# Patient Record
Sex: MALE | Race: WHITE | Employment: OTHER
[De-identification: names, ages, dates, MRNs, and addresses within clinical notes are randomized per-mention and may not be internally consistent; named-entity substitution may affect disease eponyms.]

---

## 2017-01-05 ENCOUNTER — CARE COORDINATION (OUTPATIENT)
Dept: CARE COORDINATION | Facility: CLINIC | Age: 57
End: 2017-01-05

## 2017-02-04 ENCOUNTER — HOSPITAL ENCOUNTER (EMERGENCY)
Age: 57
Discharge: HOME | End: 2017-02-04
Attending: EMERGENCY MEDICINE
Payer: MEDICARE

## 2017-02-04 VITALS
DIASTOLIC BLOOD PRESSURE: 75 MMHG | OXYGEN SATURATION: 96 % | SYSTOLIC BLOOD PRESSURE: 140 MMHG | TEMPERATURE: 100.9 F | WEIGHT: 189 LBS | RESPIRATION RATE: 15 BRPM | BODY MASS INDEX: 28.32 KG/M2 | HEART RATE: 134 BPM

## 2017-02-04 DIAGNOSIS — L03.119 CELLULITIS OF LOWER EXTREMITY, UNSPECIFIED LATERALITY: ICD-10-CM

## 2017-02-04 DIAGNOSIS — T78.40XA ALLERGIC REACTION, INITIAL ENCOUNTER: Primary | ICD-10-CM

## 2017-02-04 PROCEDURE — 6370000000 HC RX 637 (ALT 250 FOR IP)

## 2017-02-04 PROCEDURE — 99283 EMERGENCY DEPT VISIT LOW MDM: CPT

## 2017-02-04 PROCEDURE — 96372 THER/PROPH/DIAG INJ SC/IM: CPT

## 2017-02-04 PROCEDURE — 6370000000 HC RX 637 (ALT 250 FOR IP): Performed by: EMERGENCY MEDICINE

## 2017-02-04 PROCEDURE — 6360000002 HC RX W HCPCS: Performed by: EMERGENCY MEDICINE

## 2017-02-04 RX ORDER — PREDNISONE 10 MG/1
TABLET ORAL
Qty: 30 TABLET | Refills: 0 | Status: SHIPPED | OUTPATIENT
Start: 2017-02-04 | End: 2017-02-14

## 2017-02-04 RX ORDER — DEXAMETHASONE SODIUM PHOSPHATE 10 MG/ML
10 INJECTION INTRAMUSCULAR; INTRAVENOUS ONCE
Status: COMPLETED | OUTPATIENT
Start: 2017-02-04 | End: 2017-02-04

## 2017-02-04 RX ORDER — DOXYCYCLINE HYCLATE 100 MG/1
100 CAPSULE ORAL 2 TIMES DAILY
Qty: 20 CAPSULE | Refills: 0 | Status: SHIPPED | OUTPATIENT
Start: 2017-02-04 | End: 2017-02-14

## 2017-02-04 RX ORDER — DOXYCYCLINE 100 MG/1
CAPSULE ORAL
Status: DISCONTINUED
Start: 2017-02-04 | End: 2017-02-04 | Stop reason: HOSPADM

## 2017-02-04 RX ORDER — OXYCODONE HYDROCHLORIDE AND ACETAMINOPHEN 5; 325 MG/1; MG/1
2 TABLET ORAL ONCE
Status: COMPLETED | OUTPATIENT
Start: 2017-02-04 | End: 2017-02-04

## 2017-02-04 RX ORDER — DOXYCYCLINE HYCLATE 100 MG
100 TABLET ORAL ONCE
Status: COMPLETED | OUTPATIENT
Start: 2017-02-04 | End: 2017-02-04

## 2017-02-04 RX ORDER — OXYCODONE HYDROCHLORIDE AND ACETAMINOPHEN 5; 325 MG/1; MG/1
1-2 TABLET ORAL EVERY 6 HOURS PRN
Qty: 20 TABLET | Refills: 0 | Status: SHIPPED | OUTPATIENT
Start: 2017-02-04 | End: 2017-02-11

## 2017-02-04 RX ADMIN — Medication 100 MG: at 08:29

## 2017-02-04 RX ADMIN — DEXAMETHASONE SODIUM PHOSPHATE 10 MG: 10 INJECTION INTRAMUSCULAR; INTRAVENOUS at 08:25

## 2017-02-04 RX ADMIN — OXYCODONE HYDROCHLORIDE AND ACETAMINOPHEN 2 TABLET: 5; 325 TABLET ORAL at 08:25

## 2017-02-04 ASSESSMENT — PAIN SCALES - GENERAL
PAINLEVEL_OUTOF10: 6
PAINLEVEL_OUTOF10: 9
PAINLEVEL_OUTOF10: 6
PAINLEVEL_OUTOF10: 7

## 2017-02-04 ASSESSMENT — ENCOUNTER SYMPTOMS
COLOR CHANGE: 0
BACK PAIN: 0
VOICE CHANGE: 0
VOMITING: 0
TROUBLE SWALLOWING: 0
SHORTNESS OF BREATH: 0
COUGH: 0
DIARRHEA: 0
ABDOMINAL PAIN: 0
NAUSEA: 0
RHINORRHEA: 0

## 2017-02-04 ASSESSMENT — PAIN DESCRIPTION - PAIN TYPE: TYPE: ACUTE PAIN

## 2017-02-06 ENCOUNTER — CARE COORDINATION (OUTPATIENT)
Dept: CARE COORDINATION | Facility: CLINIC | Age: 57
End: 2017-02-06

## 2017-03-02 RX ORDER — LANCETS 30 GAUGE
1 EACH MISCELLANEOUS 4 TIMES DAILY
Qty: 100 EACH | Refills: 0 | Status: SHIPPED | OUTPATIENT
Start: 2017-03-02 | End: 2017-10-02 | Stop reason: SDUPTHER

## 2017-03-02 RX ORDER — GLUCOSAMINE HCL/CHONDROITIN SU 500-400 MG
CAPSULE ORAL
Qty: 300 STRIP | Refills: 0 | Status: SHIPPED | OUTPATIENT
Start: 2017-03-02 | End: 2017-08-25 | Stop reason: SDUPTHER

## 2017-03-17 ENCOUNTER — OFFICE VISIT (OUTPATIENT)
Dept: FAMILY MEDICINE CLINIC | Age: 57
End: 2017-03-17
Payer: MEDICARE

## 2017-03-17 VITALS
HEART RATE: 84 BPM | WEIGHT: 205 LBS | DIASTOLIC BLOOD PRESSURE: 100 MMHG | HEIGHT: 69 IN | OXYGEN SATURATION: 97 % | BODY MASS INDEX: 30.36 KG/M2 | SYSTOLIC BLOOD PRESSURE: 148 MMHG

## 2017-03-17 DIAGNOSIS — I10 ESSENTIAL HYPERTENSION: ICD-10-CM

## 2017-03-17 DIAGNOSIS — Z79.4 TYPE 2 DIABETES MELLITUS WITHOUT COMPLICATION, WITH LONG-TERM CURRENT USE OF INSULIN (HCC): ICD-10-CM

## 2017-03-17 DIAGNOSIS — E11.9 TYPE 2 DIABETES MELLITUS WITHOUT COMPLICATION, WITH LONG-TERM CURRENT USE OF INSULIN (HCC): ICD-10-CM

## 2017-03-17 DIAGNOSIS — L40.9 PSORIASIS: Primary | ICD-10-CM

## 2017-03-17 PROCEDURE — G8427 DOCREV CUR MEDS BY ELIG CLIN: HCPCS | Performed by: FAMILY MEDICINE

## 2017-03-17 PROCEDURE — G8484 FLU IMMUNIZE NO ADMIN: HCPCS | Performed by: FAMILY MEDICINE

## 2017-03-17 PROCEDURE — 3017F COLORECTAL CA SCREEN DOC REV: CPT | Performed by: FAMILY MEDICINE

## 2017-03-17 PROCEDURE — G8417 CALC BMI ABV UP PARAM F/U: HCPCS | Performed by: FAMILY MEDICINE

## 2017-03-17 PROCEDURE — G8599 NO ASA/ANTIPLAT THER USE RNG: HCPCS | Performed by: FAMILY MEDICINE

## 2017-03-17 PROCEDURE — 99214 OFFICE O/P EST MOD 30 MIN: CPT | Performed by: FAMILY MEDICINE

## 2017-03-17 PROCEDURE — 3045F PR MOST RECENT HEMOGLOBIN A1C LEVEL 7.0-9.0%: CPT | Performed by: FAMILY MEDICINE

## 2017-03-17 PROCEDURE — 1036F TOBACCO NON-USER: CPT | Performed by: FAMILY MEDICINE

## 2017-03-17 RX ORDER — HALOBETASOL PROPIONATE 0.05 %
OINTMENT (GRAM) TOPICAL
COMMUNITY
Start: 2017-02-14 | End: 2017-08-25 | Stop reason: ALTCHOICE

## 2017-03-17 RX ORDER — METOPROLOL TARTRATE 50 MG/1
50 TABLET, FILM COATED ORAL 2 TIMES DAILY
Qty: 60 TABLET | Refills: 1 | Status: SHIPPED | OUTPATIENT
Start: 2017-03-17 | End: 2017-05-15 | Stop reason: SDUPTHER

## 2017-03-27 ENCOUNTER — CARE COORDINATION (OUTPATIENT)
Dept: CARE COORDINATION | Age: 57
End: 2017-03-27

## 2017-04-09 ASSESSMENT — ENCOUNTER SYMPTOMS: COLOR CHANGE: 1

## 2017-04-24 ENCOUNTER — OFFICE VISIT (OUTPATIENT)
Dept: FAMILY MEDICINE CLINIC | Age: 57
End: 2017-04-24
Payer: MEDICARE

## 2017-04-24 VITALS
BODY MASS INDEX: 30.36 KG/M2 | RESPIRATION RATE: 20 BRPM | WEIGHT: 205 LBS | TEMPERATURE: 97.8 F | DIASTOLIC BLOOD PRESSURE: 78 MMHG | OXYGEN SATURATION: 98 % | HEART RATE: 60 BPM | HEIGHT: 69 IN | SYSTOLIC BLOOD PRESSURE: 118 MMHG

## 2017-04-24 DIAGNOSIS — I10 ESSENTIAL HYPERTENSION: Primary | ICD-10-CM

## 2017-04-24 DIAGNOSIS — Z79.4 TYPE 2 DIABETES MELLITUS WITHOUT COMPLICATION, WITH LONG-TERM CURRENT USE OF INSULIN (HCC): ICD-10-CM

## 2017-04-24 DIAGNOSIS — E11.9 TYPE 2 DIABETES MELLITUS WITHOUT COMPLICATION, WITH LONG-TERM CURRENT USE OF INSULIN (HCC): ICD-10-CM

## 2017-04-24 DIAGNOSIS — L30.9 DERMATITIS: ICD-10-CM

## 2017-04-24 DIAGNOSIS — L40.9 PSORIASIS: ICD-10-CM

## 2017-04-24 PROCEDURE — G8427 DOCREV CUR MEDS BY ELIG CLIN: HCPCS | Performed by: FAMILY MEDICINE

## 2017-04-24 PROCEDURE — G8417 CALC BMI ABV UP PARAM F/U: HCPCS | Performed by: FAMILY MEDICINE

## 2017-04-24 PROCEDURE — 1036F TOBACCO NON-USER: CPT | Performed by: FAMILY MEDICINE

## 2017-04-24 PROCEDURE — 3045F PR MOST RECENT HEMOGLOBIN A1C LEVEL 7.0-9.0%: CPT | Performed by: FAMILY MEDICINE

## 2017-04-24 PROCEDURE — 3017F COLORECTAL CA SCREEN DOC REV: CPT | Performed by: FAMILY MEDICINE

## 2017-04-24 PROCEDURE — 99214 OFFICE O/P EST MOD 30 MIN: CPT | Performed by: FAMILY MEDICINE

## 2017-04-24 PROCEDURE — G8599 NO ASA/ANTIPLAT THER USE RNG: HCPCS | Performed by: FAMILY MEDICINE

## 2017-04-26 ENCOUNTER — OFFICE VISIT (OUTPATIENT)
Dept: PULMONOLOGY | Age: 57
End: 2017-04-26
Payer: MEDICARE

## 2017-04-26 VITALS
BODY MASS INDEX: 30.36 KG/M2 | HEART RATE: 60 BPM | SYSTOLIC BLOOD PRESSURE: 140 MMHG | RESPIRATION RATE: 20 BRPM | OXYGEN SATURATION: 98 % | WEIGHT: 205 LBS | HEIGHT: 69 IN | DIASTOLIC BLOOD PRESSURE: 90 MMHG

## 2017-04-26 DIAGNOSIS — Z98.890 S/P THORACOTOMY: ICD-10-CM

## 2017-04-26 DIAGNOSIS — J43.9 GIANT BULLOUS EMPHYSEMA (HCC): Primary | ICD-10-CM

## 2017-04-26 DIAGNOSIS — L40.50 PSORIASIS WITH ARTHROPATHY (HCC): ICD-10-CM

## 2017-04-26 DIAGNOSIS — R68.89 CUSHINGOID FACIES: ICD-10-CM

## 2017-04-26 PROCEDURE — G8417 CALC BMI ABV UP PARAM F/U: HCPCS | Performed by: INTERNAL MEDICINE

## 2017-04-26 PROCEDURE — G8926 SPIRO NO PERF OR DOC: HCPCS | Performed by: INTERNAL MEDICINE

## 2017-04-26 PROCEDURE — 99214 OFFICE O/P EST MOD 30 MIN: CPT | Performed by: INTERNAL MEDICINE

## 2017-04-26 PROCEDURE — G8599 NO ASA/ANTIPLAT THER USE RNG: HCPCS | Performed by: INTERNAL MEDICINE

## 2017-04-26 PROCEDURE — 3017F COLORECTAL CA SCREEN DOC REV: CPT | Performed by: INTERNAL MEDICINE

## 2017-04-26 PROCEDURE — 1036F TOBACCO NON-USER: CPT | Performed by: INTERNAL MEDICINE

## 2017-04-26 PROCEDURE — 3023F SPIROM DOC REV: CPT | Performed by: INTERNAL MEDICINE

## 2017-04-26 PROCEDURE — G8427 DOCREV CUR MEDS BY ELIG CLIN: HCPCS | Performed by: INTERNAL MEDICINE

## 2017-04-28 ENCOUNTER — TELEPHONE (OUTPATIENT)
Dept: FAMILY MEDICINE CLINIC | Age: 57
End: 2017-04-28

## 2017-05-06 ENCOUNTER — HOSPITAL ENCOUNTER (EMERGENCY)
Age: 57
Discharge: HOME | End: 2017-05-06
Attending: SPECIALIST
Payer: MEDICARE

## 2017-05-06 VITALS
WEIGHT: 205 LBS | OXYGEN SATURATION: 93 % | TEMPERATURE: 97.7 F | RESPIRATION RATE: 18 BRPM | HEART RATE: 92 BPM | SYSTOLIC BLOOD PRESSURE: 172 MMHG | DIASTOLIC BLOOD PRESSURE: 100 MMHG | HEIGHT: 69 IN | BODY MASS INDEX: 30.36 KG/M2

## 2017-05-06 DIAGNOSIS — L23.9 ALLERGIC DERMATITIS: Primary | ICD-10-CM

## 2017-05-06 PROCEDURE — 6370000000 HC RX 637 (ALT 250 FOR IP): Performed by: SPECIALIST

## 2017-05-06 PROCEDURE — 99282 EMERGENCY DEPT VISIT SF MDM: CPT

## 2017-05-06 RX ORDER — PREDNISONE 10 MG/1
TABLET ORAL
Qty: 20 TABLET | Refills: 0 | Status: SHIPPED | OUTPATIENT
Start: 2017-05-06 | End: 2017-05-16

## 2017-05-06 RX ORDER — PREDNISONE 20 MG/1
60 TABLET ORAL ONCE
Status: COMPLETED | OUTPATIENT
Start: 2017-05-06 | End: 2017-05-06

## 2017-05-06 RX ORDER — FAMOTIDINE 20 MG/1
20 TABLET, FILM COATED ORAL 2 TIMES DAILY
Qty: 10 TABLET | Refills: 0 | Status: SHIPPED | OUTPATIENT
Start: 2017-05-06 | End: 2017-05-09 | Stop reason: SDUPTHER

## 2017-05-06 RX ORDER — FAMOTIDINE 20 MG/1
20 TABLET, FILM COATED ORAL ONCE
Status: COMPLETED | OUTPATIENT
Start: 2017-05-06 | End: 2017-05-06

## 2017-05-06 RX ADMIN — FAMOTIDINE 20 MG: 20 TABLET ORAL at 20:20

## 2017-05-06 RX ADMIN — PREDNISONE 60 MG: 20 TABLET ORAL at 20:20

## 2017-05-06 ASSESSMENT — PAIN DESCRIPTION - PAIN TYPE: TYPE: CHRONIC PAIN

## 2017-05-06 ASSESSMENT — ENCOUNTER SYMPTOMS
SHORTNESS OF BREATH: 0
COLOR CHANGE: 1
WHEEZING: 0

## 2017-05-06 ASSESSMENT — PAIN SCALES - GENERAL: PAINLEVEL_OUTOF10: 7

## 2017-05-08 ENCOUNTER — CARE COORDINATION (OUTPATIENT)
Dept: CARE COORDINATION | Age: 57
End: 2017-05-08

## 2017-05-09 ENCOUNTER — OFFICE VISIT (OUTPATIENT)
Dept: FAMILY MEDICINE CLINIC | Age: 57
End: 2017-05-09
Payer: MEDICARE

## 2017-05-09 VITALS
BODY MASS INDEX: 30.36 KG/M2 | OXYGEN SATURATION: 97 % | HEART RATE: 79 BPM | SYSTOLIC BLOOD PRESSURE: 166 MMHG | DIASTOLIC BLOOD PRESSURE: 102 MMHG | HEIGHT: 69 IN | WEIGHT: 205 LBS

## 2017-05-09 DIAGNOSIS — T50.905A HYPERGLYCEMIA, DRUG-INDUCED: ICD-10-CM

## 2017-05-09 DIAGNOSIS — R73.9 HYPERGLYCEMIA, DRUG-INDUCED: ICD-10-CM

## 2017-05-09 DIAGNOSIS — I10 ESSENTIAL HYPERTENSION: ICD-10-CM

## 2017-05-09 DIAGNOSIS — L30.9 CHRONIC DERMATITIS: Primary | ICD-10-CM

## 2017-05-09 PROCEDURE — 99213 OFFICE O/P EST LOW 20 MIN: CPT | Performed by: FAMILY MEDICINE

## 2017-05-09 PROCEDURE — G8417 CALC BMI ABV UP PARAM F/U: HCPCS | Performed by: FAMILY MEDICINE

## 2017-05-09 PROCEDURE — G8599 NO ASA/ANTIPLAT THER USE RNG: HCPCS | Performed by: FAMILY MEDICINE

## 2017-05-09 PROCEDURE — G8427 DOCREV CUR MEDS BY ELIG CLIN: HCPCS | Performed by: FAMILY MEDICINE

## 2017-05-09 PROCEDURE — 1036F TOBACCO NON-USER: CPT | Performed by: FAMILY MEDICINE

## 2017-05-09 PROCEDURE — 3017F COLORECTAL CA SCREEN DOC REV: CPT | Performed by: FAMILY MEDICINE

## 2017-05-10 RX ORDER — FAMOTIDINE 20 MG/1
20 TABLET, FILM COATED ORAL 2 TIMES DAILY
Qty: 60 TABLET | Refills: 0 | Status: SHIPPED | OUTPATIENT
Start: 2017-05-10 | End: 2017-06-09 | Stop reason: SDUPTHER

## 2017-05-15 ENCOUNTER — HOSPITAL ENCOUNTER (EMERGENCY)
Age: 57
Discharge: HOME | End: 2017-05-15
Attending: EMERGENCY MEDICINE
Payer: MEDICARE

## 2017-05-15 VITALS
HEIGHT: 68 IN | OXYGEN SATURATION: 95 % | BODY MASS INDEX: 31.07 KG/M2 | HEART RATE: 90 BPM | SYSTOLIC BLOOD PRESSURE: 155 MMHG | TEMPERATURE: 96.1 F | RESPIRATION RATE: 14 BRPM | DIASTOLIC BLOOD PRESSURE: 66 MMHG | WEIGHT: 205 LBS

## 2017-05-15 DIAGNOSIS — R33.9 URINARY RETENTION: Primary | ICD-10-CM

## 2017-05-15 DIAGNOSIS — I10 ESSENTIAL HYPERTENSION: ICD-10-CM

## 2017-05-15 DIAGNOSIS — L50.8 RECURRENT URTICARIA: ICD-10-CM

## 2017-05-15 LAB
-: NORMAL
AMORPHOUS: NORMAL
BACTERIA: NORMAL
BILIRUBIN URINE: NEGATIVE
CASTS UA: NORMAL /LPF (ref 0–2)
COLOR: ABNORMAL
COMMENT UA: ABNORMAL
CRYSTALS, UA: NORMAL /HPF
EPITHELIAL CELLS UA: NORMAL /HPF (ref 0–5)
GLUCOSE URINE: NEGATIVE
KETONES, URINE: NEGATIVE
LEUKOCYTE ESTERASE, URINE: NEGATIVE
MUCUS: NORMAL
NITRITE, URINE: NEGATIVE
OTHER OBSERVATIONS UA: NORMAL
PH UA: 6 (ref 5–6)
PROTEIN UA: NEGATIVE
RBC UA: NORMAL /HPF (ref 0–4)
RENAL EPITHELIAL, UA: NORMAL /HPF
SPECIFIC GRAVITY UA: 1 (ref 1.01–1.02)
TRICHOMONAS: NORMAL
TURBIDITY: ABNORMAL
URINE HGB: NEGATIVE
UROBILINOGEN, URINE: NORMAL
WBC UA: NORMAL /HPF (ref 0–4)
YEAST: NORMAL

## 2017-05-15 PROCEDURE — 51702 INSERT TEMP BLADDER CATH: CPT

## 2017-05-15 PROCEDURE — 99283 EMERGENCY DEPT VISIT LOW MDM: CPT

## 2017-05-15 PROCEDURE — 81001 URINALYSIS AUTO W/SCOPE: CPT

## 2017-05-15 RX ORDER — METOPROLOL TARTRATE 50 MG/1
50 TABLET, FILM COATED ORAL 2 TIMES DAILY
Qty: 60 TABLET | Refills: 5 | Status: SHIPPED | OUTPATIENT
Start: 2017-05-15 | End: 2017-06-30

## 2017-05-15 ASSESSMENT — PAIN DESCRIPTION - PROGRESSION: CLINICAL_PROGRESSION: GRADUALLY WORSENING

## 2017-05-15 ASSESSMENT — PAIN SCALES - GENERAL
PAINLEVEL_OUTOF10: 7
PAINLEVEL_OUTOF10: 3

## 2017-05-15 ASSESSMENT — PAIN DESCRIPTION - LOCATION: LOCATION: PELVIS

## 2017-05-15 ASSESSMENT — PAIN DESCRIPTION - ONSET: ONSET: ON-GOING

## 2017-05-15 ASSESSMENT — PAIN DESCRIPTION - FREQUENCY: FREQUENCY: CONTINUOUS

## 2017-05-15 ASSESSMENT — PAIN - FUNCTIONAL ASSESSMENT: PAIN_FUNCTIONAL_ASSESSMENT: 0-10

## 2017-05-16 ENCOUNTER — HOSPITAL ENCOUNTER (EMERGENCY)
Age: 57
Discharge: HOME | End: 2017-05-16
Attending: EMERGENCY MEDICINE
Payer: MEDICARE

## 2017-05-16 VITALS
HEART RATE: 70 BPM | BODY MASS INDEX: 31.07 KG/M2 | SYSTOLIC BLOOD PRESSURE: 177 MMHG | RESPIRATION RATE: 14 BRPM | OXYGEN SATURATION: 97 % | HEIGHT: 68 IN | DIASTOLIC BLOOD PRESSURE: 105 MMHG | WEIGHT: 205 LBS | TEMPERATURE: 98.2 F

## 2017-05-16 DIAGNOSIS — Z46.6 ENCOUNTER FOR FOLEY CATHETER REMOVAL: Primary | ICD-10-CM

## 2017-05-16 PROCEDURE — 99283 EMERGENCY DEPT VISIT LOW MDM: CPT

## 2017-05-16 ASSESSMENT — ENCOUNTER SYMPTOMS
EYES NEGATIVE: 1
GASTROINTESTINAL NEGATIVE: 1
RESPIRATORY NEGATIVE: 1

## 2017-05-17 ENCOUNTER — TELEPHONE (OUTPATIENT)
Dept: FAMILY MEDICINE CLINIC | Age: 57
End: 2017-05-17

## 2017-05-21 ENCOUNTER — HOSPITAL ENCOUNTER (EMERGENCY)
Age: 57
Discharge: HOME | End: 2017-05-21
Attending: EMERGENCY MEDICINE
Payer: MEDICARE

## 2017-05-21 VITALS
RESPIRATION RATE: 14 BRPM | DIASTOLIC BLOOD PRESSURE: 69 MMHG | BODY MASS INDEX: 30.41 KG/M2 | SYSTOLIC BLOOD PRESSURE: 104 MMHG | WEIGHT: 200 LBS | OXYGEN SATURATION: 98 % | HEART RATE: 88 BPM | TEMPERATURE: 98.8 F

## 2017-05-21 DIAGNOSIS — T78.40XA ALLERGIC REACTION, INITIAL ENCOUNTER: Primary | ICD-10-CM

## 2017-05-21 PROCEDURE — 6370000000 HC RX 637 (ALT 250 FOR IP): Performed by: EMERGENCY MEDICINE

## 2017-05-21 PROCEDURE — 99282 EMERGENCY DEPT VISIT SF MDM: CPT

## 2017-05-21 RX ORDER — PREDNISONE 20 MG/1
60 TABLET ORAL ONCE
Status: COMPLETED | OUTPATIENT
Start: 2017-05-21 | End: 2017-05-21

## 2017-05-21 RX ORDER — PREDNISONE 20 MG/1
TABLET ORAL
Qty: 25 TABLET | Refills: 0 | Status: SHIPPED | OUTPATIENT
Start: 2017-05-21 | End: 2017-05-31

## 2017-05-21 RX ADMIN — PREDNISONE 60 MG: 20 TABLET ORAL at 13:10

## 2017-05-21 ASSESSMENT — ENCOUNTER SYMPTOMS
TROUBLE SWALLOWING: 0
COLOR CHANGE: 0
SHORTNESS OF BREATH: 0
COUGH: 0
RHINORRHEA: 0
BACK PAIN: 0
DIARRHEA: 0
VOICE CHANGE: 0
VOMITING: 0
ABDOMINAL PAIN: 0
NAUSEA: 0

## 2017-05-21 ASSESSMENT — PAIN SCALES - GENERAL
PAINLEVEL_OUTOF10: 8
PAINLEVEL_OUTOF10: 9

## 2017-05-21 ASSESSMENT — PAIN DESCRIPTION - ONSET: ONSET: ON-GOING

## 2017-05-21 ASSESSMENT — PAIN DESCRIPTION - PROGRESSION: CLINICAL_PROGRESSION: NOT CHANGED

## 2017-05-21 ASSESSMENT — PAIN DESCRIPTION - DESCRIPTORS: DESCRIPTORS: ACHING

## 2017-05-21 ASSESSMENT — PAIN DESCRIPTION - FREQUENCY: FREQUENCY: CONTINUOUS

## 2017-05-21 ASSESSMENT — PAIN DESCRIPTION - LOCATION: LOCATION: GENERALIZED

## 2017-05-21 ASSESSMENT — PAIN DESCRIPTION - PAIN TYPE: TYPE: ACUTE PAIN

## 2017-05-21 ASSESSMENT — PAIN DESCRIPTION - ORIENTATION: ORIENTATION: OTHER (COMMENT)

## 2017-05-22 ENCOUNTER — CARE COORDINATION (OUTPATIENT)
Dept: CARE COORDINATION | Age: 57
End: 2017-05-22

## 2017-05-23 ENCOUNTER — OFFICE VISIT (OUTPATIENT)
Dept: FAMILY MEDICINE CLINIC | Age: 57
End: 2017-05-23
Payer: MEDICARE

## 2017-05-23 VITALS
HEIGHT: 68 IN | DIASTOLIC BLOOD PRESSURE: 80 MMHG | HEART RATE: 84 BPM | SYSTOLIC BLOOD PRESSURE: 122 MMHG | WEIGHT: 200 LBS | BODY MASS INDEX: 30.31 KG/M2

## 2017-05-23 DIAGNOSIS — L40.9 PSORIASIS: ICD-10-CM

## 2017-05-23 DIAGNOSIS — I10 ESSENTIAL HYPERTENSION: ICD-10-CM

## 2017-05-23 DIAGNOSIS — L30.9 CHRONIC DERMATITIS: Primary | ICD-10-CM

## 2017-05-23 PROCEDURE — G8599 NO ASA/ANTIPLAT THER USE RNG: HCPCS | Performed by: FAMILY MEDICINE

## 2017-05-23 PROCEDURE — 3017F COLORECTAL CA SCREEN DOC REV: CPT | Performed by: FAMILY MEDICINE

## 2017-05-23 PROCEDURE — G8417 CALC BMI ABV UP PARAM F/U: HCPCS | Performed by: FAMILY MEDICINE

## 2017-05-23 PROCEDURE — 3046F HEMOGLOBIN A1C LEVEL >9.0%: CPT | Performed by: FAMILY MEDICINE

## 2017-05-23 PROCEDURE — G8427 DOCREV CUR MEDS BY ELIG CLIN: HCPCS | Performed by: FAMILY MEDICINE

## 2017-05-23 PROCEDURE — 1036F TOBACCO NON-USER: CPT | Performed by: FAMILY MEDICINE

## 2017-05-23 PROCEDURE — 99213 OFFICE O/P EST LOW 20 MIN: CPT | Performed by: FAMILY MEDICINE

## 2017-06-01 ENCOUNTER — TELEPHONE (OUTPATIENT)
Dept: FAMILY MEDICINE CLINIC | Age: 57
End: 2017-06-01

## 2017-06-01 DIAGNOSIS — L30.9 CHRONIC DERMATITIS: Primary | ICD-10-CM

## 2017-06-01 RX ORDER — PREDNISONE 10 MG/1
TABLET ORAL
Qty: 55 TABLET | Refills: 0 | Status: SHIPPED | OUTPATIENT
Start: 2017-06-01 | End: 2017-06-14 | Stop reason: DRUGHIGH

## 2017-06-09 RX ORDER — FAMOTIDINE 20 MG/1
TABLET, FILM COATED ORAL
Qty: 60 TABLET | Refills: 5 | Status: SHIPPED | OUTPATIENT
Start: 2017-06-09 | End: 2017-06-14 | Stop reason: ALTCHOICE

## 2017-06-09 ASSESSMENT — ENCOUNTER SYMPTOMS: COLOR CHANGE: 1

## 2017-06-14 ENCOUNTER — OFFICE VISIT (OUTPATIENT)
Dept: FAMILY MEDICINE CLINIC | Age: 57
End: 2017-06-14
Payer: MEDICARE

## 2017-06-14 VITALS
BODY MASS INDEX: 31.4 KG/M2 | HEIGHT: 68 IN | WEIGHT: 207.2 LBS | SYSTOLIC BLOOD PRESSURE: 144 MMHG | HEART RATE: 71 BPM | RESPIRATION RATE: 12 BRPM | OXYGEN SATURATION: 98 % | DIASTOLIC BLOOD PRESSURE: 88 MMHG | TEMPERATURE: 98.4 F

## 2017-06-14 DIAGNOSIS — R21 RASH AND NONSPECIFIC SKIN ERUPTION: ICD-10-CM

## 2017-06-14 DIAGNOSIS — R21 RASH: Primary | ICD-10-CM

## 2017-06-14 DIAGNOSIS — T78.40XD ALLERGIC REACTION, SUBSEQUENT ENCOUNTER: Primary | ICD-10-CM

## 2017-06-14 PROCEDURE — G8427 DOCREV CUR MEDS BY ELIG CLIN: HCPCS | Performed by: NURSE PRACTITIONER

## 2017-06-14 PROCEDURE — 99215 OFFICE O/P EST HI 40 MIN: CPT | Performed by: NURSE PRACTITIONER

## 2017-06-14 PROCEDURE — 1036F TOBACCO NON-USER: CPT | Performed by: NURSE PRACTITIONER

## 2017-06-14 PROCEDURE — G8417 CALC BMI ABV UP PARAM F/U: HCPCS | Performed by: NURSE PRACTITIONER

## 2017-06-14 PROCEDURE — G8599 NO ASA/ANTIPLAT THER USE RNG: HCPCS | Performed by: NURSE PRACTITIONER

## 2017-06-14 PROCEDURE — 3017F COLORECTAL CA SCREEN DOC REV: CPT | Performed by: NURSE PRACTITIONER

## 2017-06-14 RX ORDER — PREDNISONE 50 MG/1
50 TABLET ORAL DAILY
Qty: 30 TABLET | Refills: 1 | Status: SHIPPED | OUTPATIENT
Start: 2017-06-14 | End: 2017-06-20

## 2017-06-14 RX ORDER — RANITIDINE 150 MG/1
150 TABLET ORAL 2 TIMES DAILY
Qty: 60 TABLET | Refills: 3 | Status: SHIPPED | OUTPATIENT
Start: 2017-06-14 | End: 2017-10-10 | Stop reason: SDUPTHER

## 2017-06-14 ASSESSMENT — ENCOUNTER SYMPTOMS
CONSTIPATION: 0
NAUSEA: 0
TROUBLE SWALLOWING: 0
RESPIRATORY NEGATIVE: 1
COUGH: 0
EYES NEGATIVE: 1
CHEST TIGHTNESS: 0
ABDOMINAL PAIN: 0
SHORTNESS OF BREATH: 0
GASTROINTESTINAL NEGATIVE: 1
SINUS PRESSURE: 0
DIARRHEA: 0
ALLERGIC/IMMUNOLOGIC NEGATIVE: 1
VOMITING: 0

## 2017-06-20 ENCOUNTER — OFFICE VISIT (OUTPATIENT)
Dept: FAMILY MEDICINE CLINIC | Age: 57
End: 2017-06-20
Payer: MEDICARE

## 2017-06-20 VITALS
WEIGHT: 206 LBS | HEART RATE: 60 BPM | BODY MASS INDEX: 31.22 KG/M2 | HEIGHT: 68 IN | SYSTOLIC BLOOD PRESSURE: 176 MMHG | DIASTOLIC BLOOD PRESSURE: 98 MMHG

## 2017-06-20 DIAGNOSIS — L29.9 ITCHING: ICD-10-CM

## 2017-06-20 DIAGNOSIS — T38.0X5A STEROID SIDE EFFECTS, INITIAL ENCOUNTER: ICD-10-CM

## 2017-06-20 DIAGNOSIS — T78.40XA ALLERGIC REACTION CAUSED BY A DRUG: Primary | ICD-10-CM

## 2017-06-20 DIAGNOSIS — E55.9 VITAMIN D DEFICIENCY: ICD-10-CM

## 2017-06-20 PROCEDURE — 3017F COLORECTAL CA SCREEN DOC REV: CPT | Performed by: NURSE PRACTITIONER

## 2017-06-20 PROCEDURE — G8417 CALC BMI ABV UP PARAM F/U: HCPCS | Performed by: NURSE PRACTITIONER

## 2017-06-20 PROCEDURE — G8599 NO ASA/ANTIPLAT THER USE RNG: HCPCS | Performed by: NURSE PRACTITIONER

## 2017-06-20 PROCEDURE — 1036F TOBACCO NON-USER: CPT | Performed by: NURSE PRACTITIONER

## 2017-06-20 PROCEDURE — 99213 OFFICE O/P EST LOW 20 MIN: CPT | Performed by: NURSE PRACTITIONER

## 2017-06-20 PROCEDURE — G8427 DOCREV CUR MEDS BY ELIG CLIN: HCPCS | Performed by: NURSE PRACTITIONER

## 2017-06-20 RX ORDER — PREDNISONE 20 MG/1
TABLET ORAL
Qty: 60 TABLET | Refills: 1 | Status: SHIPPED | OUTPATIENT
Start: 2017-06-20 | End: 2017-09-18 | Stop reason: ALTCHOICE

## 2017-06-20 RX ORDER — WATER / MINERAL OIL / WHITE PETROLATUM 16 OZ
CREAM TOPICAL
Qty: 3 PACKAGE | Refills: 1 | Status: SHIPPED | OUTPATIENT
Start: 2017-06-20 | End: 2019-03-29

## 2017-06-20 RX ORDER — LEVOCETIRIZINE DIHYDROCHLORIDE 5 MG/1
5 TABLET, FILM COATED ORAL NIGHTLY
Qty: 30 TABLET | Refills: 1 | Status: SHIPPED | OUTPATIENT
Start: 2017-06-20 | End: 2017-08-15 | Stop reason: SDUPTHER

## 2017-06-20 RX ORDER — PREDNISONE 1 MG/1
TABLET ORAL
Qty: 30 TABLET | Refills: 1 | Status: SHIPPED | OUTPATIENT
Start: 2017-06-20 | End: 2017-10-31 | Stop reason: ALTCHOICE

## 2017-06-21 ENCOUNTER — CARE COORDINATION (OUTPATIENT)
Dept: CARE COORDINATION | Age: 57
End: 2017-06-21

## 2017-06-30 ENCOUNTER — OFFICE VISIT (OUTPATIENT)
Dept: FAMILY MEDICINE CLINIC | Age: 57
End: 2017-06-30
Payer: MEDICARE

## 2017-06-30 VITALS
SYSTOLIC BLOOD PRESSURE: 174 MMHG | BODY MASS INDEX: 30.66 KG/M2 | DIASTOLIC BLOOD PRESSURE: 96 MMHG | HEART RATE: 64 BPM | WEIGHT: 207 LBS | HEIGHT: 69 IN

## 2017-06-30 DIAGNOSIS — I10 ESSENTIAL HYPERTENSION: ICD-10-CM

## 2017-06-30 DIAGNOSIS — R21 RASH: Primary | ICD-10-CM

## 2017-06-30 DIAGNOSIS — L29.9 ITCHING: ICD-10-CM

## 2017-06-30 PROCEDURE — G8599 NO ASA/ANTIPLAT THER USE RNG: HCPCS | Performed by: NURSE PRACTITIONER

## 2017-06-30 PROCEDURE — 3017F COLORECTAL CA SCREEN DOC REV: CPT | Performed by: NURSE PRACTITIONER

## 2017-06-30 PROCEDURE — 99214 OFFICE O/P EST MOD 30 MIN: CPT | Performed by: NURSE PRACTITIONER

## 2017-06-30 PROCEDURE — G8417 CALC BMI ABV UP PARAM F/U: HCPCS | Performed by: NURSE PRACTITIONER

## 2017-06-30 PROCEDURE — G8427 DOCREV CUR MEDS BY ELIG CLIN: HCPCS | Performed by: NURSE PRACTITIONER

## 2017-06-30 PROCEDURE — 1036F TOBACCO NON-USER: CPT | Performed by: NURSE PRACTITIONER

## 2017-06-30 RX ORDER — BLOOD PRESSURE TEST KIT-MEDIUM
KIT MISCELLANEOUS
Qty: 1 DEVICE | Refills: 0 | Status: SHIPPED | OUTPATIENT
Start: 2017-06-30 | End: 2017-08-25 | Stop reason: ALTCHOICE

## 2017-06-30 RX ORDER — VERAPAMIL HYDROCHLORIDE 120 MG/1
120 CAPSULE, EXTENDED RELEASE ORAL NIGHTLY
Qty: 30 CAPSULE | Refills: 1 | Status: SHIPPED | OUTPATIENT
Start: 2017-06-30 | End: 2017-07-25

## 2017-06-30 ASSESSMENT — ENCOUNTER SYMPTOMS
EYE PAIN: 0
VOMITING: 0
SHORTNESS OF BREATH: 0
SORE THROAT: 0
RHINORRHEA: 0
DIARRHEA: 0
COUGH: 0
NAIL CHANGES: 0

## 2017-07-06 ENCOUNTER — TELEPHONE (OUTPATIENT)
Dept: FAMILY MEDICINE CLINIC | Age: 57
End: 2017-07-06

## 2017-07-25 ENCOUNTER — OFFICE VISIT (OUTPATIENT)
Dept: FAMILY MEDICINE CLINIC | Age: 57
End: 2017-07-25
Payer: MEDICARE

## 2017-07-25 VITALS
SYSTOLIC BLOOD PRESSURE: 142 MMHG | WEIGHT: 214 LBS | HEIGHT: 69 IN | RESPIRATION RATE: 20 BRPM | BODY MASS INDEX: 31.7 KG/M2 | DIASTOLIC BLOOD PRESSURE: 88 MMHG | HEART RATE: 60 BPM

## 2017-07-25 DIAGNOSIS — L29.9 ITCHING: ICD-10-CM

## 2017-07-25 DIAGNOSIS — T78.40XA ALLERGIC REACTION CAUSED BY A DRUG: ICD-10-CM

## 2017-07-25 DIAGNOSIS — R21 RASH: Primary | ICD-10-CM

## 2017-07-25 PROCEDURE — G8427 DOCREV CUR MEDS BY ELIG CLIN: HCPCS | Performed by: NURSE PRACTITIONER

## 2017-07-25 PROCEDURE — 3017F COLORECTAL CA SCREEN DOC REV: CPT | Performed by: NURSE PRACTITIONER

## 2017-07-25 PROCEDURE — 1036F TOBACCO NON-USER: CPT | Performed by: NURSE PRACTITIONER

## 2017-07-25 PROCEDURE — G8417 CALC BMI ABV UP PARAM F/U: HCPCS | Performed by: NURSE PRACTITIONER

## 2017-07-25 PROCEDURE — G8599 NO ASA/ANTIPLAT THER USE RNG: HCPCS | Performed by: NURSE PRACTITIONER

## 2017-07-25 PROCEDURE — 99212 OFFICE O/P EST SF 10 MIN: CPT | Performed by: NURSE PRACTITIONER

## 2017-07-25 RX ORDER — METOPROLOL TARTRATE 50 MG/1
50 TABLET, FILM COATED ORAL 2 TIMES DAILY
COMMUNITY
Start: 2017-07-15 | End: 2017-08-02 | Stop reason: DRUGHIGH

## 2017-07-25 ASSESSMENT — ENCOUNTER SYMPTOMS
EYE PAIN: 0
ROS SKIN COMMENTS: ITCHING
DIARRHEA: 0
RHINORRHEA: 0
VOMITING: 0
NAIL CHANGES: 0
SORE THROAT: 0
SHORTNESS OF BREATH: 0
COUGH: 0

## 2017-07-31 ENCOUNTER — TELEPHONE (OUTPATIENT)
Dept: FAMILY MEDICINE CLINIC | Age: 57
End: 2017-07-31

## 2017-07-31 DIAGNOSIS — I10 ESSENTIAL HYPERTENSION: Primary | ICD-10-CM

## 2017-07-31 RX ORDER — METOPROLOL TARTRATE 50 MG/1
TABLET, FILM COATED ORAL
Qty: 90 TABLET | Refills: 0 | Status: CANCELLED | OUTPATIENT
Start: 2017-07-31

## 2017-08-02 DIAGNOSIS — I10 ESSENTIAL HYPERTENSION: Primary | ICD-10-CM

## 2017-08-02 RX ORDER — METOPROLOL TARTRATE 50 MG/1
75 TABLET, FILM COATED ORAL 2 TIMES DAILY
Qty: 60 TABLET | Status: CANCELLED | OUTPATIENT
Start: 2017-08-02

## 2017-08-02 RX ORDER — METOPROLOL TARTRATE 50 MG/1
75 TABLET, FILM COATED ORAL 2 TIMES DAILY
Qty: 90 TABLET | Refills: 1 | Status: SHIPPED | OUTPATIENT
Start: 2017-08-02 | End: 2017-08-25 | Stop reason: DRUGHIGH

## 2017-08-07 ENCOUNTER — TELEPHONE (OUTPATIENT)
Dept: FAMILY MEDICINE CLINIC | Age: 57
End: 2017-08-07

## 2017-08-14 ENCOUNTER — TELEPHONE (OUTPATIENT)
Dept: FAMILY MEDICINE CLINIC | Age: 57
End: 2017-08-14

## 2017-08-14 DIAGNOSIS — E05.90 HYPERTHYROIDISM: Primary | ICD-10-CM

## 2017-08-15 DIAGNOSIS — T78.40XA ALLERGIC REACTION CAUSED BY A DRUG: ICD-10-CM

## 2017-08-16 ENCOUNTER — CARE COORDINATION (OUTPATIENT)
Dept: CARE COORDINATION | Age: 57
End: 2017-08-16

## 2017-08-17 RX ORDER — LEVOCETIRIZINE DIHYDROCHLORIDE 5 MG/1
TABLET, FILM COATED ORAL
Qty: 30 TABLET | Refills: 2 | Status: SHIPPED | OUTPATIENT
Start: 2017-08-17 | End: 2017-10-25 | Stop reason: ALTCHOICE

## 2017-08-22 ENCOUNTER — HOSPITAL ENCOUNTER (OUTPATIENT)
Dept: LAB | Age: 57
Discharge: HOME | End: 2017-08-22
Payer: MEDICARE

## 2017-08-22 DIAGNOSIS — E05.90 HYPERTHYROIDISM: ICD-10-CM

## 2017-08-22 LAB
T3 FREE: 2.46 PG/ML (ref 2.02–4.43)
THYROXINE, FREE: 1.49 NG/DL (ref 0.93–1.7)
TSH SERPL DL<=0.05 MIU/L-ACNC: 0.31 MIU/L (ref 0.3–5)

## 2017-08-22 PROCEDURE — 84439 ASSAY OF FREE THYROXINE: CPT

## 2017-08-22 PROCEDURE — 36415 COLL VENOUS BLD VENIPUNCTURE: CPT

## 2017-08-22 PROCEDURE — 84481 FREE ASSAY (FT-3): CPT

## 2017-08-22 PROCEDURE — 84443 ASSAY THYROID STIM HORMONE: CPT

## 2017-08-25 ENCOUNTER — CARE COORDINATOR VISIT (OUTPATIENT)
Dept: CARE COORDINATION | Age: 57
End: 2017-08-25

## 2017-08-25 ENCOUNTER — OFFICE VISIT (OUTPATIENT)
Dept: FAMILY MEDICINE CLINIC | Age: 57
End: 2017-08-25
Payer: MEDICARE

## 2017-08-25 VITALS
SYSTOLIC BLOOD PRESSURE: 150 MMHG | WEIGHT: 219 LBS | TEMPERATURE: 97.9 F | OXYGEN SATURATION: 93 % | BODY MASS INDEX: 32.81 KG/M2 | HEART RATE: 69 BPM | DIASTOLIC BLOOD PRESSURE: 110 MMHG

## 2017-08-25 DIAGNOSIS — E78.5 HYPERLIPIDEMIA, UNSPECIFIED HYPERLIPIDEMIA TYPE: ICD-10-CM

## 2017-08-25 DIAGNOSIS — I10 ESSENTIAL HYPERTENSION: Primary | ICD-10-CM

## 2017-08-25 DIAGNOSIS — E05.90 HYPERTHYROIDISM: ICD-10-CM

## 2017-08-25 PROCEDURE — 99214 OFFICE O/P EST MOD 30 MIN: CPT | Performed by: FAMILY MEDICINE

## 2017-08-25 PROCEDURE — G8427 DOCREV CUR MEDS BY ELIG CLIN: HCPCS | Performed by: FAMILY MEDICINE

## 2017-08-25 PROCEDURE — 3046F HEMOGLOBIN A1C LEVEL >9.0%: CPT | Performed by: FAMILY MEDICINE

## 2017-08-25 PROCEDURE — G8417 CALC BMI ABV UP PARAM F/U: HCPCS | Performed by: FAMILY MEDICINE

## 2017-08-25 PROCEDURE — G8599 NO ASA/ANTIPLAT THER USE RNG: HCPCS | Performed by: FAMILY MEDICINE

## 2017-08-25 PROCEDURE — 3017F COLORECTAL CA SCREEN DOC REV: CPT | Performed by: FAMILY MEDICINE

## 2017-08-25 PROCEDURE — 1036F TOBACCO NON-USER: CPT | Performed by: FAMILY MEDICINE

## 2017-08-25 RX ORDER — METOPROLOL TARTRATE 100 MG/1
100 TABLET ORAL 2 TIMES DAILY
Qty: 60 TABLET | Refills: 0 | Status: SHIPPED | OUTPATIENT
Start: 2017-08-25 | End: 2017-09-25 | Stop reason: SDUPTHER

## 2017-08-25 RX ORDER — GLUCOSAMINE HCL/CHONDROITIN SU 500-400 MG
CAPSULE ORAL
Qty: 300 STRIP | Refills: 5 | Status: SHIPPED | OUTPATIENT
Start: 2017-08-25 | End: 2017-10-06 | Stop reason: SDUPTHER

## 2017-08-25 ASSESSMENT — ENCOUNTER SYMPTOMS: DIARRHEA: 1

## 2017-08-28 RX ORDER — METOPROLOL TARTRATE 100 MG/1
100 TABLET ORAL 2 TIMES DAILY
Qty: 60 TABLET | Refills: 0 | Status: CANCELLED | OUTPATIENT
Start: 2017-08-28

## 2017-08-30 ENCOUNTER — CARE COORDINATION (OUTPATIENT)
Dept: CARE COORDINATION | Age: 57
End: 2017-08-30

## 2017-08-30 ENCOUNTER — TELEPHONE (OUTPATIENT)
Dept: FAMILY MEDICINE CLINIC | Age: 57
End: 2017-08-30

## 2017-08-30 DIAGNOSIS — I10 ESSENTIAL HYPERTENSION: Primary | ICD-10-CM

## 2017-09-01 RX ORDER — HYDROCHLOROTHIAZIDE 12.5 MG/1
12.5 TABLET ORAL DAILY PRN
Qty: 30 TABLET | Refills: 1 | Status: SHIPPED | OUTPATIENT
Start: 2017-09-01 | End: 2017-09-18 | Stop reason: DRUGHIGH

## 2017-09-06 ENCOUNTER — PATIENT MESSAGE (OUTPATIENT)
Dept: FAMILY MEDICINE CLINIC | Age: 57
End: 2017-09-06

## 2017-09-07 ENCOUNTER — CARE COORDINATION (OUTPATIENT)
Dept: CARE COORDINATION | Age: 57
End: 2017-09-07

## 2017-09-18 ENCOUNTER — CARE COORDINATION (OUTPATIENT)
Dept: CARE COORDINATION | Age: 57
End: 2017-09-18

## 2017-09-18 ENCOUNTER — NURSE ONLY (OUTPATIENT)
Dept: LAB | Age: 57
End: 2017-09-18

## 2017-09-18 ENCOUNTER — TELEPHONE (OUTPATIENT)
Dept: FAMILY MEDICINE CLINIC | Age: 57
End: 2017-09-18

## 2017-09-18 VITALS — DIASTOLIC BLOOD PRESSURE: 100 MMHG | SYSTOLIC BLOOD PRESSURE: 150 MMHG

## 2017-09-18 DIAGNOSIS — I10 ESSENTIAL HYPERTENSION: ICD-10-CM

## 2017-09-18 RX ORDER — HYDROCHLOROTHIAZIDE 12.5 MG/1
12.5 TABLET ORAL DAILY PRN
Qty: 30 TABLET | Refills: 1 | Status: CANCELLED | OUTPATIENT
Start: 2017-09-18

## 2017-09-18 RX ORDER — HYDROCHLOROTHIAZIDE 25 MG/1
25 TABLET ORAL DAILY
Qty: 30 TABLET | Refills: 2 | Status: ON HOLD | OUTPATIENT
Start: 2017-09-18 | End: 2017-09-23 | Stop reason: HOSPADM

## 2017-09-20 ENCOUNTER — HOSPITAL ENCOUNTER (OUTPATIENT)
Dept: LAB | Age: 57
Discharge: HOME | End: 2017-09-20
Payer: MEDICARE

## 2017-09-20 ENCOUNTER — OFFICE VISIT (OUTPATIENT)
Dept: FAMILY MEDICINE CLINIC | Age: 57
End: 2017-09-20
Payer: MEDICARE

## 2017-09-20 VITALS
DIASTOLIC BLOOD PRESSURE: 94 MMHG | HEIGHT: 69 IN | HEART RATE: 96 BPM | SYSTOLIC BLOOD PRESSURE: 120 MMHG | BODY MASS INDEX: 32.41 KG/M2 | WEIGHT: 218.8 LBS

## 2017-09-20 DIAGNOSIS — E78.5 HYPERLIPIDEMIA, UNSPECIFIED HYPERLIPIDEMIA TYPE: ICD-10-CM

## 2017-09-20 DIAGNOSIS — N18.30 CHRONIC KIDNEY DISEASE (CKD), STAGE III (MODERATE) (HCC): Chronic | ICD-10-CM

## 2017-09-20 DIAGNOSIS — L30.9 DERMATITIS: ICD-10-CM

## 2017-09-20 DIAGNOSIS — I10 ESSENTIAL HYPERTENSION: Primary | ICD-10-CM

## 2017-09-20 LAB
-: ABNORMAL
ABSOLUTE EOS #: 0 K/UL (ref 0–0.4)
ABSOLUTE LYMPH #: 1.2 K/UL (ref 1–4.8)
ABSOLUTE MONO #: 1 K/UL (ref 0.1–1.2)
AMORPHOUS: ABNORMAL
ANION GAP SERPL CALCULATED.3IONS-SCNC: 17 MMOL/L (ref 9–17)
BACTERIA: ABNORMAL
BASOPHILS # BLD: 1 % (ref 0–2)
BASOPHILS ABSOLUTE: 0.1 K/UL (ref 0–0.2)
BILIRUBIN URINE: NEGATIVE
BUN BLDV-MCNC: 25 MG/DL (ref 6–20)
BUN/CREAT BLD: 11 (ref 9–20)
CALCIUM IONIZED: 1.23 MMOL/L (ref 1.13–1.33)
CALCIUM SERPL-MCNC: 9.8 MG/DL (ref 8.6–10.4)
CASTS UA: ABNORMAL /LPF (ref 0–2)
CHLORIDE BLD-SCNC: 88 MMOL/L (ref 98–107)
CHOLESTEROL/HDL RATIO: 4.9
CHOLESTEROL: 249 MG/DL
CO2: 29 MMOL/L (ref 20–31)
COLOR: ABNORMAL
COMMENT UA: ABNORMAL
CREAT SERPL-MCNC: 2.18 MG/DL (ref 0.7–1.2)
CRYSTALS, UA: ABNORMAL /HPF
DIFFERENTIAL TYPE: ABNORMAL
EOSINOPHILS RELATIVE PERCENT: 0 % (ref 1–8)
EPITHELIAL CELLS UA: ABNORMAL /HPF (ref 0–5)
ESTIMATED AVERAGE GLUCOSE: 183 MG/DL
ESTIMATED AVERAGE GLUCOSE: 183 MG/DL
FERRITIN: 131 UG/L (ref 30–400)
GFR AFRICAN AMERICAN: 38 ML/MIN
GFR NON-AFRICAN AMERICAN: 32 ML/MIN
GFR SERPL CREATININE-BSD FRML MDRD: ABNORMAL ML/MIN/{1.73_M2}
GFR SERPL CREATININE-BSD FRML MDRD: ABNORMAL ML/MIN/{1.73_M2}
GLUCOSE BLD-MCNC: 251 MG/DL (ref 70–99)
GLUCOSE URINE: NEGATIVE
HBA1C MFR BLD: 8 % (ref 4.8–5.9)
HBA1C MFR BLD: 8 % (ref 4.8–5.9)
HCT VFR BLD CALC: 50.8 % (ref 41–53)
HDLC SERPL-MCNC: 51 MG/DL
HEMOGLOBIN: 17.1 G/DL (ref 13.5–17.5)
IRON SATURATION: 49 % (ref 20–55)
IRON: 97 UG/DL (ref 59–158)
KETONES, URINE: NEGATIVE
LDL CHOLESTEROL: 149 MG/DL (ref 0–130)
LEUKOCYTE ESTERASE, URINE: NEGATIVE
LYMPHOCYTES # BLD: 6 % (ref 15–43)
MCH RBC QN AUTO: 30.8 PG (ref 26–34)
MCHC RBC AUTO-ENTMCNC: 33.6 G/DL (ref 31–37)
MCV RBC AUTO: 91.8 FL (ref 80–100)
MONOCYTES # BLD: 5 % (ref 6–14)
MUCUS: ABNORMAL
NITRITE, URINE: NEGATIVE
OTHER OBSERVATIONS UA: ABNORMAL
PDW BLD-RTO: 15 % (ref 11–14.5)
PH UA: 6 (ref 5–6)
PLATELET # BLD: 306 K/UL (ref 140–450)
PLATELET ESTIMATE: ABNORMAL
PMV BLD AUTO: 8.4 FL (ref 6–12)
POTASSIUM SERPL-SCNC: 4.7 MMOL/L (ref 3.7–5.3)
PROTEIN UA: ABNORMAL
PTH INTACT: 84.07 PG/ML (ref 15–65)
RBC # BLD: 5.54 M/UL (ref 4.5–5.9)
RBC # BLD: ABNORMAL 10*6/UL
RBC UA: ABNORMAL /HPF (ref 0–4)
RENAL EPITHELIAL, UA: ABNORMAL /HPF
SEG NEUTROPHILS: 88 % (ref 44–74)
SEGMENTED NEUTROPHILS ABSOLUTE COUNT: 18 K/UL (ref 1.8–7.7)
SODIUM BLD-SCNC: 134 MMOL/L (ref 135–144)
SPECIFIC GRAVITY UA: 1.01 (ref 1.01–1.02)
TOTAL IRON BINDING CAPACITY: 198 UG/DL (ref 250–450)
TRICHOMONAS: ABNORMAL
TRIGL SERPL-MCNC: 244 MG/DL
TURBIDITY: ABNORMAL
UNSATURATED IRON BINDING CAPACITY: 101 UG/DL (ref 112–347)
URINE HGB: ABNORMAL
UROBILINOGEN, URINE: NORMAL
VLDLC SERPL CALC-MCNC: ABNORMAL MG/DL (ref 1–30)
WBC # BLD: 20.4 K/UL (ref 3.5–11)
WBC # BLD: ABNORMAL 10*3/UL
WBC UA: ABNORMAL /HPF (ref 0–4)
YEAST: ABNORMAL

## 2017-09-20 PROCEDURE — 36415 COLL VENOUS BLD VENIPUNCTURE: CPT

## 2017-09-20 PROCEDURE — 82728 ASSAY OF FERRITIN: CPT

## 2017-09-20 PROCEDURE — G8417 CALC BMI ABV UP PARAM F/U: HCPCS | Performed by: FAMILY MEDICINE

## 2017-09-20 PROCEDURE — 80061 LIPID PANEL: CPT

## 2017-09-20 PROCEDURE — 1036F TOBACCO NON-USER: CPT | Performed by: FAMILY MEDICINE

## 2017-09-20 PROCEDURE — 3017F COLORECTAL CA SCREEN DOC REV: CPT | Performed by: FAMILY MEDICINE

## 2017-09-20 PROCEDURE — G8427 DOCREV CUR MEDS BY ELIG CLIN: HCPCS | Performed by: FAMILY MEDICINE

## 2017-09-20 PROCEDURE — 83550 IRON BINDING TEST: CPT

## 2017-09-20 PROCEDURE — 86403 PARTICLE AGGLUT ANTBDY SCRN: CPT

## 2017-09-20 PROCEDURE — 87086 URINE CULTURE/COLONY COUNT: CPT

## 2017-09-20 PROCEDURE — 83540 ASSAY OF IRON: CPT

## 2017-09-20 PROCEDURE — 82330 ASSAY OF CALCIUM: CPT

## 2017-09-20 PROCEDURE — 85025 COMPLETE CBC W/AUTO DIFF WBC: CPT

## 2017-09-20 PROCEDURE — 83970 ASSAY OF PARATHORMONE: CPT

## 2017-09-20 PROCEDURE — 83036 HEMOGLOBIN GLYCOSYLATED A1C: CPT

## 2017-09-20 PROCEDURE — G8598 ASA/ANTIPLAT THER USED: HCPCS | Performed by: FAMILY MEDICINE

## 2017-09-20 PROCEDURE — 80048 BASIC METABOLIC PNL TOTAL CA: CPT

## 2017-09-20 PROCEDURE — 81001 URINALYSIS AUTO W/SCOPE: CPT

## 2017-09-20 PROCEDURE — 3046F HEMOGLOBIN A1C LEVEL >9.0%: CPT | Performed by: FAMILY MEDICINE

## 2017-09-20 PROCEDURE — 99214 OFFICE O/P EST MOD 30 MIN: CPT | Performed by: FAMILY MEDICINE

## 2017-09-20 RX ORDER — LISINOPRIL 5 MG/1
5 TABLET ORAL DAILY
Qty: 30 TABLET | Refills: 0 | Status: SHIPPED | OUTPATIENT
Start: 2017-09-20 | End: 2017-10-02 | Stop reason: SDUPTHER

## 2017-09-21 LAB
CULTURE: ABNORMAL
CULTURE: ABNORMAL
Lab: ABNORMAL
SPECIMEN DESCRIPTION: ABNORMAL
SPECIMEN DESCRIPTION: ABNORMAL
STATUS: ABNORMAL

## 2017-09-22 ENCOUNTER — HOSPITAL ENCOUNTER (EMERGENCY)
Age: 57
Discharge: ANOTHER HOSPITAL | End: 2017-09-22
Attending: EMERGENCY MEDICINE
Payer: MEDICARE

## 2017-09-22 ENCOUNTER — APPOINTMENT (OUTPATIENT)
Dept: GENERAL RADIOLOGY | Age: 57
End: 2017-09-22
Payer: MEDICARE

## 2017-09-22 ENCOUNTER — OFFICE VISIT (OUTPATIENT)
Dept: PRIMARY CARE CLINIC | Age: 57
End: 2017-09-22

## 2017-09-22 VITALS
BODY MASS INDEX: 32.48 KG/M2 | DIASTOLIC BLOOD PRESSURE: 88 MMHG | WEIGHT: 216.8 LBS | HEART RATE: 140 BPM | OXYGEN SATURATION: 97 % | SYSTOLIC BLOOD PRESSURE: 130 MMHG | RESPIRATION RATE: 20 BRPM

## 2017-09-22 VITALS
WEIGHT: 214 LBS | OXYGEN SATURATION: 95 % | RESPIRATION RATE: 12 BRPM | TEMPERATURE: 98.2 F | DIASTOLIC BLOOD PRESSURE: 67 MMHG | BODY MASS INDEX: 32.07 KG/M2 | SYSTOLIC BLOOD PRESSURE: 115 MMHG | HEART RATE: 86 BPM

## 2017-09-22 DIAGNOSIS — R00.0 TACHYCARDIA: Primary | ICD-10-CM

## 2017-09-22 DIAGNOSIS — R00.0 TACHYCARDIA: ICD-10-CM

## 2017-09-22 DIAGNOSIS — R77.8 ELEVATED TROPONIN: Primary | ICD-10-CM

## 2017-09-22 DIAGNOSIS — R06.02 SOB (SHORTNESS OF BREATH): ICD-10-CM

## 2017-09-22 LAB
ABSOLUTE EOS #: 0.1 K/UL (ref 0–0.4)
ABSOLUTE LYMPH #: 1.7 K/UL (ref 1–4.8)
ABSOLUTE MONO #: 1.3 K/UL (ref 0.1–1.2)
ANION GAP SERPL CALCULATED.3IONS-SCNC: 14 MMOL/L (ref 9–17)
BASOPHILS # BLD: 1 % (ref 0–2)
BASOPHILS ABSOLUTE: 0.1 K/UL (ref 0–0.2)
BUN BLDV-MCNC: 33 MG/DL (ref 6–20)
BUN/CREAT BLD: 16 (ref 9–20)
CALCIUM SERPL-MCNC: 9 MG/DL (ref 8.6–10.4)
CHLORIDE BLD-SCNC: 90 MMOL/L (ref 98–107)
CO2: 28 MMOL/L (ref 20–31)
CREAT SERPL-MCNC: 2.03 MG/DL (ref 0.7–1.2)
DIFFERENTIAL TYPE: ABNORMAL
EKG ATRIAL RATE: 108 BPM
EKG P AXIS: 53 DEGREES
EKG P-R INTERVAL: 152 MS
EKG Q-T INTERVAL: 312 MS
EKG QRS DURATION: 78 MS
EKG QTC CALCULATION (BAZETT): 418 MS
EKG R AXIS: -42 DEGREES
EKG T AXIS: 87 DEGREES
EKG VENTRICULAR RATE: 108 BPM
EOSINOPHILS RELATIVE PERCENT: 1 % (ref 1–8)
GFR AFRICAN AMERICAN: 42 ML/MIN
GFR NON-AFRICAN AMERICAN: 34 ML/MIN
GFR SERPL CREATININE-BSD FRML MDRD: ABNORMAL ML/MIN/{1.73_M2}
GFR SERPL CREATININE-BSD FRML MDRD: ABNORMAL ML/MIN/{1.73_M2}
GLUCOSE BLD-MCNC: 155 MG/DL (ref 70–99)
HCT VFR BLD CALC: 45 % (ref 41–53)
HEMOGLOBIN: 14.8 G/DL (ref 13.5–17.5)
INR BLD: 0.9
LYMPHOCYTES # BLD: 12 % (ref 15–43)
MCH RBC QN AUTO: 30.3 PG (ref 26–34)
MCHC RBC AUTO-ENTMCNC: 32.9 G/DL (ref 31–37)
MCV RBC AUTO: 92 FL (ref 80–100)
MONOCYTES # BLD: 9 % (ref 6–14)
MYOGLOBIN: 48 NG/ML (ref 28–72)
MYOGLOBIN: 53 NG/ML (ref 28–72)
PDW BLD-RTO: 15 % (ref 11–14.5)
PLATELET # BLD: 272 K/UL (ref 140–450)
PLATELET ESTIMATE: ABNORMAL
PMV BLD AUTO: 7.9 FL (ref 6–12)
POTASSIUM SERPL-SCNC: 3.8 MMOL/L (ref 3.7–5.3)
PROTHROMBIN TIME: 9.9 SEC (ref 9.4–11.3)
RBC # BLD: 4.89 M/UL (ref 4.5–5.9)
RBC # BLD: ABNORMAL 10*6/UL
SEG NEUTROPHILS: 77 % (ref 44–74)
SEGMENTED NEUTROPHILS ABSOLUTE COUNT: 10.6 K/UL (ref 1.8–7.7)
SODIUM BLD-SCNC: 132 MMOL/L (ref 135–144)
TROPONIN INTERP: ABNORMAL
TROPONIN INTERP: ABNORMAL
TROPONIN T: 0.1 NG/ML
TROPONIN T: 0.11 NG/ML
WBC # BLD: 13.7 K/UL (ref 3.5–11)
WBC # BLD: ABNORMAL 10*3/UL

## 2017-09-22 PROCEDURE — 84484 ASSAY OF TROPONIN QUANT: CPT

## 2017-09-22 PROCEDURE — 71020 XR CHEST STANDARD TWO VW: CPT

## 2017-09-22 PROCEDURE — 85610 PROTHROMBIN TIME: CPT

## 2017-09-22 PROCEDURE — 96361 HYDRATE IV INFUSION ADD-ON: CPT

## 2017-09-22 PROCEDURE — 2500000003 HC RX 250 WO HCPCS: Performed by: EMERGENCY MEDICINE

## 2017-09-22 PROCEDURE — 83874 ASSAY OF MYOGLOBIN: CPT

## 2017-09-22 PROCEDURE — 99285 EMERGENCY DEPT VISIT HI MDM: CPT

## 2017-09-22 PROCEDURE — 99999 PR OFFICE/OUTPT VISIT,PROCEDURE ONLY: CPT | Performed by: NURSE PRACTITIONER

## 2017-09-22 PROCEDURE — 80048 BASIC METABOLIC PNL TOTAL CA: CPT

## 2017-09-22 PROCEDURE — 6370000000 HC RX 637 (ALT 250 FOR IP): Performed by: EMERGENCY MEDICINE

## 2017-09-22 PROCEDURE — 93005 ELECTROCARDIOGRAM TRACING: CPT

## 2017-09-22 PROCEDURE — 85025 COMPLETE CBC W/AUTO DIFF WBC: CPT

## 2017-09-22 PROCEDURE — 36415 COLL VENOUS BLD VENIPUNCTURE: CPT

## 2017-09-22 PROCEDURE — 96374 THER/PROPH/DIAG INJ IV PUSH: CPT

## 2017-09-22 PROCEDURE — 1036F TOBACCO NON-USER: CPT | Performed by: NURSE PRACTITIONER

## 2017-09-22 PROCEDURE — 2580000003 HC RX 258: Performed by: EMERGENCY MEDICINE

## 2017-09-22 RX ORDER — METOPROLOL TARTRATE 50 MG/1
50 TABLET, FILM COATED ORAL ONCE
Status: COMPLETED | OUTPATIENT
Start: 2017-09-22 | End: 2017-09-22

## 2017-09-22 RX ORDER — 0.9 % SODIUM CHLORIDE 0.9 %
500 INTRAVENOUS SOLUTION INTRAVENOUS ONCE
Status: COMPLETED | OUTPATIENT
Start: 2017-09-22 | End: 2017-09-22

## 2017-09-22 RX ORDER — POTASSIUM CHLORIDE 7.45 MG/ML
10 INJECTION INTRAVENOUS PRN
Status: DISCONTINUED | OUTPATIENT
Start: 2017-09-22 | End: 2017-09-23 | Stop reason: HOSPADM

## 2017-09-22 RX ORDER — DOCUSATE SODIUM 100 MG/1
100 CAPSULE, LIQUID FILLED ORAL 2 TIMES DAILY
Status: DISCONTINUED | OUTPATIENT
Start: 2017-09-22 | End: 2017-09-23 | Stop reason: HOSPADM

## 2017-09-22 RX ORDER — NITROGLYCERIN 0.4 MG/1
0.4 TABLET SUBLINGUAL EVERY 5 MIN PRN
Status: DISCONTINUED | OUTPATIENT
Start: 2017-09-22 | End: 2017-09-23 | Stop reason: HOSPADM

## 2017-09-22 RX ORDER — HYDROCHLOROTHIAZIDE 25 MG/1
25 TABLET ORAL DAILY
Status: DISCONTINUED | OUTPATIENT
Start: 2017-09-23 | End: 2017-09-23

## 2017-09-22 RX ORDER — MORPHINE SULFATE 4 MG/ML
4 INJECTION, SOLUTION INTRAMUSCULAR; INTRAVENOUS
Status: DISCONTINUED | OUTPATIENT
Start: 2017-09-22 | End: 2017-09-23 | Stop reason: HOSPADM

## 2017-09-22 RX ORDER — METOPROLOL TARTRATE 5 MG/5ML
5 INJECTION INTRAVENOUS ONCE
Status: COMPLETED | OUTPATIENT
Start: 2017-09-22 | End: 2017-09-22

## 2017-09-22 RX ORDER — FAMOTIDINE 20 MG/1
20 TABLET, FILM COATED ORAL 2 TIMES DAILY
Status: DISCONTINUED | OUTPATIENT
Start: 2017-09-22 | End: 2017-09-23 | Stop reason: HOSPADM

## 2017-09-22 RX ORDER — ACETAMINOPHEN 325 MG/1
650 TABLET ORAL EVERY 4 HOURS PRN
Status: DISCONTINUED | OUTPATIENT
Start: 2017-09-22 | End: 2017-09-23 | Stop reason: HOSPADM

## 2017-09-22 RX ORDER — BISACODYL 10 MG
10 SUPPOSITORY, RECTAL RECTAL DAILY PRN
Status: DISCONTINUED | OUTPATIENT
Start: 2017-09-22 | End: 2017-09-23 | Stop reason: HOSPADM

## 2017-09-22 RX ORDER — LISINOPRIL 5 MG/1
5 TABLET ORAL DAILY
Status: DISCONTINUED | OUTPATIENT
Start: 2017-09-23 | End: 2017-09-23 | Stop reason: HOSPADM

## 2017-09-22 RX ORDER — METOPROLOL TARTRATE 50 MG/1
100 TABLET, FILM COATED ORAL 2 TIMES DAILY
Status: DISCONTINUED | OUTPATIENT
Start: 2017-09-22 | End: 2017-09-23 | Stop reason: HOSPADM

## 2017-09-22 RX ORDER — POTASSIUM CHLORIDE 20 MEQ/1
40 TABLET, EXTENDED RELEASE ORAL PRN
Status: DISCONTINUED | OUTPATIENT
Start: 2017-09-22 | End: 2017-09-23 | Stop reason: HOSPADM

## 2017-09-22 RX ORDER — MORPHINE SULFATE 2 MG/ML
2 INJECTION, SOLUTION INTRAMUSCULAR; INTRAVENOUS
Status: DISCONTINUED | OUTPATIENT
Start: 2017-09-22 | End: 2017-09-23 | Stop reason: HOSPADM

## 2017-09-22 RX ORDER — ONDANSETRON 2 MG/ML
4 INJECTION INTRAMUSCULAR; INTRAVENOUS EVERY 6 HOURS PRN
Status: DISCONTINUED | OUTPATIENT
Start: 2017-09-22 | End: 2017-09-23 | Stop reason: HOSPADM

## 2017-09-22 RX ORDER — HYDROCODONE BITARTRATE AND ACETAMINOPHEN 5; 325 MG/1; MG/1
1 TABLET ORAL EVERY 4 HOURS PRN
Status: DISCONTINUED | OUTPATIENT
Start: 2017-09-22 | End: 2017-09-23 | Stop reason: HOSPADM

## 2017-09-22 RX ORDER — SODIUM CHLORIDE 0.9 % (FLUSH) 0.9 %
10 SYRINGE (ML) INJECTION EVERY 12 HOURS SCHEDULED
Status: DISCONTINUED | OUTPATIENT
Start: 2017-09-22 | End: 2017-09-23 | Stop reason: HOSPADM

## 2017-09-22 RX ORDER — MAGNESIUM SULFATE 1 G/100ML
1 INJECTION INTRAVENOUS PRN
Status: DISCONTINUED | OUTPATIENT
Start: 2017-09-22 | End: 2017-09-23 | Stop reason: HOSPADM

## 2017-09-22 RX ORDER — CETIRIZINE HYDROCHLORIDE 10 MG/1
5 TABLET ORAL DAILY
Status: DISCONTINUED | OUTPATIENT
Start: 2017-09-23 | End: 2017-09-23 | Stop reason: HOSPADM

## 2017-09-22 RX ORDER — POTASSIUM CHLORIDE 20MEQ/15ML
40 LIQUID (ML) ORAL PRN
Status: DISCONTINUED | OUTPATIENT
Start: 2017-09-22 | End: 2017-09-23 | Stop reason: HOSPADM

## 2017-09-22 RX ORDER — SODIUM CHLORIDE 0.9 % (FLUSH) 0.9 %
10 SYRINGE (ML) INJECTION PRN
Status: DISCONTINUED | OUTPATIENT
Start: 2017-09-22 | End: 2017-09-23 | Stop reason: HOSPADM

## 2017-09-22 RX ORDER — ASPIRIN 81 MG/1
324 TABLET, CHEWABLE ORAL ONCE
Status: COMPLETED | OUTPATIENT
Start: 2017-09-22 | End: 2017-09-22

## 2017-09-22 RX ADMIN — METOPROLOL TARTRATE 50 MG: 50 TABLET, FILM COATED ORAL at 17:49

## 2017-09-22 RX ADMIN — ASPIRIN 81 MG 324 MG: 81 TABLET ORAL at 20:27

## 2017-09-22 RX ADMIN — SODIUM CHLORIDE 500 ML: 9 INJECTION, SOLUTION INTRAVENOUS at 18:00

## 2017-09-22 RX ADMIN — METOPROLOL TARTRATE 5 MG: 5 INJECTION INTRAVENOUS at 18:00

## 2017-09-22 ASSESSMENT — ENCOUNTER SYMPTOMS
ABDOMINAL PAIN: 0
SHORTNESS OF BREATH: 0
DIARRHEA: 0
COLOR CHANGE: 0
RHINORRHEA: 0
VOICE CHANGE: 0
VOMITING: 0
BACK PAIN: 0
TROUBLE SWALLOWING: 0
COUGH: 0
NAUSEA: 0

## 2017-09-22 NOTE — ED PROVIDER NOTES
[facility]  eMERGENCY dEPARTMENT eNCOUnter      Pt Name: Jelani Reyes  MRN: [id number]  [place] 1960  Date of evaluation: 9/22/2017      CHIEF COMPLAINT       Chief Complaint   Patient presents with    Tachycardia         HISTORY OF PRESENT ILLNESS    Jelani Reyes is a 49 y.o. male who presents With a chief complaint of racing heart rate, pink patient says he normally takes metoprolol 100 mg but this morning his blood pressure was normal so he did not take it he says he has no chest pain or shortness of breath just feels very worn down his pulse was in the 130s which when he went to urgent care she comes over here I'm no fevers no chills no chest pain and no leg swelling just complained of persistent tachycardia nothing seems to make it better or worse. The last time he took his Lopressor was yesterday      REVIEW OF SYSTEMS         Review of Systems   Constitutional: Positive for fatigue. Negative for chills and fever. HENT: Negative for congestion, ear pain, rhinorrhea, trouble swallowing and voice change. Respiratory: Negative for cough and shortness of breath. Cardiovascular: Positive for palpitations. Negative for chest pain and leg swelling. Gastrointestinal: Negative for abdominal pain, diarrhea, nausea and vomiting. Genitourinary: Negative for flank pain and frequency. Musculoskeletal: Negative for back pain, gait problem, neck pain and neck stiffness. Skin: Negative for color change and rash. Neurological: Negative for seizures, weakness, numbness and headaches. Hematological: Negative for adenopathy. Does not bruise/bleed easily. Psychiatric/Behavioral: Negative for behavioral problems and suicidal ideas. PAST MEDICAL HISTORY    has a past medical history of Allergic reaction caused by a drug; Arthritis; Chronic kidney disease; COPD (chronic obstructive pulmonary disease) ([facility] [address] CVA (cerebral vascular accident) ([facility] [address]  Depression; Diabetes PREDNISONE (DELTASONE) 5 MG TABLET    Take 1 tablet once daily    RANITIDINE (ZANTAC) 150 MG TABLET    Take 1 tablet by mouth 2 times daily    SKIN PROTECTANTS, MISC. (EUCERIN) CREAM    Apply topically as needed. ALLERGIES     is allergic to cosentyx [secukinumab]; lantus [insulin glargine]; methotrexate derivatives; seasonal; otezla [apremilast]; and stellaria. FAMILY HISTORY     indicated that his mother is alive. He indicated that his father is alive. He indicated that his sister is alive. He indicated that his maternal grandmother is . He indicated that his maternal grandfather is . He indicated that his paternal grandmother is . He indicated that his paternal grandfather is . family history includes Alzheimer's Disease in his maternal grandmother; Cancer in his maternal grandfather; Diabetes in his sister; High Blood Pressure in his maternal grandfather and mother; Other in his maternal grandmother, mother, and paternal grandmother. SOCIAL HISTORY      reports that he quit smoking about 2 years ago. His smoking use included Cigarettes. He has a 20.00 pack-year smoking history. He has never used smokeless tobacco. He reports that he does not drink alcohol or use illicit drugs. PHYSICAL EXAM     INITIAL VITALS:  weight is 214 lb (97.1 kg). His tympanic temperature is 98.2 °F (36.8 °C). His blood pressure is 118/91 (abnormal) and his pulse is 115. His respiration is 12 and oxygen saturation is 96%. Physical Exam   Constitutional: He is oriented to person, place, and time. He appears well-developed and well-nourished. No distress. HENT:   Head: Normocephalic and atraumatic. Eyes: Conjunctivae are normal. Pupils are equal, round, and reactive to light. Neck: Normal range of motion. Neck supple. Cardiovascular: Regular rhythm, normal heart sounds and intact distal pulses.     Tachycardic   Pulmonary/Chest: Effort normal and breath sounds normal. Abdominal: Soft. Bowel sounds are normal. He exhibits no mass. There is no tenderness. There is no rebound and no guarding. Musculoskeletal: Normal range of motion. He exhibits no edema or tenderness. Lymphadenopathy:     He has no cervical adenopathy. Neurological: He is alert and oriented to person, place, and time. No cranial nerve deficit. Skin: Skin is dry. Rash noted. He is not diaphoretic. Patient's had a fading rash bases on steroid taper   Psychiatric: He has a normal mood and affect.  His behavior is normal.       DIFFERENTIAL DIAGNOSIS/ MDM:     Tachycardia may be secondary to his not taking his beta blocker we'll give him a dose of beta blocker here we will also give him some fluids and check labs    DIAGNOSTIC RESULTS     EKG: All EKG's are interpreted by the Emergency Department Physician who either signs or Co-signs this chart in the absence of a cardiologist.  Sinus tachycardia rate of 108 bpm.  It was 152 ms QRS duration 78 ms QTC is 418 ms axis is -42 for the R wave he does have prominent P waves and an inverted T-wave in aVL      RADIOLOGY:   I directly visualized the following  images and reviewed the radiologist interpretations:     EXAMINATION:   TWO VIEWS OF THE CHEST       9/22/2017 5:50 pm       COMPARISON:   08/13/2016       HISTORY:   ORDERING SYSTEM PROVIDED HISTORY: shortness of breath   TECHNOLOGIST PROVIDED HISTORY:   Reason for exam:->shortness of breath   Ordering Physician Provided Reason for Exam: Tachycardia started yesterday   evening; denies chest pain, coughing, or shortness of breath; fatigue; former   smoker   Acuity: Acute   Type of Exam: Initial       FINDINGS:   Frontal and lateral views of the chest are submitted for review.  The cardiac   silhouette is normal in size.  Multiple overlying cardiac monitoring leads   and wires somewhat obscure evaluation of the lung parenchyma.  Lung   parenchyma is clear without focal airspace consolidation, sizeable pleural effusion, or pneumothorax. Harris Phenes is midline.  Osseous structures and soft   tissues are grossly intact.           Impression   No evidence for acute cardiopulmonary pathology.                 ED BEDSIDE ULTRASOUND:       LABS:  Labs Reviewed   BASIC METABOLIC PANEL - Abnormal; Notable for the following:        Result Value    Glucose 155 (*)     BUN 33 (*)     CREATININE 2.03 (*)     Sodium 132 (*)     Chloride 90 (*)     GFR Non- 34 (*)     GFR  42 (*)     All other components within normal limits   CBC WITH AUTO DIFFERENTIAL - Abnormal; Notable for the following:     WBC 13.7 (*)     RDW 15.0 (*)     Seg Neutrophils 77 (*)     Lymphocytes 12 (*)     Segs Absolute 10.60 (*)     Absolute Mono # 1.30 (*)     All other components within normal limits   TROPONIN - Abnormal; Notable for the following:     Troponin T 0.10 (*)     All other components within normal limits   MYOGLOBIN, SERUM   PROTIME-INR   MYOGLOBIN, SERUM   TROPONIN       Elevated troponin and this may be related to his elevated BUN/creatinine will do serial troponins    EMERGENCY DEPARTMENT COURSE:   Vitals:    Vitals:    09/22/17 1713 09/22/17 1800   BP: (!) 139/91 (!) 118/91   Pulse: 118 115   Resp: 12 12   Temp: 98.2 °F (36.8 °C)    TempSrc: Tympanic    SpO2: 96% 96%   Weight: 214 lb (97.1 kg)      -------------------------  BP: (!) 118/91, Temp: 98.2 °F (36.8 °C), Pulse: 115, Resp: 12        Re-evaluation Notes    Resting comfortably    CRITICAL CARE:   None        CONSULTS:      PROCEDURES:  None    FINAL IMPRESSION    No diagnosis found. DISPOSITION/PLAN   DISPOSITION Care is past the oncoming physician pending repeat cardiac enzymes and EKGs at the end of my shift 1930 hrs. Condition on Disposition  Stable    PATIENT REFERRED TO:  No follow-up provider specified.     DISCHARGE MEDICATIONS:  New Prescriptions    No medications on file       (Please note that portions of this note were completed with a

## 2017-09-23 ENCOUNTER — HOSPITAL ENCOUNTER (INPATIENT)
Age: 57
LOS: 1 days | Discharge: HOME | DRG: 310 | End: 2017-09-23
Attending: FAMILY MEDICINE | Admitting: FAMILY MEDICINE
Payer: MEDICARE

## 2017-09-23 VITALS
HEIGHT: 69 IN | WEIGHT: 214.73 LBS | TEMPERATURE: 98.2 F | DIASTOLIC BLOOD PRESSURE: 81 MMHG | BODY MASS INDEX: 31.8 KG/M2 | HEART RATE: 65 BPM | RESPIRATION RATE: 16 BRPM | SYSTOLIC BLOOD PRESSURE: 139 MMHG | OXYGEN SATURATION: 96 %

## 2017-09-23 LAB
ALBUMIN SERPL-MCNC: 3.4 G/DL (ref 3.5–5.2)
ALBUMIN/GLOBULIN RATIO: 1.3 (ref 1–2.5)
ALP BLD-CCNC: 90 U/L (ref 40–129)
ALT SERPL-CCNC: 24 U/L (ref 5–41)
ANION GAP SERPL CALCULATED.3IONS-SCNC: 13 MMOL/L (ref 9–17)
AST SERPL-CCNC: 14 U/L
BILIRUB SERPL-MCNC: 0.65 MG/DL (ref 0.3–1.2)
BUN BLDV-MCNC: 30 MG/DL (ref 6–20)
BUN/CREAT BLD: ABNORMAL (ref 9–20)
CALCIUM SERPL-MCNC: 8.7 MG/DL (ref 8.6–10.4)
CHLORIDE BLD-SCNC: 96 MMOL/L (ref 98–107)
CHOLESTEROL/HDL RATIO: 4.6
CHOLESTEROL: 193 MG/DL
CO2: 25 MMOL/L (ref 20–31)
CREAT SERPL-MCNC: 1.92 MG/DL (ref 0.7–1.2)
GFR AFRICAN AMERICAN: 44 ML/MIN
GFR NON-AFRICAN AMERICAN: 37 ML/MIN
GFR SERPL CREATININE-BSD FRML MDRD: ABNORMAL ML/MIN/{1.73_M2}
GFR SERPL CREATININE-BSD FRML MDRD: ABNORMAL ML/MIN/{1.73_M2}
GLUCOSE BLD-MCNC: 121 MG/DL (ref 75–110)
GLUCOSE BLD-MCNC: 129 MG/DL (ref 70–99)
GLUCOSE BLD-MCNC: 170 MG/DL (ref 75–110)
HCT VFR BLD CALC: 43.5 % (ref 41–53)
HDLC SERPL-MCNC: 42 MG/DL
HEMOGLOBIN: 14.4 G/DL (ref 13.5–17.5)
LDL CHOLESTEROL: 120 MG/DL (ref 0–130)
MCH RBC QN AUTO: 30.4 PG (ref 26–34)
MCHC RBC AUTO-ENTMCNC: 33.2 G/DL (ref 31–37)
MCV RBC AUTO: 91.6 FL (ref 80–100)
PDW BLD-RTO: 15.8 % (ref 12.5–15.4)
PLATELET # BLD: 257 K/UL (ref 140–450)
PMV BLD AUTO: 7.6 FL (ref 6–12)
POTASSIUM SERPL-SCNC: 3.7 MMOL/L (ref 3.7–5.3)
RBC # BLD: 4.75 M/UL (ref 4.5–5.9)
SODIUM BLD-SCNC: 134 MMOL/L (ref 135–144)
TOTAL PROTEIN: 6.1 G/DL (ref 6.4–8.3)
TRIGL SERPL-MCNC: 156 MG/DL
TROPONIN INTERP: ABNORMAL
TROPONIN T: 0.12 NG/ML
VLDLC SERPL CALC-MCNC: ABNORMAL MG/DL (ref 1–30)
WBC # BLD: 12.8 K/UL (ref 3.5–11)

## 2017-09-23 PROCEDURE — 6360000002 HC RX W HCPCS: Performed by: NURSE PRACTITIONER

## 2017-09-23 PROCEDURE — 80053 COMPREHEN METABOLIC PANEL: CPT

## 2017-09-23 PROCEDURE — 2580000003 HC RX 258: Performed by: NURSE PRACTITIONER

## 2017-09-23 PROCEDURE — 99235 HOSP IP/OBS SAME DATE MOD 70: CPT | Performed by: FAMILY MEDICINE

## 2017-09-23 PROCEDURE — 80061 LIPID PANEL: CPT

## 2017-09-23 PROCEDURE — 83036 HEMOGLOBIN GLYCOSYLATED A1C: CPT

## 2017-09-23 PROCEDURE — 85027 COMPLETE CBC AUTOMATED: CPT

## 2017-09-23 PROCEDURE — 84484 ASSAY OF TROPONIN QUANT: CPT

## 2017-09-23 PROCEDURE — 82947 ASSAY GLUCOSE BLOOD QUANT: CPT

## 2017-09-23 PROCEDURE — 36415 COLL VENOUS BLD VENIPUNCTURE: CPT

## 2017-09-23 PROCEDURE — 6370000000 HC RX 637 (ALT 250 FOR IP): Performed by: NURSE PRACTITIONER

## 2017-09-23 PROCEDURE — 2060000000 HC ICU INTERMEDIATE R&B

## 2017-09-23 RX ORDER — DEXTROSE MONOHYDRATE 25 G/50ML
12.5 INJECTION, SOLUTION INTRAVENOUS PRN
Status: DISCONTINUED | OUTPATIENT
Start: 2017-09-23 | End: 2017-09-23 | Stop reason: HOSPADM

## 2017-09-23 RX ORDER — NICOTINE POLACRILEX 4 MG
15 LOZENGE BUCCAL PRN
Status: DISCONTINUED | OUTPATIENT
Start: 2017-09-23 | End: 2017-09-23 | Stop reason: HOSPADM

## 2017-09-23 RX ORDER — DEXTROSE MONOHYDRATE 50 MG/ML
100 INJECTION, SOLUTION INTRAVENOUS PRN
Status: DISCONTINUED | OUTPATIENT
Start: 2017-09-23 | End: 2017-09-23 | Stop reason: HOSPADM

## 2017-09-23 RX ADMIN — Medication 10 ML: at 01:00

## 2017-09-23 RX ADMIN — ENOXAPARIN SODIUM 40 MG: 40 INJECTION SUBCUTANEOUS at 09:12

## 2017-09-23 RX ADMIN — FAMOTIDINE 20 MG: 20 TABLET, FILM COATED ORAL at 09:11

## 2017-09-23 RX ADMIN — LISINOPRIL 5 MG: 5 TABLET ORAL at 09:11

## 2017-09-23 RX ADMIN — Medication 10 ML: at 09:16

## 2017-09-23 RX ADMIN — CETIRIZINE HYDROCHLORIDE 5 MG: 10 TABLET ORAL at 09:12

## 2017-09-23 RX ADMIN — ASPIRIN 325 MG: 325 TABLET, COATED ORAL at 09:11

## 2017-09-23 RX ADMIN — METOPROLOL TARTRATE 100 MG: 50 TABLET, FILM COATED ORAL at 07:40

## 2017-09-23 ASSESSMENT — ENCOUNTER SYMPTOMS
WHEEZING: 0
VOMITING: 0
SINUS PRESSURE: 0
SORE THROAT: 0
COUGH: 0
SHORTNESS OF BREATH: 0
DIARRHEA: 0
BLOOD IN STOOL: 0
NAUSEA: 0
VOICE CHANGE: 0
CONSTIPATION: 0
ABDOMINAL PAIN: 0

## 2017-09-23 ASSESSMENT — PAIN SCALES - GENERAL
PAINLEVEL_OUTOF10: 0

## 2017-09-23 NOTE — ED PROVIDER NOTES
placed or performed during the hospital encounter of 55/02/82   Basic Metabolic Panel   Result Value Ref Range    Glucose 155 (H) 70 - 99 mg/dL    BUN 33 (H) 6 - 20 mg/dL    CREATININE 2.03 (H) 0.70 - 1.20 mg/dL    Bun/Cre Ratio 16 9 - 20    Calcium 9.0 8.6 - 10.4 mg/dL    Sodium 132 (L) 135 - 144 mmol/L    Potassium 3.8 3.7 - 5.3 mmol/L    Chloride 90 (L) 98 - 107 mmol/L    CO2 28 20 - 31 mmol/L    Anion Gap 14 9 - 17 mmol/L    GFR Non-African American 34 (L) >60 mL/min    GFR  42 (L) >60 mL/min    GFR Comment          GFR Staging NOT REPORTED    CBC Auto Differential   Result Value Ref Range    WBC 13.7 (H) 3.5 - 11.0 k/uL    RBC 4.89 4.5 - 5.9 m/uL    Hemoglobin 14.8 13.5 - 17.5 g/dL    Hematocrit 45.0 41 - 53 %    MCV 92.0 80 - 100 fL    MCH 30.3 26 - 34 pg    MCHC 32.9 31 - 37 g/dL    RDW 15.0 (H) 11.0 - 14.5 %    Platelets 631 132 - 192 k/uL    MPV 7.9 6.0 - 12.0 fL    Differential Type NOT REPORTED     WBC Morphology NOT REPORTED     RBC Morphology NOT REPORTED     Platelet Estimate NOT REPORTED     Seg Neutrophils 77 (H) 44 - 74 %    Lymphocytes 12 (L) 15 - 43 %    Monocytes 9 6 - 14 %    Eosinophils % 1 1 - 8 %    Basophils 1 0 - 2 %    Segs Absolute 10.60 (H) 1.8 - 7.7 k/uL    Absolute Lymph # 1.70 1.0 - 4.8 k/uL    Absolute Mono # 1.30 (H) 0.1 - 1.2 k/uL    Absolute Eos # 0.10 0.0 - 0.4 k/uL    Basophils # 0.10 0.0 - 0.2 k/uL   Myoglobin, Serum   Result Value Ref Range    Myoglobin 53 28 - 72 ng/mL   Protime-INR   Result Value Ref Range    Protime 9.9 9.4 - 11.3 sec    INR 0.9    Troponin   Result Value Ref Range    Troponin T 0.10 (HH) <0.03 ng/mL    Troponin Interp         Myoglobin, Serum   Result Value Ref Range    Myoglobin 48 28 - 72 ng/mL   Troponin   Result Value Ref Range    Troponin T 0.11 (HH) <0.03 ng/mL    Troponin Interp         EKG 12 Lead   Result Value Ref Range    Ventricular Rate 108 BPM    Atrial Rate 108 BPM    P-R Interval 152 ms    QRS Duration 78 ms    Q-T Interval condition. Disposition     FINAL IMPRESSION      1. Elevated troponin    2. Tachycardia          DISPOSITION/PLAN   DISPOSITION Decision to Transfer    PATIENT REFERRED TO:  No follow-up provider specified.     DISCHARGE MEDICATIONS:  Discharge Medication List as of 9/22/2017 11:46 PM                (Please note that portions of this note were completed with a voice recognition program.  Efforts were made to edit the dictations but occasionally words are mis-transcribed.)    Napier MD, NRP  Attending Emergency Medicine Physician                 Elisabeth Sparks MD  09/23/17 5610

## 2017-09-24 DIAGNOSIS — I10 ESSENTIAL HYPERTENSION: ICD-10-CM

## 2017-09-24 LAB
ESTIMATED AVERAGE GLUCOSE: 174 MG/DL
HBA1C MFR BLD: 7.7 % (ref 4–6)

## 2017-09-25 ENCOUNTER — CARE COORDINATION (OUTPATIENT)
Dept: CARE COORDINATION | Age: 57
End: 2017-09-25

## 2017-09-25 ENCOUNTER — CARE COORDINATION (OUTPATIENT)
Dept: CASE MANAGEMENT | Age: 57
End: 2017-09-25

## 2017-09-25 DIAGNOSIS — I10 ESSENTIAL HYPERTENSION: ICD-10-CM

## 2017-09-25 PROBLEM — Z86.59 HISTORY OF DEPRESSION: Status: ACTIVE | Noted: 2017-09-25

## 2017-09-25 LAB
EKG ATRIAL RATE: 72 BPM
EKG P AXIS: 48 DEGREES
EKG P-R INTERVAL: 164 MS
EKG Q-T INTERVAL: 412 MS
EKG QRS DURATION: 82 MS
EKG QTC CALCULATION (BAZETT): 451 MS
EKG R AXIS: -28 DEGREES
EKG T AXIS: 95 DEGREES
EKG VENTRICULAR RATE: 72 BPM

## 2017-09-25 RX ORDER — METOPROLOL TARTRATE 100 MG/1
TABLET ORAL
Qty: 60 TABLET | Refills: 5 | OUTPATIENT
Start: 2017-09-25

## 2017-09-25 RX ORDER — METOPROLOL TARTRATE 100 MG/1
100 TABLET ORAL 2 TIMES DAILY
Qty: 60 TABLET | Refills: 2 | Status: SHIPPED | OUTPATIENT
Start: 2017-09-25 | End: 2018-01-18 | Stop reason: SDUPTHER

## 2017-10-02 ENCOUNTER — OFFICE VISIT (OUTPATIENT)
Dept: FAMILY MEDICINE CLINIC | Age: 57
End: 2017-10-02
Payer: MEDICARE

## 2017-10-02 VITALS
HEART RATE: 69 BPM | BODY MASS INDEX: 33.95 KG/M2 | OXYGEN SATURATION: 96 % | HEIGHT: 68 IN | SYSTOLIC BLOOD PRESSURE: 130 MMHG | DIASTOLIC BLOOD PRESSURE: 80 MMHG | WEIGHT: 224 LBS

## 2017-10-02 DIAGNOSIS — I10 ESSENTIAL HYPERTENSION: Primary | ICD-10-CM

## 2017-10-02 DIAGNOSIS — L40.9 PSORIASIS: ICD-10-CM

## 2017-10-02 DIAGNOSIS — L53.8 MACULAR ERYTHEMATOUS RASH: ICD-10-CM

## 2017-10-02 PROCEDURE — 99495 TRANSJ CARE MGMT MOD F2F 14D: CPT | Performed by: FAMILY MEDICINE

## 2017-10-02 RX ORDER — LISINOPRIL 5 MG/1
5 TABLET ORAL 2 TIMES DAILY
Qty: 60 TABLET | Refills: 2 | Status: SHIPPED | OUTPATIENT
Start: 2017-10-02 | End: 2017-12-07 | Stop reason: SDUPTHER

## 2017-10-02 NOTE — MR AVS SNAPSHOT
Your BMI as listed above is considered obese (30 or more). BMI is an estimate of body fat, calculated from your height and weight. The higher your BMI, the greater your risk of heart disease, high blood pressure, type 2 diabetes, stroke, gallstones, arthritis, sleep apnea, and certain cancers. BMI is not perfect. It may overestimate body fat in athletes and people who are more muscular. Even a small weight loss (between 5 and 10 percent of your current weight) by decreasing your calorie intake and becoming more physically active will help lower your risk of developing or worsening diseases associated with obesity. Learn more at: [employer]             Today's Medication Changes          These changes are accurate as of: 10/2/17 10:45 AM.  If you have any questions, ask your nurse or doctor. CHANGE how you take these medications           lisinopril 5 MG tablet   Commonly known as:  PRINIVIL;ZESTRIL   Instructions: Take 1 tablet by mouth 2 times daily   Quantity:  60 tablet   Refills:  2   What changed:  when to take this   Changed by:  Vito Divers, DO            Where to Get Your Medications      These medications were sent to [address] [address]     Phone:  [phone]     lisinopril 5 MG tablet               Your Current Medications Are              lisinopril (PRINIVIL;ZESTRIL) 5 MG tablet Take 1 tablet by mouth 2 times daily    metoprolol (LOPRESSOR) 100 MG tablet Take 1 tablet by mouth 2 times daily    Insulin Syringe-Needle U-100 (AIMSCO INS SYR .3CC/29GX0.5\") 29G X 1/2\" 0.3 ML MISC Use 5 times daily as needed. Patient to use 1 needle Twice daily, then 1 needle with sliding scale TID.     insulin NPH (HUMULIN N;NOVOLIN N) 100 UNIT/ML injection vial Inject 12 Units into the skin 2 times daily Allergic reaction caused by a drug    BRENDAN (acute kidney injury) ([facility] [address]    Hyperkalemia    Macular erythematous rash    Psoriasis    Leukocytosis    Sepsis (HCC)    Pneumothorax    Sinus tachycardia    Subcutaneous emphysema, post-procedure    Chronic bullous emphysema (HCC)    Hypertension    Psoriatic arthritis (HCC)    Chronic kidney disease (CKD), stage III (moderate) (Chronic)    CVA (cerebral vascular accident) ([facility] [address]    History of migraine headaches    Hyperlipidemia    Pituitary microadenoma ([facility] [address]      Your Goals as of 10/2/2017 at 10:45 AM              9/18/17 8/30/17 8/25/17       Care Coordination    Self Monitoring (pt-stated)   On track  On track  On track    Notes    Self-Monitored Blood Glucose - I will check my blood sugar blood sugars ac & HS  I will notify my provider of any trends of increasing or decreasing blood sugars over a 1 month period. I will notify my provider if I have any blood sugar readings less than 70 more than 2 times a month. None Recently Recorded    Barriers: none  Plan for overcoming my barriers: N/A  Confidence: 8/10  Anticipated Goal Completion Date: 10/1/2017          Preventive Care        Date Due    Diabetic Foot Exam 12/23/1972    Pneumococcal Vaccine - Pneumovax for adults aged 19-64 years with: chronic heart disease, chronic lung disease, diabetes mellitus, alcoholism, chronic liver disease, or cigarette smoking. (1 of 1 - PPSV23) 12/23/1981    Eye Exam By An Eye Doctor 7/31/2015    Yearly Flu Vaccine (1) 10/21/2017 (Originally 9/1/2017)    Tetanus Combination Vaccine (1 - Tdap) 9/20/2018 (Originally 12/23/1981)    Colonoscopy 8/8/2018    Hemoglobin A1C (Test For Long-Term Glucose Control) 9/23/2018    Cholesterol Screening 9/23/2018            [employer] Signup           Our records indicate that you have an active [employer] account. You can view your After Visit Summary by going to [email or web address] and logging in with your

## 2017-10-02 NOTE — PROGRESS NOTES
[address]  [address] [place]  [phone]      Tyron Whatley is a 49 y.o. male who presents today for his medical conditions/complaints as noted below. Tyron Whatley is c/o of Follow-Up from Hospital (hospitalized STV 9/23/2017 for tachycardia DOD 09/23/17.)      HPI:     Pt here today for follow-up. Pt states he sometimes takes Lisinopril 5 mg BID - maybe 5x's pe week; sometimes seems to only work for 10-12 hours. 2 nights ago, he woke up at 3:00 am with a \"banging\" headache - BP was 186/126 with HR 74. Had his HCTZ stopped as it was felt to be dehydrating him. No further tachycardia since he was discharged home on 9/23; fatigue is improved. Sees Dr. Cuca Burnette on 10/11 to discuss outpatient stress testing, which pt declined to do at the hospital.    Had tapered down on his Prednisone to 10 mg; however, he started to have worsening of his rash on R arm, axillary area, face, and abdomen 2 days ago. Increased his Prednisone to 40 mg daily x past 2 days. This would be 11 days after his last Taltz injection. Sees Dr. Monroe Felty in 11/2017 - will try to hold off on further Taltz injections until he sees him. Glucose has been stable - testing frequently due to increased Prednisone use.         Past Medical History:   Diagnosis Date    Allergic reaction caused by a drug 8/13/2016    Arthritis     psoritic arthritis    Chronic kidney disease     Follows with Nephro here    COPD (chronic obstructive pulmonary disease) ([address]     pt  denies    CVA (cerebral vascular accident) ([address]     Neuro eval with Dr Samra Cerna here    Depression     Diabetes mellitus ([address]     History of blood transfusion     History of migraine headaches     Hyperlipidemia     Hypertension     Pituitary microadenoma ([address]     Eval by NS and Endo 2011 but no recent FU    Pneumothorax 05/27/2015    left    Psoriatic arthritis ([address]     Stroke ([address]     x3    Thrombocytopenia ([facility] [address]       Past Surgical History:   Procedure Laterality Date    CHEST TUBE INSERTION Left     out now    COLONOSCOPY      11/13 SIS 8 y    OTHER SURGICAL HISTORY Right     stent r kidney    OTHER SURGICAL HISTORY  5/27/15    resection of bleb    PILONIDAL CYST DRAINAGE      SHOULDER SURGERY Left     Rotator cuff    THORACOSCOPY  5/27/15    THORACOTOMY  5/27/15    UPPER GASTROINTESTINAL ENDOSCOPY       Family History   Problem Relation Age of Onset    Diabetes Sister     Alzheimer's Disease Maternal Grandmother     Other Maternal Grandmother      dementia    High Blood Pressure Maternal Grandfather     Cancer Maternal Grandfather      Unknown    High Blood Pressure Mother     Other Mother      blood clots    Other Paternal Grandmother      dementia     Social History   Substance Use Topics    Smoking status: Former Smoker     Packs/day: 1.00     Years: 20.00     Types: Cigarettes     Quit date: 5/27/2015    Smokeless tobacco: Never Used      Comment: Quit smoking 5/27/2016, CARMEN GEEP, 10/20/2016.  Alcohol use No      Current Outpatient Prescriptions   Medication Sig Dispense Refill    lisinopril (PRINIVIL;ZESTRIL) 5 MG tablet Take 1 tablet by mouth 2 times daily 60 tablet 2    metoprolol (LOPRESSOR) 100 MG tablet Take 1 tablet by mouth 2 times daily 60 tablet 2    Insulin Syringe-Needle U-100 (AIMSCO INS SYR .3CC/29GX0.5\") 29G X 1/2\" 0.3 ML MISC Use 5 times daily as needed.   Patient to use 1 needle Twice daily, then 1 needle with sliding scale TID. 300 each 3    insulin NPH (HUMULIN N;NOVOLIN N) 100 UNIT/ML injection vial Inject 12 Units into the skin 2 times daily (Patient taking differently: Inject 6 Units into the skin 2 times daily ) 2 vial 5    insulin aspart (NOVOLOG) 100 UNIT/ML injection vial Use TID as directed per sliding scale: Less than 170 0 units 171-220 3 units 221-270 5 units 271-320 7 units Greater keerthi 320 9 units 1 vial 5    Glucose Blood (BLOOD GLUCOSE TEST STRIPS) STRP Freestyle Lyte strips. 3x's per day. Hyperglycemia, DM Type II - exacerbated by meds - on insulin 300 strip 5    levocetirizine (XYZAL) 5 MG tablet TAKE ONE TABLET BY MOUTH EVERY EVENING 30 tablet 2    Skin Protectants, Misc. (EUCERIN) cream Apply topically as needed. 3 Package 1    predniSONE (DELTASONE) 5 MG tablet Take 1 tablet once daily (Patient taking differently: Take 20 mg by mouth 2 times daily Take 1 tablet once daily) 30 tablet 1    ranitidine (ZANTAC) 150 MG tablet Take 1 tablet by mouth 2 times daily 60 tablet 3    Ixekizumab (TALTZ) 80 MG/ML SOAJ Inject into the skin every 30 days       Lancets MISC 1 each by Does not apply route 4 times daily Compatible with freestyle monitor 100 each 0    glucose monitoring kit (FREESTYLE) monitoring kit 1 kit by Does not apply route daily as needed      Handicap Placard MISC by Does not apply route Diagnosis: J43.9, L40.50, N18.3. Expires in 5 years 08/23/2021. 1 each 0     No current facility-administered medications for this visit. Allergies   Allergen Reactions    Cosentyx [Secukinumab] Rash     Nausea, vomiting fever    Lantus [Insulin Glargine] Itching, Swelling and Rash     Patient started Lantus about a month ago, only new medication. His skin reaction started on his abdomen where he injects the Lantus and moved up to the face. This is similar to his response to Cosentyx.      Methotrexate Derivatives Other (See Comments)     Bone marrow toxicity    Seasonal     Otezla [Apremilast] Rash    Stellaria Rash       Health Maintenance   Topic Date Due    Diabetic foot exam  12/23/1972    Pneumococcal med risk (1 of 1 - PPSV23) 12/23/1981    Diabetic retinal exam  07/31/2015    Flu vaccine (1) 10/21/2017 (Originally 9/1/2017)    DTaP/Tdap/Td vaccine (1 - Tdap) 09/20/2018 (Originally 12/23/1981)    Colon cancer screen colonoscopy  08/08/2018    Diabetic hemoglobin A1C test  09/23/2018    Lipid screen  09/23/2018 Pt voiced understanding. Reviewed health maintenance.               Electronically signed by Isidro Brothers DO on 10/2/2017 at 9:32 PM

## 2017-10-03 DIAGNOSIS — I10 ESSENTIAL HYPERTENSION: ICD-10-CM

## 2017-10-03 RX ORDER — METOPROLOL TARTRATE 100 MG/1
100 TABLET ORAL 2 TIMES DAILY
Qty: 60 TABLET | Refills: 2 | OUTPATIENT
Start: 2017-10-03

## 2017-10-03 RX ORDER — LANCETS 28 GAUGE
1 EACH MISCELLANEOUS 3 TIMES DAILY
Qty: 100 EACH | Refills: 11 | Status: SHIPPED | OUTPATIENT
Start: 2017-10-03 | End: 2018-05-29 | Stop reason: ALTCHOICE

## 2017-10-04 ENCOUNTER — CARE COORDINATION (OUTPATIENT)
Dept: CASE MANAGEMENT | Age: 57
End: 2017-10-04

## 2017-10-04 NOTE — CARE COORDINATION
[address] [facility] Transitions Follow Up Call    10/4/2017    Patient: Yashira Ta  Patient : 1960   MRN: [id number]  Reason for Admission: There are no discharge diagnoses documented for the most recent discharge. Discharge Date: 17 RARS: Risk Score: 18       Spoke with: Mir Falconer, patient    Spoke with patient briefly. He states that he is feeling \"fine\" and denies any chest pain, palpitations, he states his blood pressure and HR have been \"normal\". Asked patient about rash and he states \"it's still there\"  He was limited in his responses. He was seen by PCP on 10/2, denies any questions about visit, says he returns to office on 10/31/17. He was reminded of cardiology appointment on 10/11/17. Denies needs. Will notify Izaiah Go of end of episode. Care Transitions Subsequent and Final Call    Schedule Follow Up Appointment with PCP:  Completed   Subsequent and Final Calls   Do you have any ongoing symptoms?:  Yes   Onset of Patient-reported symptoms:  Other   Patient-reported symptoms:  Rash   Have your medications changed?:  No   Do you have any questions related to your medications?:  No   Do you currently have any active services?:  No   Do you have any needs or concerns that I can assist you with?:  No   Identified Barriers:  None   Care Transitions Interventions   Other Interventions:                               Follow Up  Future Appointments  Date Time Provider Akshat Vann   10/11/2017 11:15 AM Nelda Soulier, MD [facility]   10/31/2017 9:40 AM DO DANIEL [facility]   2018 9:30 AM [facility]       Jacquie Shaikh RN

## 2017-10-06 RX ORDER — GLUCOSAMINE HCL/CHONDROITIN SU 500-400 MG
CAPSULE ORAL
Qty: 200 STRIP | Refills: 5 | Status: SHIPPED | OUTPATIENT
Start: 2017-10-06 | End: 2018-02-02 | Stop reason: SDUPTHER

## 2017-10-10 DIAGNOSIS — R21 RASH: ICD-10-CM

## 2017-10-10 RX ORDER — RANITIDINE 150 MG/1
TABLET ORAL
Qty: 60 TABLET | Refills: 5 | Status: SHIPPED | OUTPATIENT
Start: 2017-10-10 | End: 2017-10-31 | Stop reason: ALTCHOICE

## 2017-10-11 ENCOUNTER — OFFICE VISIT (OUTPATIENT)
Dept: CARDIOLOGY | Age: 57
End: 2017-10-11
Payer: MEDICARE

## 2017-10-11 ENCOUNTER — TELEPHONE (OUTPATIENT)
Dept: FAMILY MEDICINE CLINIC | Age: 57
End: 2017-10-11

## 2017-10-11 VITALS
WEIGHT: 222.4 LBS | BODY MASS INDEX: 32.94 KG/M2 | RESPIRATION RATE: 14 BRPM | DIASTOLIC BLOOD PRESSURE: 74 MMHG | HEIGHT: 69 IN | SYSTOLIC BLOOD PRESSURE: 128 MMHG | HEART RATE: 88 BPM

## 2017-10-11 DIAGNOSIS — I20.8 ANGINA EFFORT: ICD-10-CM

## 2017-10-11 DIAGNOSIS — R00.0 SINUS TACHYCARDIA: Primary | ICD-10-CM

## 2017-10-11 DIAGNOSIS — I10 ESSENTIAL HYPERTENSION: ICD-10-CM

## 2017-10-11 PROCEDURE — G8484 FLU IMMUNIZE NO ADMIN: HCPCS | Performed by: INTERNAL MEDICINE

## 2017-10-11 PROCEDURE — G8598 ASA/ANTIPLAT THER USED: HCPCS | Performed by: INTERNAL MEDICINE

## 2017-10-11 PROCEDURE — 3017F COLORECTAL CA SCREEN DOC REV: CPT | Performed by: INTERNAL MEDICINE

## 2017-10-11 PROCEDURE — G8417 CALC BMI ABV UP PARAM F/U: HCPCS | Performed by: INTERNAL MEDICINE

## 2017-10-11 PROCEDURE — 99214 OFFICE O/P EST MOD 30 MIN: CPT | Performed by: INTERNAL MEDICINE

## 2017-10-11 PROCEDURE — G8427 DOCREV CUR MEDS BY ELIG CLIN: HCPCS | Performed by: INTERNAL MEDICINE

## 2017-10-11 PROCEDURE — 1111F DSCHRG MED/CURRENT MED MERGE: CPT | Performed by: INTERNAL MEDICINE

## 2017-10-11 PROCEDURE — 1036F TOBACCO NON-USER: CPT | Performed by: INTERNAL MEDICINE

## 2017-10-11 NOTE — PROGRESS NOTES
Today's Date: 10/11/2017  Patient's Name: Shae Ascencio  Patient's age: 49 y.o., 1960    CHIEF COMPLAINT:    Racing heart since yesterday     HISTORY OF PRESENT ILLNESS:       History was obtained from the patient.       Shae Ascencio is a 49 y.o.  male with PMH of   - Essential hypertensin  - Diabetes Mellitus Type 2 on oral medications/insulin   - Tobacco abuse - 15 pack years, quit smoking 2 years ago  - CVA  - CKD stage III  - hyperlipidemia  - psoriasis, takes ixekizunab     We were consulted for tachycardia     Patient went to [facility] for racing heart and tachycardia. He measured his blood pressure at home, as it was in 150s he took 5 mg lisinopril, later again one more lisinopril. Did not take metoprolol as his BP came back to normal. HR was in 130s. He complained of racing of heart, felt exhausted but denied any chest pain, syncope, lightheadedness, leg swelling, SOB, cough, fever, chills, dysuria, nausea, vomiting, diaphoresis. In [place] ED, he got IV lopressor and 50 mg oral metoprolol and his HR came back to normal and did not feel heart racing. . Troponin was elevated 0.1, 0.11, 0.12. He has CKD with baseline creatinine of 2. His recent creatinine was 2.03. Initial EKG showed sinus tachycardia and inverted T waves in in aVL. Repeat EKG unchanged from previous one, other than the heart rate being normal. Today patient did not have any complaints. denied any MI, stents in the past. Transferred here for further evaluation.     Skin rash noticed on neck, chest, fading on abdomen. Takes prednisone for rash. Not taking aspirin as his dermatologist told that he was allergic to that medication. As per patient he is not allergic to aspirin.      Upon admission, pt was A&O, normotensive, tachycardic and afebrile.      Admission troponin was 0.10, 0.11, 0.12     Labs showed no leukocytosis, no AGMA, Na 132, creatinine 2.03, BUN 33, , HbA1C 8.     He is doing quit well tenderness  · Bowel sounds present  Extremities:  ·  No Cyanosis or Clubbing  ·  Lower extremity edema: No  ·  Skin: Warm and dry  Neurological:  · Alert and oriented. · Moves all extremities well  · No abnormalities of mood, affect, memory, mentation, or behavior are noted    Cardiac Data:  Diagnostics:    EKG: normal sinus rhythm, unchanged from previous tracings. Labs:   Labs:   CBC:        Recent Labs      09/20/17   0946  09/22/17   1744   WBC  20.4*  13.7*   HGB  17.1  14.8   HCT  50.8  45.0   PLT  306  272      BMP:        Recent Labs      09/20/17   0946  09/22/17   1744   NA  134*  132*   K  4.7  3.8   CO2  29  28   BUN  25*  33*   CREATININE  2.18*  2.03*   LABGLOM  32*  34*   GLUCOSE  251*  155*      BNP: No results for input(s): BNP in the last 72 hours.   PT/INR:       Recent Labs      09/22/17   1744   PROTIME  9.9   INR  0.9        FASTING LIPID PANEL:        Lab Results   Component Value Date     HDL 51 09/20/2017     TRIG 244 09/20/2017        FASTING LIPID PANEL:  Lab Results   Component Value Date    HDL 42 09/23/2017    TRIG 156 09/23/2017       IMPRESSION:    Patient Active Problem List   Diagnosis    Hypertension    Psoriatic arthritis ([address]    Chronic kidney disease (CKD), stage III (moderate)    CVA (cerebral vascular accident) ([address]    History of migraine headaches    Hyperlipidemia    Pituitary microadenoma (HCC)    Chronic bullous emphysema (HCC)    Subcutaneous emphysema, post-procedure    Sinus tachycardia    Pneumothorax    Sepsis ([address]    Allergic reaction caused by a drug    BRENDAN (acute kidney injury) ([address]    Hyperkalemia    Macular erythematous rash    Psoriasis    Leukocytosis    Acute hyperglycemia    Hypokalemia    Hyponatremia    Uncontrolled diabetes mellitus type 2 without complications (HCC)    Allergic reaction    COPD (chronic obstructive pulmonary disease) (HCC)    History of depression       Last Echo: 8/15/2016  Normal left ventricle size, wall

## 2017-10-11 NOTE — TELEPHONE ENCOUNTER
[facility] if he needs to hold any medications prior to his stress test.  This has not been scheduled yet. Please call him back at [phone].

## 2017-10-24 ENCOUNTER — HOSPITAL ENCOUNTER (OUTPATIENT)
Dept: NON INVASIVE DIAGNOSTICS | Age: 57
Discharge: HOME | End: 2017-10-24
Payer: MEDICARE

## 2017-10-24 ENCOUNTER — PATIENT MESSAGE (OUTPATIENT)
Dept: FAMILY MEDICINE CLINIC | Age: 57
End: 2017-10-24

## 2017-10-24 ENCOUNTER — HOSPITAL ENCOUNTER (OUTPATIENT)
Dept: NUCLEAR MEDICINE | Age: 57
Discharge: HOME | End: 2017-10-24
Payer: MEDICARE

## 2017-10-24 DIAGNOSIS — I20.8 ANGINA EFFORT: ICD-10-CM

## 2017-10-24 DIAGNOSIS — R00.0 SINUS TACHYCARDIA: ICD-10-CM

## 2017-10-24 DIAGNOSIS — L29.9 PRURITUS: Primary | ICD-10-CM

## 2017-10-24 DIAGNOSIS — I10 ESSENTIAL HYPERTENSION: ICD-10-CM

## 2017-10-24 PROCEDURE — A9500 TC99M SESTAMIBI: HCPCS | Performed by: INTERNAL MEDICINE

## 2017-10-24 PROCEDURE — 6360000002 HC RX W HCPCS: Performed by: INTERNAL MEDICINE

## 2017-10-24 PROCEDURE — 93017 CV STRESS TEST TRACING ONLY: CPT

## 2017-10-24 PROCEDURE — 78452 HT MUSCLE IMAGE SPECT MULT: CPT

## 2017-10-24 PROCEDURE — 3430000000 HC RX DIAGNOSTIC RADIOPHARMACEUTICAL: Performed by: INTERNAL MEDICINE

## 2017-10-24 PROCEDURE — 93018 CV STRESS TEST I&R ONLY: CPT | Performed by: INTERNAL MEDICINE

## 2017-10-24 RX ORDER — AMINOPHYLLINE DIHYDRATE 25 MG/ML
100 INJECTION, SOLUTION INTRAVENOUS ONCE
Status: COMPLETED | OUTPATIENT
Start: 2017-10-24 | End: 2017-10-24

## 2017-10-24 RX ADMIN — TETRAKIS(2-METHOXYISOBUTYLISOCYANIDE)COPPER(I) TETRAFLUOROBORATE 10 MILLICURIE: 1 INJECTION, POWDER, LYOPHILIZED, FOR SOLUTION INTRAVENOUS at 11:57

## 2017-10-24 RX ADMIN — REGADENOSON 0.4 MG: 0.08 INJECTION, SOLUTION INTRAVENOUS at 10:26

## 2017-10-24 RX ADMIN — AMINOPHYLLINE 100 MG: 25 INJECTION, SOLUTION INTRAVENOUS at 10:32

## 2017-10-24 RX ADMIN — TETRAKIS(2-METHOXYISOBUTYLISOCYANIDE)COPPER(I) TETRAFLUOROBORATE 30 MILLICURIE: 1 INJECTION, POWDER, LYOPHILIZED, FOR SOLUTION INTRAVENOUS at 11:57

## 2017-10-24 NOTE — TELEPHONE ENCOUNTER
From: Ruth Ann Chisholm  To: Damian Almodovar DO  Sent: 10/24/2017 3:01 PM EDT  Subject: Prescription Question    Dr Surekha Arana is there any way you can call a prescription to stop the itching? I think i had one before but I can't find any. Or please give me a call.  Thank you

## 2017-10-25 RX ORDER — CETIRIZINE HYDROCHLORIDE 10 MG/1
10 TABLET ORAL DAILY
Qty: 30 TABLET | Refills: 0 | Status: SHIPPED | OUTPATIENT
Start: 2017-10-25 | End: 2017-10-31 | Stop reason: ALTCHOICE

## 2017-10-25 NOTE — PROCEDURES
[address] [place], [place] [address]                             CARDIAC STRESS TEST    PATIENT NAME: David Redman                 :             1960  MED REC NO:   [id number]                              ROOM:  ACCOUNT NO:   [de-identified]                            ADMISSION DATE:  10/24/2017  PROVIDER:     Lincoln Sanz    DATE OF STUDY:  10/24/2017    Nuclear Myocardial Perfusion Imaging (SPECT)    Ordering Physician:  Argentina Reeder MD    Referring Physician: Bernadine De La Cruz DO    Primary Care Provider: Bernadine De La Cruz DO    Patient's Age: 49        Height: 69 inches        Weight: 220 pounds    TESTING METHOD  INDICATION:  Hypertension, angina    STRESS:   Lexiscan  AGENT:    Cardiolite  REST:     Injection Date: 10/24/2017 Time: 0845  Amount: 13.90 mCi  STRESS:   Injection Date: 10/24/2017 Time: 1025  Amount: 38.5 mCi  IMAGE TIME:    Rest: 933     Stress: 1115    FINDINGS:  Ischemia (Reversible Defect): No  Infarction (Reversible Defect):    No  Ejection fraction Calculated: Normal  Segmental wall motion:   Normal  Diastolic LV Compliance (Stiffness):    Normal    IMPRESSION:  1. Normal perfusion scan. 2. EF 64%.       Gwendolyn Cohen    D: 10/25/2017 12:04:12       T: 10/25/2017 12:12:45     [id number]  Job#: [id number]    Doc#: Unknown

## 2017-10-31 ENCOUNTER — CARE COORDINATION (OUTPATIENT)
Dept: FAMILY MEDICINE CLINIC | Age: 57
End: 2017-10-31

## 2017-10-31 ENCOUNTER — OFFICE VISIT (OUTPATIENT)
Dept: FAMILY MEDICINE CLINIC | Age: 57
End: 2017-10-31
Payer: MEDICARE

## 2017-10-31 VITALS
HEIGHT: 69 IN | SYSTOLIC BLOOD PRESSURE: 122 MMHG | BODY MASS INDEX: 31.7 KG/M2 | OXYGEN SATURATION: 95 % | HEART RATE: 60 BPM | WEIGHT: 214 LBS | DIASTOLIC BLOOD PRESSURE: 84 MMHG

## 2017-10-31 DIAGNOSIS — L29.9 ITCHING: ICD-10-CM

## 2017-10-31 DIAGNOSIS — L53.8 MACULAR ERYTHEMATOUS RASH: ICD-10-CM

## 2017-10-31 DIAGNOSIS — H66.002 ACUTE SUPPURATIVE OTITIS MEDIA OF LEFT EAR WITHOUT SPONTANEOUS RUPTURE OF TYMPANIC MEMBRANE, RECURRENCE NOT SPECIFIED: Primary | ICD-10-CM

## 2017-10-31 DIAGNOSIS — N18.30 CHRONIC KIDNEY DISEASE (CKD), STAGE III (MODERATE) (HCC): Chronic | ICD-10-CM

## 2017-10-31 PROCEDURE — G8417 CALC BMI ABV UP PARAM F/U: HCPCS | Performed by: FAMILY MEDICINE

## 2017-10-31 PROCEDURE — G8484 FLU IMMUNIZE NO ADMIN: HCPCS | Performed by: FAMILY MEDICINE

## 2017-10-31 PROCEDURE — 1036F TOBACCO NON-USER: CPT | Performed by: FAMILY MEDICINE

## 2017-10-31 PROCEDURE — 3045F PR MOST RECENT HEMOGLOBIN A1C LEVEL 7.0-9.0%: CPT | Performed by: FAMILY MEDICINE

## 2017-10-31 PROCEDURE — 99214 OFFICE O/P EST MOD 30 MIN: CPT | Performed by: FAMILY MEDICINE

## 2017-10-31 PROCEDURE — 3017F COLORECTAL CA SCREEN DOC REV: CPT | Performed by: FAMILY MEDICINE

## 2017-10-31 PROCEDURE — G8427 DOCREV CUR MEDS BY ELIG CLIN: HCPCS | Performed by: FAMILY MEDICINE

## 2017-10-31 PROCEDURE — G8598 ASA/ANTIPLAT THER USED: HCPCS | Performed by: FAMILY MEDICINE

## 2017-10-31 RX ORDER — HYDROXYZINE HYDROCHLORIDE 25 MG/1
25 TABLET, FILM COATED ORAL 3 TIMES DAILY PRN
Qty: 20 TABLET | Refills: 0 | Status: SHIPPED | OUTPATIENT
Start: 2017-10-31 | End: 2017-11-08 | Stop reason: ALTCHOICE

## 2017-10-31 RX ORDER — AMOXICILLIN 875 MG/1
875 TABLET, COATED ORAL 2 TIMES DAILY
Qty: 20 TABLET | Refills: 0 | Status: SHIPPED | OUTPATIENT
Start: 2017-10-31 | End: 2017-11-10

## 2017-10-31 ASSESSMENT — PATIENT HEALTH QUESTIONNAIRE - PHQ9
SUM OF ALL RESPONSES TO PHQ9 QUESTIONS 1 & 2: 0
1. LITTLE INTEREST OR PLEASURE IN DOING THINGS: 0
SUM OF ALL RESPONSES TO PHQ QUESTIONS 1-9: 0
2. FEELING DOWN, DEPRESSED OR HOPELESS: 0

## 2017-10-31 NOTE — CARE COORDINATION
Ambulatory Care Coordination Note  10/31/2017  CM Risk Score: 11  Teena Mortality Risk Score:      ACC: Lorelei Jiang RN    Summary Note: Leonardo Murcia has  CKD stage 4, COPD, Psoriasis, Type II Diabetes, Psoriatic Arthritis. He follows with pulmonology, nephrologist, dermatology and allergist for his recurrent rash.       This writer was unable to met with Patient while here to see PCP. Discussed with Dr. Zach Pan after Patient left. Leonardo Murcia is off Prednisone, insulin, and is losing weight. She stated BS are good since off Prednisone   Per chart review- weight down 10 # this month. Unable to reach Patient by phone. Left message requesting return call.      Future Appointments  Date Time Provider Akshat Garciaisti   2/2/2018 9:00 AM DO DANIEL Charles DPP   4/11/2018 11:15 AM MD ANGELICA Thompson DPP   4/25/2018 9:30 AM [facility]

## 2017-10-31 NOTE — PROGRESS NOTES
Medications    hydrOXYzine (ATARAX) 25 MG tablet     Sig: Take 1 tablet by mouth 3 times daily as needed for Itching     Dispense:  20 tablet     Refill:  0    amoxicillin (AMOXIL) 875 MG tablet     Sig: Take 1 tablet by mouth 2 times daily for 10 days With food     Dispense:  20 tablet     Refill:  0       Patient given educational materials - see patient instructions. Discussed use, benefit, and side effects of prescribed medications. All patient questions answered. Pt voiced understanding. Reviewed health maintenance.               Electronically signed by Damian Almodovar DO on 11/12/2017 at 10:07 PM

## 2017-10-31 NOTE — PATIENT INSTRUCTIONS
Patient Education        Ear Infection (Otitis Media): Care Instructions  Your Care Instructions    An ear infection may start with a cold and affect the middle ear (otitis media). It can hurt a lot. Most ear infections clear up on their own in a couple of days. Most often you will not need antibiotics. This is because many ear infections are caused by a virus. Antibiotics don't work against a virus. Regular doses of pain medicines are the best way to reduce your fever and help you feel better. Follow-up care is a key part of your treatment and safety. Be sure to make and go to all appointments, and call your doctor if you are having problems. It's also a good idea to know your test results and keep a list of the medicines you take. How can you care for yourself at home? · Take pain medicines exactly as directed. ¨ If the doctor gave you a prescription medicine for pain, take it as prescribed. ¨ If you are not taking a prescription pain medicine, take an over-the-counter medicine, such as acetaminophen (Tylenol), ibuprofen (Advil, Motrin), or naproxen (Aleve). Read and follow all instructions on the label. ¨ Do not take two or more pain medicines at the same time unless the doctor told you to. Many pain medicines have acetaminophen, which is Tylenol. Too much acetaminophen (Tylenol) can be harmful. · Plan to take a full dose of pain reliever before bedtime. Getting enough sleep will help you get better. · Try a warm, moist washcloth on the ear. It may help relieve pain. · If your doctor prescribed antibiotics, take them as directed. Do not stop taking them just because you feel better. You need to take the full course of antibiotics. When should you call for help? Call your doctor now or seek immediate medical care if:  · You have new or increasing ear pain. · You have new or increasing pus or blood draining from your ear. · You have a fever with a stiff neck or a severe headache.   Watch closely for changes in your health, and be sure to contact your doctor if:  · You have new or worse symptoms. · You are not getting better after taking an antibiotic for 2 days. Where can you learn more? Go to https://Spacebarpefaraeb.Searchperience Inc[employer] org and sign in to your [employer] account. Enter S313 in the [facility] box to learn more about \"Ear Infection (Otitis Media): Care Instructions. \"     If you do not have an account, please click on the \"Sign Up Now\" link. Current as of: May 4, 2017  Content Version: 11.3  © 8528-2439 [employer]. Care instructions adapted under license by [address] If you have questions about a medical condition or this instruction, always ask your healthcare professional. [address] any warranty or liability for your use of this information.

## 2017-11-03 ENCOUNTER — TELEPHONE (OUTPATIENT)
Dept: PRIMARY CARE CLINIC | Age: 57
End: 2017-11-03

## 2017-11-03 DIAGNOSIS — L29.9 PRURITUS: Primary | ICD-10-CM

## 2017-11-03 RX ORDER — FEXOFENADINE HCL 180 MG/1
180 TABLET ORAL DAILY
Qty: 30 TABLET | Refills: 0 | Status: SHIPPED | OUTPATIENT
Start: 2017-11-03 | End: 2019-02-14

## 2017-11-04 ENCOUNTER — HOSPITAL ENCOUNTER (OUTPATIENT)
Dept: LAB | Age: 57
Setting detail: SPECIMEN
Discharge: HOME | End: 2017-11-04
Payer: MEDICARE

## 2017-11-04 DIAGNOSIS — E05.90 HYPERTHYROIDISM: ICD-10-CM

## 2017-11-04 LAB
ESTIMATED AVERAGE GLUCOSE: 160 MG/DL
HBA1C MFR BLD: 7.2 % (ref 4.8–5.9)
THYROXINE, FREE: 1.24 NG/DL (ref 0.93–1.7)
TSH SERPL DL<=0.05 MIU/L-ACNC: 0.99 MIU/L (ref 0.3–5)

## 2017-11-04 PROCEDURE — 84439 ASSAY OF FREE THYROXINE: CPT

## 2017-11-04 PROCEDURE — 36415 COLL VENOUS BLD VENIPUNCTURE: CPT

## 2017-11-04 PROCEDURE — 83036 HEMOGLOBIN GLYCOSYLATED A1C: CPT

## 2017-11-04 PROCEDURE — 84443 ASSAY THYROID STIM HORMONE: CPT

## 2017-11-06 ENCOUNTER — TELEPHONE (OUTPATIENT)
Dept: FAMILY MEDICINE CLINIC | Age: 57
End: 2017-11-06

## 2017-11-06 DIAGNOSIS — L29.9 PRURITUS: Primary | ICD-10-CM

## 2017-11-06 NOTE — TELEPHONE ENCOUNTER
Pt states he tried the allegra all weekend and it did not help for his rash and itching and it's not, pt would like something else, pt uses [facility], please advise at above number.

## 2017-11-07 NOTE — TELEPHONE ENCOUNTER
Gi Coy says he's not taking Zantac anymore as it did not help. The Allegra doesn't help either but it's better than nothing at all. Says he's to the point of desperation & doesn't want to have to re-start the psoriasis med or the prednisone. Would like to try the gabapentin. Send to [facility] if ok. Told him I'd call him back.

## 2017-11-08 RX ORDER — GABAPENTIN 100 MG/1
100 CAPSULE ORAL 3 TIMES DAILY
Qty: 90 CAPSULE | Refills: 0 | Status: SHIPPED | OUTPATIENT
Start: 2017-11-08 | End: 2018-10-02 | Stop reason: ALTCHOICE

## 2017-11-13 ENCOUNTER — CARE COORDINATION (OUTPATIENT)
Dept: CARE COORDINATION | Age: 57
End: 2017-11-13

## 2017-11-13 NOTE — CARE COORDINATION
Ambulatory Care Coordination Note  11/13/2017  CM Risk Score: 11  Teena Mortality Risk Score:      ACC: Antony Carrillo RN    Summary Note: Mendel Bowie has  CKD stage 4, COPD, Psoriasis, Type II Diabetes, Psoriatic Arthritis. He follows with pulmonology, nephrologist, dermatology and allergist for his recurrent rash.      Lab Results   Component Value Date    LABA1C 7.2 (H) 11/04/2017    LABA1C 7.7 (H) 09/23/2017    LABA1C 8.0 (H) 09/20/2017     Lab Results   Component Value Date     11/04/2017     09/23/2017     09/20/2017       Per chart review- rash restarted last week. Patient was started on new medication. Unable to reach Patient by phone. Left message requesting return call.        Future Appointments  Date Time Provider [facility]   2/2/2018 9:00 AM DO DANIEL Rockwell DPP   4/11/2018 11:15 AM MD ANGELICA Mckenzie DPP   4/25/2018 9:30 AM MD [facility]

## 2017-11-25 ENCOUNTER — PATIENT MESSAGE (OUTPATIENT)
Dept: FAMILY MEDICINE CLINIC | Age: 57
End: 2017-11-25

## 2017-11-27 ENCOUNTER — TELEPHONE (OUTPATIENT)
Dept: FAMILY MEDICINE CLINIC | Age: 57
End: 2017-11-27

## 2017-11-27 ENCOUNTER — PATIENT MESSAGE (OUTPATIENT)
Dept: FAMILY MEDICINE CLINIC | Age: 57
End: 2017-11-27

## 2017-11-27 DIAGNOSIS — L30.9 DERMATITIS: Primary | ICD-10-CM

## 2017-11-27 NOTE — TELEPHONE ENCOUNTER
Called the patient back and asked the following questions; Why did he start the [address] again, When did he starts the prednisone, how was he taking the medication, how his rash is doing now. Patient reported that he needed to use the shot because he was having a flare up of Psoriasis. He reported using half of a 50 mg tablet for 7 days of the prednisone. He then reported that his last dose was yesterday and last night his rash started back up.

## 2017-11-28 RX ORDER — PREDNISONE 10 MG/1
TABLET ORAL
Qty: 29 TABLET | Refills: 0 | Status: SHIPPED | OUTPATIENT
Start: 2017-11-28 | End: 2018-01-05 | Stop reason: ALTCHOICE

## 2017-12-07 ENCOUNTER — OFFICE VISIT (OUTPATIENT)
Dept: PRIMARY CARE CLINIC | Age: 57
End: 2017-12-07
Payer: MEDICARE

## 2017-12-07 VITALS
HEART RATE: 64 BPM | OXYGEN SATURATION: 98 % | WEIGHT: 216.4 LBS | HEIGHT: 69 IN | BODY MASS INDEX: 32.05 KG/M2 | DIASTOLIC BLOOD PRESSURE: 98 MMHG | TEMPERATURE: 98.3 F | SYSTOLIC BLOOD PRESSURE: 166 MMHG

## 2017-12-07 DIAGNOSIS — I10 ESSENTIAL HYPERTENSION: ICD-10-CM

## 2017-12-07 DIAGNOSIS — L40.9 PSORIASIS: ICD-10-CM

## 2017-12-07 DIAGNOSIS — L53.8 MACULAR ERYTHEMATOUS RASH: Primary | ICD-10-CM

## 2017-12-07 PROCEDURE — G8427 DOCREV CUR MEDS BY ELIG CLIN: HCPCS | Performed by: PHYSICIAN ASSISTANT

## 2017-12-07 PROCEDURE — 3017F COLORECTAL CA SCREEN DOC REV: CPT | Performed by: PHYSICIAN ASSISTANT

## 2017-12-07 PROCEDURE — G8484 FLU IMMUNIZE NO ADMIN: HCPCS | Performed by: PHYSICIAN ASSISTANT

## 2017-12-07 PROCEDURE — G8417 CALC BMI ABV UP PARAM F/U: HCPCS | Performed by: PHYSICIAN ASSISTANT

## 2017-12-07 PROCEDURE — 99213 OFFICE O/P EST LOW 20 MIN: CPT | Performed by: PHYSICIAN ASSISTANT

## 2017-12-07 PROCEDURE — G8598 ASA/ANTIPLAT THER USED: HCPCS | Performed by: PHYSICIAN ASSISTANT

## 2017-12-07 PROCEDURE — 1036F TOBACCO NON-USER: CPT | Performed by: PHYSICIAN ASSISTANT

## 2017-12-07 RX ORDER — LISINOPRIL 10 MG/1
10 TABLET ORAL 2 TIMES DAILY
Qty: 60 TABLET | Refills: 1 | Status: SHIPPED | OUTPATIENT
Start: 2017-12-07 | End: 2017-12-13 | Stop reason: DRUGHIGH

## 2017-12-07 RX ORDER — PREDNISONE 20 MG/1
TABLET ORAL
Qty: 60 TABLET | Refills: 0 | Status: SHIPPED | OUTPATIENT
Start: 2017-12-07 | End: 2018-01-05 | Stop reason: SDUPTHER

## 2017-12-07 ASSESSMENT — ENCOUNTER SYMPTOMS
SHORTNESS OF BREATH: 0
COUGH: 0

## 2017-12-08 ENCOUNTER — TELEPHONE (OUTPATIENT)
Dept: FAMILY MEDICINE CLINIC | Age: 57
End: 2017-12-08

## 2017-12-08 NOTE — TELEPHONE ENCOUNTER
Pt calling stating he was seen in UC yesterday and increased his BP med, pt states he took 1 pill before his appt, 1 after the appt and 2 at bedtime and 1 this AM and it's not helped at all with the ringing in his ears, states he's had it for 4 days, please advise pt at above number.

## 2017-12-08 NOTE — TELEPHONE ENCOUNTER
Pt is using up his 5 mg tablets. He took 1 QID instead of 2 BID.   BP's were 160/110 and 150/98. UC provider said his ears were clear so it's not cerumen. He is not using ASA.

## 2017-12-08 NOTE — TELEPHONE ENCOUNTER
Is his BP improved since he increased his Lisinopril? The ringing may not be completely resolved with this change. Also, he was only supposed to increase to 10 mg BID, so I am unsure why he took 4 tabs yesterday.

## 2017-12-09 NOTE — TELEPHONE ENCOUNTER
Returned call to pt - states that the ringing in his ears started 3 days ago; has not improved yet. BP is still high this evening - \"over 150\"; he has already taken 10 mg (two 5 mg tabs) BID today. Recommended he increase further to 15 mg BID - will start taking three 5 mg tabs BID tomorrow morning, and monitor BP over the weekend. Will call on Monday or Tuesday with updated BP readings. Instructed him to just wait and monitor whether the ringing improves when his BP returns to normal range. Should f/u sooner if he develops any significant HA or stroke-like symptoms. Pt stated his understanding with this plan. Of note, pt did say his rash was slightly improved today - taking Prednisone 40 mg daily; was given 30-day supply in  yesterday.

## 2017-12-11 ENCOUNTER — TELEPHONE (OUTPATIENT)
Dept: FAMILY MEDICINE CLINIC | Age: 57
End: 2017-12-11

## 2017-12-11 DIAGNOSIS — I10 ESSENTIAL HYPERTENSION: Primary | ICD-10-CM

## 2017-12-11 NOTE — TELEPHONE ENCOUNTER
Pt calling stating his BP was still elevated over the weekend and the ringing in his ears is not gone, Sat, 150/107, 148/110, 142/100, Sunday 159/102, 142/108, 142/94 and today 140/97 and 142/100, please advise pt at above number.

## 2017-12-11 NOTE — TELEPHONE ENCOUNTER
Called and spoke with the patient and informed him the Dr. Zach Pan was not in the office today. However he should report the ED if he has any chest pain, sob, dizziness. He voiced understanding.

## 2017-12-12 NOTE — TELEPHONE ENCOUNTER
Please confirm that pt is taking Lisinopril 15 mg (1.5 of the 10 mg tabs) BID. If so, would recommend that he increase to 20 mg BID (two 10 mg tabs) and see if this brings it down >140/90 and helps with the ringing. Agree with pt following up sooner or going to ER if he develops any of the listed worrisome symptoms.

## 2017-12-13 RX ORDER — LISINOPRIL 20 MG/1
20 TABLET ORAL 2 TIMES DAILY
Qty: 60 TABLET | Refills: 0 | Status: SHIPPED | OUTPATIENT
Start: 2017-12-13 | End: 2018-01-09 | Stop reason: SDUPTHER

## 2017-12-13 NOTE — TELEPHONE ENCOUNTER
Called the patient and asked how he is taking his lisinopril. He stated he is taking Three 5 mg tablets twice a day because he only has 5 mgs left. Please send script for 20s to the pharmacy.

## 2017-12-19 ENCOUNTER — TELEPHONE (OUTPATIENT)
Dept: FAMILY MEDICINE CLINIC | Age: 57
End: 2017-12-19

## 2017-12-19 DIAGNOSIS — I10 ESSENTIAL HYPERTENSION: Primary | ICD-10-CM

## 2017-12-20 RX ORDER — AMLODIPINE BESYLATE 5 MG/1
5 TABLET ORAL DAILY
Qty: 30 TABLET | Refills: 1 | Status: SHIPPED | OUTPATIENT
Start: 2017-12-20 | End: 2018-02-02 | Stop reason: SINTOL

## 2017-12-20 NOTE — TELEPHONE ENCOUNTER
What is his current dose of Prednisone, and when he is next set to decrease/taper his dose? He is already on the maximum dose of Lisinopril and Lopressor.

## 2017-12-27 ENCOUNTER — CARE COORDINATION (OUTPATIENT)
Dept: CARE COORDINATION | Age: 57
End: 2017-12-27

## 2017-12-27 NOTE — CARE COORDINATION
Addressed             Most Recent     Patient Stated (pt-stated)                Sheri Poole stated he is going to join the [facility] 1/2/2018- walking 30 minutes 3 x week     Barriers: none  Plan for overcoming my barriers: N/A  Confidence: 8/10  Anticipated Goal Completion Date: 3/30/2018       Self Monitoring (pt-stated)   On track (12/27/2017)             Self-Monitored Blood Glucose - I will check my blood sugar blood sugars ac & HS  I will notify my provider of any trends of increasing or decreasing blood sugars over a 1 month period. I will notify my provider if I have any blood sugar readings less than 70 more than 2 times a month. None Recently Recorded    Barriers: none  Plan for overcoming my barriers: N/A  Confidence: 8/10  Anticipated Goal Completion Date: 3/30/2018              Prior to Admission medications    Medication Sig Start Date End Date Taking? Authorizing Provider   amLODIPine (NORVASC) 5 MG tablet Take 1 tablet by mouth daily 12/20/17   Quirino Deeds Black, DO   lisinopril (PRINIVIL;ZESTRIL) 20 MG tablet Take 1 tablet by mouth 2 times daily 12/13/17   Quirino Deeds Black, DO   predniSONE (DELTASONE) 20 MG tablet Take 2 tablets once daily 12/7/17   [place], [place]   predniSONE (DELTASONE) 10 MG tablet Take 3 tabs by mouth daily x 5 days, then 2 tabs daily x 5 days, then 1 tab daily x 4 days. 11/28/17   Quirino Deeds Black, DO   gabapentin (NEURONTIN) 100 MG capsule Take 1 capsule by mouth 3 times daily 11/8/17   Quirino Deeds Black, DO   fexofenadine (ALLEGRA) 180 MG tablet Take 1 tablet by mouth daily 11/3/17   Quirino Deeds Black, DO   triamcinolone (KENALOG) 0.1 % ointment  10/14/17   Historical Provider, MD   Glucose Blood (BLOOD GLUCOSE TEST STRIPS) STRP Freestyle Lyte strips,5 x's per day.  Hyperglycemia, DM Type II - exacerbated by meds - on insulin 10/6/17   Quirino Deeds Black, DO   FREESTYLE LANCETS MISC 1 each by Does not apply route 3 times daily 10/3/17   Quirino Deeds Black, DO   metoprolol (LOPRESSOR) 100 MG tablet Take 1

## 2017-12-28 DIAGNOSIS — I10 ESSENTIAL HYPERTENSION: ICD-10-CM

## 2017-12-28 RX ORDER — LISINOPRIL 5 MG/1
TABLET ORAL
Qty: 60 TABLET | Refills: 1 | OUTPATIENT
Start: 2017-12-28

## 2018-01-05 DIAGNOSIS — L53.8 MACULAR ERYTHEMATOUS RASH: ICD-10-CM

## 2018-01-05 DIAGNOSIS — L40.9 PSORIASIS: ICD-10-CM

## 2018-01-05 RX ORDER — PREDNISONE 20 MG/1
TABLET ORAL
Qty: 60 TABLET | Refills: 0 | Status: SHIPPED | OUTPATIENT
Start: 2018-01-05 | End: 2018-02-21 | Stop reason: SDUPTHER

## 2018-01-09 DIAGNOSIS — I10 ESSENTIAL HYPERTENSION: ICD-10-CM

## 2018-01-09 RX ORDER — LISINOPRIL 20 MG/1
TABLET ORAL
Qty: 60 TABLET | Refills: 2 | Status: SHIPPED | OUTPATIENT
Start: 2018-01-09 | End: 2018-05-23 | Stop reason: SDUPTHER

## 2018-01-18 DIAGNOSIS — I10 ESSENTIAL HYPERTENSION: ICD-10-CM

## 2018-01-19 RX ORDER — METOPROLOL TARTRATE 100 MG/1
TABLET ORAL
Qty: 60 TABLET | Refills: 5 | Status: SHIPPED | OUTPATIENT
Start: 2018-01-19 | End: 2018-08-12 | Stop reason: SDUPTHER

## 2018-02-01 ENCOUNTER — CARE COORDINATION (OUTPATIENT)
Dept: CARE COORDINATION | Age: 58
End: 2018-02-01

## 2018-02-01 NOTE — CARE COORDINATION
daily 12/20/17  Yes Homer Chen DO   gabapentin (NEURONTIN) 100 MG capsule Take 1 capsule by mouth 3 times daily 11/8/17  Yes Homer Chen DO   fexofenadine (ALLEGRA) 180 MG tablet Take 1 tablet by mouth daily 11/3/17  Yes Homer Chen, DO   insulin NPH (HUMULIN N;NOVOLIN N) 100 UNIT/ML injection vial Inject 12 Units into the skin 2 times daily  Patient taking differently: Inject 6 Units into the skin 2 times daily  8/28/17  Yes Homer Chen DO   insulin aspart (NOVOLOG) 100 UNIT/ML injection vial Use TID as directed per sliding scale: Less than 170 0 units 171-220 3 units 221-270 5 units 271-320 7 units Greater keerthi 320 9 units 8/28/17  Yes Homer Chen DO   Ixekizumab (TALTZ) 80 MG/ML SOAJ Inject into the skin every 30 days    Yes Historical Provider, MD   triamcinolone (KENALOG) 0.1 % ointment  10/14/17   Historical Provider, MD   Glucose Blood (BLOOD GLUCOSE TEST STRIPS) STRP Freestyle Lyte strips,5 x's per day. Hyperglycemia, DM Type II - exacerbated by meds - on insulin 10/6/17   Homer Chen, DO   FREESTYLE LANCETS MISC 1 each by Does not apply route 3 times daily 10/3/17   Homer Chen, DO   Insulin Syringe-Needle U-100 (AIMSCO INS SYR .3CC/29GX0.5\") 29G X 1/2\" 0.3 ML MISC Use 5 times daily as needed. Patient to use 1 needle Twice daily, then 1 needle with sliding scale TID. 8/30/17   Gustavo Waverly, DO   Skin Protectants, Misc. (EUCERIN) cream Apply topically as needed. 6/20/17   Angel Luis Gomez NP   glucose monitoring kit (FREESTYLE) monitoring kit 1 kit by Does not apply route daily as needed    Historical Provider, MD   Handicap Placard MISC by Does not apply route Diagnosis: J43.9, L40.50, N18.3.   Expires in 5 years 08/23/2021. 8/23/16   Gustavo Parker, DO       Future Appointments  Date Time Provider Akshat Vann   2/2/2018 9:00 AM Homermalorie Galicia [facility] [place] DANIEL DPP   4/11/2018 11:15 AM [facility]

## 2018-02-01 NOTE — PATIENT INSTRUCTIONS
Patient Education        Influenza (Flu): Care Instructions  Your Care Instructions    Influenza (flu) is an infection in the lungs and breathing passages. It is caused by the influenza virus. There are different strains, or types, of the flu virus from year to year. Unlike the common cold, the flu comes on suddenly and the symptoms, such as a cough, congestion, fever, chills, fatigue, aches, and pains, are more severe. These symptoms may last up to 10 days. Although the flu can make you feel very sick, it usually doesn't cause serious health problems. Home treatment is usually all you need for flu symptoms. But your doctor may prescribe antiviral medicine to prevent other health problems, such as pneumonia, from developing. Older people and those who have a long-term health condition, such as lung disease, are most at risk for having pneumonia or other health problems. Follow-up care is a key part of your treatment and safety. Be sure to make and go to all appointments, and call your doctor if you are having problems. It's also a good idea to know your test results and keep a list of the medicines you take. How can you care for yourself at home? · Get plenty of rest.  · Drink plenty of fluids, enough so that your urine is light yellow or clear like water. If you have kidney, heart, or liver disease and have to limit fluids, talk with your doctor before you increase the amount of fluids you drink. · Take an over-the-counter pain medicine if needed, such as acetaminophen (Tylenol), ibuprofen (Advil, Motrin), or naproxen (Aleve), to relieve fever, headache, and muscle aches. Read and follow all instructions on the label. No one younger than 20 should take aspirin. It has been linked to Reye syndrome, a serious illness. · Do not smoke. Smoking can make the flu worse. If you need help quitting, talk to your doctor about stop-smoking programs and medicines.  These can increase your chances of quitting for good.  · Breathe moist air from a hot shower or from a sink filled with hot water to help clear a stuffy nose. · Before you use cough and cold medicines, check the label. These medicines may not be safe for young children or for people with certain health problems. · If the skin around your nose and lips becomes sore, put some petroleum jelly on the area. · To ease coughing:  ¨ Drink fluids to soothe a scratchy throat. ¨ Suck on cough drops or plain hard candy. ¨ Take an over-the-counter cough medicine that contains dextromethorphan to help you get some sleep. Read and follow all instructions on the label. ¨ Raise your head at night with an extra pillow. This may help you rest if coughing keeps you awake. · Take any prescribed medicine exactly as directed. Call your doctor if you think you are having a problem with your medicine. To avoid spreading the flu  · Wash your hands regularly, and keep your hands away from your face. · Stay home from school, work, and other public places until you are feeling better and your fever has been gone for at least 24 hours. The fever needs to have gone away on its own without the help of medicine. · Ask people living with you to talk to their doctors about preventing the flu. They may get antiviral medicine to keep from getting the flu from you. · To prevent the flu in the future, get a flu vaccine every fall. Encourage people living with you to get the vaccine. · Cover your mouth when you cough or sneeze. When should you call for help? Call 911 anytime you think you may need emergency care. For example, call if:  ? · You have severe trouble breathing. ?Call your doctor now or seek immediate medical care if:  ? · You have new or worse trouble breathing. ? · You seem to be getting much sicker. ? · You feel very sleepy or confused. ? · You have a new or higher fever. ? · You get a new rash. ? Watch closely for changes in your health, and be sure to contact your doctor if:  ? · You begin to get better and then get worse. ? · You are not getting better after 1 week. Where can you learn more? Go to https://Cognitive Matchpepiceweb.jigl. org and sign in to your [employer] account. Enter C512 in the [facility] box to learn more about \"Influenza (Flu): Care Instructions. \"     If you do not have an account, please click on the \"Sign Up Now\" link. Current as of: May 12, 2017  Content Version: 11.5  © 2531-5911 [employer], Incorporated. Care instructions adapted under license by [facility] ([facility]). If you have questions about a medical condition or this instruction, always ask your healthcare professional. [address] any warranty or liability for your use of this information.

## 2018-02-02 ENCOUNTER — OFFICE VISIT (OUTPATIENT)
Dept: FAMILY MEDICINE CLINIC | Age: 58
End: 2018-02-02
Payer: MEDICARE

## 2018-02-02 VITALS
HEART RATE: 64 BPM | HEIGHT: 69 IN | SYSTOLIC BLOOD PRESSURE: 136 MMHG | BODY MASS INDEX: 30.96 KG/M2 | WEIGHT: 209 LBS | DIASTOLIC BLOOD PRESSURE: 84 MMHG | OXYGEN SATURATION: 94 %

## 2018-02-02 DIAGNOSIS — T78.40XD ALLERGIC REACTION TO DRUG, SUBSEQUENT ENCOUNTER: ICD-10-CM

## 2018-02-02 DIAGNOSIS — N18.30 CHRONIC KIDNEY DISEASE (CKD), STAGE III (MODERATE) (HCC): Chronic | ICD-10-CM

## 2018-02-02 DIAGNOSIS — L40.9 PSORIASIS: ICD-10-CM

## 2018-02-02 DIAGNOSIS — I10 ESSENTIAL HYPERTENSION: ICD-10-CM

## 2018-02-02 PROCEDURE — 3017F COLORECTAL CA SCREEN DOC REV: CPT | Performed by: FAMILY MEDICINE

## 2018-02-02 PROCEDURE — G8417 CALC BMI ABV UP PARAM F/U: HCPCS | Performed by: FAMILY MEDICINE

## 2018-02-02 PROCEDURE — 1036F TOBACCO NON-USER: CPT | Performed by: FAMILY MEDICINE

## 2018-02-02 PROCEDURE — G8484 FLU IMMUNIZE NO ADMIN: HCPCS | Performed by: FAMILY MEDICINE

## 2018-02-02 PROCEDURE — G8599 NO ASA/ANTIPLAT THER USE RNG: HCPCS | Performed by: FAMILY MEDICINE

## 2018-02-02 PROCEDURE — 99214 OFFICE O/P EST MOD 30 MIN: CPT | Performed by: FAMILY MEDICINE

## 2018-02-02 PROCEDURE — G8427 DOCREV CUR MEDS BY ELIG CLIN: HCPCS | Performed by: FAMILY MEDICINE

## 2018-02-02 PROCEDURE — 3046F HEMOGLOBIN A1C LEVEL >9.0%: CPT | Performed by: FAMILY MEDICINE

## 2018-02-02 RX ORDER — GLUCOSAMINE HCL/CHONDROITIN SU 500-400 MG
CAPSULE ORAL
Qty: 300 STRIP | Refills: 3 | Status: SHIPPED | OUTPATIENT
Start: 2018-02-02 | End: 2018-05-29 | Stop reason: ALTCHOICE

## 2018-02-06 ENCOUNTER — TELEPHONE (OUTPATIENT)
Dept: FAMILY MEDICINE CLINIC | Age: 58
End: 2018-02-06

## 2018-02-06 DIAGNOSIS — E87.1 HYPONATREMIA: Primary | ICD-10-CM

## 2018-02-06 DIAGNOSIS — I10 ESSENTIAL HYPERTENSION: ICD-10-CM

## 2018-02-06 NOTE — TELEPHONE ENCOUNTER
Pt calling stating he has stopped the amlodipine and then he started getting headaches, so he started it again and headache went away, but pt states he needs to be on something else due to side effects he had discussed with KB at last ov, pt uses above pharmacy, please advise at above number.

## 2018-02-12 NOTE — TELEPHONE ENCOUNTER
Melani Venegas stopped the amlodipine because he was getting lightheaded & his BP was 96/74. He finished prednisone on Feb 8. The H/A's went away. His systolic BP has remained in the 90's but his heart rate won't go lower than 88. He is only taking metoprolol & ranitidine. Says he can feel his heart pounding. No chest pain.

## 2018-02-14 NOTE — TELEPHONE ENCOUNTER
Please confirm - did pt also stop Lisinopril? Are headaches and sexual side effects all resolved? Pt to increase water intake to help raise BP; may need to SLIGHTLY increase salt intake until systolic BP >335. Will continue only Metoprolol for BP at this time, but if BP increases, may need to re-start Lisinopril.

## 2018-02-15 DIAGNOSIS — I10 ESSENTIAL HYPERTENSION: ICD-10-CM

## 2018-02-15 RX ORDER — METOPROLOL TARTRATE 100 MG/1
50 TABLET ORAL 2 TIMES DAILY
Qty: 60 TABLET | Refills: 5 | Status: CANCELLED | OUTPATIENT
Start: 2018-02-15

## 2018-02-15 RX ORDER — AMLODIPINE BESYLATE 5 MG/1
TABLET ORAL
Qty: 30 TABLET | Refills: 0 | OUTPATIENT
Start: 2018-02-15

## 2018-02-15 NOTE — TELEPHONE ENCOUNTER
Pt has refills at the pharmacy already per med list - he can fill this, and then cut the tabs in 1/2.

## 2018-02-16 ENCOUNTER — TELEPHONE (OUTPATIENT)
Dept: FAMILY MEDICINE CLINIC | Age: 58
End: 2018-02-16

## 2018-02-21 ENCOUNTER — TELEPHONE (OUTPATIENT)
Dept: FAMILY MEDICINE CLINIC | Age: 58
End: 2018-02-21

## 2018-02-21 DIAGNOSIS — Z79.52 CURRENT CHRONIC USE OF SYSTEMIC STEROIDS: ICD-10-CM

## 2018-02-21 DIAGNOSIS — L53.8 MACULAR ERYTHEMATOUS RASH: Primary | ICD-10-CM

## 2018-02-21 DIAGNOSIS — T50.905D ADVERSE EFFECT OF DRUG, SUBSEQUENT ENCOUNTER: ICD-10-CM

## 2018-02-21 RX ORDER — PREDNISONE 20 MG/1
TABLET ORAL
Qty: 30 TABLET | Refills: 0 | Status: SHIPPED | OUTPATIENT
Start: 2018-02-21 | End: 2018-03-06 | Stop reason: DRUGHIGH

## 2018-02-21 NOTE — TELEPHONE ENCOUNTER
Wants to give himself the injection today- he has it at home. The skin on his hands is cracking and the rash is starting up his back again. Thinks he should re-start at 40 mg daily. His next appt with specialist is March 23.

## 2018-03-02 ENCOUNTER — TELEPHONE (OUTPATIENT)
Dept: PRIMARY CARE CLINIC | Age: 58
End: 2018-03-02

## 2018-03-06 ENCOUNTER — HOSPITAL ENCOUNTER (OUTPATIENT)
Dept: LAB | Age: 58
Setting detail: SPECIMEN
Discharge: HOME | End: 2018-03-06
Payer: MEDICARE

## 2018-03-06 DIAGNOSIS — T50.905A ADVERSE EFFECT OF DRUG, INITIAL ENCOUNTER: ICD-10-CM

## 2018-03-06 DIAGNOSIS — E87.1 HYPONATREMIA: ICD-10-CM

## 2018-03-06 DIAGNOSIS — Z79.52 CURRENT CHRONIC USE OF SYSTEMIC STEROIDS: ICD-10-CM

## 2018-03-06 LAB
ANION GAP SERPL CALCULATED.3IONS-SCNC: 12 MMOL/L (ref 9–17)
BUN BLDV-MCNC: 25 MG/DL (ref 6–20)
BUN/CREAT BLD: 13 (ref 9–20)
CALCIUM SERPL-MCNC: 8.7 MG/DL (ref 8.6–10.4)
CHLORIDE BLD-SCNC: 94 MMOL/L (ref 98–107)
CO2: 27 MMOL/L (ref 20–31)
CREAT SERPL-MCNC: 1.87 MG/DL (ref 0.7–1.2)
GFR AFRICAN AMERICAN: 46 ML/MIN
GFR NON-AFRICAN AMERICAN: 38 ML/MIN
GFR SERPL CREATININE-BSD FRML MDRD: ABNORMAL ML/MIN/{1.73_M2}
GFR SERPL CREATININE-BSD FRML MDRD: ABNORMAL ML/MIN/{1.73_M2}
GLUCOSE BLD-MCNC: 425 MG/DL (ref 70–99)
POTASSIUM SERPL-SCNC: 4.9 MMOL/L (ref 3.7–5.3)
SODIUM BLD-SCNC: 133 MMOL/L (ref 135–144)

## 2018-03-06 PROCEDURE — 80048 BASIC METABOLIC PNL TOTAL CA: CPT

## 2018-03-06 PROCEDURE — 36415 COLL VENOUS BLD VENIPUNCTURE: CPT

## 2018-03-06 RX ORDER — PREDNISONE 10 MG/1
30 TABLET ORAL DAILY
Qty: 45 TABLET | Refills: 0 | Status: SHIPPED | OUTPATIENT
Start: 2018-03-06 | End: 2018-05-29 | Stop reason: ALTCHOICE

## 2018-03-06 NOTE — TELEPHONE ENCOUNTER
Pt came to window requesting med refills of [place] log and Prednisone, states that he cant just stop taking the Prednisone 40 mg, he has 3 day left of it and would like 30 mg sent into his phamamcy.

## 2018-03-07 ENCOUNTER — CARE COORDINATION (OUTPATIENT)
Dept: CARE COORDINATION | Age: 58
End: 2018-03-07

## 2018-03-07 DIAGNOSIS — I10 ESSENTIAL HYPERTENSION: ICD-10-CM

## 2018-03-19 RX ORDER — BLOOD SUGAR DIAGNOSTIC
STRIP MISCELLANEOUS
Qty: 150 EACH | Refills: 5 | Status: SHIPPED | OUTPATIENT
Start: 2018-03-19 | End: 2018-05-29 | Stop reason: ALTCHOICE

## 2018-03-20 ENCOUNTER — HOSPITAL ENCOUNTER (OUTPATIENT)
Dept: LAB | Age: 58
Setting detail: SPECIMEN
Discharge: HOME | End: 2018-03-20
Payer: MEDICARE

## 2018-03-20 LAB
-: NORMAL
ABSOLUTE EOS #: 0.1 K/UL (ref 0–0.4)
ABSOLUTE IMMATURE GRANULOCYTE: ABNORMAL K/UL (ref 0–0.3)
ABSOLUTE LYMPH #: 1.3 K/UL (ref 1–4.8)
ABSOLUTE MONO #: 0.8 K/UL (ref 0.1–1.2)
AMORPHOUS: NORMAL
ANION GAP SERPL CALCULATED.3IONS-SCNC: 10 MMOL/L (ref 9–17)
BACTERIA: NORMAL
BASOPHILS # BLD: 1 % (ref 0–2)
BASOPHILS ABSOLUTE: 0.1 K/UL (ref 0–0.2)
BILIRUBIN URINE: NEGATIVE
BUN BLDV-MCNC: 24 MG/DL (ref 6–20)
BUN/CREAT BLD: 14 (ref 9–20)
CALCIUM SERPL-MCNC: 8.8 MG/DL (ref 8.6–10.4)
CASTS UA: NORMAL /LPF (ref 0–2)
CHLORIDE BLD-SCNC: 91 MMOL/L (ref 98–107)
CO2: 31 MMOL/L (ref 20–31)
COLOR: ABNORMAL
COMMENT UA: ABNORMAL
CREAT SERPL-MCNC: 1.75 MG/DL (ref 0.7–1.2)
CRYSTALS, UA: NORMAL /HPF
DIFFERENTIAL TYPE: ABNORMAL
EOSINOPHILS RELATIVE PERCENT: 1 % (ref 1–8)
EPITHELIAL CELLS UA: NORMAL /HPF (ref 0–5)
GFR AFRICAN AMERICAN: 49 ML/MIN
GFR NON-AFRICAN AMERICAN: 41 ML/MIN
GFR SERPL CREATININE-BSD FRML MDRD: ABNORMAL ML/MIN/{1.73_M2}
GFR SERPL CREATININE-BSD FRML MDRD: ABNORMAL ML/MIN/{1.73_M2}
GLUCOSE BLD-MCNC: 495 MG/DL (ref 70–99)
GLUCOSE URINE: ABNORMAL
HCT VFR BLD CALC: 46.1 % (ref 41–53)
HEMOGLOBIN: 15.2 G/DL (ref 13.5–17.5)
IMMATURE GRANULOCYTES: ABNORMAL %
KETONES, URINE: NEGATIVE
LEUKOCYTE ESTERASE, URINE: NEGATIVE
LYMPHOCYTES # BLD: 11 % (ref 15–43)
MCH RBC QN AUTO: 31.1 PG (ref 26–34)
MCHC RBC AUTO-ENTMCNC: 32.9 G/DL (ref 31–37)
MCV RBC AUTO: 94.7 FL (ref 80–100)
MONOCYTES # BLD: 7 % (ref 6–14)
MUCUS: NORMAL
NITRITE, URINE: NEGATIVE
NRBC AUTOMATED: ABNORMAL PER 100 WBC
OTHER OBSERVATIONS UA: NORMAL
PDW BLD-RTO: 15.4 % (ref 11–14.5)
PH UA: 5.5 (ref 5–6)
PLATELET # BLD: 252 K/UL (ref 140–450)
PLATELET ESTIMATE: ABNORMAL
PMV BLD AUTO: 7.9 FL (ref 6–12)
POTASSIUM SERPL-SCNC: 5.3 MMOL/L (ref 3.7–5.3)
PROTEIN UA: NEGATIVE
RBC # BLD: 4.87 M/UL (ref 4.5–5.9)
RBC # BLD: ABNORMAL 10*6/UL
RBC UA: NORMAL /HPF (ref 0–4)
RENAL EPITHELIAL, UA: NORMAL /HPF
SEG NEUTROPHILS: 80 % (ref 44–74)
SEGMENTED NEUTROPHILS ABSOLUTE COUNT: 9.6 K/UL (ref 1.8–7.7)
SODIUM BLD-SCNC: 132 MMOL/L (ref 135–144)
SPECIFIC GRAVITY UA: 1 (ref 1.01–1.02)
TRICHOMONAS: NORMAL
TURBIDITY: ABNORMAL
URINE HGB: NEGATIVE
UROBILINOGEN, URINE: NORMAL
WBC # BLD: 11.8 K/UL (ref 3.5–11)
WBC # BLD: ABNORMAL 10*3/UL
WBC UA: NORMAL /HPF (ref 0–4)
YEAST: NORMAL

## 2018-03-20 PROCEDURE — 81001 URINALYSIS AUTO W/SCOPE: CPT

## 2018-03-20 PROCEDURE — 80048 BASIC METABOLIC PNL TOTAL CA: CPT

## 2018-03-20 PROCEDURE — 85025 COMPLETE CBC W/AUTO DIFF WBC: CPT

## 2018-03-20 PROCEDURE — 36415 COLL VENOUS BLD VENIPUNCTURE: CPT

## 2018-03-28 ENCOUNTER — INITIAL CONSULT (OUTPATIENT)
Dept: ENDOCRINOLOGY | Age: 58
End: 2018-03-28
Payer: MEDICARE

## 2018-03-28 VITALS
HEART RATE: 88 BPM | SYSTOLIC BLOOD PRESSURE: 116 MMHG | WEIGHT: 215.6 LBS | RESPIRATION RATE: 18 BRPM | BODY MASS INDEX: 31.93 KG/M2 | OXYGEN SATURATION: 96 % | DIASTOLIC BLOOD PRESSURE: 88 MMHG | HEIGHT: 69 IN | TEMPERATURE: 98 F

## 2018-03-28 DIAGNOSIS — Z79.52 LONG TERM CURRENT USE OF SYSTEMIC STEROIDS: Primary | ICD-10-CM

## 2018-03-28 DIAGNOSIS — E09.9 DRUG OR CHEMICAL INDUCED DIABETES MELLITUS WITHOUT COMPLICATION, WITHOUT LONG-TERM CURRENT USE OF INSULIN (HCC): ICD-10-CM

## 2018-03-28 LAB
GLUCOSE BLD-MCNC: 169 MG/DL
HBA1C MFR BLD: 8.6 %

## 2018-03-28 PROCEDURE — 3017F COLORECTAL CA SCREEN DOC REV: CPT | Performed by: INTERNAL MEDICINE

## 2018-03-28 PROCEDURE — G8427 DOCREV CUR MEDS BY ELIG CLIN: HCPCS | Performed by: INTERNAL MEDICINE

## 2018-03-28 PROCEDURE — G8417 CALC BMI ABV UP PARAM F/U: HCPCS | Performed by: INTERNAL MEDICINE

## 2018-03-28 PROCEDURE — 99204 OFFICE O/P NEW MOD 45 MIN: CPT | Performed by: INTERNAL MEDICINE

## 2018-03-28 PROCEDURE — 82962 GLUCOSE BLOOD TEST: CPT | Performed by: INTERNAL MEDICINE

## 2018-03-28 PROCEDURE — 83036 HEMOGLOBIN GLYCOSYLATED A1C: CPT | Performed by: INTERNAL MEDICINE

## 2018-03-28 PROCEDURE — G8484 FLU IMMUNIZE NO ADMIN: HCPCS | Performed by: INTERNAL MEDICINE

## 2018-03-28 RX ORDER — DEXAMETHASONE 0.5 MG/1
0.5 TABLET ORAL EVERY 6 HOURS
Qty: 40 TABLET | Refills: 0 | Status: CANCELLED | OUTPATIENT
Start: 2018-03-28 | End: 2018-04-07

## 2018-03-28 RX ORDER — RANITIDINE 150 MG/1
150 TABLET ORAL 2 TIMES DAILY
COMMUNITY
End: 2018-04-09 | Stop reason: SDUPTHER

## 2018-03-28 NOTE — PROGRESS NOTES
gabapentin (NEURONTIN) 100 MG capsule Take 1 capsule by mouth 3 times daily 90 capsule 0    fexofenadine (ALLEGRA) 180 MG tablet Take 1 tablet by mouth daily 30 tablet 0    FREESTYLE LANCETS MISC 1 each by Does not apply route 3 times daily 100 each 11    insulin NPH (HUMULIN N;NOVOLIN N) 100 UNIT/ML injection vial Inject 12 Units into the skin 2 times daily (Patient taking differently: Inject 8 Units into the skin 2 times daily ) 2 vial 5    Ixekizumab (TALTZ) 80 MG/ML SOAJ Inject into the skin every 30 days       glucose monitoring kit (FREESTYLE) monitoring kit 1 kit by Does not apply route daily as needed       No current facility-administered medications for this visit. Allergies   Allergen Reactions    Cosentyx [Secukinumab] Rash     Nausea, vomiting fever    Lantus [Insulin Glargine] Itching, Swelling and Rash     Patient started Lantus about a month ago, only new medication. His skin reaction started on his abdomen where he injects the Lantus and moved up to the face. This is similar to his response to Cosentyx.  Methotrexate Derivatives Other (See Comments)     Bone marrow toxicity    Seasonal     Otezla [Apremilast] Rash    Stellaria Rash       Subjective:      Review of Systems     CONSTITUTIONAL:  GAINED 25 LB IN LAST YEAR AND HALF SINCE ON PREDNISONE,  NO FATIGUE OR FEVER. Head: NO HEAD ACHES  Eyes: NO VISION CHANGES  ENT: NO COMPLAINTS  Cardiovascular: HYPERTENSION AS NOTED ABOVE  NO HYPERCHOLESTEROLEMIA . NO CHEST PAIN OR ANGINA. Peripheral Vascular: NO CLAUDICATION. HAS SLIGHT LEG EDEMA  Respiratory: NO COUGH OR SHORTNESS OF BREATH. NO HX OF SLEEP APNEA. [facility] [place] HX OF SPONTANEOUS LUNG COLLAPSE IN PAST. STABLE NOW. Gastro - Intetestinal: OCCASIONAL ACID REFLUX. BOWEL O.K. NO ABDOMINAL PAIN. Genito - Urinary: NO POLYURIA . NO DYSURIA. Rheumatological:  SOME ARTHRITIC PAINS IN KNEES , HANDS AND FEET. Neurological: HX OF  RECURRENT TIA  IN PAST. NO HX OF SEIZURES.  NO PARESTHESIAS and subcortical white matter which essentially are nonspecific in imaging    appearance however likely represent chronic small vessel disease. No    acute or subacute ischemic insult is seen.       Noncontrasted examination to the level of the pituitary gland demonstrate    hyperintense T1-weighted signal intensity within the posterior aspect of    the sella which appears slightly larger than previous examination    measuring approximately 1.1 CM x6 mm without evidence for cavernous sinus    invasion or suprasellar extension. Differential considerations include    Rathke's cleft cyst or choristoma. Nonetheless study lacks intravenous    contrast for accurate assessment. No evidence for invasion of the clivus    seen. No evidence for mass effect on the optic chiasm seen. IMPRESSION:    Slight interval enlargement of the previously seen    hyperintense T1-weighted signal intensity mass in the posterior aspect of    the sella. Differential considerations include Rathke's cleft cyst or    pituitary choristoma. Nonetheless study lacks intravenous contrast for    accurate assessment. No suprasellar extension seen. No evidence for    cavernous sinus invasion seen. No evidence for clival invasion seen. IMPRESSION :    WEIGHT GAIN FROM  CHRONIC STEROID USE FOR  PSORIASIS AND SKIN RASH    EXOGENOUS CUSHING'S    HX OF HYPERTENSION    STEROID INDUCED DIABETES MELLITUS    CHRONIC RENAL INSUFFICIENCY. 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 Long term current use of systemic steroids    2. BMI 32.0-32.9,adult    3. Drug or chemical induced diabetes mellitus without complication, without long-term current use of insulin ([address]              Plan:      1.  ALL LAB AND REPORTS

## 2018-04-02 ENCOUNTER — CARE COORDINATION (OUTPATIENT)
Dept: CARE COORDINATION | Age: 58
End: 2018-04-02

## 2018-04-02 RX ORDER — AMLODIPINE BESYLATE 5 MG/1
TABLET ORAL
Qty: 30 TABLET | Refills: 0 | OUTPATIENT
Start: 2018-04-02

## 2018-04-06 ENCOUNTER — OFFICE VISIT (OUTPATIENT)
Dept: FAMILY MEDICINE CLINIC | Age: 58
End: 2018-04-06
Payer: MEDICARE

## 2018-04-06 VITALS
DIASTOLIC BLOOD PRESSURE: 80 MMHG | HEIGHT: 69 IN | BODY MASS INDEX: 31.34 KG/M2 | SYSTOLIC BLOOD PRESSURE: 130 MMHG | HEART RATE: 68 BPM | WEIGHT: 211.6 LBS

## 2018-04-06 DIAGNOSIS — N18.30 CHRONIC KIDNEY DISEASE (CKD), STAGE III (MODERATE) (HCC): Chronic | ICD-10-CM

## 2018-04-06 DIAGNOSIS — I10 ESSENTIAL HYPERTENSION: ICD-10-CM

## 2018-04-06 DIAGNOSIS — L40.9 SCALP PSORIASIS: ICD-10-CM

## 2018-04-06 DIAGNOSIS — I10 ESSENTIAL HYPERTENSION: Primary | ICD-10-CM

## 2018-04-06 PROCEDURE — G8417 CALC BMI ABV UP PARAM F/U: HCPCS | Performed by: FAMILY MEDICINE

## 2018-04-06 PROCEDURE — 99214 OFFICE O/P EST MOD 30 MIN: CPT | Performed by: FAMILY MEDICINE

## 2018-04-06 PROCEDURE — 1036F TOBACCO NON-USER: CPT | Performed by: FAMILY MEDICINE

## 2018-04-06 PROCEDURE — 3017F COLORECTAL CA SCREEN DOC REV: CPT | Performed by: FAMILY MEDICINE

## 2018-04-06 PROCEDURE — G8427 DOCREV CUR MEDS BY ELIG CLIN: HCPCS | Performed by: FAMILY MEDICINE

## 2018-04-06 PROCEDURE — G8599 NO ASA/ANTIPLAT THER USE RNG: HCPCS | Performed by: FAMILY MEDICINE

## 2018-04-06 RX ORDER — LISINOPRIL 20 MG/1
TABLET ORAL
Qty: 60 TABLET | Refills: 1 | Status: CANCELLED | OUTPATIENT
Start: 2018-04-06

## 2018-04-06 RX ORDER — LISINOPRIL 20 MG/1
TABLET ORAL
Qty: 60 TABLET | Status: CANCELLED | OUTPATIENT
Start: 2018-04-06

## 2018-04-06 RX ORDER — CLOBETASOL PROPIONATE 0.05 G/100ML
SHAMPOO TOPICAL
Qty: 1 BOTTLE | Refills: 0 | Status: SHIPPED | OUTPATIENT
Start: 2018-04-06 | End: 2018-05-16 | Stop reason: SDUPTHER

## 2018-04-07 DIAGNOSIS — R21 RASH: ICD-10-CM

## 2018-04-09 RX ORDER — RANITIDINE 150 MG/1
TABLET ORAL
Qty: 60 TABLET | Refills: 11 | Status: SHIPPED | OUTPATIENT
Start: 2018-04-09 | End: 2019-04-12 | Stop reason: SDUPTHER

## 2018-04-11 DIAGNOSIS — I10 ESSENTIAL HYPERTENSION: ICD-10-CM

## 2018-04-12 ENCOUNTER — HOSPITAL ENCOUNTER (OUTPATIENT)
Dept: BONE DENSITY | Age: 58
Discharge: HOME | End: 2018-04-14
Payer: MEDICARE

## 2018-04-12 DIAGNOSIS — Z79.52 LONG TERM CURRENT USE OF SYSTEMIC STEROIDS: ICD-10-CM

## 2018-04-12 PROCEDURE — 77080 DXA BONE DENSITY AXIAL: CPT

## 2018-04-12 RX ORDER — LISINOPRIL 20 MG/1
TABLET ORAL
Qty: 60 TABLET | Refills: 1 | OUTPATIENT
Start: 2018-04-12

## 2018-05-02 ENCOUNTER — CARE COORDINATION (OUTPATIENT)
Dept: FAMILY MEDICINE CLINIC | Age: 58
End: 2018-05-02

## 2018-05-16 DIAGNOSIS — L40.9 SCALP PSORIASIS: ICD-10-CM

## 2018-05-17 RX ORDER — CLOBETASOL PROPIONATE 0.05 G/100ML
SHAMPOO TOPICAL
Qty: 1 BOTTLE | Refills: 2 | Status: SHIPPED | OUTPATIENT
Start: 2018-05-17 | End: 2019-01-21 | Stop reason: SDUPTHER

## 2018-05-18 RX ORDER — CLOBETASOL PROPIONATE 0.05 G/100ML
SHAMPOO TOPICAL
Refills: 0 | OUTPATIENT
Start: 2018-05-18

## 2018-05-23 DIAGNOSIS — I10 ESSENTIAL HYPERTENSION: ICD-10-CM

## 2018-05-23 RX ORDER — LISINOPRIL 20 MG/1
20 TABLET ORAL 2 TIMES DAILY
Qty: 60 TABLET | Refills: 0 | Status: SHIPPED | OUTPATIENT
Start: 2018-05-23 | End: 2018-06-21 | Stop reason: SDUPTHER

## 2018-05-29 ENCOUNTER — OFFICE VISIT (OUTPATIENT)
Dept: FAMILY MEDICINE CLINIC | Age: 58
End: 2018-05-29
Payer: MEDICARE

## 2018-05-29 VITALS
SYSTOLIC BLOOD PRESSURE: 130 MMHG | DIASTOLIC BLOOD PRESSURE: 80 MMHG | HEIGHT: 69 IN | BODY MASS INDEX: 29.77 KG/M2 | HEART RATE: 76 BPM | WEIGHT: 201 LBS | OXYGEN SATURATION: 94 %

## 2018-05-29 DIAGNOSIS — L53.8 MACULAR ERYTHEMATOUS RASH: ICD-10-CM

## 2018-05-29 DIAGNOSIS — L40.9 PSORIASIS: Primary | ICD-10-CM

## 2018-05-29 DIAGNOSIS — E78.5 HYPERLIPIDEMIA, UNSPECIFIED HYPERLIPIDEMIA TYPE: ICD-10-CM

## 2018-05-29 DIAGNOSIS — H60.92 OTITIS EXTERNA OF LEFT EAR, UNSPECIFIED CHRONICITY, UNSPECIFIED TYPE: ICD-10-CM

## 2018-05-29 PROCEDURE — 3017F COLORECTAL CA SCREEN DOC REV: CPT | Performed by: FAMILY MEDICINE

## 2018-05-29 PROCEDURE — G8599 NO ASA/ANTIPLAT THER USE RNG: HCPCS | Performed by: FAMILY MEDICINE

## 2018-05-29 PROCEDURE — G8417 CALC BMI ABV UP PARAM F/U: HCPCS | Performed by: FAMILY MEDICINE

## 2018-05-29 PROCEDURE — G8427 DOCREV CUR MEDS BY ELIG CLIN: HCPCS | Performed by: FAMILY MEDICINE

## 2018-05-29 PROCEDURE — 99214 OFFICE O/P EST MOD 30 MIN: CPT | Performed by: FAMILY MEDICINE

## 2018-05-29 PROCEDURE — 4130F TOPICAL PREP RX AOE: CPT | Performed by: FAMILY MEDICINE

## 2018-05-29 PROCEDURE — 1036F TOBACCO NON-USER: CPT | Performed by: FAMILY MEDICINE

## 2018-05-29 RX ORDER — ATORVASTATIN CALCIUM 10 MG/1
10 TABLET, FILM COATED ORAL DAILY
Qty: 30 TABLET | Refills: 5 | Status: SHIPPED | OUTPATIENT
Start: 2018-05-29 | End: 2018-06-25

## 2018-05-29 RX ORDER — CIPROFLOXACIN AND DEXAMETHASONE 3; 1 MG/ML; MG/ML
4 SUSPENSION/ DROPS AURICULAR (OTIC) 2 TIMES DAILY
Qty: 1 BOTTLE | Refills: 1 | Status: SHIPPED | OUTPATIENT
Start: 2018-05-29 | End: 2018-09-28 | Stop reason: SDUPTHER

## 2018-05-30 ENCOUNTER — CARE COORDINATION (OUTPATIENT)
Dept: FAMILY MEDICINE CLINIC | Age: 58
End: 2018-05-30

## 2018-06-13 ENCOUNTER — HOSPITAL ENCOUNTER (OUTPATIENT)
Dept: LAB | Age: 58
Setting detail: SPECIMEN
Discharge: HOME | End: 2018-06-13
Payer: MEDICARE

## 2018-06-13 LAB
ABSOLUTE EOS #: 0.4 K/UL (ref 0–0.4)
ABSOLUTE IMMATURE GRANULOCYTE: ABNORMAL K/UL (ref 0–0.3)
ABSOLUTE LYMPH #: 1.8 K/UL (ref 1–4.8)
ABSOLUTE MONO #: 1.1 K/UL (ref 0.1–1.2)
ALBUMIN SERPL-MCNC: 3.9 G/DL (ref 3.5–5.2)
ALBUMIN/GLOBULIN RATIO: 1.1 (ref 1–2.5)
ALP BLD-CCNC: 90 U/L (ref 40–129)
ALT SERPL-CCNC: 16 U/L (ref 5–41)
ANION GAP SERPL CALCULATED.3IONS-SCNC: 9 MMOL/L (ref 9–17)
AST SERPL-CCNC: 18 U/L
BASOPHILS # BLD: 1 % (ref 0–2)
BASOPHILS ABSOLUTE: 0.2 K/UL (ref 0–0.2)
BILIRUB SERPL-MCNC: 0.78 MG/DL (ref 0.3–1.2)
BUN BLDV-MCNC: 9 MG/DL (ref 6–20)
BUN/CREAT BLD: 5 (ref 9–20)
CALCIUM SERPL-MCNC: 9.1 MG/DL (ref 8.6–10.4)
CHLORIDE BLD-SCNC: 89 MMOL/L (ref 98–107)
CO2: 28 MMOL/L (ref 20–31)
CREAT SERPL-MCNC: 1.89 MG/DL (ref 0.7–1.2)
CREATININE: 1.9 MG/DL
DIFFERENTIAL TYPE: ABNORMAL
EOSINOPHILS RELATIVE PERCENT: 4 % (ref 1–8)
GFR AFRICAN AMERICAN: 45 ML/MIN
GFR NON-AFRICAN AMERICAN: 37 ML/MIN
GFR SERPL CREATININE-BSD FRML MDRD: ABNORMAL ML/MIN/{1.73_M2}
GFR SERPL CREATININE-BSD FRML MDRD: ABNORMAL ML/MIN/{1.73_M2}
GLUCOSE BLD-MCNC: 141 MG/DL (ref 70–99)
HCT VFR BLD CALC: 43.2 % (ref 41–53)
HEMOGLOBIN: 14.6 G/DL (ref 13.5–17.5)
IMMATURE GRANULOCYTES: ABNORMAL %
LYMPHOCYTES # BLD: 15 % (ref 15–43)
MCH RBC QN AUTO: 32 PG (ref 26–34)
MCHC RBC AUTO-ENTMCNC: 33.9 G/DL (ref 31–37)
MCV RBC AUTO: 94.3 FL (ref 80–100)
MONOCYTES # BLD: 9 % (ref 6–14)
NRBC AUTOMATED: ABNORMAL PER 100 WBC
PDW BLD-RTO: 15.6 % (ref 11–14.5)
PLATELET # BLD: 316 K/UL (ref 140–450)
PLATELET ESTIMATE: ABNORMAL
PMV BLD AUTO: 7.5 FL (ref 6–12)
POTASSIUM SERPL-SCNC: 4.4 MMOL/L (ref 3.7–5.3)
RBC # BLD: 4.58 M/UL (ref 4.5–5.9)
RBC # BLD: ABNORMAL 10*6/UL
SEG NEUTROPHILS: 71 % (ref 44–74)
SEGMENTED NEUTROPHILS ABSOLUTE COUNT: 8.2 K/UL (ref 1.8–7.7)
SODIUM BLD-SCNC: 126 MMOL/L (ref 135–144)
TOTAL PROTEIN: 7.3 G/DL (ref 6.4–8.3)
WBC # BLD: 11.6 K/UL (ref 3.5–11)
WBC # BLD: ABNORMAL 10*3/UL

## 2018-06-13 PROCEDURE — 36415 COLL VENOUS BLD VENIPUNCTURE: CPT

## 2018-06-13 PROCEDURE — 85025 COMPLETE CBC W/AUTO DIFF WBC: CPT

## 2018-06-13 PROCEDURE — 80053 COMPREHEN METABOLIC PANEL: CPT

## 2018-06-21 DIAGNOSIS — I10 ESSENTIAL HYPERTENSION: ICD-10-CM

## 2018-06-21 RX ORDER — LISINOPRIL 20 MG/1
TABLET ORAL
Qty: 60 TABLET | Refills: 5 | Status: SHIPPED | OUTPATIENT
Start: 2018-06-21 | End: 2018-10-16 | Stop reason: SDUPTHER

## 2018-06-22 LAB — HBA1C MFR BLD: 6.8 %

## 2018-06-25 ENCOUNTER — OFFICE VISIT (OUTPATIENT)
Dept: ENDOCRINOLOGY | Age: 58
End: 2018-06-25
Payer: MEDICARE

## 2018-06-25 VITALS
BODY MASS INDEX: 30.07 KG/M2 | SYSTOLIC BLOOD PRESSURE: 120 MMHG | TEMPERATURE: 97.8 F | OXYGEN SATURATION: 98 % | HEART RATE: 60 BPM | HEIGHT: 69 IN | DIASTOLIC BLOOD PRESSURE: 80 MMHG | WEIGHT: 203 LBS

## 2018-06-25 DIAGNOSIS — Z79.52 LONG TERM CURRENT USE OF SYSTEMIC STEROIDS: Primary | ICD-10-CM

## 2018-06-25 DIAGNOSIS — E09.9 DRUG OR CHEMICAL INDUCED DIABETES MELLITUS WITHOUT COMPLICATION, WITHOUT LONG-TERM CURRENT USE OF INSULIN (HCC): ICD-10-CM

## 2018-06-25 LAB
AVERAGE GLUCOSE: NORMAL
HBA1C MFR BLD: 6.8 %

## 2018-06-25 PROCEDURE — G8417 CALC BMI ABV UP PARAM F/U: HCPCS | Performed by: INTERNAL MEDICINE

## 2018-06-25 PROCEDURE — G8599 NO ASA/ANTIPLAT THER USE RNG: HCPCS | Performed by: INTERNAL MEDICINE

## 2018-06-25 PROCEDURE — 1036F TOBACCO NON-USER: CPT | Performed by: INTERNAL MEDICINE

## 2018-06-25 PROCEDURE — 3017F COLORECTAL CA SCREEN DOC REV: CPT | Performed by: INTERNAL MEDICINE

## 2018-06-25 PROCEDURE — 99214 OFFICE O/P EST MOD 30 MIN: CPT | Performed by: INTERNAL MEDICINE

## 2018-06-25 PROCEDURE — G8427 DOCREV CUR MEDS BY ELIG CLIN: HCPCS | Performed by: INTERNAL MEDICINE

## 2018-06-25 PROCEDURE — 83036 HEMOGLOBIN GLYCOSYLATED A1C: CPT | Performed by: INTERNAL MEDICINE

## 2018-06-25 RX ORDER — IBANDRONATE SODIUM 150 MG/1
150 TABLET, FILM COATED ORAL
Qty: 1 TABLET | Refills: 3 | Status: SHIPPED | OUTPATIENT
Start: 2018-06-25 | End: 2018-07-02 | Stop reason: CLARIF

## 2018-06-25 RX ORDER — PRAVASTATIN SODIUM 20 MG
20 TABLET ORAL EVERY EVENING
Qty: 30 TABLET | Refills: 5 | Status: SHIPPED | OUTPATIENT
Start: 2018-06-25 | End: 2018-10-02 | Stop reason: ALTCHOICE

## 2018-07-02 RX ORDER — ALENDRONATE SODIUM 70 MG/1
70 TABLET ORAL
Qty: 4 TABLET | Refills: 3 | Status: SHIPPED | OUTPATIENT
Start: 2018-07-02 | End: 2018-10-02 | Stop reason: ALTCHOICE

## 2018-07-05 ENCOUNTER — CARE COORDINATION (OUTPATIENT)
Dept: FAMILY MEDICINE CLINIC | Age: 58
End: 2018-07-05

## 2018-07-05 NOTE — CARE COORDINATION
Ambulatory Care Coordination Note  7/5/2018  CM Risk Score: 7  Teena Mortality Risk Score:      ACC: Pedro Alexander RN    Summary Note: Hailey Pena has  CKD stage 4, COPD, Psoriasis, Type II Diabetes, Psoriatic Arthritis. He follows with pulmonology, nephrologist, dermatology, endocrinology, and allergist for his recurrent rash- psoriasis.          Plan of Care : F/U on dermatology appointment. F/U weight loss and BS readings since off insulin. Review medications.                             [facility] education, support, and assessments.        Hgb A1C 6/25/2018 was 6.8 at endocrinologist's office. Per chart review: he is not on insulin. Unable to reach patient by phone. Left message requesting return call. Care Coordination Interventions    Program Enrollment:  Complex Care  Referral from Primary Care Provider:  No  Suggested Interventions and Community Resources  Diabetes Education:  Completed  Zone Management Tools:  Completed         Prior to Admission medications    Medication Sig Start Date End Date Taking? Authorizing Provider   alendronate (FOSAMAX) 70 MG tablet Take 1 tablet by mouth every 7 days 7/2/18   Alisia Ndiaye MD   pravastatin (PRAVACHOL) 20 MG tablet Take 1 tablet by mouth every evening 6/25/18   Alisia Ndiaye MD   lisinopril (PRINIVIL;ZESTRIL) 20 MG tablet TAKE ONE TABLET BY MOUTH TWICE A DAY 6/21/18   Clovia Lines Black, DO   Clobetasol Propionate 0.05 % SHAM Apply to scalp BID; leave on for 15 mins and then rinse thoroughly. May use 3-4x's weekly for maintenance.  5/17/18   Clovia Lines Black, DO   ranitidine (ZANTAC) 150 MG tablet TAKE ONE TABLET BY MOUTH TWICE A DAY 4/9/18   Clovia Lines Black, DO   metoprolol (LOPRESSOR) 100 MG tablet TAKE ONE TABLET BY MOUTH TWICE A DAY 1/19/18   Clovia Lines Black, DO   gabapentin (NEURONTIN) 100 MG capsule Take 1 capsule by mouth 3 times daily 11/8/17   Clovia Lines Black, DO   fexofenadine (ALLEGRA) 180 MG

## 2018-07-11 ENCOUNTER — CARE COORDINATION (OUTPATIENT)
Dept: FAMILY MEDICINE CLINIC | Age: 58
End: 2018-07-11

## 2018-07-11 NOTE — CARE COORDINATION
Ambulatory Care Coordination Note  7/11/2018  CM Risk Score: 7  Teena Mortality Risk Score:      ACC: Fan Woodward RN    Summary Note: Gianni Moreno has  CKD stage 4, COPD, Psoriasis, Type II Diabetes, Psoriatic Arthritis. He follows with pulmonology, nephrologist, dermatology, endocrinology, and allergist for his recurrent rash- psoriasis.          Plan of Care : F/U on dermatology appointment.                          F/U weight loss and BS readings since off insulin. Review medications.                             [facility] education, support, and assessments.        Spoke with Abran Carnes. He is not on insulin at this time, \"my BS have been good\", last Hgb A1C at [facility] was 6.8. He stated his psoriasis has returned. He is seeing dermatologist and is taking Acitretin one capsule daily- MAR updated. He stated he is not able to exercise due to psoriasis. He is following with dermatologist tomorrow. He reported that his weight is up to 210# ( \"I was below 200# before my psoriasis flared up\"). General Assessment    Do you have any symptoms that are causing concern?:  Yes  Progression since Onset:  Unchanged  Reported Symptoms:  (Comment: Psoriasis)       Diabetes Assessment    Medic Alert ID:  No  Meal Planning:  Avoidance of concentrated sweets   How often do you test your blood sugar?:  Daily, Meals, Bedtime   Do you have barriers with adherence to non-pharmacologic self-management interventions?  (Nutrition/Exercise/Self-Monitoring):  No   Have you ever had to go to the ED for symptoms of low blood sugar?:  No       No patient-reported symptoms       and   COPD Assessment    Does the patient understand envrionmental exposure?:  Yes  Does the patient have a nebulizer?:  No     No patient-reported symptoms         Symptoms:             Care Coordination Interventions    Program Enrollment:  Complex Care  Referral from Primary Care Provider:  No  Suggested Interventions and Community

## 2018-07-12 ENCOUNTER — HOSPITAL ENCOUNTER (OUTPATIENT)
Dept: LAB | Age: 58
Setting detail: SPECIMEN
Discharge: HOME | End: 2018-07-12
Payer: MEDICARE

## 2018-07-12 LAB
ABSOLUTE EOS #: 0.2 K/UL (ref 0–0.4)
ABSOLUTE IMMATURE GRANULOCYTE: ABNORMAL K/UL (ref 0–0.3)
ABSOLUTE LYMPH #: 1.5 K/UL (ref 1–4.8)
ABSOLUTE MONO #: 1.2 K/UL (ref 0.1–1.2)
ALBUMIN SERPL-MCNC: 3.9 G/DL (ref 3.5–5.2)
ALBUMIN/GLOBULIN RATIO: 1 (ref 1–2.5)
ALP BLD-CCNC: 114 U/L (ref 40–129)
ALT SERPL-CCNC: 15 U/L (ref 5–41)
ANION GAP SERPL CALCULATED.3IONS-SCNC: 11 MMOL/L (ref 9–17)
AST SERPL-CCNC: 14 U/L
BASOPHILS # BLD: 1 % (ref 0–2)
BASOPHILS ABSOLUTE: 0.2 K/UL (ref 0–0.2)
BILIRUB SERPL-MCNC: 0.75 MG/DL (ref 0.3–1.2)
BUN BLDV-MCNC: 9 MG/DL (ref 6–20)
BUN/CREAT BLD: 4 (ref 9–20)
CALCIUM SERPL-MCNC: 9.4 MG/DL (ref 8.6–10.4)
CHLORIDE BLD-SCNC: 87 MMOL/L (ref 98–107)
CO2: 29 MMOL/L (ref 20–31)
CREAT SERPL-MCNC: 2.07 MG/DL (ref 0.7–1.2)
DIFFERENTIAL TYPE: ABNORMAL
EOSINOPHILS RELATIVE PERCENT: 2 % (ref 1–8)
GFR AFRICAN AMERICAN: 41 ML/MIN
GFR NON-AFRICAN AMERICAN: 34 ML/MIN
GFR SERPL CREATININE-BSD FRML MDRD: ABNORMAL ML/MIN/{1.73_M2}
GFR SERPL CREATININE-BSD FRML MDRD: ABNORMAL ML/MIN/{1.73_M2}
GLUCOSE BLD-MCNC: 138 MG/DL (ref 70–99)
HCT VFR BLD CALC: 46.3 % (ref 41–53)
HEMOGLOBIN: 15.6 G/DL (ref 13.5–17.5)
IMMATURE GRANULOCYTES: ABNORMAL %
LYMPHOCYTES # BLD: 11 % (ref 15–43)
MCH RBC QN AUTO: 31.7 PG (ref 26–34)
MCHC RBC AUTO-ENTMCNC: 33.6 G/DL (ref 31–37)
MCV RBC AUTO: 94.3 FL (ref 80–100)
MONOCYTES # BLD: 9 % (ref 6–14)
NRBC AUTOMATED: ABNORMAL PER 100 WBC
PDW BLD-RTO: 15.5 % (ref 11–14.5)
PLATELET # BLD: 413 K/UL (ref 140–450)
PLATELET ESTIMATE: ABNORMAL
PMV BLD AUTO: 7.1 FL (ref 6–12)
POTASSIUM SERPL-SCNC: 5.1 MMOL/L (ref 3.7–5.3)
RBC # BLD: 4.9 M/UL (ref 4.5–5.9)
RBC # BLD: ABNORMAL 10*6/UL
SEG NEUTROPHILS: 77 % (ref 44–74)
SEGMENTED NEUTROPHILS ABSOLUTE COUNT: 10.3 K/UL (ref 1.8–7.7)
SODIUM BLD-SCNC: 127 MMOL/L (ref 135–144)
TOTAL PROTEIN: 7.7 G/DL (ref 6.4–8.3)
WBC # BLD: 13.4 K/UL (ref 3.5–11)
WBC # BLD: ABNORMAL 10*3/UL

## 2018-07-12 PROCEDURE — 36415 COLL VENOUS BLD VENIPUNCTURE: CPT

## 2018-07-12 PROCEDURE — 85025 COMPLETE CBC W/AUTO DIFF WBC: CPT

## 2018-07-12 PROCEDURE — 80053 COMPREHEN METABOLIC PANEL: CPT

## 2018-08-03 ENCOUNTER — HOSPITAL ENCOUNTER (OUTPATIENT)
Dept: LAB | Age: 58
Setting detail: SPECIMEN
Discharge: HOME | End: 2018-08-03
Payer: MEDICARE

## 2018-08-03 DIAGNOSIS — E78.5 HYPERLIPIDEMIA, UNSPECIFIED HYPERLIPIDEMIA TYPE: ICD-10-CM

## 2018-08-03 LAB
ABSOLUTE EOS #: 0.4 K/UL (ref 0–0.4)
ABSOLUTE IMMATURE GRANULOCYTE: ABNORMAL K/UL (ref 0–0.3)
ABSOLUTE LYMPH #: 1.1 K/UL (ref 1–4.8)
ABSOLUTE MONO #: 0.8 K/UL (ref 0.1–1.2)
ALBUMIN SERPL-MCNC: 3.6 G/DL (ref 3.5–5.2)
ALBUMIN/GLOBULIN RATIO: 1.1 (ref 1–2.5)
ALP BLD-CCNC: 110 U/L (ref 40–129)
ALT SERPL-CCNC: 19 U/L (ref 5–41)
ANION GAP SERPL CALCULATED.3IONS-SCNC: 13 MMOL/L (ref 9–17)
AST SERPL-CCNC: 16 U/L
BASOPHILS # BLD: 1 % (ref 0–2)
BASOPHILS ABSOLUTE: 0.1 K/UL (ref 0–0.2)
BILIRUB SERPL-MCNC: 0.6 MG/DL (ref 0.3–1.2)
BUN BLDV-MCNC: 11 MG/DL (ref 6–20)
BUN/CREAT BLD: 5 (ref 9–20)
CALCIUM SERPL-MCNC: 8.9 MG/DL (ref 8.6–10.4)
CHLORIDE BLD-SCNC: 92 MMOL/L (ref 98–107)
CO2: 27 MMOL/L (ref 20–31)
CREAT SERPL-MCNC: 2.05 MG/DL (ref 0.7–1.2)
DIFFERENTIAL TYPE: ABNORMAL
EOSINOPHILS RELATIVE PERCENT: 3 % (ref 1–8)
GFR AFRICAN AMERICAN: 41 ML/MIN
GFR NON-AFRICAN AMERICAN: 34 ML/MIN
GFR SERPL CREATININE-BSD FRML MDRD: ABNORMAL ML/MIN/{1.73_M2}
GFR SERPL CREATININE-BSD FRML MDRD: ABNORMAL ML/MIN/{1.73_M2}
GLUCOSE BLD-MCNC: 276 MG/DL (ref 70–99)
HCT VFR BLD CALC: 42.1 % (ref 41–53)
HEMOGLOBIN: 14.4 G/DL (ref 13.5–17.5)
IMMATURE GRANULOCYTES: ABNORMAL %
LYMPHOCYTES # BLD: 10 % (ref 15–43)
MCH RBC QN AUTO: 33.1 PG (ref 26–34)
MCHC RBC AUTO-ENTMCNC: 34.1 G/DL (ref 31–37)
MCV RBC AUTO: 96.8 FL (ref 80–100)
MONOCYTES # BLD: 7 % (ref 6–14)
NRBC AUTOMATED: ABNORMAL PER 100 WBC
PDW BLD-RTO: 17 % (ref 11–14.5)
PLATELET # BLD: 337 K/UL (ref 140–450)
PLATELET ESTIMATE: ABNORMAL
PMV BLD AUTO: 7.1 FL (ref 6–12)
POTASSIUM SERPL-SCNC: 4.2 MMOL/L (ref 3.7–5.3)
RBC # BLD: 4.35 M/UL (ref 4.5–5.9)
RBC # BLD: ABNORMAL 10*6/UL
SEG NEUTROPHILS: 79 % (ref 44–74)
SEGMENTED NEUTROPHILS ABSOLUTE COUNT: 9.2 K/UL (ref 1.8–7.7)
SODIUM BLD-SCNC: 132 MMOL/L (ref 135–144)
TOTAL PROTEIN: 7 G/DL (ref 6.4–8.3)
WBC # BLD: 11.6 K/UL (ref 3.5–11)
WBC # BLD: ABNORMAL 10*3/UL

## 2018-08-03 PROCEDURE — 36415 COLL VENOUS BLD VENIPUNCTURE: CPT

## 2018-08-03 PROCEDURE — 80053 COMPREHEN METABOLIC PANEL: CPT

## 2018-08-03 PROCEDURE — 85025 COMPLETE CBC W/AUTO DIFF WBC: CPT

## 2018-08-09 ENCOUNTER — CARE COORDINATION (OUTPATIENT)
Dept: FAMILY MEDICINE CLINIC | Age: 58
End: 2018-08-09

## 2018-08-09 NOTE — CARE COORDINATION
BY MOUTH TWICE A DAY 1/19/18   Loulou Service Black, DO   gabapentin (NEURONTIN) 100 MG capsule Take 1 capsule by mouth 3 times daily 11/8/17   Loulou Service Black, DO   fexofenadine (ALLEGRA) 180 MG tablet Take 1 tablet by mouth daily 11/3/17   Loulou Service Black, DO   triamcinolone (KENALOG) 0.1 % ointment  10/14/17   Historical Provider, MD   Skin Protectants, Misc. (EUCERIN) cream Apply topically as needed. 6/20/17   Artie Douglas, APRN - CNP   Handicap Placard MISC by Does not apply route Diagnosis: J43.9, L40.50, N18.3.   Expires in 5 years 08/23/2021. 8/23/16   Micha White, DO       Future Appointments  Date Time Provider Akshat Vann   10/2/2018 8:20 AM [facility] Getachew Tang,  [facility]   10/26/2018 1:15 PM [facility]

## 2018-08-12 DIAGNOSIS — I10 ESSENTIAL HYPERTENSION: ICD-10-CM

## 2018-08-13 RX ORDER — METOPROLOL TARTRATE 100 MG/1
TABLET ORAL
Qty: 60 TABLET | Refills: 11 | Status: SHIPPED | OUTPATIENT
Start: 2018-08-13 | End: 2019-08-07 | Stop reason: SDUPTHER

## 2018-08-16 ENCOUNTER — HOSPITAL ENCOUNTER (EMERGENCY)
Age: 58
Discharge: HOME | End: 2018-08-16
Attending: EMERGENCY MEDICINE
Payer: MEDICARE

## 2018-08-16 VITALS
SYSTOLIC BLOOD PRESSURE: 126 MMHG | RESPIRATION RATE: 16 BRPM | DIASTOLIC BLOOD PRESSURE: 78 MMHG | HEART RATE: 87 BPM | TEMPERATURE: 100.5 F | OXYGEN SATURATION: 96 %

## 2018-08-16 DIAGNOSIS — L50.9 URTICARIA: Primary | ICD-10-CM

## 2018-08-16 PROCEDURE — 99282 EMERGENCY DEPT VISIT SF MDM: CPT

## 2018-08-16 PROCEDURE — 6360000002 HC RX W HCPCS: Performed by: EMERGENCY MEDICINE

## 2018-08-16 PROCEDURE — S0028 INJECTION, FAMOTIDINE, 20 MG: HCPCS | Performed by: EMERGENCY MEDICINE

## 2018-08-16 PROCEDURE — 96375 TX/PRO/DX INJ NEW DRUG ADDON: CPT

## 2018-08-16 PROCEDURE — 96374 THER/PROPH/DIAG INJ IV PUSH: CPT

## 2018-08-16 PROCEDURE — 2500000003 HC RX 250 WO HCPCS: Performed by: EMERGENCY MEDICINE

## 2018-08-16 PROCEDURE — 2580000003 HC RX 258: Performed by: EMERGENCY MEDICINE

## 2018-08-16 RX ORDER — METHYLPREDNISOLONE SODIUM SUCCINATE 125 MG/2ML
125 INJECTION, POWDER, LYOPHILIZED, FOR SOLUTION INTRAMUSCULAR; INTRAVENOUS ONCE
Status: COMPLETED | OUTPATIENT
Start: 2018-08-16 | End: 2018-08-16

## 2018-08-16 RX ORDER — PREDNISONE 50 MG/1
50 TABLET ORAL DAILY
Qty: 5 TABLET | Refills: 0 | Status: SHIPPED | OUTPATIENT
Start: 2018-08-16 | End: 2018-08-21

## 2018-08-16 RX ORDER — 0.9 % SODIUM CHLORIDE 0.9 %
1000 INTRAVENOUS SOLUTION INTRAVENOUS ONCE
Status: COMPLETED | OUTPATIENT
Start: 2018-08-16 | End: 2018-08-16

## 2018-08-16 RX ORDER — CEPHALEXIN 500 MG/1
500 CAPSULE ORAL 2 TIMES DAILY
COMMUNITY
End: 2018-08-27

## 2018-08-16 RX ORDER — FAMOTIDINE 20 MG/1
20 TABLET, FILM COATED ORAL 2 TIMES DAILY
Qty: 10 TABLET | Refills: 0 | Status: SHIPPED | OUTPATIENT
Start: 2018-08-16 | End: 2018-09-04 | Stop reason: ALTCHOICE

## 2018-08-16 RX ORDER — DIPHENHYDRAMINE HYDROCHLORIDE 50 MG/ML
25 INJECTION INTRAMUSCULAR; INTRAVENOUS ONCE
Status: COMPLETED | OUTPATIENT
Start: 2018-08-16 | End: 2018-08-16

## 2018-08-16 RX ORDER — CETIRIZINE HYDROCHLORIDE 10 MG/1
10 TABLET ORAL DAILY
Qty: 5 TABLET | Refills: 0 | Status: SHIPPED | OUTPATIENT
Start: 2018-08-16 | End: 2018-08-21

## 2018-08-16 RX ADMIN — FAMOTIDINE 20 MG: 10 INJECTION, SOLUTION INTRAVENOUS at 17:25

## 2018-08-16 RX ADMIN — DIPHENHYDRAMINE HYDROCHLORIDE 25 MG: 50 INJECTION, SOLUTION INTRAMUSCULAR; INTRAVENOUS at 17:23

## 2018-08-16 RX ADMIN — SODIUM CHLORIDE 1000 ML: 9 INJECTION, SOLUTION INTRAVENOUS at 17:22

## 2018-08-16 RX ADMIN — METHYLPREDNISOLONE SODIUM SUCCINATE 125 MG: 125 INJECTION, POWDER, FOR SOLUTION INTRAMUSCULAR; INTRAVENOUS at 17:28

## 2018-08-16 ASSESSMENT — PAIN DESCRIPTION - PAIN TYPE: TYPE: ACUTE PAIN

## 2018-08-16 ASSESSMENT — PAIN DESCRIPTION - FREQUENCY: FREQUENCY: CONTINUOUS

## 2018-08-16 ASSESSMENT — PAIN SCALES - GENERAL: PAINLEVEL_OUTOF10: 7

## 2018-08-16 ASSESSMENT — PAIN DESCRIPTION - DESCRIPTORS: DESCRIPTORS: BURNING

## 2018-08-16 NOTE — ED PROVIDER NOTES
[address] [facility] ED  [facility]  Phone: [phone]      Pt Name: Yovana Donald  MRN: [id number]  [place] 1960  Date of evaluation: 8/16/2018      CHIEF COMPLAINT       Chief Complaint   Patient presents with    Allergic Reaction     pt is on acitretion for psoriasis - started keflex yesterday for imbetigo by Dr [facility] - today increasing redness/ itching and pain - back, arms. legs, chest, face - painful and itchy       HISTORY OF PRESENT ILLNESS    53-year-old male presents to the emergency department today complaining of generalized itchy rash encompassing his entire body. He tells me that he was placed on Keflex yesterday and recently started on a medication for his psoriatic arthritis. He denies any shortness of breath. This been no other evaluation or management of these symptoms right arrival.  He did not call his dermatologist prior to coming here. He hasn't point with him tomorrow. He has tolerated Keflex in the past without any difficulty. He reports that it is itching so much that it hurts. Pain on a scale of 0-10 is a 7. REVIEW OF SYSTEMS     Review of Systems   All other systems reviewed and are negative. PAST MEDICAL HISTORY    has a past medical history of Allergic reaction caused by a drug; Arthritis; Chronic kidney disease; COPD (chronic obstructive pulmonary disease) ([address] CVA (cerebral vascular accident) ([address] Depression; Diabetes mellitus ([address] History of blood transfusion; History of migraine headaches; Hyperlipidemia; Hypertension; Pituitary microadenoma ([address] Pneumothorax; Psoriatic arthritis ([address] Stroke [facility]); and Thrombocytopenia ([address] SURGICAL HISTORY      has a past surgical history that includes other surgical history (Right); shoulder surgery (Left); Colonoscopy; Upper gastrointestinal endoscopy; Pilonidal cyst drainage; chest tube insertion (Left);  Thoracoscopy (5/27/15); thoracotomy (5/27/15); and other surgical history (5/27/15). CURRENT MEDICATIONS       Previous Medications    ACITRETIN PO    Take 1 capsule by mouth daily    ALENDRONATE (FOSAMAX) 70 MG TABLET    Take 1 tablet by mouth every 7 days    CEPHALEXIN (KEFLEX) 500 MG CAPSULE    Take 500 mg by mouth 2 times daily    CLOBETASOL PROPIONATE 0.05 % SHAM    Apply to scalp BID; leave on for 15 mins and then rinse thoroughly. May use 3-4x's weekly for maintenance. FEXOFENADINE (ALLEGRA) 180 MG TABLET    Take 1 tablet by mouth daily    GABAPENTIN (NEURONTIN) 100 MG CAPSULE    Take 1 capsule by mouth 3 times daily    HANDICAP PLACARD MISC    by Does not apply route Diagnosis: J43.9, L40.50, N18.3. Expires in 5 years 2021. LISINOPRIL (PRINIVIL;ZESTRIL) 20 MG TABLET    TAKE ONE TABLET BY MOUTH TWICE A DAY    METOPROLOL (LOPRESSOR) 100 MG TABLET    TAKE ONE TABLET BY MOUTH TWICE A DAY    PRAVASTATIN (PRAVACHOL) 20 MG TABLET    Take 1 tablet by mouth every evening    RANITIDINE (ZANTAC) 150 MG TABLET    TAKE ONE TABLET BY MOUTH TWICE A DAY    SKIN PROTECTANTS, MISC. (EUCERIN) CREAM    Apply topically as needed. TRIAMCINOLONE (KENALOG) 0.1 % OINTMENT           ALLERGIES     is allergic to cosentyx [secukinumab]; lantus [insulin glargine]; methotrexate derivatives; seasonal; otezla [apremilast]; and stellaria. FAMILY HISTORY     indicated that his mother is alive. He indicated that his father is alive. He indicated that his sister is alive. He indicated that his maternal grandmother is . He indicated that his maternal grandfather is . He indicated that his paternal grandmother is . He indicated that his paternal grandfather is . family history includes Alzheimer's Disease in his maternal grandmother; Cancer in his maternal grandfather; Diabetes in his sister; High Blood Pressure in his maternal grandfather and mother;  Other in his maternal grandmother, mother, and paternal  diphenhydrAMINE (BENADRYL) injection 25 mg    predniSONE (DELTASONE) 50 MG tablet     Sig: Take 1 tablet by mouth daily for 5 days     Dispense:  5 tablet     Refill:  0    famotidine (PEPCID) 20 MG tablet     Sig: Take 1 tablet by mouth 2 times daily for 5 days     Dispense:  10 tablet     Refill:  0    cetirizine (ZYRTEC ALLERGY) 10 MG tablet     Sig: Take 1 tablet by mouth daily for 5 days     Dispense:  5 tablet     Refill:  0          FINAL IMPRESSION      1.  Urticaria          DISPOSITION/PLAN   DISPOSITION Decision To Discharge 08/16/2018 06:33:44 PM      Condition on Disposition  Good    PATIENT REFERRED TO:  Your dermatologist    Schedule an appointment as soon as possible for a visit in 1 day        DISCHARGE MEDICATIONS:  New Prescriptions    CETIRIZINE (ZYRTEC ALLERGY) 10 MG TABLET    Take 1 tablet by mouth daily for 5 days    FAMOTIDINE (PEPCID) 20 MG TABLET    Take 1 tablet by mouth 2 times daily for 5 days    PREDNISONE (DELTASONE) 50 MG TABLET    Take 1 tablet by mouth daily for 5 days       (Please note that portions of this note were completed with a voice recognition program.  Efforts were made to edit the dictations but occasionally words are mis-transcribed.)    Kary Manley MD, F.A.C.E.P, F.A.A.E.M  Emergency Physician Attending          Kary Manley MD  08/16/18 0061

## 2018-08-17 ENCOUNTER — CARE COORDINATION (OUTPATIENT)
Dept: FAMILY MEDICINE CLINIC | Age: 58
End: 2018-08-17

## 2018-08-17 NOTE — CARE COORDINATION
Ambulatory Care Coordination Note  8/17/2018  CM Risk Score: 7  Teena Mortality Risk Score:      ACC: Paula Wall, RN    Summary Note: Harry Baltazar has  CKD stage 4, COPD, Psoriasis, Type II Diabetes, Psoriatic Arthritis. He follows with pulmonology, nephrologist, dermatology, endocrinology, and allergist for his recurrent rash- psoriasis.        [facility] ER 8/16/2018- Urticaria- given medications and referred back to his dermatologist.     Plan of Care : F/U [facility] ER 8/16/2018- referred back to his dermatologist         F/U on dermatology appointment.                          F/U weight loss and BS readings since off insulin.                           Review medications.                             Continue education, support, and assessments.         Unable to reach by phone- left message requesting a return call. Care Coordination Interventions    Program Enrollment:  Complex Care  Referral from Primary Care Provider:  No  Suggested Interventions and Community Resources  Diabetes Education:  Completed  Zone Management Tools:  Completed           Prior to Admission medications    Medication Sig Start Date End Date Taking?  Authorizing Provider   cephALEXin (KEFLEX) 500 MG capsule Take 500 mg by mouth 2 times daily    Historical Provider, MD   predniSONE (DELTASONE) 50 MG tablet Take 1 tablet by mouth daily for 5 days 8/16/18 8/21/18  Ton De La Vega MD   famotidine (PEPCID) 20 MG tablet Take 1 tablet by mouth 2 times daily for 5 days 8/16/18 8/21/18  Ton De La Vega MD   cetirizine (ZYRTEC ALLERGY) 10 MG tablet Take 1 tablet by mouth daily for 5 days 8/16/18 8/21/18  Ton De La Vega MD   metoprolol (LOPRESSOR) 100 MG tablet TAKE ONE TABLET BY MOUTH TWICE A DAY 8/13/18   Noemy Chen DO   ACITRETIN PO Take 1 capsule by mouth daily    Historical Provider, MD   alendronate (FOSAMAX) 70 MG tablet Take 1 tablet by mouth every 7 days 7/2/18   Freddie Tobias MD   pravastatin (PRAVACHOL) 20 MG tablet Take 1 tablet by mouth

## 2018-08-27 ENCOUNTER — OFFICE VISIT (OUTPATIENT)
Dept: FAMILY MEDICINE CLINIC | Age: 58
End: 2018-08-27
Payer: MEDICARE

## 2018-08-27 VITALS
WEIGHT: 190 LBS | DIASTOLIC BLOOD PRESSURE: 80 MMHG | HEART RATE: 69 BPM | SYSTOLIC BLOOD PRESSURE: 132 MMHG | OXYGEN SATURATION: 98 % | BODY MASS INDEX: 28.47 KG/M2

## 2018-08-27 DIAGNOSIS — I10 ESSENTIAL HYPERTENSION: ICD-10-CM

## 2018-08-27 DIAGNOSIS — L30.9 CHRONIC DERMATITIS: Primary | ICD-10-CM

## 2018-08-27 DIAGNOSIS — L40.9 PSORIASIS: ICD-10-CM

## 2018-08-27 DIAGNOSIS — N18.30 CHRONIC KIDNEY DISEASE (CKD), STAGE III (MODERATE) (HCC): Chronic | ICD-10-CM

## 2018-08-27 PROCEDURE — 99213 OFFICE O/P EST LOW 20 MIN: CPT | Performed by: FAMILY MEDICINE

## 2018-08-27 PROCEDURE — 1036F TOBACCO NON-USER: CPT | Performed by: FAMILY MEDICINE

## 2018-08-27 PROCEDURE — 3017F COLORECTAL CA SCREEN DOC REV: CPT | Performed by: FAMILY MEDICINE

## 2018-08-27 PROCEDURE — G8599 NO ASA/ANTIPLAT THER USE RNG: HCPCS | Performed by: FAMILY MEDICINE

## 2018-08-27 PROCEDURE — G8427 DOCREV CUR MEDS BY ELIG CLIN: HCPCS | Performed by: FAMILY MEDICINE

## 2018-08-27 PROCEDURE — G8417 CALC BMI ABV UP PARAM F/U: HCPCS | Performed by: FAMILY MEDICINE

## 2018-08-27 RX ORDER — ACITRETIN 25 MG/1
25 CAPSULE ORAL
COMMUNITY
End: 2018-10-02 | Stop reason: ALTCHOICE

## 2018-08-27 NOTE — PROGRESS NOTES
Hyperlipidemia     Hypertension     Pituitary microadenoma ([facility]     Eval by NS and Endo 2011 but no recent FU    Pneumothorax 05/27/2015    left    Psoriatic arthritis ([facility]     Stroke ([facility]     x3    Thrombocytopenia ([facility]       Past Surgical History:   Procedure Laterality Date    CHEST TUBE INSERTION Left     out now    COLONOSCOPY      11/13 SIS 10 y    OTHER SURGICAL HISTORY Right     stent r kidney    OTHER SURGICAL HISTORY  5/27/15    resection of bleb    PILONIDAL CYST DRAINAGE      SHOULDER SURGERY Left     Rotator cuff    THORACOSCOPY  5/27/15    THORACOTOMY  5/27/15    UPPER GASTROINTESTINAL ENDOSCOPY       Family History   Problem Relation Age of Onset    Diabetes Sister     Alzheimer's Disease Maternal Grandmother     Other Maternal Grandmother         dementia    High Blood Pressure Maternal Grandfather     Cancer Maternal Grandfather         Unknown    High Blood Pressure Mother     Other Mother         blood clots    Other Paternal Grandmother         dementia     Social History   Substance Use Topics    Smoking status: Former Smoker     Packs/day: 1.00     Years: 20.00     Types: Cigarettes     Quit date: 5/27/2015    Smokeless tobacco: Never Used      Comment: Quit smoking 5/27/2016, [facility], 10/20/2016.  Alcohol use No      Current Outpatient Prescriptions   Medication Sig Dispense Refill    acitretin (SORIATANE) 25 MG capsule Take 25 mg by mouth every morning (before breakfast)      mupirocin (BACTROBAN) 2 % ointment Apply topically 4 times daily Apply topically 3 times daily.       metoprolol (LOPRESSOR) 100 MG tablet TAKE ONE TABLET BY MOUTH TWICE A DAY 60 tablet 11    alendronate (FOSAMAX) 70 MG tablet Take 1 tablet by mouth every 7 days 4 tablet 3    pravastatin (PRAVACHOL) 20 MG tablet Take 1 tablet by mouth every evening 30 tablet 5    lisinopril (PRINIVIL;ZESTRIL) 20 MG tablet TAKE ONE TABLET BY MOUTH TWICE A DAY 60 tablet 5    Clobetasol Propionate 0.05 % SHAM Apply to scalp BID; leave on for 15 mins and then rinse thoroughly. May use 3-4x's weekly for maintenance. 1 Bottle 2    ranitidine (ZANTAC) 150 MG tablet TAKE ONE TABLET BY MOUTH TWICE A DAY 60 tablet 11    gabapentin (NEURONTIN) 100 MG capsule Take 1 capsule by mouth 3 times daily 90 capsule 0    fexofenadine (ALLEGRA) 180 MG tablet Take 1 tablet by mouth daily 30 tablet 0    Skin Protectants, Misc. (EUCERIN) cream Apply topically as needed. 3 Package 1    Handicap Placard MISC by Does not apply route Diagnosis: J43.9, L40.50, N18.3. Expires in 5 years 08/23/2021. 1 each 0    famotidine (PEPCID) 20 MG tablet Take 1 tablet by mouth 2 times daily for 5 days 10 tablet 0     No current facility-administered medications for this visit. Allergies   Allergen Reactions    Cosentyx [Secukinumab] Rash     Nausea, vomiting fever    Lantus [Insulin Glargine] Itching, Swelling and Rash     Patient started Lantus about a month ago, only new medication. His skin reaction started on his abdomen where he injects the Lantus and moved up to the face. This is similar to his response to Cosentyx.      Methotrexate Derivatives Other (See Comments)     Bone marrow toxicity    Seasonal     Keflex [Cephalexin] Rash    Otezla [Apremilast] Rash    Stellaria Rash       Health Maintenance   Topic Date Due    Pneumococcal med risk (1 of 1 - PPSV23) 12/23/1981    Shingles Vaccine (1 of 2 - 2 Dose Series) 12/23/2012    DTaP/Tdap/Td vaccine (1 - Tdap) 09/20/2018 (Originally 12/23/1981)    Flu vaccine (1) 09/01/2018    Lipid screen  09/23/2018    Diabetic retinal exam  10/30/2018    Diabetic foot exam  02/02/2019    A1C test (Diabetic or Prediabetic)  06/25/2019    Potassium monitoring  08/03/2019    Creatinine monitoring  08/03/2019    Colon cancer screen colonoscopy  08/08/2023    Hepatitis C screen  Addressed    HIV screen  Addressed       Subjective:      Review of Systems   Skin: Positive by Laly Bhatti DO on 8/28/2018 at 12:53 AM

## 2018-08-28 ASSESSMENT — ENCOUNTER SYMPTOMS: COLOR CHANGE: 1

## 2018-09-03 ENCOUNTER — HOSPITAL ENCOUNTER (EMERGENCY)
Age: 58
Discharge: HOME | End: 2018-09-03
Attending: EMERGENCY MEDICINE
Payer: MEDICARE

## 2018-09-03 VITALS
HEART RATE: 87 BPM | OXYGEN SATURATION: 94 % | TEMPERATURE: 98.1 F | DIASTOLIC BLOOD PRESSURE: 93 MMHG | RESPIRATION RATE: 16 BRPM | SYSTOLIC BLOOD PRESSURE: 159 MMHG

## 2018-09-03 DIAGNOSIS — S61.012A LACERATION OF LEFT THUMB WITHOUT FOREIGN BODY WITHOUT DAMAGE TO NAIL, INITIAL ENCOUNTER: Primary | ICD-10-CM

## 2018-09-03 PROCEDURE — 90471 IMMUNIZATION ADMIN: CPT | Performed by: EMERGENCY MEDICINE

## 2018-09-03 PROCEDURE — 12001 RPR S/N/AX/GEN/TRNK 2.5CM/<: CPT

## 2018-09-03 PROCEDURE — 90715 TDAP VACCINE 7 YRS/> IM: CPT | Performed by: EMERGENCY MEDICINE

## 2018-09-03 PROCEDURE — 6360000002 HC RX W HCPCS: Performed by: EMERGENCY MEDICINE

## 2018-09-03 PROCEDURE — 99282 EMERGENCY DEPT VISIT SF MDM: CPT

## 2018-09-03 PROCEDURE — 6370000000 HC RX 637 (ALT 250 FOR IP): Performed by: EMERGENCY MEDICINE

## 2018-09-03 PROCEDURE — 2500000003 HC RX 250 WO HCPCS: Performed by: EMERGENCY MEDICINE

## 2018-09-03 RX ORDER — LIDOCAINE HYDROCHLORIDE 10 MG/ML
20 INJECTION, SOLUTION INFILTRATION; PERINEURAL ONCE
Status: COMPLETED | OUTPATIENT
Start: 2018-09-03 | End: 2018-09-03

## 2018-09-03 RX ORDER — DIAPER,BRIEF,INFANT-TODD,DISP
EACH MISCELLANEOUS ONCE
Status: COMPLETED | OUTPATIENT
Start: 2018-09-03 | End: 2018-09-03

## 2018-09-03 RX ADMIN — BACITRACIN ZINC 1 G: 500 OINTMENT TOPICAL at 09:10

## 2018-09-03 RX ADMIN — TETANUS TOXOID, REDUCED DIPHTHERIA TOXOID AND ACELLULAR PERTUSSIS VACCINE, ADSORBED 0.5 ML: 5; 2.5; 8; 8; 2.5 SUSPENSION INTRAMUSCULAR at 09:09

## 2018-09-03 RX ADMIN — LIDOCAINE HYDROCHLORIDE 20 ML: 10 INJECTION, SOLUTION INFILTRATION; PERINEURAL at 09:10

## 2018-09-03 ASSESSMENT — ENCOUNTER SYMPTOMS
BLOOD IN STOOL: 0
SHORTNESS OF BREATH: 0
WHEEZING: 0
BACK PAIN: 0
ABDOMINAL PAIN: 0
NAUSEA: 0
DIARRHEA: 0
SORE THROAT: 0
CONSTIPATION: 0
TROUBLE SWALLOWING: 0
VOMITING: 0

## 2018-09-03 ASSESSMENT — PAIN SCALES - GENERAL
PAINLEVEL_OUTOF10: 2
PAINLEVEL_OUTOF10: 0

## 2018-09-03 ASSESSMENT — PAIN DESCRIPTION - ORIENTATION: ORIENTATION: LEFT

## 2018-09-03 ASSESSMENT — PAIN DESCRIPTION - LOCATION: LOCATION: FINGER (COMMENT WHICH ONE)

## 2018-09-03 ASSESSMENT — PAIN DESCRIPTION - FREQUENCY: FREQUENCY: CONTINUOUS

## 2018-09-03 NOTE — ED PROVIDER NOTES
Stroke [facility]); and Thrombocytopenia ([facility] [address] SURGICAL HISTORY      has a past surgical history that includes other surgical history (Right); shoulder surgery (Left); Colonoscopy; Upper gastrointestinal endoscopy; Pilonidal cyst drainage; chest tube insertion (Left); Thoracoscopy (5/27/15); thoracotomy (5/27/15); and other surgical history (5/27/15). CURRENT MEDICATIONS       Previous Medications    ACITRETIN (SORIATANE) 25 MG CAPSULE    Take 25 mg by mouth every morning (before breakfast)    ALENDRONATE (FOSAMAX) 70 MG TABLET    Take 1 tablet by mouth every 7 days    CLOBETASOL PROPIONATE 0.05 % SHAM    Apply to scalp BID; leave on for 15 mins and then rinse thoroughly. May use 3-4x's weekly for maintenance. FAMOTIDINE (PEPCID) 20 MG TABLET    Take 1 tablet by mouth 2 times daily for 5 days    FEXOFENADINE (ALLEGRA) 180 MG TABLET    Take 1 tablet by mouth daily    GABAPENTIN (NEURONTIN) 100 MG CAPSULE    Take 1 capsule by mouth 3 times daily    HANDICAP PLACARD MISC    by Does not apply route Diagnosis: J43.9, L40.50, N18.3. Expires in 5 years 08/23/2021. LISINOPRIL (PRINIVIL;ZESTRIL) 20 MG TABLET    TAKE ONE TABLET BY MOUTH TWICE A DAY    METOPROLOL (LOPRESSOR) 100 MG TABLET    TAKE ONE TABLET BY MOUTH TWICE A DAY    MUPIROCIN (BACTROBAN) 2 % OINTMENT    Apply topically 4 times daily Apply topically 3 times daily. PRAVASTATIN (PRAVACHOL) 20 MG TABLET    Take 1 tablet by mouth every evening    RANITIDINE (ZANTAC) 150 MG TABLET    TAKE ONE TABLET BY MOUTH TWICE A DAY    SKIN PROTECTANTS, MISC. (EUCERIN) CREAM    Apply topically as needed. ALLERGIES     is allergic to cosentyx [secukinumab]; lantus [insulin glargine]; methotrexate derivatives; seasonal; keflex [cephalexin]; otezla [apremilast]; stellaria; and taltz [ixekizumab]. FAMILY HISTORY     indicated that his mother is alive. He indicated that his father is alive. He indicated that his sister is alive.  He indicated that his maternal

## 2018-09-04 ENCOUNTER — CARE COORDINATION (OUTPATIENT)
Dept: FAMILY MEDICINE CLINIC | Age: 58
End: 2018-09-04

## 2018-09-04 NOTE — CARE COORDINATION
Ambulatory Care Coordination Note  9/4/2018  CM Risk Score: 11  Teena Mortality Risk Score:      ACC: Oliver Jimenez, RN    Summary Note: Jose Walker has  CKD stage 4, COPD, Psoriasis, Type II Diabetes, Psoriatic Arthritis. He follows with pulmonology, nephrologist, dermatology, endocrinology, and allergist for his recurrent rash- psoriasis.        [facility] ER 8/16/2018- Urticaria- given medications and referred back to his dermatologist.   [facility] ([facility] ER 9/3/2018 Laceration Lt Thumb- sutured    Plan of Care : F/U weight loss and BS readings since off insulin.                           Review medications.                             QEGBABWW education, support, and assessments.      Spoke with Jose Walker. He stated his urticaria was from allergy to keflex. He denied any concerns with BS. Last testing low 100s. Denied any increase in SOB. He stated weight down to 190 #. He has not returned to the [facility]. ER F/U:  He denied any redness, drainage at suture site lt thumb. Spoke with PCP- suture removal 7-10 days. Appointment made for 9/11/2018 at 4 pm. Lv croft. Care Coordination Interventions    Program Enrollment:  Complex Care  Referral from Primary Care Provider:  No  Suggested Interventions and Community Resources  Diabetes Education:  Completed  Zone Management Tools:  Completed  Other Services or Interventions: Follows with endocrinologist and dermatologist.          Goals Addressed             Most Recent       Patient Stated     COMPLETED: Patient Stated (pt-stated)   On track (9/4/2018)             Jose Walker stated he is working on losing weight- would like to be below 200 #. Barriers: impairment:  physical: psoriatic arthritis  Plan for overcoming my barriers: following with dermatology and endocrinologist  Confidence: 9/10  Anticipated Goal Completion Date: 9/1/2018 7/11/2018 Jose Walker reported he was below 200# and then Psoriasis flared up- 210#  9/4/2018 Jose Walker has met his goal- weight 190#.         Patient Stated (pt-stated)   No change (9/4/2018)             Kishore Colin stated he would like to return to exercising 60 minutes every other day. Barriers: impairment:  physical: skin has been peeling off hands and feet. Plan for overcoming my barriers: He has stopped the medication that he feels caused this and is seeing improvement. Confidence: 8/10  Anticipated Goal Completion Date: 12/4/2018            Prior to Admission medications    Medication Sig Start Date End Date Taking? Authorizing Provider   metoprolol (LOPRESSOR) 100 MG tablet TAKE ONE TABLET BY MOUTH TWICE A DAY 8/13/18  Yes Abran Chen DO   pravastatin (PRAVACHOL) 20 MG tablet Take 1 tablet by mouth every evening 6/25/18  Yes Edmond Biggs MD   lisinopril (PRINIVIL;ZESTRIL) 20 MG tablet TAKE ONE TABLET BY MOUTH TWICE A DAY 6/21/18  Yes Abran Chen DO   ranitidine (ZANTAC) 150 MG tablet TAKE ONE TABLET BY MOUTH TWICE A DAY 4/9/18  Yes Abran Chen DO   gabapentin (NEURONTIN) 100 MG capsule Take 1 capsule by mouth 3 times daily 11/8/17  Yes Abran Chen DO   fexofenadine (ALLEGRA) 180 MG tablet Take 1 tablet by mouth daily 11/3/17  Yes Abran Chen DO   acitretin (SORIATANE) 25 MG capsule Take 25 mg by mouth every morning (before breakfast)    Historical Provider, MD   mupirocin (BACTROBAN) 2 % ointment Apply topically 4 times daily Apply topically 3 times daily. Historical Provider, MD   alendronate (FOSAMAX) 70 MG tablet Take 1 tablet by mouth every 7 days 7/2/18   Edmond Biggs MD   Clobetasol Propionate 0.05 % SHAM Apply to scalp BID; leave on for 15 mins and then rinse thoroughly. May use 3-4x's weekly for maintenance. 5/17/18   Marlena Wolfe DO   Skin Protectants, Misc. (EUCERIN) cream Apply topically as needed. 6/20/17   XOCHILT Agudelo - ALEXSANDER   Handicap Placard MISC by Does not apply route Diagnosis: J43.9, L40.50, N18.3.   Expires in 5 years 08/23/2021. 8/23/16   Marlena Wolfe, DO       Future Appointments  Date Time

## 2018-09-11 ENCOUNTER — OFFICE VISIT (OUTPATIENT)
Dept: FAMILY MEDICINE CLINIC | Age: 58
End: 2018-09-11
Payer: MEDICARE

## 2018-09-11 VITALS
SYSTOLIC BLOOD PRESSURE: 118 MMHG | BODY MASS INDEX: 28.77 KG/M2 | OXYGEN SATURATION: 98 % | WEIGHT: 192 LBS | DIASTOLIC BLOOD PRESSURE: 76 MMHG | HEART RATE: 76 BPM

## 2018-09-11 DIAGNOSIS — Z48.02 ENCOUNTER FOR REMOVAL OF SUTURES: Primary | ICD-10-CM

## 2018-09-11 DIAGNOSIS — S61.112D LACERATION OF LEFT THUMB WITHOUT FOREIGN BODY WITH DAMAGE TO NAIL, SUBSEQUENT ENCOUNTER: ICD-10-CM

## 2018-09-11 PROCEDURE — 1036F TOBACCO NON-USER: CPT | Performed by: FAMILY MEDICINE

## 2018-09-11 PROCEDURE — 3017F COLORECTAL CA SCREEN DOC REV: CPT | Performed by: FAMILY MEDICINE

## 2018-09-11 PROCEDURE — G8427 DOCREV CUR MEDS BY ELIG CLIN: HCPCS | Performed by: FAMILY MEDICINE

## 2018-09-11 PROCEDURE — G8599 NO ASA/ANTIPLAT THER USE RNG: HCPCS | Performed by: FAMILY MEDICINE

## 2018-09-11 PROCEDURE — 99212 OFFICE O/P EST SF 10 MIN: CPT | Performed by: FAMILY MEDICINE

## 2018-09-11 PROCEDURE — G8417 CALC BMI ABV UP PARAM F/U: HCPCS | Performed by: FAMILY MEDICINE

## 2018-09-12 ASSESSMENT — ENCOUNTER SYMPTOMS: COLOR CHANGE: 0

## 2018-09-27 ENCOUNTER — PATIENT MESSAGE (OUTPATIENT)
Dept: FAMILY MEDICINE CLINIC | Age: 58
End: 2018-09-27

## 2018-09-27 DIAGNOSIS — H60.92 OTITIS EXTERNA OF LEFT EAR, UNSPECIFIED CHRONICITY, UNSPECIFIED TYPE: ICD-10-CM

## 2018-09-28 RX ORDER — CIPROFLOXACIN AND DEXAMETHASONE 3; 1 MG/ML; MG/ML
4 SUSPENSION/ DROPS AURICULAR (OTIC) 2 TIMES DAILY
Qty: 1 BOTTLE | Refills: 1 | Status: SHIPPED | OUTPATIENT
Start: 2018-09-28 | End: 2019-03-29 | Stop reason: ALTCHOICE

## 2018-09-28 NOTE — TELEPHONE ENCOUNTER
From: Vargas Garcia  To: Micha White DO  Sent: 9/27/2018 7:34 PM EDT  Subject: Prescription Question    I cant seem to find the medicine you prescribed for the ringing in my ears. ciprofloxacin-dexamethasone. Can you please fax a script to [facility]?     Thank you

## 2018-10-02 ENCOUNTER — OFFICE VISIT (OUTPATIENT)
Dept: FAMILY MEDICINE CLINIC | Age: 58
End: 2018-10-02
Payer: MEDICARE

## 2018-10-02 VITALS
HEART RATE: 72 BPM | BODY MASS INDEX: 27.85 KG/M2 | TEMPERATURE: 97.8 F | WEIGHT: 188 LBS | HEIGHT: 69 IN | OXYGEN SATURATION: 97 % | DIASTOLIC BLOOD PRESSURE: 60 MMHG | SYSTOLIC BLOOD PRESSURE: 90 MMHG

## 2018-10-02 DIAGNOSIS — R00.0 SINUS TACHYCARDIA: ICD-10-CM

## 2018-10-02 DIAGNOSIS — L40.50 PSORIATIC ARTHRITIS (HCC): ICD-10-CM

## 2018-10-02 DIAGNOSIS — Z00.00 ROUTINE GENERAL MEDICAL EXAMINATION AT A HEALTH CARE FACILITY: Primary | ICD-10-CM

## 2018-10-02 DIAGNOSIS — F41.8 DEPRESSION WITH ANXIETY: ICD-10-CM

## 2018-10-02 DIAGNOSIS — I10 ESSENTIAL HYPERTENSION: ICD-10-CM

## 2018-10-02 DIAGNOSIS — G47.9 SLEEP DIFFICULTIES: ICD-10-CM

## 2018-10-02 DIAGNOSIS — N18.30 CHRONIC KIDNEY DISEASE (CKD), STAGE III (MODERATE) (HCC): Chronic | ICD-10-CM

## 2018-10-02 DIAGNOSIS — J43.9 CHRONIC BULLOUS EMPHYSEMA (HCC): ICD-10-CM

## 2018-10-02 DIAGNOSIS — L40.9 PSORIASIS: ICD-10-CM

## 2018-10-02 PROBLEM — Z86.59 HISTORY OF DEPRESSION: Status: RESOLVED | Noted: 2017-09-25 | Resolved: 2018-10-02

## 2018-10-02 PROCEDURE — 1036F TOBACCO NON-USER: CPT | Performed by: FAMILY MEDICINE

## 2018-10-02 PROCEDURE — G8417 CALC BMI ABV UP PARAM F/U: HCPCS | Performed by: FAMILY MEDICINE

## 2018-10-02 PROCEDURE — 3017F COLORECTAL CA SCREEN DOC REV: CPT | Performed by: FAMILY MEDICINE

## 2018-10-02 PROCEDURE — G0438 PPPS, INITIAL VISIT: HCPCS | Performed by: FAMILY MEDICINE

## 2018-10-02 PROCEDURE — 99214 OFFICE O/P EST MOD 30 MIN: CPT | Performed by: FAMILY MEDICINE

## 2018-10-02 PROCEDURE — G8427 DOCREV CUR MEDS BY ELIG CLIN: HCPCS | Performed by: FAMILY MEDICINE

## 2018-10-02 PROCEDURE — 3023F SPIROM DOC REV: CPT | Performed by: FAMILY MEDICINE

## 2018-10-02 PROCEDURE — G8599 NO ASA/ANTIPLAT THER USE RNG: HCPCS | Performed by: FAMILY MEDICINE

## 2018-10-02 PROCEDURE — G8484 FLU IMMUNIZE NO ADMIN: HCPCS | Performed by: FAMILY MEDICINE

## 2018-10-02 PROCEDURE — G8926 SPIRO NO PERF OR DOC: HCPCS | Performed by: FAMILY MEDICINE

## 2018-10-02 RX ORDER — CALCIPOTRIENE 50 UG/G
CREAM TOPICAL
COMMUNITY
Start: 2018-09-18 | End: 2019-03-29

## 2018-10-02 ASSESSMENT — ENCOUNTER SYMPTOMS
BLURRED VISION: 0
SHORTNESS OF BREATH: 0
BLOOD IN STOOL: 0

## 2018-10-02 ASSESSMENT — PATIENT HEALTH QUESTIONNAIRE - PHQ9: SUM OF ALL RESPONSES TO PHQ QUESTIONS 1-9: 18

## 2018-10-02 ASSESSMENT — ANXIETY QUESTIONNAIRES: GAD7 TOTAL SCORE: 6

## 2018-10-02 ASSESSMENT — LIFESTYLE VARIABLES: HOW OFTEN DO YOU HAVE A DRINK CONTAINING ALCOHOL: 0

## 2018-10-02 NOTE — PATIENT INSTRUCTIONS
Personalized Preventive Plan for [place]den [place] - 10/2/2018  Medicare offers a range of preventive health benefits. Some of the tests and screenings are paid in full while other may be subject to a deductible, co-insurance, and/or copay. Some of these benefits include a comprehensive review of your medical history including lifestyle, illnesses that may run in your family, and various assessments and screenings as appropriate. After reviewing your medical record and screening and assessments performed today your provider may have ordered immunizations, labs, imaging, and/or referrals for you. A list of these orders (if applicable) as well as your Preventive Care list are included within your After Visit Summary for your review. Other Preventive Recommendations:    · A preventive eye exam performed by an eye specialist is recommended every 1-2 years to screen for glaucoma; cataracts, macular degeneration, and other eye disorders. · A preventive dental visit is recommended every 6 months. · Try to get at least 150 minutes of exercise per week or 10,000 steps per day on a pedometer . · Order or download the FREE \"Exercise & Physical Activity: Your Everyday Guide\" from The [employer]. Call 3-596.965.7440 or search [employer] online. · You need 6806-4026 mg of calcium and 1465-7924 IU of vitamin D per day. It is possible to meet your calcium requirement with diet alone, but a vitamin D supplement is usually necessary to meet this goal.  · When exposed to the sun, use a sunscreen that protects against both UVA and UVB radiation with an SPF of 30 or greater. Reapply every 2 to 3 hours or after sweating, drying off with a towel, or swimming. · Always wear a seat belt when traveling in a car. Always wear a helmet when riding a bicycle or motorcycle.

## 2018-10-02 NOTE — PROGRESS NOTES
SURGICAL HISTORY Right     stent r kidney    OTHER SURGICAL HISTORY  5/27/15    resection of bleb    PILONIDAL CYST DRAINAGE      SHOULDER SURGERY Left     Rotator cuff    THORACOSCOPY  5/27/15    THORACOTOMY  5/27/15    UPPER GASTROINTESTINAL ENDOSCOPY         Family History   Problem Relation Age of Onset    Diabetes Sister     Alzheimer's Disease Maternal Grandmother     Other Maternal Grandmother         dementia    High Blood Pressure Maternal Grandfather     Cancer Maternal Grandfather         Unknown    High Blood Pressure Mother     Other Mother         blood clots    Other Paternal Grandmother         dementia       CareTeam (Including outside providers/suppliers regularly involved in providing care):   Patient Care Team:  Fabiola Patel DO as PCP - General (Family Medicine)  Fabiola Patel DO as PCP - [facility] Attributed Provider  Isidro Campos as Referring Physician (Internal Medicine)  Peter Ellison MD as Surgeon (Cardiothoracic Surgery)  Mary Tripp MD (Nephrology)  Consuelo Miller MD as Consulting Physician (Pediatric Allergy & Immunology)  Geo Mark MD as Consulting Physician (Pulmonology)  Andre Gracia (Dermatology)  Rj Underwood RN as Care Coordinator  Maria Elena Sales MD as Consulting Physician (Cardiology)  Gladys Omer MD as Consulting Physician (Endocrinology)  Dimple aClix MD (Dermatology)    Review of Systems   Constitutional: Negative for fever. HENT: Positive for tinnitus (in the L ear). Negative for hearing loss. Eyes: Negative for blurred vision. Respiratory: Negative for shortness of breath. Cardiovascular: Negative for chest pain and palpitations. Gastrointestinal: Negative for blood in stool. Genitourinary: Negative for dysuria and hematuria. Skin: Positive for rash. Neurological: Negative for dizziness and headaches. Psychiatric/Behavioral: Positive for depression. Negative for suicidal ideas.  The patient is

## 2018-10-05 ENCOUNTER — CARE COORDINATION (OUTPATIENT)
Dept: FAMILY MEDICINE CLINIC | Age: 58
End: 2018-10-05

## 2018-10-16 DIAGNOSIS — I10 ESSENTIAL HYPERTENSION: ICD-10-CM

## 2018-10-17 RX ORDER — LISINOPRIL 20 MG/1
20 TABLET ORAL DAILY
Qty: 90 TABLET | Refills: 3 | Status: SHIPPED | OUTPATIENT
Start: 2018-10-17 | End: 2019-10-14

## 2018-10-29 DIAGNOSIS — F41.8 DEPRESSION WITH ANXIETY: ICD-10-CM

## 2018-11-02 ENCOUNTER — CARE COORDINATION (OUTPATIENT)
Dept: CARE COORDINATION | Age: 58
End: 2018-11-02

## 2018-11-06 ENCOUNTER — CARE COORDINATION (OUTPATIENT)
Dept: CARE COORDINATION | Age: 58
End: 2018-11-06

## 2018-11-06 ENCOUNTER — OFFICE VISIT (OUTPATIENT)
Dept: FAMILY MEDICINE CLINIC | Age: 58
End: 2018-11-06
Payer: MEDICARE

## 2018-11-06 VITALS
HEART RATE: 66 BPM | WEIGHT: 196.6 LBS | BODY MASS INDEX: 29.46 KG/M2 | OXYGEN SATURATION: 97 % | DIASTOLIC BLOOD PRESSURE: 78 MMHG | SYSTOLIC BLOOD PRESSURE: 122 MMHG

## 2018-11-06 DIAGNOSIS — L40.9 PSORIASIS: ICD-10-CM

## 2018-11-06 DIAGNOSIS — D35.2 PITUITARY MICROADENOMA (HCC): ICD-10-CM

## 2018-11-06 DIAGNOSIS — E78.5 HYPERLIPIDEMIA, UNSPECIFIED HYPERLIPIDEMIA TYPE: ICD-10-CM

## 2018-11-06 DIAGNOSIS — E09.9 STEROID-INDUCED DIABETES (HCC): ICD-10-CM

## 2018-11-06 DIAGNOSIS — G47.00 INSOMNIA, UNSPECIFIED TYPE: Primary | ICD-10-CM

## 2018-11-06 DIAGNOSIS — T38.0X5A STEROID-INDUCED DIABETES (HCC): ICD-10-CM

## 2018-11-06 PROCEDURE — G8427 DOCREV CUR MEDS BY ELIG CLIN: HCPCS | Performed by: FAMILY MEDICINE

## 2018-11-06 PROCEDURE — G8599 NO ASA/ANTIPLAT THER USE RNG: HCPCS | Performed by: FAMILY MEDICINE

## 2018-11-06 PROCEDURE — 1036F TOBACCO NON-USER: CPT | Performed by: FAMILY MEDICINE

## 2018-11-06 PROCEDURE — G8483 FLU IMM NO ADMIN DOC REA: HCPCS | Performed by: FAMILY MEDICINE

## 2018-11-06 PROCEDURE — 99214 OFFICE O/P EST MOD 30 MIN: CPT | Performed by: FAMILY MEDICINE

## 2018-11-06 PROCEDURE — G8417 CALC BMI ABV UP PARAM F/U: HCPCS | Performed by: FAMILY MEDICINE

## 2018-11-06 PROCEDURE — 3017F COLORECTAL CA SCREEN DOC REV: CPT | Performed by: FAMILY MEDICINE

## 2018-11-06 RX ORDER — TRAZODONE HYDROCHLORIDE 50 MG/1
TABLET ORAL
Qty: 30 TABLET | Refills: 0 | Status: SHIPPED | OUTPATIENT
Start: 2018-11-06 | End: 2018-11-19 | Stop reason: DRUGHIGH

## 2018-11-06 NOTE — PROGRESS NOTES
Eval by NS and Endo 2011 but no recent FU    Pneumothorax 05/27/2015    left    Psoriatic arthritis ([facility] [address]     Stroke ([facility] [address]     x3    Thrombocytopenia ([facility] [address]       Past Surgical History:   Procedure Laterality Date    CHEST TUBE INSERTION Left     out now    COLONOSCOPY      11/13 SIS 10 y    OTHER SURGICAL HISTORY Right     stent r kidney    OTHER SURGICAL HISTORY  5/27/15    resection of bleb    PILONIDAL CYST DRAINAGE      SHOULDER SURGERY Left     Rotator cuff    THORACOSCOPY  5/27/15    THORACOTOMY  5/27/15    UPPER GASTROINTESTINAL ENDOSCOPY       Family History   Problem Relation Age of Onset    Diabetes Sister     Alzheimer's Disease Maternal Grandmother     Other Maternal Grandmother         dementia    High Blood Pressure Maternal Grandfather     Cancer Maternal Grandfather         Unknown    High Blood Pressure Mother     Other Mother         blood clots    Other Paternal Grandmother         dementia     Social History   Substance Use Topics    Smoking status: Former Smoker     Packs/day: 1.00     Years: 20.00     Types: Cigarettes     Quit date: 5/27/2015    Smokeless tobacco: Never Used      Comment: Quit smoking 5/27/2016, CARMEN GEEP, 10/20/2016.  Alcohol use No      Current Outpatient Prescriptions   Medication Sig Dispense Refill    Guselkumab 100 MG/ML SOSY See Admin Instructions      traZODone (DESYREL) 50 MG tablet Take 1 tab by mouth nightly as needed for insomnia; if not effective after 5 nights, may increase to 2 tabs nightly. 30 tablet 0    sertraline (ZOLOFT) 50 MG tablet TAKE ONE TABLET BY MOUTH ONCE NIGHTLY 30 tablet 1    lisinopril (PRINIVIL;ZESTRIL) 20 MG tablet Take 1 tablet by mouth daily 90 tablet 3    calcipotriene (DOVONEX) 0.005 % cream Saturday and Sunday  Cover area with thin film (avoid face, armpits, groin)  Then cover areas with warm water damp towel and then cover with dry towel and blankets.   Leave on for 1-2 hrs   Remove all after 1-2

## 2018-11-07 ENCOUNTER — HOSPITAL ENCOUNTER (OUTPATIENT)
Dept: LAB | Age: 58
Discharge: HOME | End: 2018-11-07
Payer: MEDICARE

## 2018-11-07 DIAGNOSIS — E78.5 HYPERLIPIDEMIA, UNSPECIFIED HYPERLIPIDEMIA TYPE: ICD-10-CM

## 2018-11-07 DIAGNOSIS — E09.9 STEROID-INDUCED DIABETES (HCC): ICD-10-CM

## 2018-11-07 DIAGNOSIS — T38.0X5A STEROID-INDUCED DIABETES (HCC): ICD-10-CM

## 2018-11-07 LAB
CHOLESTEROL/HDL RATIO: 4.6
CHOLESTEROL: 158 MG/DL
ESTIMATED AVERAGE GLUCOSE: 154 MG/DL
HBA1C MFR BLD: 7 % (ref 4.8–5.9)
HDLC SERPL-MCNC: 34 MG/DL
LDL CHOLESTEROL: 102 MG/DL (ref 0–130)
TRIGL SERPL-MCNC: 108 MG/DL
VLDLC SERPL CALC-MCNC: ABNORMAL MG/DL (ref 1–30)

## 2018-11-07 PROCEDURE — 80061 LIPID PANEL: CPT

## 2018-11-07 PROCEDURE — 83036 HEMOGLOBIN GLYCOSYLATED A1C: CPT

## 2018-11-07 PROCEDURE — 36415 COLL VENOUS BLD VENIPUNCTURE: CPT

## 2018-11-08 DIAGNOSIS — T38.0X5A STEROID-INDUCED DIABETES (HCC): Primary | ICD-10-CM

## 2018-11-08 DIAGNOSIS — E09.9 STEROID-INDUCED DIABETES (HCC): Primary | ICD-10-CM

## 2018-11-18 DIAGNOSIS — G47.00 INSOMNIA, UNSPECIFIED TYPE: ICD-10-CM

## 2018-11-19 RX ORDER — TRAZODONE HYDROCHLORIDE 100 MG/1
100 TABLET ORAL NIGHTLY
Qty: 30 TABLET | Refills: 2 | Status: SHIPPED | OUTPATIENT
Start: 2018-11-19 | End: 2019-02-14 | Stop reason: SDUPTHER

## 2018-11-19 RX ORDER — TRAZODONE HYDROCHLORIDE 50 MG/1
TABLET ORAL
Qty: 30 TABLET | Refills: 0 | OUTPATIENT
Start: 2018-11-19

## 2018-11-24 ENCOUNTER — CARE COORDINATION (OUTPATIENT)
Dept: CARE COORDINATION | Age: 58
End: 2018-11-24

## 2018-11-24 NOTE — CARE COORDINATION
(Nutrition/Exercise/Self-Monitoring):  No   Have you ever had to go to the ED for symptoms of low blood sugar?:  No       No patient-reported symptoms       and   COPD Assessment    Does the patient understand envrionmental exposure?:  Yes  Does the patient have a nebulizer?:  No     No patient-reported symptoms         Symptoms:           Care Coordination Interventions    Program Enrollment:  Complex Care  Referral from Primary Care Provider:  No  Suggested Interventions and Community Resources  Diabetes Education:  Completed  Zone Management Tools:  Completed  Other Services or Interventions:  He has seen endocrinologist and is following with dermatologist at [facility]. Goals Addressed             Most Recent       Patient Stated     Patient Stated (pt-stated)   No change (11/24/2018)             Keith Echavarria stated he would like to return to exercising 60 minutes every other day. Barriers: impairment:  physical: skin has been peeling off hands and feet. Plan for overcoming my barriers: He has stopped the medication that he feels caused this and is seeing improvement. Confidence: 8/10  Anticipated Goal Completion Date: 12/4/2018            Prior to Admission medications    Medication Sig Start Date End Date Taking? Authorizing Provider   traZODone (DESYREL) 100 MG tablet Take 1 tablet by mouth nightly 11/19/18   Adrianne Chen, DO   Guselkumab 100 MG/ML SOSY See Admin Instructions 10/9/18   Historical Provider, MD   sertraline (ZOLOFT) 50 MG tablet TAKE ONE TABLET BY MOUTH ONCE NIGHTLY 10/29/18   Adrianne Chen, DO   lisinopril (PRINIVIL;ZESTRIL) 20 MG tablet Take 1 tablet by mouth daily 10/17/18   Adrianne Chen, DO   calcipotriene (DOVONEX) 0.005 % cream Saturday and Sunday  Cover area with thin film (avoid face, armpits, groin)  Then cover areas with warm water damp towel and then cover with dry towel and blankets.   Leave on for 1-2 hrs   Remove all after 1-2 hours and then remove the thin film

## 2019-01-07 ENCOUNTER — OFFICE VISIT (OUTPATIENT)
Dept: FAMILY MEDICINE CLINIC | Age: 59
End: 2019-01-07
Payer: MEDICARE

## 2019-01-07 ENCOUNTER — HOSPITAL ENCOUNTER (OUTPATIENT)
Dept: LAB | Age: 59
Discharge: HOME | End: 2019-01-07
Payer: MEDICARE

## 2019-01-07 ENCOUNTER — CARE COORDINATION (OUTPATIENT)
Dept: CARE COORDINATION | Age: 59
End: 2019-01-07

## 2019-01-07 VITALS
DIASTOLIC BLOOD PRESSURE: 84 MMHG | WEIGHT: 186 LBS | OXYGEN SATURATION: 98 % | BODY MASS INDEX: 27.87 KG/M2 | SYSTOLIC BLOOD PRESSURE: 120 MMHG | HEART RATE: 60 BPM

## 2019-01-07 DIAGNOSIS — N18.30 CHRONIC KIDNEY DISEASE (CKD), STAGE III (MODERATE) (HCC): Chronic | ICD-10-CM

## 2019-01-07 DIAGNOSIS — I10 ESSENTIAL HYPERTENSION: Primary | ICD-10-CM

## 2019-01-07 DIAGNOSIS — F41.8 DEPRESSION WITH ANXIETY: ICD-10-CM

## 2019-01-07 DIAGNOSIS — L40.9 PSORIASIS: ICD-10-CM

## 2019-01-07 LAB
-: NORMAL
ABSOLUTE EOS #: 0.4 K/UL (ref 0–0.4)
ABSOLUTE IMMATURE GRANULOCYTE: ABNORMAL K/UL (ref 0–0.3)
ABSOLUTE LYMPH #: 1.6 K/UL (ref 1–4.8)
ABSOLUTE MONO #: 1.1 K/UL (ref 0.1–1.2)
AMORPHOUS: NORMAL
ANION GAP SERPL CALCULATED.3IONS-SCNC: 12 MMOL/L (ref 9–17)
BACTERIA: NORMAL
BASOPHILS # BLD: 1 % (ref 0–2)
BASOPHILS ABSOLUTE: 0.1 K/UL (ref 0–0.2)
BILIRUBIN URINE: NEGATIVE
BUN BLDV-MCNC: 11 MG/DL (ref 6–20)
BUN/CREAT BLD: 5 (ref 9–20)
CALCIUM IONIZED: 1.25 MMOL/L (ref 1.13–1.33)
CALCIUM SERPL-MCNC: 9.4 MG/DL (ref 8.6–10.4)
CASTS UA: NORMAL /LPF (ref 0–2)
CHLORIDE BLD-SCNC: 96 MMOL/L (ref 98–107)
CO2: 27 MMOL/L (ref 20–31)
COLOR: ABNORMAL
COMMENT UA: ABNORMAL
CREAT SERPL-MCNC: 2.07 MG/DL (ref 0.7–1.2)
CRYSTALS, UA: NORMAL /HPF
DIFFERENTIAL TYPE: ABNORMAL
EOSINOPHILS RELATIVE PERCENT: 4 % (ref 1–8)
EPITHELIAL CELLS UA: NORMAL /HPF (ref 0–5)
GFR AFRICAN AMERICAN: 40 ML/MIN
GFR NON-AFRICAN AMERICAN: 33 ML/MIN
GFR SERPL CREATININE-BSD FRML MDRD: ABNORMAL ML/MIN/{1.73_M2}
GFR SERPL CREATININE-BSD FRML MDRD: ABNORMAL ML/MIN/{1.73_M2}
GLUCOSE BLD-MCNC: 130 MG/DL (ref 70–99)
GLUCOSE URINE: NEGATIVE
HCT VFR BLD CALC: 46.9 % (ref 41–53)
HEMOGLOBIN: 15.2 G/DL (ref 13.5–17.5)
IMMATURE GRANULOCYTES: ABNORMAL %
KETONES, URINE: NEGATIVE
LEUKOCYTE ESTERASE, URINE: NEGATIVE
LYMPHOCYTES # BLD: 15 % (ref 15–43)
MCH RBC QN AUTO: 31.2 PG (ref 26–34)
MCHC RBC AUTO-ENTMCNC: 32.4 G/DL (ref 31–37)
MCV RBC AUTO: 96.4 FL (ref 80–100)
MONOCYTES # BLD: 10 % (ref 6–14)
MUCUS: NORMAL
NITRITE, URINE: NEGATIVE
NRBC AUTOMATED: ABNORMAL PER 100 WBC
OTHER OBSERVATIONS UA: NORMAL
PDW BLD-RTO: 14.9 % (ref 11–14.5)
PH UA: 5.5 (ref 5–6)
PLATELET # BLD: 328 K/UL (ref 140–450)
PLATELET ESTIMATE: ABNORMAL
PMV BLD AUTO: 7.9 FL (ref 6–12)
POTASSIUM SERPL-SCNC: 4.5 MMOL/L (ref 3.7–5.3)
PROTEIN UA: ABNORMAL
PTH INTACT: 61.65 PG/ML (ref 15–65)
RBC # BLD: 4.87 M/UL (ref 4.5–5.9)
RBC # BLD: ABNORMAL 10*6/UL
RBC UA: NORMAL /HPF (ref 0–4)
RENAL EPITHELIAL, UA: NORMAL /HPF
SEG NEUTROPHILS: 70 % (ref 44–74)
SEGMENTED NEUTROPHILS ABSOLUTE COUNT: 7.8 K/UL (ref 1.8–7.7)
SODIUM BLD-SCNC: 135 MMOL/L (ref 135–144)
SPECIFIC GRAVITY UA: 1.02 (ref 1.01–1.02)
TRICHOMONAS: NORMAL
TURBIDITY: ABNORMAL
URINE HGB: NEGATIVE
UROBILINOGEN, URINE: NORMAL
WBC # BLD: 11.1 K/UL (ref 3.5–11)
WBC # BLD: ABNORMAL 10*3/UL
WBC UA: NORMAL /HPF (ref 0–4)
YEAST: NORMAL

## 2019-01-07 PROCEDURE — 3017F COLORECTAL CA SCREEN DOC REV: CPT | Performed by: FAMILY MEDICINE

## 2019-01-07 PROCEDURE — 36415 COLL VENOUS BLD VENIPUNCTURE: CPT

## 2019-01-07 PROCEDURE — G8417 CALC BMI ABV UP PARAM F/U: HCPCS | Performed by: FAMILY MEDICINE

## 2019-01-07 PROCEDURE — G8483 FLU IMM NO ADMIN DOC REA: HCPCS | Performed by: FAMILY MEDICINE

## 2019-01-07 PROCEDURE — G8427 DOCREV CUR MEDS BY ELIG CLIN: HCPCS | Performed by: FAMILY MEDICINE

## 2019-01-07 PROCEDURE — G8599 NO ASA/ANTIPLAT THER USE RNG: HCPCS | Performed by: FAMILY MEDICINE

## 2019-01-07 PROCEDURE — 1036F TOBACCO NON-USER: CPT | Performed by: FAMILY MEDICINE

## 2019-01-07 PROCEDURE — 80048 BASIC METABOLIC PNL TOTAL CA: CPT

## 2019-01-07 PROCEDURE — 99214 OFFICE O/P EST MOD 30 MIN: CPT | Performed by: FAMILY MEDICINE

## 2019-01-07 PROCEDURE — 83970 ASSAY OF PARATHORMONE: CPT

## 2019-01-07 PROCEDURE — 82330 ASSAY OF CALCIUM: CPT

## 2019-01-07 PROCEDURE — 81001 URINALYSIS AUTO W/SCOPE: CPT

## 2019-01-07 PROCEDURE — 85025 COMPLETE CBC W/AUTO DIFF WBC: CPT

## 2019-01-13 PROBLEM — L40.9 PSORIASIS: Status: ACTIVE | Noted: 2019-01-13

## 2019-01-21 DIAGNOSIS — L40.9 SCALP PSORIASIS: ICD-10-CM

## 2019-01-22 RX ORDER — CLOBETASOL PROPIONATE 0.05 G/100ML
SHAMPOO TOPICAL
Qty: 1 BOTTLE | Refills: 5 | Status: SHIPPED | OUTPATIENT
Start: 2019-01-22 | End: 2019-04-12

## 2019-02-05 ENCOUNTER — CARE COORDINATION (OUTPATIENT)
Dept: CARE COORDINATION | Age: 59
End: 2019-02-05

## 2019-02-05 RX ORDER — FOLIC ACID 1 MG/1
1 TABLET ORAL
COMMUNITY
Start: 2019-01-22 | End: 2019-04-12

## 2019-02-09 ENCOUNTER — HOSPITAL ENCOUNTER (OUTPATIENT)
Dept: LAB | Age: 59
Discharge: HOME | End: 2019-02-09
Payer: MEDICARE

## 2019-02-09 DIAGNOSIS — T38.0X5A STEROID-INDUCED DIABETES (HCC): ICD-10-CM

## 2019-02-09 DIAGNOSIS — E09.9 STEROID-INDUCED DIABETES (HCC): ICD-10-CM

## 2019-02-09 LAB
ABSOLUTE EOS #: 0.3 K/UL (ref 0–0.4)
ABSOLUTE IMMATURE GRANULOCYTE: ABNORMAL K/UL (ref 0–0.3)
ABSOLUTE LYMPH #: 1.8 K/UL (ref 1–4.8)
ABSOLUTE MONO #: 0.7 K/UL (ref 0.1–1.2)
ALBUMIN SERPL-MCNC: 3.8 G/DL (ref 3.5–5.2)
ALBUMIN/GLOBULIN RATIO: 1 (ref 1–2.5)
ALP BLD-CCNC: 96 U/L (ref 40–129)
ALT SERPL-CCNC: 13 U/L (ref 5–41)
ANION GAP SERPL CALCULATED.3IONS-SCNC: 10 MMOL/L (ref 9–17)
AST SERPL-CCNC: 15 U/L
BASOPHILS # BLD: 1 % (ref 0–2)
BASOPHILS ABSOLUTE: 0.1 K/UL (ref 0–0.2)
BILIRUB SERPL-MCNC: 0.65 MG/DL (ref 0.3–1.2)
BUN BLDV-MCNC: 14 MG/DL (ref 6–20)
BUN/CREAT BLD: 7 (ref 9–20)
CALCIUM SERPL-MCNC: 9.4 MG/DL (ref 8.6–10.4)
CHLORIDE BLD-SCNC: 96 MMOL/L (ref 98–107)
CO2: 29 MMOL/L (ref 20–31)
CREAT SERPL-MCNC: 2.15 MG/DL (ref 0.7–1.2)
DIFFERENTIAL TYPE: ABNORMAL
EOSINOPHILS RELATIVE PERCENT: 3 % (ref 1–8)
ESTIMATED AVERAGE GLUCOSE: 154 MG/DL
GFR AFRICAN AMERICAN: 39 ML/MIN
GFR NON-AFRICAN AMERICAN: 32 ML/MIN
GFR SERPL CREATININE-BSD FRML MDRD: ABNORMAL ML/MIN/{1.73_M2}
GFR SERPL CREATININE-BSD FRML MDRD: ABNORMAL ML/MIN/{1.73_M2}
GLUCOSE BLD-MCNC: 131 MG/DL (ref 70–99)
HBA1C MFR BLD: 7 % (ref 4.8–5.9)
HCT VFR BLD CALC: 47.1 % (ref 41–53)
HEMOGLOBIN: 15.5 G/DL (ref 13.5–17.5)
IMMATURE GRANULOCYTES: ABNORMAL %
LYMPHOCYTES # BLD: 17 % (ref 15–43)
MCH RBC QN AUTO: 31.9 PG (ref 26–34)
MCHC RBC AUTO-ENTMCNC: 32.8 G/DL (ref 31–37)
MCV RBC AUTO: 97.2 FL (ref 80–100)
MONOCYTES # BLD: 7 % (ref 6–14)
NRBC AUTOMATED: ABNORMAL PER 100 WBC
PDW BLD-RTO: 15.2 % (ref 11–14.5)
PLATELET # BLD: 313 K/UL (ref 140–450)
PLATELET ESTIMATE: ABNORMAL
PMV BLD AUTO: 7.5 FL (ref 6–12)
POTASSIUM SERPL-SCNC: 4.7 MMOL/L (ref 3.7–5.3)
RBC # BLD: 4.85 M/UL (ref 4.5–5.9)
RBC # BLD: ABNORMAL 10*6/UL
SEG NEUTROPHILS: 72 % (ref 44–74)
SEGMENTED NEUTROPHILS ABSOLUTE COUNT: 7.4 K/UL (ref 1.8–7.7)
SODIUM BLD-SCNC: 135 MMOL/L (ref 135–144)
TOTAL PROTEIN: 7.8 G/DL (ref 6.4–8.3)
WBC # BLD: 10.3 K/UL (ref 3.5–11)
WBC # BLD: ABNORMAL 10*3/UL

## 2019-02-09 PROCEDURE — 85025 COMPLETE CBC W/AUTO DIFF WBC: CPT

## 2019-02-09 PROCEDURE — 83036 HEMOGLOBIN GLYCOSYLATED A1C: CPT

## 2019-02-09 PROCEDURE — 80053 COMPREHEN METABOLIC PANEL: CPT

## 2019-02-09 PROCEDURE — 36415 COLL VENOUS BLD VENIPUNCTURE: CPT

## 2019-02-14 ENCOUNTER — OFFICE VISIT (OUTPATIENT)
Dept: CARDIOLOGY | Age: 59
End: 2019-02-14
Payer: MEDICARE

## 2019-02-14 VITALS
DIASTOLIC BLOOD PRESSURE: 64 MMHG | HEART RATE: 86 BPM | BODY MASS INDEX: 27.55 KG/M2 | HEIGHT: 69 IN | WEIGHT: 186 LBS | SYSTOLIC BLOOD PRESSURE: 102 MMHG

## 2019-02-14 DIAGNOSIS — I10 ESSENTIAL HYPERTENSION: Primary | ICD-10-CM

## 2019-02-14 DIAGNOSIS — G47.00 INSOMNIA, UNSPECIFIED TYPE: ICD-10-CM

## 2019-02-14 PROCEDURE — G8483 FLU IMM NO ADMIN DOC REA: HCPCS | Performed by: INTERNAL MEDICINE

## 2019-02-14 PROCEDURE — 93000 ELECTROCARDIOGRAM COMPLETE: CPT | Performed by: INTERNAL MEDICINE

## 2019-02-14 PROCEDURE — 1036F TOBACCO NON-USER: CPT | Performed by: INTERNAL MEDICINE

## 2019-02-14 PROCEDURE — G8417 CALC BMI ABV UP PARAM F/U: HCPCS | Performed by: INTERNAL MEDICINE

## 2019-02-14 PROCEDURE — G8427 DOCREV CUR MEDS BY ELIG CLIN: HCPCS | Performed by: INTERNAL MEDICINE

## 2019-02-14 PROCEDURE — 99212 OFFICE O/P EST SF 10 MIN: CPT | Performed by: INTERNAL MEDICINE

## 2019-02-14 PROCEDURE — G8599 NO ASA/ANTIPLAT THER USE RNG: HCPCS | Performed by: INTERNAL MEDICINE

## 2019-02-14 PROCEDURE — 3017F COLORECTAL CA SCREEN DOC REV: CPT | Performed by: INTERNAL MEDICINE

## 2019-02-14 RX ORDER — HYDROXYZINE HYDROCHLORIDE 25 MG/1
25 TABLET, FILM COATED ORAL NIGHTLY
COMMUNITY
Start: 2019-01-22 | End: 2019-04-12 | Stop reason: ALTCHOICE

## 2019-02-14 RX ORDER — CETIRIZINE HYDROCHLORIDE 10 MG/1
10 TABLET ORAL DAILY
COMMUNITY
Start: 2019-01-22 | End: 2021-06-08 | Stop reason: ALTCHOICE

## 2019-02-14 RX ORDER — TRAZODONE HYDROCHLORIDE 100 MG/1
TABLET ORAL
Qty: 30 TABLET | Refills: 5 | Status: SHIPPED | OUTPATIENT
Start: 2019-02-14 | End: 2019-02-14

## 2019-02-27 ENCOUNTER — CARE COORDINATION (OUTPATIENT)
Dept: CARE COORDINATION | Age: 59
End: 2019-02-27

## 2019-03-29 ENCOUNTER — CARE COORDINATION (OUTPATIENT)
Dept: CARE COORDINATION | Age: 59
End: 2019-03-29

## 2019-03-29 NOTE — CARE COORDINATION
Ambulatory Care Coordination Note  4/2/2019  CM Risk Score: 11  Teena Mortality Risk Score:      ACC: Marian Addison RN    Summary Note: Chintan Pimentel has  CKD stage 4, COPD, Psoriasis, Type II Diabetes, Psoriatic Arthritis. He follows with pulmonology, nephrologist, rheumatologist, dermatology, endocrinology (completed)    He is following at the [facility]. Chintan Pimentel started in [facility] d/t elevated BS and frequent ER visits. He was on steroids for his Psoriasis and Psoriatic Arthritis. His Hgb A1C was 13.9. He is now at 7.0 and not on steroids or diabetic medications. He is controlling BS through diet and being active.      Plan of Care :[facility]- completion                      Spoke with Chintan Pimentel. He stated his BS have good and he has not had any problems with BP. His weight is down to 186 #. He has scheduled PCP follow up. He is scheduled for follow up at the [facility] in May. He is also scheduled with his other specialists. He is not able to exercise at this time d/t arthritis pain. He is eating healthy and active in home. Medications reviewed- he stated he is not taking methotrexate d/t increased itching, Atarax, folic acid and Zyrtex. He is aware that last DM eye exam was 10/2017 and that he should have one yearly. He stated he just received new glasses. He is aware of same day appointments in [facility] and Urgent Care. He has not had any ER/IP visits in over 6 months. Zone Tools- DM and COPD reinforced. Chintan Pimentel voiced understanding. Education completed. Discussed moving to surveillance program. Chintan Pimentel stated he did not need further calls at this time. He stated he would call if he had issues. He has this writer's contact information. He is aware that this writer will no longer be calling him. This writer can restart ACC in the future if needed.      Lab Results   Component Value Date    LABA1C 7.0 (H) 02/09/2019    LABA1C 7.0 (H) 11/07/2018    LABA1C 6.8 06/25/2018     Lab Results   Component Value Date    EAG 154 02/09/2019     11/07/2018     11/04/2017        Diabetes Assessment    Medic Alert ID:  No  Meal Planning:  Avoidance of concentrated sweets   How often do you test your blood sugar?:  Daily, Meals, Bedtime   Do you have barriers with adherence to non-pharmacologic self-management interventions? (Nutrition/Exercise/Self-Monitoring):  No   Have you ever had to go to the ED for symptoms of low blood sugar?:  No       No patient-reported symptoms   Do you have hyperglycemia symptoms?:  No   Do you have hypoglycemia symptoms?:  No   Last Blood Sugar Value:  117   Blood Sugar Monitoring Regimen:  Once a Day   Blood Sugar Trends:  No Change       and   COPD Assessment    Does the patient understand envrionmental exposure?:  Yes  Does the patient have a nebulizer?:  No     No patient-reported symptoms         Symptoms:      Symptom course:  stable  Increase use of rapid acting/rescue inhaled medications?:  Not Applicable  Change in chronic cough?:  No/At Baseline  Change in sputum?:  No/At Baseline  Self Monitoring - SaO2:  No         Care Coordination Interventions    Program Enrollment:  Complex Care  Referral from Primary Care Provider:  No  Suggested Interventions and Community Resources  Diabetes Education:  Completed (Comment: saw endocrinologist )  Zone Management Tools:  Completed (Comment: DM and COPD)  Other Services or Interventions:  He has seen endocrinologist and is following with dermatologist at [facility]. Goals Addressed                 This Visit's Progress       Patient Stated     COMPLETED: Patient Stated (pt-stated)   On track     Franko Cali stated he would like to return to exercising 60 minutes every other day. Barriers: impairment:  physical: skin has been peeling off hands and feet. Plan for overcoming my barriers: He has stopped the medication that he feels caused this and is seeing improvement.    Confidence: 8/10  Anticipated Goal Completion Date:

## 2019-04-06 DIAGNOSIS — I10 ESSENTIAL HYPERTENSION: ICD-10-CM

## 2019-04-08 RX ORDER — LISINOPRIL 20 MG/1
TABLET ORAL
Qty: 180 TABLET | Refills: 3 | OUTPATIENT
Start: 2019-04-08

## 2019-04-12 ENCOUNTER — OFFICE VISIT (OUTPATIENT)
Dept: FAMILY MEDICINE CLINIC | Age: 59
End: 2019-04-12
Payer: MEDICARE

## 2019-04-12 VITALS
HEART RATE: 92 BPM | DIASTOLIC BLOOD PRESSURE: 72 MMHG | SYSTOLIC BLOOD PRESSURE: 110 MMHG | WEIGHT: 192 LBS | BODY MASS INDEX: 28.77 KG/M2 | OXYGEN SATURATION: 96 %

## 2019-04-12 DIAGNOSIS — E09.9 STEROID-INDUCED DIABETES (HCC): ICD-10-CM

## 2019-04-12 DIAGNOSIS — L40.9 SCALP PSORIASIS: ICD-10-CM

## 2019-04-12 DIAGNOSIS — L30.9 CHRONIC DERMATITIS: ICD-10-CM

## 2019-04-12 DIAGNOSIS — T38.0X5A STEROID-INDUCED DIABETES (HCC): ICD-10-CM

## 2019-04-12 DIAGNOSIS — K21.9 GASTROESOPHAGEAL REFLUX DISEASE, ESOPHAGITIS PRESENCE NOT SPECIFIED: ICD-10-CM

## 2019-04-12 DIAGNOSIS — L40.50 PSORIATIC ARTHRITIS (HCC): ICD-10-CM

## 2019-04-12 DIAGNOSIS — I10 ESSENTIAL HYPERTENSION: Primary | ICD-10-CM

## 2019-04-12 PROCEDURE — 3017F COLORECTAL CA SCREEN DOC REV: CPT | Performed by: FAMILY MEDICINE

## 2019-04-12 PROCEDURE — G8427 DOCREV CUR MEDS BY ELIG CLIN: HCPCS | Performed by: FAMILY MEDICINE

## 2019-04-12 PROCEDURE — G8599 NO ASA/ANTIPLAT THER USE RNG: HCPCS | Performed by: FAMILY MEDICINE

## 2019-04-12 PROCEDURE — G8417 CALC BMI ABV UP PARAM F/U: HCPCS | Performed by: FAMILY MEDICINE

## 2019-04-12 PROCEDURE — 1036F TOBACCO NON-USER: CPT | Performed by: FAMILY MEDICINE

## 2019-04-12 PROCEDURE — 99214 OFFICE O/P EST MOD 30 MIN: CPT | Performed by: FAMILY MEDICINE

## 2019-04-12 RX ORDER — RANITIDINE 150 MG/1
150 TABLET ORAL 2 TIMES DAILY
Qty: 180 TABLET | Refills: 3 | Status: SHIPPED | OUTPATIENT
Start: 2019-04-12 | End: 2019-12-02 | Stop reason: ALTCHOICE

## 2019-04-12 RX ORDER — LISINOPRIL 20 MG/1
20 TABLET ORAL DAILY
Qty: 90 TABLET | Refills: 3 | Status: CANCELLED | OUTPATIENT
Start: 2019-04-12

## 2019-04-12 NOTE — PROGRESS NOTES
[address]  [address], [address] Ad[place] Morelos  [phone]      Ricardo Smith is a 66 y.o. male who presents today for his medical conditions/complaints as noted below. Ricardo Smith is c/o of Insomnia and Hypertension      HPI:     Pt here today for follow-up of HTN, depression, and insomnia. Pt would like to speak with [facility] about going back on Cyclosporine; the Tremfya does not seem to be working well enough for his psoriasis or his arthritic pain. Still has issues with psoriasis on his scalp, and the insurance is going to stop covering the Clobetasol proprionate 0.05% solution he uses on scalp. Also having cracking of his feet - makes it hard to walk for a few days. Also causing more heartburn and GERD symptoms - taking Zantac 150 mg BID. Has an appt next month to see them and talk about these concerns. Last Tremfya injection was mid-to-late March. Was also started on Methotrexate 2 tablets weekly in 1/2019; tried on this on 2 different occasions, and it made him break out in rash on his body in dark red spots both times that lasted 3-4 days. No longer taking this. Was also prescribed Topicort 0.25% ointment, but his insurance did not cover, so he did not fill. Not using Zyrtec or Hydroxyzine currently, but has these on hand to use as needed for itching; Zyrtec to be used in the morning and Hydroxyzine at bedtime. Still has a significant amount of arthritis pain - mostly in feet, hips, and R hand. Keeping from doing things he wants to be doing, so less active. Just saw Nephrology in 2/2019 - Cr at 2.15. Taking Lisinopril 20 mg daily and Metoprolol 100 mg BID - taking Metoprolol at 10:00 am and the second with the Lisinopril at 7:00 pm, so he has not yet taken anything today. BP well-controlled today - 110/72.      No longer using Trazodone 100 mg nightly for insomnia - states on his own, he is able to get at least 6 hours of sleep. Sometimes, his arthritis pain keeps up him for awhile, but otherwise he feels he is doing well enough off the Trazodone, as it made him have sexual side effects. Also no longer taking the Zoloft x past 2 months - also feels that he was having sexual side effects from this. States mood is improved from when he started this.           Past Medical History:   Diagnosis Date    Allergic reaction caused by a drug 8/13/2016    Arthritis     psoritic arthritis    Chronic kidney disease     Follows with Nephro    COPD (chronic obstructive pulmonary disease) ([address]     pt  denies    CVA (cerebral vascular accident) ([address]     Neuro eval with Dr Ovalle here    Depression     Diabetes mellitus ([address]     History of blood transfusion     History of migraine headaches     Hyperlipidemia     Hypertension     Pituitary microadenoma ([address]     Eval by NS and Endo 2011 but no recent FU    Pneumothorax 05/27/2015    left    Psoriatic arthritis ([address]     Stroke ([address]     x3    Thrombocytopenia ([address]       Past Surgical History:   Procedure Laterality Date    CHEST TUBE INSERTION Left     out now    COLONOSCOPY      11/13 SIS 10 y    OTHER SURGICAL HISTORY Right     stent r kidney    OTHER SURGICAL HISTORY  5/27/15    resection of bleb    PILONIDAL CYST DRAINAGE      SHOULDER SURGERY Left     Rotator cuff    THORACOSCOPY  5/27/15    THORACOTOMY  5/27/15    UPPER GASTROINTESTINAL ENDOSCOPY       Family History   Problem Relation Age of Onset    Diabetes Sister     Alzheimer's Disease Maternal Grandmother     Other Maternal Grandmother         dementia    High Blood Pressure Maternal Grandfather     Cancer Maternal Grandfather         Unknown    High Blood Pressure Mother     Other Mother         blood clots    Other Paternal Grandmother         dementia     Social History     Tobacco Use    Smoking status: Former Smoker     Packs/day: 1.00     Years: 20.00     Pack years: 20.00     Types: Cigarettes     Last attempt to quit: 5/27/2015     Years since quitting: 3.9    Smokeless tobacco: Never Used    Tobacco comment: Quit smoking 5/27/2016, CARMEN Mack P, 10/20/2016. Substance Use Topics    Alcohol use: No     Alcohol/week: 0.0 oz      Current Outpatient Medications   Medication Sig Dispense Refill    ranitidine (ZANTAC) 150 MG tablet Take 1 tablet by mouth 2 times daily 180 tablet 3    cetirizine (ZYRTEC ALLERGY) 10 MG tablet Take 10 mg by mouth daily       Guselkumab 100 MG/ML SOSY See Admin Instructions      lisinopril (PRINIVIL;ZESTRIL) 20 MG tablet Take 1 tablet by mouth daily 90 tablet 3    metoprolol (LOPRESSOR) 100 MG tablet TAKE ONE TABLET BY MOUTH TWICE A DAY 60 tablet 11     No current facility-administered medications for this visit. Allergies   Allergen Reactions    Cosentyx [Secukinumab] Rash     Other reaction(s): Unknown  Nausea, vomiting fever  Nausea, vomiting fever    Lantus [Insulin Glargine] Itching, Swelling and Rash     Patient started Lantus about a month ago, only new medication. His skin reaction started on his abdomen where he injects the Lantus and moved up to the face. This is similar to his response to Cosentyx.      Infliximab      Other reaction(s): Nerve damage    Methotrexate Derivatives Other (See Comments)     Bone marrow toxicity    Seasonal     Adhesive Tape Rash    Keflex [Cephalexin] Rash    Otezla [Apremilast] Rash     Other reaction(s): Unknown    Stellaria Rash    Taltz [Ixekizumab] Rash     Other reaction(s): Unknown    Ustekinumab Rash     Other reaction(s): rash-allergic and acute renal failure  Other reaction(s): Unknown       Health Maintenance   Topic Date Due    Pneumococcal 0-64 years Vaccine (1 of 1 - PPSV23) 12/23/1968    Hepatitis B Vaccine (1 of 3 - Risk 3-dose series) 12/23/1981    Diabetic retinal exam  10/30/2018    Shingles Vaccine (1 of 2) 10/02/2019 (Originally 12/23/2012)    Lipid screen  11/07/2019    A1C test (Diabetic or Prediabetic)  02/09/2020    Potassium monitoring  02/09/2020    Creatinine monitoring  02/09/2020    Diabetic foot exam  04/12/2020    Colon cancer screen colonoscopy  08/08/2023    DTaP/Tdap/Td vaccine (2 - Td) 09/03/2028    Hepatitis C screen  Addressed    HIV screen  Addressed       Subjective:      Review of Systems   Constitutional: Negative for unexpected weight change. Skin: Positive for rash (chronic psoriasis). Psychiatric/Behavioral: Negative for dysphoric mood and sleep disturbance. Objective:     Vitals:    04/12/19 0943   BP: 110/72   Site: Right Upper Arm   Position: Sitting   Cuff Size: Large Adult   Pulse: 92   SpO2: 96%   Weight: 192 lb (87.1 kg)     Physical Exam   Constitutional: He is oriented to person, place, and time. He appears well-developed and well-nourished. No distress. HENT:   Head: Normocephalic and atraumatic. Right Ear: External ear normal.   Left Ear: External ear normal.   Eyes: Conjunctivae are normal.   Cardiovascular: Normal rate, regular rhythm and normal heart sounds. Pulmonary/Chest: Effort normal and breath sounds normal. No respiratory distress. Abdominal: Soft. Bowel sounds are normal. He exhibits no distension. There is no tenderness. Musculoskeletal: He exhibits no edema. Neurological: He is alert and oriented to person, place, and time. Skin: Skin is warm and dry. Psychiatric: He has a normal mood and affect. Diabetic foot check: Normal sensation with the monofilament bilaterally. Dorsalis pedis pulses intact bilaterally. Capillary refill at the toes was less than 2 seconds. No skin breakdown, erythema, blisters, scaling, or ulcers. Toenails thin and not ingrown. No evidence of fungal infection. Assessment:       Diagnosis Orders   1. Essential hypertension  Lipid Panel   2. Gastroesophageal reflux disease, esophagitis presence not specified  ranitidine (ZANTAC) 150 MG tablet   3.  Psoriatic arthritis ([address] 4. Scalp psoriasis     5. Chronic dermatitis     6. Steroid-induced diabetes ([facility]  Hemoglobin A1C     DIABETES FOOT EXAM         Plan:      Return in about 4 months (around 8/22/2019) for Follow-up. Orders Placed This Encounter   Procedures    Hemoglobin A1C     Standing Status:   Future     Standing Expiration Date:   4/12/2020    Lipid Panel     Standing Status:   Future     Standing Expiration Date:   4/12/2020     Order Specific Question:   Is Patient Fasting?/# of Hours     Answer:   12     DIABETES FOOT EXAM     Orders Placed This Encounter   Medications    ranitidine (ZANTAC) 150 MG tablet     Sig: Take 1 tablet by mouth 2 times daily     Dispense:  180 tablet     Refill:  3       Patient given educational materials - see patient instructions. Discussed use, benefit, and side effects of prescribed medications. All patient questions answered. Pt voiced understanding. Reviewed health maintenance.             Electronically signed by Marlyn Pires DO on 4/21/2019 at 11:52 PM

## 2019-08-07 DIAGNOSIS — I10 ESSENTIAL HYPERTENSION: ICD-10-CM

## 2019-08-08 RX ORDER — METOPROLOL TARTRATE 100 MG/1
TABLET ORAL
Qty: 60 TABLET | Refills: 11 | Status: SHIPPED | OUTPATIENT
Start: 2019-08-08 | End: 2019-10-21 | Stop reason: ALTCHOICE

## 2019-08-24 ENCOUNTER — HOSPITAL ENCOUNTER (OUTPATIENT)
Dept: LAB | Age: 59
Discharge: HOME | End: 2019-08-24
Payer: MEDICARE

## 2019-08-24 DIAGNOSIS — E09.9 STEROID-INDUCED DIABETES (HCC): ICD-10-CM

## 2019-08-24 DIAGNOSIS — T38.0X5A STEROID-INDUCED DIABETES (HCC): ICD-10-CM

## 2019-08-24 DIAGNOSIS — I10 ESSENTIAL HYPERTENSION: ICD-10-CM

## 2019-08-24 LAB
CHOLESTEROL/HDL RATIO: 3.8
CHOLESTEROL: 121 MG/DL
ESTIMATED AVERAGE GLUCOSE: 203 MG/DL
HBA1C MFR BLD: 8.7 % (ref 4.8–5.9)
HDLC SERPL-MCNC: 32 MG/DL
LDL CHOLESTEROL: 74 MG/DL (ref 0–130)
TRIGL SERPL-MCNC: 75 MG/DL
VLDLC SERPL CALC-MCNC: ABNORMAL MG/DL (ref 1–30)

## 2019-08-24 PROCEDURE — 80061 LIPID PANEL: CPT

## 2019-08-24 PROCEDURE — 36415 COLL VENOUS BLD VENIPUNCTURE: CPT

## 2019-08-24 PROCEDURE — 83036 HEMOGLOBIN GLYCOSYLATED A1C: CPT

## 2019-08-30 ENCOUNTER — OFFICE VISIT (OUTPATIENT)
Dept: FAMILY MEDICINE CLINIC | Age: 59
End: 2019-08-30
Payer: MEDICARE

## 2019-08-30 VITALS
DIASTOLIC BLOOD PRESSURE: 78 MMHG | OXYGEN SATURATION: 98 % | WEIGHT: 183 LBS | SYSTOLIC BLOOD PRESSURE: 128 MMHG | HEART RATE: 50 BPM | BODY MASS INDEX: 27.11 KG/M2 | HEIGHT: 69 IN

## 2019-08-30 DIAGNOSIS — H93.13 TINNITUS OF BOTH EARS: Primary | ICD-10-CM

## 2019-08-30 DIAGNOSIS — T38.0X5A STEROID-INDUCED DIABETES (HCC): ICD-10-CM

## 2019-08-30 DIAGNOSIS — I10 ESSENTIAL HYPERTENSION: ICD-10-CM

## 2019-08-30 DIAGNOSIS — L40.9 PSORIASIS: ICD-10-CM

## 2019-08-30 DIAGNOSIS — L40.50 PSORIATIC ARTHRITIS (HCC): ICD-10-CM

## 2019-08-30 DIAGNOSIS — E09.9 STEROID-INDUCED DIABETES (HCC): ICD-10-CM

## 2019-08-30 DIAGNOSIS — J43.9 CHRONIC BULLOUS EMPHYSEMA (HCC): ICD-10-CM

## 2019-08-30 PROCEDURE — G8417 CALC BMI ABV UP PARAM F/U: HCPCS | Performed by: FAMILY MEDICINE

## 2019-08-30 PROCEDURE — G8427 DOCREV CUR MEDS BY ELIG CLIN: HCPCS | Performed by: FAMILY MEDICINE

## 2019-08-30 PROCEDURE — 99214 OFFICE O/P EST MOD 30 MIN: CPT | Performed by: FAMILY MEDICINE

## 2019-08-30 PROCEDURE — 3045F PR MOST RECENT HEMOGLOBIN A1C LEVEL 7.0-9.0%: CPT | Performed by: FAMILY MEDICINE

## 2019-08-30 PROCEDURE — 1036F TOBACCO NON-USER: CPT | Performed by: FAMILY MEDICINE

## 2019-08-30 PROCEDURE — G8599 NO ASA/ANTIPLAT THER USE RNG: HCPCS | Performed by: FAMILY MEDICINE

## 2019-08-30 PROCEDURE — 3023F SPIROM DOC REV: CPT | Performed by: FAMILY MEDICINE

## 2019-08-30 PROCEDURE — G8926 SPIRO NO PERF OR DOC: HCPCS | Performed by: FAMILY MEDICINE

## 2019-08-30 PROCEDURE — 3017F COLORECTAL CA SCREEN DOC REV: CPT | Performed by: FAMILY MEDICINE

## 2019-08-30 PROCEDURE — 2022F DILAT RTA XM EVC RTNOPTHY: CPT | Performed by: FAMILY MEDICINE

## 2019-08-30 RX ORDER — CLOBETASOL PROPIONATE 0.05 G/100ML
SHAMPOO TOPICAL
COMMUNITY
Start: 2019-08-08 | End: 2019-08-30 | Stop reason: ALTCHOICE

## 2019-08-30 RX ORDER — PREDNISONE 10 MG/1
TABLET ORAL
COMMUNITY
Start: 2019-08-27 | End: 2019-10-21

## 2019-08-30 RX ORDER — DESOXIMETASONE 2.5 MG/G
OINTMENT TOPICAL
COMMUNITY
Start: 2019-01-22 | End: 2020-02-07

## 2019-08-30 ASSESSMENT — PATIENT HEALTH QUESTIONNAIRE - PHQ9
SUM OF ALL RESPONSES TO PHQ QUESTIONS 1-9: 0
SUM OF ALL RESPONSES TO PHQ9 QUESTIONS 1 & 2: 0
SUM OF ALL RESPONSES TO PHQ QUESTIONS 1-9: 0
1. LITTLE INTEREST OR PLEASURE IN DOING THINGS: 0
2. FEELING DOWN, DEPRESSED OR HOPELESS: 0

## 2019-08-30 NOTE — PROGRESS NOTES
[address]  [address], [address] Carlita Eliceo Morelos  [phone]      Priscila Nath is a 66 y.o. male who presents today for his medical conditions/complaints as noted below. Priscila Nath is c/o of Hypertension (4 month follow up) and Tinnitus (thinks it related to lisinopril and metoprolol)      HPI:     Pt here today for follow-up of HTN and tinnitus. Has had tinnitus intermittently x past few months; thought it was related to the [facility], as it would get better the further out from the injection he was. Went off the [facility] completely 3 months ago, but it has not resolved. Sounds like a \"whomping\" in his ears with his pulse; worse when he lies down on either side. Not taking any ASA. Pt went to [facility] in July - told them that he wanted off the [facility] and had been off it x 2 months already. Called when his rash worsening and they sent in Rx for Prednisone - 40 x 7 days, 30 x 7 days, 20 x 7 days, and 10 x 7 days. Started Prednisone 3 days ago. Knowing that his glucose will be higher on the Prednisone, pt has been trying to eat healthier - cooking outside more; has been having more fish and less fried food.         Past Medical History:   Diagnosis Date    Allergic reaction caused by a drug 8/13/2016    Arthritis     psoritic arthritis    Chronic kidney disease     Follows with Nephro    COPD (chronic obstructive pulmonary disease) ([address]     pt  denies    CVA (cerebral vascular accident) ([address]     Neuro eval with Dr Ovalle here    Depression     Diabetes mellitus ([address]     History of blood transfusion     History of migraine headaches     Hyperlipidemia     Hypertension     Pituitary microadenoma ([address]     Eval by NS and Endo 2011 but no recent FU    Pneumothorax 05/27/2015    left    Psoriatic arthritis ([address]     Stroke ([address]     x3    Thrombocytopenia ([address]       Past Surgical History:   Procedure Laterality Date    CHEST TUBE INSERTION Left     out now    COLONOSCOPY       SIS 10 y    OTHER SURGICAL HISTORY Right     stent r kidney    OTHER SURGICAL HISTORY  5/27/15    resection of bleb    PILONIDAL CYST DRAINAGE      SHOULDER SURGERY Left     Rotator cuff    THORACOSCOPY  5/27/15    THORACOTOMY  5/27/15    UPPER GASTROINTESTINAL ENDOSCOPY       Family History   Problem Relation Age of Onset    Diabetes Sister     Alzheimer's Disease Maternal Grandmother     Other Maternal Grandmother         dementia    High Blood Pressure Maternal Grandfather     Cancer Maternal Grandfather         Unknown    High Blood Pressure Mother     Other Mother         blood clots    Other Paternal Grandmother         dementia     Social History     Tobacco Use    Smoking status: Former Smoker     Packs/day: 1.00     Years: 20.00     Pack years: 20.00     Types: Cigarettes     Last attempt to quit: 2015     Years since quittin.2    Smokeless tobacco: Never Used    Tobacco comment: Quit smoking 2016, CARMEN Mack RCP, 10/20/2016. Substance Use Topics    Alcohol use: No     Alcohol/week: 0.0 standard drinks      Current Outpatient Medications   Medication Sig Dispense Refill    predniSONE (DELTASONE) 10 MG tablet Take 40 mg daily for 7 days, then decrease by 10 mg every 7 days for a total of 28 days of treatment      desoximetasone (TOPICORT) 0.25 % OINT Apply to affected areas twice daily. Avoid contact with face and groin.       insulin NPH (HUMULIN N;NOVOLIN N) 100 UNIT/ML injection vial Inject 8 Units into the skin 2 times daily 2 vial 2    insulin aspart (NOVOLOG) 100 UNIT/ML injection vial Use TID as directed per sliding scale: 171-220 - 3 units, 221-270 - 5 units, 271-320 - 7 units, greater keerthi 320 - 9 units 1 vial 2    metoprolol (LOPRESSOR) 100 MG tablet TAKE ONE TABLET BY MOUTH TWICE A DAY 60 tablet 11    ranitidine (ZANTAC) 150 MG tablet Take 1 tablet by mouth 2 times daily 180 tablet 3    cetirizine

## 2019-09-03 DIAGNOSIS — I10 ESSENTIAL HYPERTENSION: ICD-10-CM

## 2019-09-03 NOTE — TELEPHONE ENCOUNTER
Pt calling with BP readings following appt last Friday, 164/103 P 52, 154/110 p 67, 153/100/ P50, 149/100  P62. Pt states he needs to be on something for his BP, [facility], please advise at above number.

## 2019-09-07 ENCOUNTER — PATIENT MESSAGE (OUTPATIENT)
Dept: FAMILY MEDICINE CLINIC | Age: 59
End: 2019-09-07

## 2019-09-09 RX ORDER — BLOOD-GLUCOSE METER
1 KIT MISCELLANEOUS DAILY
Qty: 1 KIT | Refills: 0 | Status: CANCELLED | OUTPATIENT
Start: 2019-09-09

## 2019-09-09 RX ORDER — GLUCOSAMINE HCL/CHONDROITIN SU 500-400 MG
CAPSULE ORAL
Qty: 100 STRIP | Refills: 0 | Status: CANCELLED | OUTPATIENT
Start: 2019-09-09

## 2019-09-09 NOTE — TELEPHONE ENCOUNTER
Pt needs glucometer and test strips sent to [facility]. Also would like new B/P meds, thinks the tinnitus is associated with the b/p meds he is on.

## 2019-09-11 NOTE — TELEPHONE ENCOUNTER
Patient calling stating that he needs this test strips and different medication from BP. Patient is very upset. Please address.

## 2019-09-12 RX ORDER — GLUCOSAMINE HCL/CHONDROITIN SU 500-400 MG
CAPSULE ORAL
Qty: 100 STRIP | Refills: 10 | Status: SHIPPED | OUTPATIENT
Start: 2019-09-12 | End: 2019-12-02

## 2019-09-12 RX ORDER — HYDROCHLOROTHIAZIDE 25 MG/1
TABLET ORAL
Qty: 30 TABLET | Refills: 0 | Status: SHIPPED | OUTPATIENT
Start: 2019-09-12 | End: 2019-10-21 | Stop reason: ALTCHOICE

## 2019-09-16 ENCOUNTER — TELEPHONE (OUTPATIENT)
Dept: FAMILY MEDICINE CLINIC | Age: 59
End: 2019-09-16

## 2019-09-19 NOTE — TELEPHONE ENCOUNTER
Pt informed to take b/p 2 hours after taking med. Pt voiced understanding. Alos, pt is taking a who tab of the HCTZ 25 mg. Pt voiced understanding of calling in BS and B/P readings for another week. Per pt, is only taking prednisone for 11 more days.

## 2019-09-19 NOTE — TELEPHONE ENCOUNTER
Noted, thank you. Please inform pt that he should only record BP readings that are at least 2 hours or longer after he has taken BP meds; the ones before he takes meds are likely to always be high. How does he feel he is tolerating the new HCTZ 25 mg, and is he taking 1/2 tab or 1 tab daily? Glad the ringing in his ears is much better. Would recommend he take his Novolin 11 units QAM and 8 units QPM, and keep monitoring glucose readings. Can he please call in with BP and glucose readings again in another week? How many more days/weeks will he be taking the Prednisone?

## 2019-10-06 ENCOUNTER — HOSPITAL ENCOUNTER (EMERGENCY)
Age: 59
Discharge: ANOTHER HOSPITAL | End: 2019-10-07
Attending: EMERGENCY MEDICINE
Payer: MEDICARE

## 2019-10-06 ENCOUNTER — APPOINTMENT (OUTPATIENT)
Dept: CT IMAGING | Age: 59
End: 2019-10-06
Payer: MEDICARE

## 2019-10-06 DIAGNOSIS — N49.2 CELLULITIS OF SCROTUM: Primary | ICD-10-CM

## 2019-10-06 LAB
-: NORMAL
ABSOLUTE BANDS #: 0.53 K/UL
ABSOLUTE EOS #: 0.21 K/UL (ref 0–0.4)
ABSOLUTE IMMATURE GRANULOCYTE: ABNORMAL K/UL (ref 0–0.3)
ABSOLUTE LYMPH #: 0.42 K/UL (ref 1–4.8)
ABSOLUTE MONO #: 0.21 K/UL (ref 0.1–1.2)
ALBUMIN SERPL-MCNC: 3.5 G/DL (ref 3.5–5.2)
ALBUMIN/GLOBULIN RATIO: 1.1 (ref 1–2.5)
ALP BLD-CCNC: 83 U/L (ref 40–129)
ALT SERPL-CCNC: 22 U/L (ref 5–41)
AMORPHOUS: NORMAL
ANION GAP SERPL CALCULATED.3IONS-SCNC: 13 MMOL/L (ref 9–17)
AST SERPL-CCNC: 20 U/L
BACTERIA: NORMAL
BANDS: 5 %
BASOPHILS # BLD: 0 % (ref 0–2)
BASOPHILS ABSOLUTE: 0 K/UL (ref 0–0.2)
BILIRUB SERPL-MCNC: 1.96 MG/DL (ref 0.3–1.2)
BILIRUBIN DIRECT: 0.37 MG/DL
BILIRUBIN URINE: NEGATIVE
BILIRUBIN, INDIRECT: 1.59 MG/DL (ref 0–1)
BUN BLDV-MCNC: 26 MG/DL (ref 6–20)
BUN/CREAT BLD: 10 (ref 9–20)
CALCIUM SERPL-MCNC: 8.7 MG/DL (ref 8.6–10.4)
CASTS UA: NORMAL /LPF (ref 0–2)
CASTS UA: NORMAL /LPF (ref 0–2)
CHLORIDE BLD-SCNC: 87 MMOL/L (ref 98–107)
CO2: 27 MMOL/L (ref 20–31)
COLOR: ABNORMAL
COMMENT UA: ABNORMAL
CREAT SERPL-MCNC: 2.59 MG/DL (ref 0.7–1.2)
CRYSTALS, UA: NORMAL /HPF
DIFFERENTIAL TYPE: ABNORMAL
EKG ATRIAL RATE: 100 BPM
EKG Q-T INTERVAL: 236 MS
EKG QRS DURATION: 76 MS
EKG QTC CALCULATION (BAZETT): 400 MS
EKG R AXIS: -45 DEGREES
EKG T AXIS: -49 DEGREES
EKG VENTRICULAR RATE: 173 BPM
EOSINOPHILS RELATIVE PERCENT: 2 % (ref 1–8)
EPITHELIAL CELLS UA: NORMAL /HPF (ref 0–5)
GFR AFRICAN AMERICAN: 31 ML/MIN
GFR NON-AFRICAN AMERICAN: 26 ML/MIN
GFR SERPL CREATININE-BSD FRML MDRD: ABNORMAL ML/MIN/{1.73_M2}
GFR SERPL CREATININE-BSD FRML MDRD: ABNORMAL ML/MIN/{1.73_M2}
GLOBULIN: 3.3 G/DL (ref 1.5–3.8)
GLUCOSE BLD-MCNC: 180 MG/DL (ref 70–99)
GLUCOSE URINE: NEGATIVE
HCT VFR BLD CALC: 43.4 % (ref 41–53)
HEMOGLOBIN: 15.2 G/DL (ref 13.5–17.5)
IMMATURE GRANULOCYTES: ABNORMAL %
KETONES, URINE: NEGATIVE
LACTIC ACID, SEPSIS WHOLE BLOOD: NORMAL MMOL/L (ref 0.5–1.9)
LACTIC ACID, SEPSIS: 1.1 MMOL/L (ref 0.5–1.9)
LEUKOCYTE ESTERASE, URINE: NEGATIVE
LYMPHOCYTES # BLD: 4 % (ref 15–43)
MCH RBC QN AUTO: 32.3 PG (ref 26–34)
MCHC RBC AUTO-ENTMCNC: 35.1 G/DL (ref 31–37)
MCV RBC AUTO: 92.1 FL (ref 80–100)
MONOCYTES # BLD: 2 % (ref 6–14)
MORPHOLOGY: ABNORMAL
MUCUS: NORMAL
NITRITE, URINE: NEGATIVE
NRBC AUTOMATED: ABNORMAL PER 100 WBC
OTHER OBSERVATIONS UA: NORMAL
PDW BLD-RTO: 14.2 % (ref 11–14.5)
PH UA: 6 (ref 5–6)
PLATELET # BLD: 232 K/UL (ref 140–450)
PLATELET ESTIMATE: ABNORMAL
PMV BLD AUTO: 8 FL (ref 6–12)
POTASSIUM SERPL-SCNC: 4.1 MMOL/L (ref 3.7–5.3)
PROTEIN UA: ABNORMAL
RBC # BLD: 4.71 M/UL (ref 4.5–5.9)
RBC # BLD: ABNORMAL 10*6/UL
RBC UA: NORMAL /HPF (ref 0–4)
RENAL EPITHELIAL, UA: NORMAL /HPF
SEG NEUTROPHILS: 87 % (ref 44–74)
SEGMENTED NEUTROPHILS ABSOLUTE COUNT: 9.13 K/UL (ref 1.8–7.7)
SODIUM BLD-SCNC: 127 MMOL/L (ref 135–144)
SPECIFIC GRAVITY UA: 1.01 (ref 1.01–1.02)
TOTAL PROTEIN: 6.8 G/DL (ref 6.4–8.3)
TRICHOMONAS: NORMAL
TROPONIN INTERP: NORMAL
TROPONIN T: NORMAL NG/ML
TROPONIN, HIGH SENSITIVITY: 17 NG/L (ref 0–22)
TURBIDITY: ABNORMAL
URINE HGB: ABNORMAL
UROBILINOGEN, URINE: NORMAL
WBC # BLD: 10.5 K/UL (ref 3.5–11)
WBC # BLD: ABNORMAL 10*3/UL
WBC UA: NORMAL /HPF (ref 0–4)
YEAST: NORMAL

## 2019-10-06 PROCEDURE — 87040 BLOOD CULTURE FOR BACTERIA: CPT

## 2019-10-06 PROCEDURE — 36415 COLL VENOUS BLD VENIPUNCTURE: CPT

## 2019-10-06 PROCEDURE — 93005 ELECTROCARDIOGRAM TRACING: CPT | Performed by: EMERGENCY MEDICINE

## 2019-10-06 PROCEDURE — 74176 CT ABD & PELVIS W/O CONTRAST: CPT

## 2019-10-06 PROCEDURE — 80076 HEPATIC FUNCTION PANEL: CPT

## 2019-10-06 PROCEDURE — 2580000003 HC RX 258: Performed by: EMERGENCY MEDICINE

## 2019-10-06 PROCEDURE — 99285 EMERGENCY DEPT VISIT HI MDM: CPT

## 2019-10-06 PROCEDURE — 96375 TX/PRO/DX INJ NEW DRUG ADDON: CPT

## 2019-10-06 PROCEDURE — 6360000002 HC RX W HCPCS: Performed by: EMERGENCY MEDICINE

## 2019-10-06 PROCEDURE — 96376 TX/PRO/DX INJ SAME DRUG ADON: CPT

## 2019-10-06 PROCEDURE — 85025 COMPLETE CBC W/AUTO DIFF WBC: CPT

## 2019-10-06 PROCEDURE — 80048 BASIC METABOLIC PNL TOTAL CA: CPT

## 2019-10-06 PROCEDURE — 96365 THER/PROPH/DIAG IV INF INIT: CPT

## 2019-10-06 PROCEDURE — 81001 URINALYSIS AUTO W/SCOPE: CPT

## 2019-10-06 PROCEDURE — 84484 ASSAY OF TROPONIN QUANT: CPT

## 2019-10-06 PROCEDURE — 83605 ASSAY OF LACTIC ACID: CPT

## 2019-10-06 RX ORDER — 0.9 % SODIUM CHLORIDE 0.9 %
500 INTRAVENOUS SOLUTION INTRAVENOUS ONCE
Status: COMPLETED | OUTPATIENT
Start: 2019-10-06 | End: 2019-10-06

## 2019-10-06 RX ORDER — 0.9 % SODIUM CHLORIDE 0.9 %
1000 INTRAVENOUS SOLUTION INTRAVENOUS ONCE
Status: COMPLETED | OUTPATIENT
Start: 2019-10-06 | End: 2019-10-06

## 2019-10-06 RX ORDER — MORPHINE SULFATE 2 MG/ML
2 INJECTION, SOLUTION INTRAMUSCULAR; INTRAVENOUS ONCE
Status: COMPLETED | OUTPATIENT
Start: 2019-10-06 | End: 2019-10-06

## 2019-10-06 RX ORDER — MORPHINE SULFATE 4 MG/ML
4 INJECTION, SOLUTION INTRAMUSCULAR; INTRAVENOUS ONCE
Status: COMPLETED | OUTPATIENT
Start: 2019-10-06 | End: 2019-10-06

## 2019-10-06 RX ORDER — DIPHENHYDRAMINE HYDROCHLORIDE 50 MG/ML
50 INJECTION INTRAMUSCULAR; INTRAVENOUS ONCE
Status: COMPLETED | OUTPATIENT
Start: 2019-10-06 | End: 2019-10-06

## 2019-10-06 RX ADMIN — MORPHINE SULFATE 2 MG: 2 INJECTION, SOLUTION INTRAMUSCULAR; INTRAVENOUS at 19:19

## 2019-10-06 RX ADMIN — SODIUM CHLORIDE 1000 ML: 9 INJECTION, SOLUTION INTRAVENOUS at 19:16

## 2019-10-06 RX ADMIN — DIPHENHYDRAMINE HYDROCHLORIDE 50 MG: 50 INJECTION, SOLUTION INTRAMUSCULAR; INTRAVENOUS at 20:42

## 2019-10-06 RX ADMIN — SODIUM CHLORIDE 500 ML: 9 INJECTION, SOLUTION INTRAVENOUS at 22:20

## 2019-10-06 RX ADMIN — VANCOMYCIN HYDROCHLORIDE 1000 MG: 1 INJECTION, POWDER, LYOPHILIZED, FOR SOLUTION INTRAVENOUS at 21:29

## 2019-10-06 RX ADMIN — MORPHINE SULFATE 4 MG: 4 INJECTION, SOLUTION INTRAMUSCULAR; INTRAVENOUS at 21:27

## 2019-10-06 ASSESSMENT — PAIN SCALES - GENERAL
PAINLEVEL_OUTOF10: 7
PAINLEVEL_OUTOF10: 10
PAINLEVEL_OUTOF10: 7
PAINLEVEL_OUTOF10: 6
PAINLEVEL_OUTOF10: 8
PAINLEVEL_OUTOF10: 8

## 2019-10-06 ASSESSMENT — PAIN DESCRIPTION - ORIENTATION: ORIENTATION: RIGHT;LEFT

## 2019-10-06 ASSESSMENT — PAIN DESCRIPTION - LOCATION: LOCATION: SCROTUM

## 2019-10-07 ENCOUNTER — HOSPITAL ENCOUNTER (INPATIENT)
Age: 59
LOS: 1 days | Discharge: ANOTHER HOSPITAL | DRG: 607 | End: 2019-10-08
Attending: INTERNAL MEDICINE | Admitting: INTERNAL MEDICINE
Payer: MEDICARE

## 2019-10-07 VITALS
WEIGHT: 178 LBS | TEMPERATURE: 99.3 F | HEIGHT: 69 IN | HEART RATE: 92 BPM | OXYGEN SATURATION: 96 % | DIASTOLIC BLOOD PRESSURE: 56 MMHG | SYSTOLIC BLOOD PRESSURE: 95 MMHG | RESPIRATION RATE: 16 BRPM | BODY MASS INDEX: 26.36 KG/M2

## 2019-10-07 PROBLEM — B37.81 THRUSH OF MOUTH AND ESOPHAGUS (HCC): Status: ACTIVE | Noted: 2019-10-07

## 2019-10-07 PROBLEM — L27.0 DRUG-INDUCED SKIN RASH: Status: ACTIVE | Noted: 2019-10-07

## 2019-10-07 PROBLEM — B37.0 THRUSH OF MOUTH AND ESOPHAGUS (HCC): Status: ACTIVE | Noted: 2019-10-07

## 2019-10-07 LAB
GLUCOSE BLD-MCNC: 100 MG/DL (ref 75–110)
GLUCOSE BLD-MCNC: 196 MG/DL (ref 75–110)
GLUCOSE BLD-MCNC: 92 MG/DL (ref 75–110)
METHOTREXATE LEVEL: 0.04 UMOL/L

## 2019-10-07 PROCEDURE — 2580000003 HC RX 258: Performed by: NURSE PRACTITIONER

## 2019-10-07 PROCEDURE — 6360000002 HC RX W HCPCS: Performed by: EMERGENCY MEDICINE

## 2019-10-07 PROCEDURE — 2500000003 HC RX 250 WO HCPCS: Performed by: PHYSICIAN ASSISTANT

## 2019-10-07 PROCEDURE — 80299 QUANTITATIVE ASSAY DRUG: CPT

## 2019-10-07 PROCEDURE — 1200000000 HC SEMI PRIVATE

## 2019-10-07 PROCEDURE — APPSS60 APP SPLIT SHARED TIME 46-60 MINUTES: Performed by: PHYSICIAN ASSISTANT

## 2019-10-07 PROCEDURE — 87040 BLOOD CULTURE FOR BACTERIA: CPT

## 2019-10-07 PROCEDURE — 36415 COLL VENOUS BLD VENIPUNCTURE: CPT

## 2019-10-07 PROCEDURE — 2580000003 HC RX 258: Performed by: PHYSICIAN ASSISTANT

## 2019-10-07 PROCEDURE — 99222 1ST HOSP IP/OBS MODERATE 55: CPT | Performed by: INTERNAL MEDICINE

## 2019-10-07 PROCEDURE — 96376 TX/PRO/DX INJ SAME DRUG ADON: CPT

## 2019-10-07 PROCEDURE — 96375 TX/PRO/DX INJ NEW DRUG ADDON: CPT

## 2019-10-07 PROCEDURE — 82947 ASSAY GLUCOSE BLOOD QUANT: CPT

## 2019-10-07 PROCEDURE — 6360000002 HC RX W HCPCS: Performed by: PHYSICIAN ASSISTANT

## 2019-10-07 PROCEDURE — 6370000000 HC RX 637 (ALT 250 FOR IP): Performed by: PHYSICIAN ASSISTANT

## 2019-10-07 PROCEDURE — 6360000002 HC RX W HCPCS: Performed by: NURSE PRACTITIONER

## 2019-10-07 RX ORDER — SODIUM CHLORIDE 0.9 % (FLUSH) 0.9 %
10 SYRINGE (ML) INJECTION PRN
Status: DISCONTINUED | OUTPATIENT
Start: 2019-10-07 | End: 2019-10-08 | Stop reason: HOSPADM

## 2019-10-07 RX ORDER — OXYCODONE HYDROCHLORIDE AND ACETAMINOPHEN 5; 325 MG/1; MG/1
1 TABLET ORAL EVERY 4 HOURS PRN
Status: DISCONTINUED | OUTPATIENT
Start: 2019-10-07 | End: 2019-10-08

## 2019-10-07 RX ORDER — METHYLPREDNISOLONE SODIUM SUCCINATE 125 MG/2ML
125 INJECTION, POWDER, LYOPHILIZED, FOR SOLUTION INTRAMUSCULAR; INTRAVENOUS ONCE
Status: COMPLETED | OUTPATIENT
Start: 2019-10-07 | End: 2019-10-07

## 2019-10-07 RX ORDER — HYDRALAZINE HYDROCHLORIDE 20 MG/ML
10 INJECTION INTRAMUSCULAR; INTRAVENOUS EVERY 6 HOURS PRN
Status: DISCONTINUED | OUTPATIENT
Start: 2019-10-07 | End: 2019-10-08 | Stop reason: HOSPADM

## 2019-10-07 RX ORDER — FAMOTIDINE 20 MG/1
20 TABLET, FILM COATED ORAL DAILY
Status: DISCONTINUED | OUTPATIENT
Start: 2019-10-07 | End: 2019-10-07

## 2019-10-07 RX ORDER — DIPHENHYDRAMINE HYDROCHLORIDE 50 MG/ML
25 INJECTION INTRAMUSCULAR; INTRAVENOUS EVERY 6 HOURS
Status: DISCONTINUED | OUTPATIENT
Start: 2019-10-07 | End: 2019-10-08 | Stop reason: HOSPADM

## 2019-10-07 RX ORDER — POTASSIUM CHLORIDE 20 MEQ/1
40 TABLET, EXTENDED RELEASE ORAL PRN
Status: DISCONTINUED | OUTPATIENT
Start: 2019-10-07 | End: 2019-10-08 | Stop reason: HOSPADM

## 2019-10-07 RX ORDER — METOPROLOL TARTRATE 50 MG/1
100 TABLET, FILM COATED ORAL 2 TIMES DAILY
Status: DISCONTINUED | OUTPATIENT
Start: 2019-10-07 | End: 2019-10-08 | Stop reason: HOSPADM

## 2019-10-07 RX ORDER — HYDROCHLOROTHIAZIDE 25 MG/1
25 TABLET ORAL DAILY
Status: DISCONTINUED | OUTPATIENT
Start: 2019-10-07 | End: 2019-10-08 | Stop reason: HOSPADM

## 2019-10-07 RX ORDER — CETIRIZINE HYDROCHLORIDE 10 MG/1
10 TABLET ORAL DAILY
Status: DISCONTINUED | OUTPATIENT
Start: 2019-10-07 | End: 2019-10-07

## 2019-10-07 RX ORDER — SODIUM CHLORIDE 0.9 % (FLUSH) 0.9 %
10 SYRINGE (ML) INJECTION EVERY 12 HOURS SCHEDULED
Status: DISCONTINUED | OUTPATIENT
Start: 2019-10-07 | End: 2019-10-08 | Stop reason: HOSPADM

## 2019-10-07 RX ORDER — SODIUM CHLORIDE 9 MG/ML
INJECTION, SOLUTION INTRAVENOUS CONTINUOUS
Status: DISCONTINUED | OUTPATIENT
Start: 2019-10-07 | End: 2019-10-08 | Stop reason: HOSPADM

## 2019-10-07 RX ORDER — FLUCONAZOLE 2 MG/ML
200 INJECTION, SOLUTION INTRAVENOUS EVERY 24 HOURS
Status: DISCONTINUED | OUTPATIENT
Start: 2019-10-07 | End: 2019-10-08 | Stop reason: HOSPADM

## 2019-10-07 RX ORDER — MORPHINE SULFATE 4 MG/ML
4 INJECTION, SOLUTION INTRAMUSCULAR; INTRAVENOUS ONCE
Status: COMPLETED | OUTPATIENT
Start: 2019-10-07 | End: 2019-10-07

## 2019-10-07 RX ORDER — ACETAMINOPHEN 325 MG/1
650 TABLET ORAL EVERY 4 HOURS PRN
Status: DISCONTINUED | OUTPATIENT
Start: 2019-10-07 | End: 2019-10-08 | Stop reason: HOSPADM

## 2019-10-07 RX ORDER — NICOTINE 21 MG/24HR
1 PATCH, TRANSDERMAL 24 HOURS TRANSDERMAL DAILY PRN
Status: DISCONTINUED | OUTPATIENT
Start: 2019-10-07 | End: 2019-10-07

## 2019-10-07 RX ORDER — POTASSIUM CHLORIDE 7.45 MG/ML
10 INJECTION INTRAVENOUS PRN
Status: DISCONTINUED | OUTPATIENT
Start: 2019-10-07 | End: 2019-10-08 | Stop reason: HOSPADM

## 2019-10-07 RX ORDER — ONDANSETRON 2 MG/ML
4 INJECTION INTRAMUSCULAR; INTRAVENOUS EVERY 6 HOURS PRN
Status: DISCONTINUED | OUTPATIENT
Start: 2019-10-07 | End: 2019-10-08 | Stop reason: HOSPADM

## 2019-10-07 RX ORDER — MAGNESIUM SULFATE 1 G/100ML
1 INJECTION INTRAVENOUS PRN
Status: DISCONTINUED | OUTPATIENT
Start: 2019-10-07 | End: 2019-10-08 | Stop reason: HOSPADM

## 2019-10-07 RX ADMIN — NYSTATIN 500000 UNITS: 100000 SUSPENSION ORAL at 17:49

## 2019-10-07 RX ADMIN — METOPROLOL TARTRATE 100 MG: 50 TABLET, FILM COATED ORAL at 20:51

## 2019-10-07 RX ADMIN — HYDROMORPHONE HYDROCHLORIDE 1 MG: 1 INJECTION, SOLUTION INTRAMUSCULAR; INTRAVENOUS; SUBCUTANEOUS at 08:50

## 2019-10-07 RX ADMIN — TRIAMCINOLONE ACETONIDE: 1 OINTMENT TOPICAL at 20:51

## 2019-10-07 RX ADMIN — DIPHENHYDRAMINE HYDROCHLORIDE 25 MG: 50 INJECTION, SOLUTION INTRAMUSCULAR; INTRAVENOUS at 21:17

## 2019-10-07 RX ADMIN — METOPROLOL TARTRATE 100 MG: 50 TABLET, FILM COATED ORAL at 14:17

## 2019-10-07 RX ADMIN — ENOXAPARIN SODIUM 40 MG: 40 INJECTION SUBCUTANEOUS at 14:17

## 2019-10-07 RX ADMIN — METHYLPREDNISOLONE SODIUM SUCCINATE 125 MG: 125 INJECTION, POWDER, FOR SOLUTION INTRAMUSCULAR; INTRAVENOUS at 15:17

## 2019-10-07 RX ADMIN — HYDROMORPHONE HYDROCHLORIDE 1 MG: 1 INJECTION, SOLUTION INTRAMUSCULAR; INTRAVENOUS; SUBCUTANEOUS at 01:13

## 2019-10-07 RX ADMIN — CETIRIZINE HYDROCHLORIDE 10 MG: 10 TABLET ORAL at 14:17

## 2019-10-07 RX ADMIN — FLUCONAZOLE 200 MG: 2 INJECTION, SOLUTION INTRAVENOUS at 15:59

## 2019-10-07 RX ADMIN — OXYCODONE HYDROCHLORIDE AND ACETAMINOPHEN 1 TABLET: 5; 325 TABLET ORAL at 17:49

## 2019-10-07 RX ADMIN — FOLIC ACID 1 MG: 5 INJECTION, SOLUTION INTRAMUSCULAR; INTRAVENOUS; SUBCUTANEOUS at 15:21

## 2019-10-07 RX ADMIN — LIDOCAINE HYDROCHLORIDE: 20 SOLUTION ORAL; TOPICAL at 15:18

## 2019-10-07 RX ADMIN — SODIUM CHLORIDE: 9 INJECTION, SOLUTION INTRAVENOUS at 10:21

## 2019-10-07 RX ADMIN — FAMOTIDINE 20 MG: 10 INJECTION, SOLUTION INTRAVENOUS at 20:51

## 2019-10-07 RX ADMIN — FAMOTIDINE 20 MG: 20 TABLET, FILM COATED ORAL at 14:17

## 2019-10-07 RX ADMIN — INSULIN HUMAN 6 UNITS: 100 INJECTION, SUSPENSION SUBCUTANEOUS at 21:04

## 2019-10-07 RX ADMIN — HYDROCHLOROTHIAZIDE 25 MG: 25 TABLET ORAL at 14:17

## 2019-10-07 RX ADMIN — NYSTATIN 500000 UNITS: 100000 SUSPENSION ORAL at 20:51

## 2019-10-07 RX ADMIN — INSULIN LISPRO 1 UNITS: 100 INJECTION, SOLUTION INTRAVENOUS; SUBCUTANEOUS at 21:05

## 2019-10-07 RX ADMIN — MORPHINE SULFATE 4 MG: 4 INJECTION, SOLUTION INTRAMUSCULAR; INTRAVENOUS at 05:36

## 2019-10-07 RX ADMIN — DIPHENHYDRAMINE HYDROCHLORIDE 25 MG: 50 INJECTION, SOLUTION INTRAMUSCULAR; INTRAVENOUS at 15:16

## 2019-10-07 ASSESSMENT — PAIN SCALES - GENERAL
PAINLEVEL_OUTOF10: 3
PAINLEVEL_OUTOF10: 8
PAINLEVEL_OUTOF10: 10
PAINLEVEL_OUTOF10: 8
PAINLEVEL_OUTOF10: 6

## 2019-10-07 ASSESSMENT — ENCOUNTER SYMPTOMS
TROUBLE SWALLOWING: 1
CHEST TIGHTNESS: 0
VOMITING: 0
SHORTNESS OF BREATH: 0
DIARRHEA: 1
NAUSEA: 0
ABDOMINAL PAIN: 0
EYE PAIN: 0
CONSTIPATION: 0
ABDOMINAL DISTENTION: 0
WHEEZING: 0
COUGH: 0

## 2019-10-07 ASSESSMENT — PAIN DESCRIPTION - LOCATION: LOCATION: GENERALIZED

## 2019-10-08 VITALS
HEART RATE: 83 BPM | WEIGHT: 180.38 LBS | OXYGEN SATURATION: 99 % | TEMPERATURE: 98.6 F | BODY MASS INDEX: 27.34 KG/M2 | DIASTOLIC BLOOD PRESSURE: 96 MMHG | RESPIRATION RATE: 18 BRPM | SYSTOLIC BLOOD PRESSURE: 139 MMHG | HEIGHT: 68 IN

## 2019-10-08 PROBLEM — D72.819 LEUKOPENIA: Status: ACTIVE | Noted: 2019-10-08

## 2019-10-08 LAB
ANION GAP SERPL CALCULATED.3IONS-SCNC: 15 MMOL/L (ref 9–17)
BUN BLDV-MCNC: 32 MG/DL (ref 6–20)
BUN/CREAT BLD: ABNORMAL (ref 9–20)
CALCIUM SERPL-MCNC: 8.5 MG/DL (ref 8.6–10.4)
CHLORIDE BLD-SCNC: 93 MMOL/L (ref 98–107)
CO2: 26 MMOL/L (ref 20–31)
CREAT SERPL-MCNC: 2.08 MG/DL (ref 0.7–1.2)
GFR AFRICAN AMERICAN: 40 ML/MIN
GFR NON-AFRICAN AMERICAN: 33 ML/MIN
GFR SERPL CREATININE-BSD FRML MDRD: ABNORMAL ML/MIN/{1.73_M2}
GFR SERPL CREATININE-BSD FRML MDRD: ABNORMAL ML/MIN/{1.73_M2}
GLUCOSE BLD-MCNC: 145 MG/DL (ref 75–110)
GLUCOSE BLD-MCNC: 171 MG/DL (ref 75–110)
GLUCOSE BLD-MCNC: 192 MG/DL (ref 75–110)
GLUCOSE BLD-MCNC: 204 MG/DL (ref 70–99)
HCT VFR BLD CALC: 39 % (ref 40.7–50.3)
HEMOGLOBIN: 13.2 G/DL (ref 13–17)
MCH RBC QN AUTO: 31.3 PG (ref 25.2–33.5)
MCHC RBC AUTO-ENTMCNC: 33.8 G/DL (ref 28.4–34.8)
MCV RBC AUTO: 92.4 FL (ref 82.6–102.9)
NRBC AUTOMATED: 0 PER 100 WBC
PDW BLD-RTO: 12.5 % (ref 11.8–14.4)
PLATELET # BLD: 178 K/UL (ref 138–453)
PMV BLD AUTO: 10 FL (ref 8.1–13.5)
POTASSIUM SERPL-SCNC: 4.7 MMOL/L (ref 3.7–5.3)
RBC # BLD: 4.22 M/UL (ref 4.21–5.77)
SODIUM BLD-SCNC: 134 MMOL/L (ref 135–144)
WBC # BLD: 1.9 K/UL (ref 3.5–11.3)

## 2019-10-08 PROCEDURE — 2580000003 HC RX 258: Performed by: PHYSICIAN ASSISTANT

## 2019-10-08 PROCEDURE — 6360000002 HC RX W HCPCS: Performed by: NURSE PRACTITIONER

## 2019-10-08 PROCEDURE — APPSS30 APP SPLIT SHARED TIME 16-30 MINUTES: Performed by: PHYSICIAN ASSISTANT

## 2019-10-08 PROCEDURE — 6360000002 HC RX W HCPCS: Performed by: PHYSICIAN ASSISTANT

## 2019-10-08 PROCEDURE — 99232 SBSQ HOSP IP/OBS MODERATE 35: CPT | Performed by: INTERNAL MEDICINE

## 2019-10-08 PROCEDURE — 36415 COLL VENOUS BLD VENIPUNCTURE: CPT

## 2019-10-08 PROCEDURE — 2500000003 HC RX 250 WO HCPCS: Performed by: PHYSICIAN ASSISTANT

## 2019-10-08 PROCEDURE — 6370000000 HC RX 637 (ALT 250 FOR IP): Performed by: PHYSICIAN ASSISTANT

## 2019-10-08 PROCEDURE — 80048 BASIC METABOLIC PNL TOTAL CA: CPT

## 2019-10-08 PROCEDURE — 82947 ASSAY GLUCOSE BLOOD QUANT: CPT

## 2019-10-08 PROCEDURE — 85027 COMPLETE CBC AUTOMATED: CPT

## 2019-10-08 RX ADMIN — DIPHENHYDRAMINE HYDROCHLORIDE 25 MG: 50 INJECTION, SOLUTION INTRAMUSCULAR; INTRAVENOUS at 04:19

## 2019-10-08 RX ADMIN — ENOXAPARIN SODIUM 40 MG: 40 INJECTION SUBCUTANEOUS at 09:00

## 2019-10-08 RX ADMIN — NYSTATIN 500000 UNITS: 100000 SUSPENSION ORAL at 13:02

## 2019-10-08 RX ADMIN — METOPROLOL TARTRATE 100 MG: 50 TABLET, FILM COATED ORAL at 17:14

## 2019-10-08 RX ADMIN — NYSTATIN 500000 UNITS: 100000 SUSPENSION ORAL at 09:00

## 2019-10-08 RX ADMIN — FLUCONAZOLE 200 MG: 2 INJECTION, SOLUTION INTRAVENOUS at 15:22

## 2019-10-08 RX ADMIN — TRIAMCINOLONE ACETONIDE: 1 OINTMENT TOPICAL at 09:06

## 2019-10-08 RX ADMIN — INSULIN LISPRO 1 UNITS: 100 INJECTION, SOLUTION INTRAVENOUS; SUBCUTANEOUS at 09:07

## 2019-10-08 RX ADMIN — HYDROMORPHONE HYDROCHLORIDE 0.5 MG: 1 INJECTION, SOLUTION INTRAMUSCULAR; INTRAVENOUS; SUBCUTANEOUS at 18:30

## 2019-10-08 RX ADMIN — DIPHENHYDRAMINE HYDROCHLORIDE 25 MG: 50 INJECTION, SOLUTION INTRAMUSCULAR; INTRAVENOUS at 14:41

## 2019-10-08 RX ADMIN — HYDROMORPHONE HYDROCHLORIDE 0.5 MG: 1 INJECTION, SOLUTION INTRAMUSCULAR; INTRAVENOUS; SUBCUTANEOUS at 14:41

## 2019-10-08 RX ADMIN — INSULIN LISPRO 1 UNITS: 100 INJECTION, SOLUTION INTRAVENOUS; SUBCUTANEOUS at 11:58

## 2019-10-08 RX ADMIN — METOPROLOL TARTRATE 100 MG: 50 TABLET, FILM COATED ORAL at 09:07

## 2019-10-08 RX ADMIN — HYDROMORPHONE HYDROCHLORIDE 0.5 MG: 1 INJECTION, SOLUTION INTRAMUSCULAR; INTRAVENOUS; SUBCUTANEOUS at 10:46

## 2019-10-08 RX ADMIN — INSULIN LISPRO 1 UNITS: 100 INJECTION, SOLUTION INTRAVENOUS; SUBCUTANEOUS at 17:15

## 2019-10-08 RX ADMIN — DIPHENHYDRAMINE HYDROCHLORIDE 25 MG: 50 INJECTION, SOLUTION INTRAMUSCULAR; INTRAVENOUS at 09:00

## 2019-10-08 RX ADMIN — INSULIN HUMAN 6 UNITS: 100 INJECTION, SUSPENSION SUBCUTANEOUS at 09:07

## 2019-10-08 RX ADMIN — LIDOCAINE HYDROCHLORIDE: 20 SOLUTION ORAL; TOPICAL at 04:22

## 2019-10-08 RX ADMIN — NYSTATIN 500000 UNITS: 100000 SUSPENSION ORAL at 17:14

## 2019-10-08 RX ADMIN — FAMOTIDINE 20 MG: 10 INJECTION, SOLUTION INTRAVENOUS at 09:00

## 2019-10-08 RX ADMIN — FOLIC ACID 1 MG: 5 INJECTION, SOLUTION INTRAMUSCULAR; INTRAVENOUS; SUBCUTANEOUS at 09:05

## 2019-10-08 ASSESSMENT — PAIN SCALES - GENERAL
PAINLEVEL_OUTOF10: 8
PAINLEVEL_OUTOF10: 7
PAINLEVEL_OUTOF10: 7

## 2019-10-10 DIAGNOSIS — I10 ESSENTIAL HYPERTENSION: ICD-10-CM

## 2019-10-13 LAB
CULTURE: NORMAL
Lab: NORMAL
SPECIMEN DESCRIPTION: NORMAL

## 2019-10-14 RX ORDER — LISINOPRIL 20 MG/1
TABLET ORAL
Qty: 90 TABLET | Refills: 1 | OUTPATIENT
Start: 2019-10-14

## 2019-10-16 ENCOUNTER — HOSPITAL ENCOUNTER (OUTPATIENT)
Dept: LAB | Age: 59
Discharge: HOME | End: 2019-10-16
Payer: MEDICARE

## 2019-10-16 LAB
ABSOLUTE BANDS #: 0.67 K/UL
ABSOLUTE EOS #: 0.11 K/UL (ref 0–0.4)
ABSOLUTE IMMATURE GRANULOCYTE: ABNORMAL K/UL (ref 0–0.3)
ABSOLUTE LYMPH #: 0.84 K/UL (ref 1–4.8)
ABSOLUTE MONO #: 1.35 K/UL (ref 0.1–1.2)
ALBUMIN SERPL-MCNC: 3.3 G/DL (ref 3.5–5.2)
ALBUMIN/GLOBULIN RATIO: 0.9 (ref 1–2.5)
ALP BLD-CCNC: 65 U/L (ref 40–129)
ALT SERPL-CCNC: 28 U/L (ref 5–41)
ANION GAP SERPL CALCULATED.3IONS-SCNC: 14 MMOL/L (ref 9–17)
AST SERPL-CCNC: 22 U/L
ATYPICAL LYMPHOCYTE ABSOLUTE COUNT: 0.07 K/UL
ATYPICAL LYMPHOCYTES: 2 %
BANDS: 19 %
BASOPHILS # BLD: 0 % (ref 0–2)
BASOPHILS ABSOLUTE: 0 K/UL (ref 0–0.2)
BILIRUB SERPL-MCNC: 0.51 MG/DL (ref 0.3–1.2)
BUN BLDV-MCNC: 25 MG/DL (ref 6–20)
BUN/CREAT BLD: 8 (ref 9–20)
CALCIUM SERPL-MCNC: 9.3 MG/DL (ref 8.6–10.4)
CHLORIDE BLD-SCNC: 87 MMOL/L (ref 98–107)
CO2: 27 MMOL/L (ref 20–31)
CREAT SERPL-MCNC: 2.96 MG/DL (ref 0.7–1.2)
DIFFERENTIAL TYPE: ABNORMAL
EOSINOPHILS RELATIVE PERCENT: 3 % (ref 1–8)
GFR AFRICAN AMERICAN: 27 ML/MIN
GFR NON-AFRICAN AMERICAN: 22 ML/MIN
GFR SERPL CREATININE-BSD FRML MDRD: ABNORMAL ML/MIN/{1.73_M2}
GFR SERPL CREATININE-BSD FRML MDRD: ABNORMAL ML/MIN/{1.73_M2}
GLUCOSE BLD-MCNC: 143 MG/DL (ref 70–99)
HCT VFR BLD CALC: 39.2 % (ref 41–53)
HEMOGLOBIN: 13.3 G/DL (ref 13.5–17.5)
IMMATURE GRANULOCYTES: ABNORMAL %
LYMPHOCYTES # BLD: 24 % (ref 15–43)
MCH RBC QN AUTO: 31.2 PG (ref 26–34)
MCHC RBC AUTO-ENTMCNC: 34 G/DL (ref 31–37)
MCV RBC AUTO: 91.8 FL (ref 80–100)
MONOCYTES # BLD: 39 % (ref 6–14)
MORPHOLOGY: ABNORMAL
NRBC AUTOMATED: ABNORMAL PER 100 WBC
PDW BLD-RTO: 14.1 % (ref 11–14.5)
PLATELET # BLD: 244 K/UL (ref 140–450)
PLATELET ESTIMATE: ABNORMAL
PMV BLD AUTO: 8.8 FL (ref 6–12)
POTASSIUM SERPL-SCNC: 4.4 MMOL/L (ref 3.7–5.3)
RBC # BLD: 4.27 M/UL (ref 4.5–5.9)
RBC # BLD: ABNORMAL 10*6/UL
SEG NEUTROPHILS: 13 % (ref 44–74)
SEGMENTED NEUTROPHILS ABSOLUTE COUNT: 0.46 K/UL (ref 1.8–7.7)
SODIUM BLD-SCNC: 128 MMOL/L (ref 135–144)
TOTAL PROTEIN: 7.1 G/DL (ref 6.4–8.3)
WBC # BLD: 3.5 K/UL (ref 3.5–11)
WBC # BLD: ABNORMAL 10*3/UL

## 2019-10-16 PROCEDURE — 36415 COLL VENOUS BLD VENIPUNCTURE: CPT

## 2019-10-16 PROCEDURE — 80053 COMPREHEN METABOLIC PANEL: CPT

## 2019-10-16 PROCEDURE — 85025 COMPLETE CBC W/AUTO DIFF WBC: CPT

## 2019-10-21 ENCOUNTER — OFFICE VISIT (OUTPATIENT)
Dept: FAMILY MEDICINE CLINIC | Age: 59
End: 2019-10-21
Payer: MEDICARE

## 2019-10-21 VITALS
HEART RATE: 100 BPM | BODY MASS INDEX: 24.14 KG/M2 | OXYGEN SATURATION: 98 % | HEIGHT: 69 IN | DIASTOLIC BLOOD PRESSURE: 90 MMHG | SYSTOLIC BLOOD PRESSURE: 140 MMHG | WEIGHT: 163 LBS

## 2019-10-21 DIAGNOSIS — L40.9 PSORIASIS: ICD-10-CM

## 2019-10-21 DIAGNOSIS — I10 ESSENTIAL HYPERTENSION: Primary | ICD-10-CM

## 2019-10-21 PROCEDURE — G8420 CALC BMI NORM PARAMETERS: HCPCS | Performed by: FAMILY MEDICINE

## 2019-10-21 PROCEDURE — 1036F TOBACCO NON-USER: CPT | Performed by: FAMILY MEDICINE

## 2019-10-21 PROCEDURE — G8598 ASA/ANTIPLAT THER USED: HCPCS | Performed by: FAMILY MEDICINE

## 2019-10-21 PROCEDURE — G8483 FLU IMM NO ADMIN DOC REA: HCPCS | Performed by: FAMILY MEDICINE

## 2019-10-21 PROCEDURE — 99214 OFFICE O/P EST MOD 30 MIN: CPT | Performed by: FAMILY MEDICINE

## 2019-10-21 PROCEDURE — 1111F DSCHRG MED/CURRENT MED MERGE: CPT | Performed by: FAMILY MEDICINE

## 2019-10-21 PROCEDURE — G8427 DOCREV CUR MEDS BY ELIG CLIN: HCPCS | Performed by: FAMILY MEDICINE

## 2019-10-21 PROCEDURE — 3017F COLORECTAL CA SCREEN DOC REV: CPT | Performed by: FAMILY MEDICINE

## 2019-10-21 RX ORDER — CARVEDILOL 25 MG/1
25 TABLET ORAL 2 TIMES DAILY
COMMUNITY
Start: 2019-10-13 | End: 2019-10-21 | Stop reason: SINTOL

## 2019-10-21 RX ORDER — ATENOLOL 25 MG/1
25 TABLET ORAL DAILY
Qty: 30 TABLET | Refills: 0 | Status: SHIPPED | OUTPATIENT
Start: 2019-10-21 | End: 2019-11-14 | Stop reason: SINTOL

## 2019-10-28 ENCOUNTER — TELEPHONE (OUTPATIENT)
Dept: FAMILY MEDICINE CLINIC | Age: 59
End: 2019-10-28

## 2019-10-28 DIAGNOSIS — I10 ESSENTIAL HYPERTENSION: ICD-10-CM

## 2019-10-30 RX ORDER — HYDROCHLOROTHIAZIDE 25 MG/1
25 TABLET ORAL DAILY
Qty: 30 TABLET | Refills: 0 | Status: CANCELLED | OUTPATIENT
Start: 2019-10-30

## 2019-10-31 ENCOUNTER — PATIENT MESSAGE (OUTPATIENT)
Dept: FAMILY MEDICINE CLINIC | Age: 59
End: 2019-10-31

## 2019-11-02 DIAGNOSIS — I10 ESSENTIAL HYPERTENSION: ICD-10-CM

## 2019-11-02 RX ORDER — ATENOLOL 25 MG/1
25 TABLET ORAL DAILY
Qty: 30 TABLET | Refills: 0 | Status: CANCELLED | OUTPATIENT
Start: 2019-11-02

## 2019-11-04 RX ORDER — ATENOLOL 25 MG/1
TABLET ORAL DAILY
Refills: 0 | Status: CANCELLED | OUTPATIENT
Start: 2019-11-04

## 2019-11-14 ENCOUNTER — OFFICE VISIT (OUTPATIENT)
Dept: CARDIOLOGY | Age: 59
End: 2019-11-14
Payer: MEDICARE

## 2019-11-14 VITALS
DIASTOLIC BLOOD PRESSURE: 70 MMHG | HEIGHT: 68 IN | SYSTOLIC BLOOD PRESSURE: 110 MMHG | BODY MASS INDEX: 26.83 KG/M2 | WEIGHT: 177 LBS | HEART RATE: 74 BPM

## 2019-11-14 DIAGNOSIS — I10 ESSENTIAL HYPERTENSION: Primary | ICD-10-CM

## 2019-11-14 PROCEDURE — 1036F TOBACCO NON-USER: CPT | Performed by: INTERNAL MEDICINE

## 2019-11-14 PROCEDURE — G8483 FLU IMM NO ADMIN DOC REA: HCPCS | Performed by: INTERNAL MEDICINE

## 2019-11-14 PROCEDURE — G8427 DOCREV CUR MEDS BY ELIG CLIN: HCPCS | Performed by: INTERNAL MEDICINE

## 2019-11-14 PROCEDURE — 3017F COLORECTAL CA SCREEN DOC REV: CPT | Performed by: INTERNAL MEDICINE

## 2019-11-14 PROCEDURE — G8598 ASA/ANTIPLAT THER USED: HCPCS | Performed by: INTERNAL MEDICINE

## 2019-11-14 PROCEDURE — 99214 OFFICE O/P EST MOD 30 MIN: CPT | Performed by: INTERNAL MEDICINE

## 2019-11-14 PROCEDURE — G8417 CALC BMI ABV UP PARAM F/U: HCPCS | Performed by: INTERNAL MEDICINE

## 2019-11-14 RX ORDER — ZINC SULFATE 50(220)MG
220 CAPSULE ORAL DAILY
COMMUNITY
Start: 2019-10-14 | End: 2020-06-23

## 2019-11-14 RX ORDER — METOPROLOL TARTRATE 100 MG/1
100 TABLET ORAL 2 TIMES DAILY
COMMUNITY
End: 2020-08-24

## 2019-11-16 DIAGNOSIS — I10 ESSENTIAL HYPERTENSION: ICD-10-CM

## 2019-11-18 RX ORDER — ATENOLOL 25 MG/1
TABLET ORAL
Qty: 30 TABLET | Refills: 0 | OUTPATIENT
Start: 2019-11-18

## 2019-12-02 ENCOUNTER — HOSPITAL ENCOUNTER (OUTPATIENT)
Dept: LAB | Age: 59
Discharge: HOME | End: 2019-12-02
Payer: MEDICARE

## 2019-12-02 ENCOUNTER — OFFICE VISIT (OUTPATIENT)
Dept: FAMILY MEDICINE CLINIC | Age: 59
End: 2019-12-02
Payer: MEDICARE

## 2019-12-02 VITALS
OXYGEN SATURATION: 97 % | BODY MASS INDEX: 27.43 KG/M2 | DIASTOLIC BLOOD PRESSURE: 78 MMHG | HEART RATE: 63 BPM | HEIGHT: 68 IN | WEIGHT: 181 LBS | SYSTOLIC BLOOD PRESSURE: 122 MMHG

## 2019-12-02 DIAGNOSIS — G47.00 INSOMNIA, UNSPECIFIED TYPE: ICD-10-CM

## 2019-12-02 DIAGNOSIS — T38.0X5A STEROID-INDUCED DIABETES (HCC): ICD-10-CM

## 2019-12-02 DIAGNOSIS — N18.30 CHRONIC KIDNEY DISEASE (CKD), STAGE III (MODERATE) (HCC): ICD-10-CM

## 2019-12-02 DIAGNOSIS — L40.9 PSORIASIS: ICD-10-CM

## 2019-12-02 DIAGNOSIS — E09.9 STEROID-INDUCED DIABETES (HCC): ICD-10-CM

## 2019-12-02 DIAGNOSIS — L40.50 PSORIATIC ARTHRITIS (HCC): ICD-10-CM

## 2019-12-02 DIAGNOSIS — I10 ESSENTIAL HYPERTENSION: Primary | ICD-10-CM

## 2019-12-02 DIAGNOSIS — D35.2 PITUITARY MICROADENOMA (HCC): ICD-10-CM

## 2019-12-02 LAB
ANION GAP SERPL CALCULATED.3IONS-SCNC: 8 MMOL/L (ref 9–17)
BUN BLDV-MCNC: 11 MG/DL (ref 6–20)
BUN/CREAT BLD: 6 (ref 9–20)
CALCIUM SERPL-MCNC: 9.4 MG/DL (ref 8.6–10.4)
CHLORIDE BLD-SCNC: 95 MMOL/L (ref 98–107)
CO2: 30 MMOL/L (ref 20–31)
CREAT SERPL-MCNC: 1.8 MG/DL (ref 0.7–1.2)
ESTIMATED AVERAGE GLUCOSE: 151 MG/DL
GFR AFRICAN AMERICAN: 48 ML/MIN
GFR NON-AFRICAN AMERICAN: 39 ML/MIN
GFR SERPL CREATININE-BSD FRML MDRD: ABNORMAL ML/MIN/{1.73_M2}
GFR SERPL CREATININE-BSD FRML MDRD: ABNORMAL ML/MIN/{1.73_M2}
GLUCOSE BLD-MCNC: 114 MG/DL (ref 70–99)
HBA1C MFR BLD: 6.9 % (ref 4.8–5.9)
POTASSIUM SERPL-SCNC: 4.5 MMOL/L (ref 3.7–5.3)
SODIUM BLD-SCNC: 133 MMOL/L (ref 135–144)

## 2019-12-02 PROCEDURE — G8483 FLU IMM NO ADMIN DOC REA: HCPCS | Performed by: FAMILY MEDICINE

## 2019-12-02 PROCEDURE — 83036 HEMOGLOBIN GLYCOSYLATED A1C: CPT

## 2019-12-02 PROCEDURE — 80048 BASIC METABOLIC PNL TOTAL CA: CPT

## 2019-12-02 PROCEDURE — G8417 CALC BMI ABV UP PARAM F/U: HCPCS | Performed by: FAMILY MEDICINE

## 2019-12-02 PROCEDURE — G8598 ASA/ANTIPLAT THER USED: HCPCS | Performed by: FAMILY MEDICINE

## 2019-12-02 PROCEDURE — 3017F COLORECTAL CA SCREEN DOC REV: CPT | Performed by: FAMILY MEDICINE

## 2019-12-02 PROCEDURE — 1036F TOBACCO NON-USER: CPT | Performed by: FAMILY MEDICINE

## 2019-12-02 PROCEDURE — 36415 COLL VENOUS BLD VENIPUNCTURE: CPT

## 2019-12-02 PROCEDURE — 99214 OFFICE O/P EST MOD 30 MIN: CPT | Performed by: FAMILY MEDICINE

## 2019-12-02 PROCEDURE — G8427 DOCREV CUR MEDS BY ELIG CLIN: HCPCS | Performed by: FAMILY MEDICINE

## 2019-12-02 RX ORDER — RISANKIZUMAB-RZAA 75 MG/0.83
150 KIT SUBCUTANEOUS
COMMUNITY
Start: 2019-11-25

## 2019-12-09 RX ORDER — CARVEDILOL 25 MG/1
25 TABLET ORAL 2 TIMES DAILY
Qty: 60 TABLET | Refills: 0 | OUTPATIENT
Start: 2019-12-09 | End: 2020-01-08

## 2019-12-11 ENCOUNTER — HOSPITAL ENCOUNTER (OUTPATIENT)
Dept: MRI IMAGING | Age: 59
Discharge: HOME | End: 2019-12-13
Payer: MEDICARE

## 2019-12-11 DIAGNOSIS — D35.2 PITUITARY MICROADENOMA (HCC): ICD-10-CM

## 2019-12-11 PROCEDURE — 70551 MRI BRAIN STEM W/O DYE: CPT

## 2020-02-07 ENCOUNTER — OFFICE VISIT (OUTPATIENT)
Dept: FAMILY MEDICINE CLINIC | Age: 60
End: 2020-02-07
Payer: MEDICARE

## 2020-02-07 VITALS
SYSTOLIC BLOOD PRESSURE: 122 MMHG | WEIGHT: 182 LBS | HEART RATE: 74 BPM | DIASTOLIC BLOOD PRESSURE: 78 MMHG | BODY MASS INDEX: 27.58 KG/M2 | HEIGHT: 68 IN

## 2020-02-07 PROCEDURE — G8483 FLU IMM NO ADMIN DOC REA: HCPCS | Performed by: FAMILY MEDICINE

## 2020-02-07 PROCEDURE — 1036F TOBACCO NON-USER: CPT | Performed by: FAMILY MEDICINE

## 2020-02-07 PROCEDURE — G8427 DOCREV CUR MEDS BY ELIG CLIN: HCPCS | Performed by: FAMILY MEDICINE

## 2020-02-07 PROCEDURE — 99212 OFFICE O/P EST SF 10 MIN: CPT | Performed by: FAMILY MEDICINE

## 2020-02-07 PROCEDURE — 3017F COLORECTAL CA SCREEN DOC REV: CPT | Performed by: FAMILY MEDICINE

## 2020-02-07 PROCEDURE — G8417 CALC BMI ABV UP PARAM F/U: HCPCS | Performed by: FAMILY MEDICINE

## 2020-02-07 PROCEDURE — 99214 OFFICE O/P EST MOD 30 MIN: CPT | Performed by: FAMILY MEDICINE

## 2020-02-07 RX ORDER — DIPHENOXYLATE HYDROCHLORIDE AND ATROPINE SULFATE 2.5; .025 MG/1; MG/1
1 TABLET ORAL 4 TIMES DAILY PRN
Qty: 20 TABLET | Refills: 0 | Status: SHIPPED | OUTPATIENT
Start: 2020-02-07 | End: 2020-02-12

## 2020-02-07 ASSESSMENT — PATIENT HEALTH QUESTIONNAIRE - PHQ9
2. FEELING DOWN, DEPRESSED OR HOPELESS: 0
SUM OF ALL RESPONSES TO PHQ9 QUESTIONS 1 & 2: 0
SUM OF ALL RESPONSES TO PHQ QUESTIONS 1-9: 0
SUM OF ALL RESPONSES TO PHQ QUESTIONS 1-9: 0
1. LITTLE INTEREST OR PLEASURE IN DOING THINGS: 0

## 2020-03-03 ENCOUNTER — TELEPHONE (OUTPATIENT)
Dept: FAMILY MEDICINE CLINIC | Age: 60
End: 2020-03-03

## 2020-03-03 NOTE — TELEPHONE ENCOUNTER
Pt calling stating he was told at last visit to try benadryl at night to help him sleep but pt calling stating it's not working, do you want to call in something else, pt [facility], please advise at above number.

## 2020-03-04 NOTE — TELEPHONE ENCOUNTER
Doesn't use Trazodone unless he's desperate because it \"doesn't work well\". His ears started ringing after his last Skyrizi injection, worse than with previous injections and this is keeping him awake. Is ok with either Triptan med. Send to [facility] He is going to check with Derm again on alternative psoriasis meds.

## 2020-03-06 RX ORDER — TRAZODONE HYDROCHLORIDE 100 MG/1
TABLET ORAL
Qty: 30 TABLET | Refills: 4 | OUTPATIENT
Start: 2020-03-06

## 2020-03-06 RX ORDER — NORTRIPTYLINE HYDROCHLORIDE 10 MG/1
10 CAPSULE ORAL NIGHTLY
Qty: 30 CAPSULE | Refills: 0 | Status: SHIPPED
Start: 2020-03-06 | End: 2020-06-08

## 2020-03-06 NOTE — TELEPHONE ENCOUNTER
Rx for Nortriptyline 10 mg nightly sent to [facility] - pt to try this dose for at least 1 week, and then can increase to 2 tabs (20 mg) nightly, if not effective. Should call if 20 mg dose is still not effective. Should take 30-60 mins prior to wanting to fall asleep.

## 2020-03-16 RX ORDER — CLOBETASOL PROPIONATE 0.05 G/100ML
SHAMPOO TOPICAL
Qty: 118 ML | Refills: 3 | Status: SHIPPED | OUTPATIENT
Start: 2020-03-16 | End: 2020-09-30

## 2020-03-17 NOTE — TELEPHONE ENCOUNTER
Jin Ocampo says he increased Nortriptyline to 2 caps nightly but it still didn't help with insomnia. So he stopped it when he ran out. Says [facility] must've sent the refill automatically because he didn't request it.

## 2020-03-18 RX ORDER — NORTRIPTYLINE HYDROCHLORIDE 10 MG/1
CAPSULE ORAL
Qty: 30 CAPSULE | Refills: 0 | OUTPATIENT
Start: 2020-03-18

## 2020-04-15 ENCOUNTER — VIRTUAL VISIT (OUTPATIENT)
Dept: FAMILY MEDICINE CLINIC | Age: 60
End: 2020-04-15
Payer: MEDICARE

## 2020-04-15 VITALS — BODY MASS INDEX: 26.96 KG/M2 | TEMPERATURE: 97.1 F | HEIGHT: 69 IN | WEIGHT: 182 LBS

## 2020-04-15 PROBLEM — B37.0 THRUSH OF MOUTH AND ESOPHAGUS (HCC): Status: RESOLVED | Noted: 2019-10-07 | Resolved: 2020-04-15

## 2020-04-15 PROBLEM — B37.81 THRUSH OF MOUTH AND ESOPHAGUS (HCC): Status: RESOLVED | Noted: 2019-10-07 | Resolved: 2020-04-15

## 2020-04-15 PROCEDURE — 99214 OFFICE O/P EST MOD 30 MIN: CPT | Performed by: FAMILY MEDICINE

## 2020-04-15 PROCEDURE — 1036F TOBACCO NON-USER: CPT | Performed by: FAMILY MEDICINE

## 2020-04-15 PROCEDURE — 3017F COLORECTAL CA SCREEN DOC REV: CPT | Performed by: FAMILY MEDICINE

## 2020-04-15 PROCEDURE — G8427 DOCREV CUR MEDS BY ELIG CLIN: HCPCS | Performed by: FAMILY MEDICINE

## 2020-04-15 PROCEDURE — G8417 CALC BMI ABV UP PARAM F/U: HCPCS | Performed by: FAMILY MEDICINE

## 2020-04-15 RX ORDER — FAMOTIDINE 20 MG/1
20 TABLET, FILM COATED ORAL 2 TIMES DAILY
Qty: 60 TABLET | Refills: 0 | Status: SHIPPED | OUTPATIENT
Start: 2020-04-15 | End: 2020-05-11

## 2020-04-15 RX ORDER — SUCRALFATE 1 G/1
TABLET ORAL
Qty: 60 TABLET | Refills: 0 | Status: SHIPPED | OUTPATIENT
Start: 2020-04-15 | End: 2020-04-27 | Stop reason: SDUPTHER

## 2020-04-15 NOTE — PROGRESS NOTES
4/15/2020    TELEHEALTH EVALUATION -- Audio/Visual (During [id number] public health emergency)    HPI:    Srikanth Velázquez (:  1960) has requested an audio/video evaluation for the following concern(s): reflux symptoms    Pt was eating [address] [employer] two days ago, and he started to feel like \"his stomach is coming up to his throat\", and his \"chest is on fire\". Increased belching; no abdominal bloating. Feels like he can't swallow. No change in diet recently. Had 3-4 episodes of vomiting 2 days ago; none since. No nausea. No change in bowels. No epigastric pain. No sore throat or bad taste in his mouth. Has tried lunch meat sandwich - couldn't eat it. Could eat dry chips and crackers. No symptoms between meals. Tried milk, water, Pepto-Bismol, Nexium, and Pepcid - none are effective. Review of Systems   HENT: Positive for trouble swallowing. Gastrointestinal: Positive for vomiting. Negative for constipation, diarrhea and nausea. Prior to Visit Medications    Medication Sig Taking? Authorizing Provider   famotidine (PEPCID) 20 MG tablet Take 1 tablet by mouth 2 times daily Yes Monique Chen, DO   sucralfate (CARAFATE) 1 GM tablet Take 1 tab 4 times daily, on empty stomach, 1 hr before meals & bed. Do not take antacids within 30 mins & separate other meds by 2 hrs. Yes Monique Chen, DO   Clobetasol Propionate 0.05 % SHAM APPLY TO SCALP, LEAVE ON FOR 15 MINS THEN RINSE THOROUGHLY. MAY USE 3-4 TIMES WEEKLY FOR MAINTENANCE. Yes Monique Chen, DO   nortriptyline (PAMELOR) 10 MG capsule Take 1 capsule by mouth nightly May increase to 2 capsules nightly after 7 nights, if not effective.  Yes Monique Chen, DO   SKYRIZI, 150 MG DOSE, 75 MG/0.83ML PSKT injection  Yes Historical Provider, MD   Ascorbic Acid (VITAMIN C PO) Take by mouth 2 times daily Yes Historical Provider, MD   zinc sulfate (ZINCATE) 220 (50 Zn) MG capsule Take 220 mg by mouth daily Yes Historical Provider, MD   metoprolol (LOPRESSOR) 100 MG tablet Take 100 mg by mouth 2 times daily Yes Historical Provider, MD   cyanocobalamin 1000 MCG tablet Take 1,000 mcg by mouth daily Yes Historical Provider, MD   cetirizine (ZYRTEC ALLERGY) 10 MG tablet Take 10 mg by mouth daily  Yes Historical Provider, MD       Social History     Tobacco Use    Smoking status: Former Smoker     Packs/day: 1.00     Years: 20.00     Pack years: 20.00     Types: Cigarettes     Last attempt to quit: 2015     Years since quittin.9    Smokeless tobacco: Never Used    Tobacco comment: Quit smoking 2016, CARMEN Mack P, 10/20/2016. Substance Use Topics    Alcohol use: No     Alcohol/week: 0.0 standard drinks    Drug use: No        Allergies   Allergen Reactions    Cosentyx [Secukinumab] Rash     Other reaction(s): Unknown  Nausea, vomiting fever  Nausea, vomiting fever    Lantus [Insulin Glargine] Itching, Swelling and Rash     Patient started Lantus about a month ago, only new medication. His skin reaction started on his abdomen where he injects the Lantus and moved up to the face. This is similar to his response to Cosentyx.      Infliximab      Other reaction(s): Nerve damage    Methotrexate Derivatives Other (See Comments)     Bone marrow toxicity    Seasonal     Adhesive Tape Rash    Keflex [Cephalexin] Rash    Otezla [Apremilast] Rash     Other reaction(s): Unknown    Stellaria Rash    Taltz [Ixekizumab] Rash     Other reaction(s): Unknown    Ustekinumab Rash     Other reaction(s): rash-allergic and acute renal failure  Other reaction(s): Unknown   ,   Past Medical History:   Diagnosis Date    Allergic reaction caused by a drug 2016    Arthritis     psoritic arthritis    Chronic kidney disease     Follows with Nephro    COPD (chronic obstructive pulmonary disease) (HCC)     pt  denies    CVA (cerebral vascular accident) ([facility]     Neuro eval with Dr Ovalle here    Depression     Diabetes mellitus

## 2020-04-17 ENCOUNTER — TELEPHONE (OUTPATIENT)
Dept: FAMILY MEDICINE CLINIC | Age: 60
End: 2020-04-17

## 2020-04-26 ASSESSMENT — ENCOUNTER SYMPTOMS
VOMITING: 1
NAUSEA: 0
TROUBLE SWALLOWING: 1
DIARRHEA: 0
CONSTIPATION: 0

## 2020-04-27 DIAGNOSIS — R10.13 DYSPEPSIA: ICD-10-CM

## 2020-04-27 RX ORDER — SUCRALFATE 1 G/1
TABLET ORAL
Qty: 120 TABLET | Refills: 2 | Status: SHIPPED | OUTPATIENT
Start: 2020-04-27 | End: 2020-06-23

## 2020-04-28 NOTE — TELEPHONE ENCOUNTER
Med refilled; however, he should be trying to wean off this medication over the next 1-2 weeks, and see if he still requires it as often for his symptoms. Pt to update the office on his progress.

## 2020-05-02 ENCOUNTER — OFFICE VISIT (OUTPATIENT)
Dept: PRIMARY CARE CLINIC | Age: 60
End: 2020-05-02
Payer: MEDICARE

## 2020-05-02 VITALS
TEMPERATURE: 97.9 F | SYSTOLIC BLOOD PRESSURE: 118 MMHG | BODY MASS INDEX: 27.34 KG/M2 | HEART RATE: 76 BPM | RESPIRATION RATE: 18 BRPM | WEIGHT: 183 LBS | OXYGEN SATURATION: 98 % | DIASTOLIC BLOOD PRESSURE: 70 MMHG

## 2020-05-02 PROCEDURE — 99213 OFFICE O/P EST LOW 20 MIN: CPT | Performed by: FAMILY MEDICINE

## 2020-05-02 PROCEDURE — G8417 CALC BMI ABV UP PARAM F/U: HCPCS | Performed by: FAMILY MEDICINE

## 2020-05-02 PROCEDURE — 3017F COLORECTAL CA SCREEN DOC REV: CPT | Performed by: FAMILY MEDICINE

## 2020-05-02 PROCEDURE — 1036F TOBACCO NON-USER: CPT | Performed by: FAMILY MEDICINE

## 2020-05-02 PROCEDURE — G8427 DOCREV CUR MEDS BY ELIG CLIN: HCPCS | Performed by: FAMILY MEDICINE

## 2020-05-02 RX ORDER — DOXYCYCLINE HYCLATE 100 MG
100 TABLET ORAL 2 TIMES DAILY
Qty: 20 TABLET | Refills: 0 | Status: SHIPPED | OUTPATIENT
Start: 2020-05-02 | End: 2020-05-12

## 2020-05-02 NOTE — PATIENT INSTRUCTIONS
Patient Education        Folliculitis: Care Instructions  Your Care Instructions    Folliculitis (say \"nrl-KDD-vzj-LY-tus\") is an infection of the pouches (follicles) in the skin where hair grows. It can occur on any part of the body, but it is most common on the scalp, face, armpits, and groin. Bacteria, such as those found in a hot tub, can cause folliculitis. Folliculitis begins as a red, tender area near a strand of hair. The skin can itch or burn and may drain pus or blood. Sometimes folliculitis can lead to more serious skin infections. Your doctor usually can treat mild folliculitis with an antibiotic cream or ointment. If you have folliculitis on your scalp, you may use a shampoo that kills bacteria. Antibiotics you take as pills can treat infections deeper in the skin. For stubborn cases of folliculitis, laser treatment may be an option. Laser treatment uses strong beams of light to destroy the hair follicle. But hair will no longer grow in the treated area. Follow-up care is a key part of your treatment and safety. Be sure to make and go to all appointments, and call your doctor if you are having problems. It's also a good idea to know your test results and keep a list of the medicines you take. How can you care for yourself at home? · Take your medicine exactly as prescribed. If your doctor prescribed antibiotics, take them as directed. Do not stop taking them just because you feel better. You need to take the full course of antibiotics. · Use a soap that kills bacteria to wash the infected area. If your scalp or beard is infected, use a shampoo with selenium or propylene glycol. Be careful. Do not scrub too long or too hard. · Mix 1 1/3 cup warm water and 1 tablespoon vinegar. Soak a cloth in the mixture, and place it over the infected skin until it cools off (usually 5 to 10 minutes). You can do this 3 to 6 times a day. · Do not share your razor, towel, or washcloth.  That can spread folliculitis. · Use a new blade in your razor each time you shave to keep from re-infecting your skin. · If you tend to get folliculitis, avoid using hot tubs. They can contain bacteria that cause folliculitis. When should you call for help? Call your doctor now or seek immediate medical care if:    · You have symptoms of infection, such as:  ? Increased pain, swelling, warmth, or redness. ? Red streaks leading from the area. ? Pus draining from the area. ? A fever.    Watch closely for changes in your health, and be sure to contact your doctor if:    · You do not get better as expected. Where can you learn more? Go to https://[employer]. org and sign in to your imeem account. Enter M257 in the [employer] box to learn more about \"Folliculitis: Care Instructions. \"     If you do not have an account, please click on the \"Sign Up Now\" link. Current as of: October 30, 2019Content Version: 12.4  © 3457-3237 Healthwise, Inc[employer]. Care instructions adapted under license by [facility] ([facility]). If you have questions about a medical condition or this instruction, always ask your healthcare professional. [address] any warranty or liability for your use of this information.

## 2020-05-12 RX ORDER — FAMOTIDINE 20 MG/1
TABLET, FILM COATED ORAL
Qty: 60 TABLET | Refills: 5 | Status: SHIPPED | OUTPATIENT
Start: 2020-05-12 | End: 2020-06-09

## 2020-05-13 RX ORDER — RANITIDINE 150 MG/1
TABLET ORAL
Qty: 180 TABLET | Refills: 1 | OUTPATIENT
Start: 2020-05-13

## 2020-05-14 ENCOUNTER — TELEMEDICINE (OUTPATIENT)
Dept: CARDIOLOGY | Age: 60
End: 2020-05-14
Payer: MEDICARE

## 2020-05-14 PROCEDURE — 1036F TOBACCO NON-USER: CPT | Performed by: INTERNAL MEDICINE

## 2020-05-14 PROCEDURE — G8428 CUR MEDS NOT DOCUMENT: HCPCS | Performed by: INTERNAL MEDICINE

## 2020-05-14 PROCEDURE — 3017F COLORECTAL CA SCREEN DOC REV: CPT | Performed by: INTERNAL MEDICINE

## 2020-05-14 PROCEDURE — G8926 SPIRO NO PERF OR DOC: HCPCS | Performed by: INTERNAL MEDICINE

## 2020-05-14 PROCEDURE — 3023F SPIROM DOC REV: CPT | Performed by: INTERNAL MEDICINE

## 2020-05-14 PROCEDURE — 3046F HEMOGLOBIN A1C LEVEL >9.0%: CPT | Performed by: INTERNAL MEDICINE

## 2020-05-14 PROCEDURE — 2022F DILAT RTA XM EVC RTNOPTHY: CPT | Performed by: INTERNAL MEDICINE

## 2020-05-14 PROCEDURE — G8417 CALC BMI ABV UP PARAM F/U: HCPCS | Performed by: INTERNAL MEDICINE

## 2020-05-14 PROCEDURE — 99214 OFFICE O/P EST MOD 30 MIN: CPT | Performed by: INTERNAL MEDICINE

## 2020-05-14 RX ORDER — LISINOPRIL 10 MG/1
10 TABLET ORAL DAILY
Qty: 90 TABLET | Refills: 1 | Status: SHIPPED | OUTPATIENT
Start: 2020-05-14 | End: 2021-03-02

## 2020-05-14 NOTE — PROGRESS NOTES
Take 1 capsule by mouth nightly May increase to 2 capsules nightly after 7 nights, if not effective. 30 capsule 0    SKYRIZI, 150 MG DOSE, 75 MG/0.83ML PSKT injection       Ascorbic Acid (VITAMIN C PO) Take by mouth 2 times daily      zinc sulfate (ZINCATE) 220 (50 Zn) MG capsule Take 220 mg by mouth daily      metoprolol (LOPRESSOR) 100 MG tablet Take 100 mg by mouth 2 times daily      cyanocobalamin 1000 MCG tablet Take 1,000 mcg by mouth daily      cetirizine (ZYRTEC ALLERGY) 10 MG tablet Take 10 mg by mouth daily        No current facility-administered medications for this visit. Patient Active Problem List   Diagnosis    Hypertension    Chronic kidney disease (CKD), stage III (moderate) (HCC)    [facility]    History of migraine headaches    Hyperlipidemia    Pituitary microadenoma (HCC)    Chronic bullous emphysema (HCC)    Sinus tachycardia    Acute kidney injury ([facility]    Hyponatremia    Uncontrolled diabetes mellitus type 2 without complications (HCC)    Psoriasis    Drug-induced skin rash    Leukopenia         PHYSICAL EXAMINATION:  [ INSTRUCTIONS:  \"[x]\" Indicates a positive item  \"[]\" Indicates a negative item  -- DELETE ALL ITEMS NOT EXAMINED]  Vital Signs: (As obtained by patient/caregiver or practitioner observation)    Blood pressure-  Heart rate-    Respiratory rate-    Temperature-  Pulse oximetry-     Constitutional: [x] Appears well-developed and well-nourished [x] No apparent distress      [] Abnormal-   Mental status  [x] Alert and awake  [x] Oriented to person/place/time [x]Able to follow commands      Eyes:  EOM    [x]  Normal  [] Abnormal-  Sclera  [x]  Normal  [] Abnormal -         Discharge [x]  None visible  [] Abnormal -    HENT:   [x] Normocephalic, atraumatic.   [] Abnormal   [x] Mouth/Throat: Mucous membranes are moist.     External Ears [x] Normal  [] Abnormal-     Neck: [x] No visualized mass     Pulmonary/Chest: [x] Respiratory effort normal.  [x] No visualized signs of difficulty breathing or respiratory distress        [] Abnormal-      Musculoskeletal:   [x] Normal gait with no signs of ataxia         [x] Normal range of motion of neck        [] Abnormal-       Neurological:        [x] No Facial Asymmetry (Cranial nerve 7 motor function) (limited exam to video visit)          [x] No gaze palsy        [] Abnormal-         Skin:        [x] No significant exanthematous lesions or discoloration noted on facial skin         [] Abnormal-            Psychiatric:       [x] Normal Affect [x] No Hallucinations        [] Abnormal-     Other pertinent observable physical exam findings-       ASSESSMENT/PLAN:    HTN- BP is elevated for about a week after his Skyrziz shots, gets up to 160systolic. Add lisinopril to take PRN for that week. Those Skyrzi shots are once every 8 weeks. DM- stable  HLP-stable  Previous history of smoking. CKD with solitary kidney. COPD  History of CVA. Preserved LV systolic function, NEGATIVE STRESS TEST 10/2017  REGULAR EXERCISE  CONTINUE CURRENT TREATMENT  Answered all questions. Discussed all medications and possible side effects. Natanael Sawant is a 64 y.o. male being evaluated by a Virtual Visit (video visit) encounter to address concerns as mentioned above. A caregiver was present when appropriate. Due to this being a TeleHealth encounter (During DIYNH-25 public health emergency), evaluation of the following organ systems was limited: Vitals/Constitutional/EENT/Resp/CV/GI//MS/Neuro/Skin/Heme-Lymph-Imm. Pursuant to the emergency declaration under the [address] [place] [address] authority and the [employer] Act, this Virtual Visit was conducted with patient's (and/or legal guardian's) consent, to reduce the patient's risk of exposure to COVID-19 and provide necessary medical care.   The patient (and/or legal guardian) has also been advised to contact this office for worsening conditions or problems, and seek emergency medical treatment and/or call 911 if deemed necessary. Services were provided through a video synchronous discussion virtually to substitute for in-person clinic visit. Patient and provider were located at their individual homes. --Sandra Morris DO on 5/14/2020 at 9:56 AM    An electronic signature was used to authenticate this note.

## 2020-06-08 ENCOUNTER — OFFICE VISIT (OUTPATIENT)
Dept: FAMILY MEDICINE CLINIC | Age: 60
End: 2020-06-08
Payer: MEDICARE

## 2020-06-08 VITALS
WEIGHT: 182.63 LBS | DIASTOLIC BLOOD PRESSURE: 84 MMHG | BODY MASS INDEX: 27.68 KG/M2 | HEIGHT: 68 IN | SYSTOLIC BLOOD PRESSURE: 110 MMHG | HEART RATE: 64 BPM | TEMPERATURE: 97.8 F

## 2020-06-08 PROCEDURE — G8427 DOCREV CUR MEDS BY ELIG CLIN: HCPCS | Performed by: FAMILY MEDICINE

## 2020-06-08 PROCEDURE — G0439 PPPS, SUBSEQ VISIT: HCPCS | Performed by: FAMILY MEDICINE

## 2020-06-08 PROCEDURE — 1036F TOBACCO NON-USER: CPT | Performed by: FAMILY MEDICINE

## 2020-06-08 PROCEDURE — 3017F COLORECTAL CA SCREEN DOC REV: CPT | Performed by: FAMILY MEDICINE

## 2020-06-08 PROCEDURE — G8417 CALC BMI ABV UP PARAM F/U: HCPCS | Performed by: FAMILY MEDICINE

## 2020-06-08 PROCEDURE — 99212 OFFICE O/P EST SF 10 MIN: CPT

## 2020-06-08 PROCEDURE — 99214 OFFICE O/P EST MOD 30 MIN: CPT | Performed by: FAMILY MEDICINE

## 2020-06-08 RX ORDER — OMEPRAZOLE 20 MG/1
20 CAPSULE, DELAYED RELEASE ORAL DAILY
Qty: 30 CAPSULE | Refills: 0 | Status: SHIPPED | OUTPATIENT
Start: 2020-06-08 | End: 2020-06-09

## 2020-06-08 RX ORDER — BETAMETHASONE DIPROPIONATE 0.5 MG/G
CREAM TOPICAL
COMMUNITY
Start: 2020-05-12 | End: 2022-04-05 | Stop reason: ALTCHOICE

## 2020-06-08 ASSESSMENT — LIFESTYLE VARIABLES
HOW OFTEN DO YOU HAVE SIX OR MORE DRINKS ON ONE OCCASION: 0
HOW MANY STANDARD DRINKS CONTAINING ALCOHOL DO YOU HAVE ON A TYPICAL DAY: 0
HOW OFTEN DURING THE LAST YEAR HAVE YOU FAILED TO DO WHAT WAS NORMALLY EXPECTED FROM YOU BECAUSE OF DRINKING: 0
HOW OFTEN DURING THE LAST YEAR HAVE YOU BEEN UNABLE TO REMEMBER WHAT HAPPENED THE NIGHT BEFORE BECAUSE YOU HAD BEEN DRINKING: 0
HOW OFTEN DURING THE LAST YEAR HAVE YOU HAD A FEELING OF GUILT OR REMORSE AFTER DRINKING: 0
HOW OFTEN DURING THE LAST YEAR HAVE YOU NEEDED AN ALCOHOLIC DRINK FIRST THING IN THE MORNING TO GET YOURSELF GOING AFTER A NIGHT OF HEAVY DRINKING: 0
AUDIT-C TOTAL SCORE: 1
HOW OFTEN DURING THE LAST YEAR HAVE YOU FOUND THAT YOU WERE NOT ABLE TO STOP DRINKING ONCE YOU HAD STARTED: 0
HOW OFTEN DO YOU HAVE A DRINK CONTAINING ALCOHOL: 1
HAS A RELATIVE, FRIEND, DOCTOR, OR ANOTHER HEALTH PROFESSIONAL EXPRESSED CONCERN ABOUT YOUR DRINKING OR SUGGESTED YOU CUT DOWN: 0
AUDIT TOTAL SCORE: 1
HAVE YOU OR SOMEONE ELSE BEEN INJURED AS A RESULT OF YOUR DRINKING: 0

## 2020-06-08 ASSESSMENT — ENCOUNTER SYMPTOMS: COLOR CHANGE: 1

## 2020-06-08 ASSESSMENT — PATIENT HEALTH QUESTIONNAIRE - PHQ9
SUM OF ALL RESPONSES TO PHQ QUESTIONS 1-9: 0
SUM OF ALL RESPONSES TO PHQ QUESTIONS 1-9: 0

## 2020-06-08 NOTE — PROGRESS NOTES
month ago - will see him again in 5 months. Last Cr was 1.8 on BMP in 12/2019 - no changes per Nephro. Last A1c was 6.9% on 12/2 - due for re-check today. No recent DM medications. Checks glucose only intermittently - no recent high's or low's. No steroid in >6 months. Due for diabetic eye exam - was going to go in Feb, but got delayed due to Covid, so will call to reschedule. BP well-controlled today - 110/84. Taking Lopressor 100 mg BID for HTN and tachycardia - stable. However, his BP will be high secondary to when he first takes his Skyrizi injection, and again when it \"kicks in\" approx 4 weeks later. This will cause his BP to be high (150's/100\"s) for approx 5-6 days, at which time, he takes the Lisinopril 10 mg daily, which keeps his BP well-controlled. Will stop it when the BP normalizes, as he checks his BP at least TID every day. Sometimes his HR will still be higher at [facility], [facility] as well - up to upper 90's, even with the Metoprolol. Will then go back down to normal.     Unsure of next appt at [facility] - wants to wait until everything with Covid dies down a little more; will have allergy testing at next appt.         Past Medical History:   Diagnosis Date    Allergic reaction caused by a drug 8/13/2016    Arthritis     psoritic arthritis    Chronic kidney disease     Follows with Nephro    COPD (chronic obstructive pulmonary disease) ([address]     pt  denies    CVA (cerebral vascular accident) ([address]     Neuro eval with Dr Ovalle here    Depression     Diabetes mellitus ([address]     History of blood transfusion     History of migraine headaches     Hyperlipidemia     Hypertension     Pituitary microadenoma ([address]     Eval by NS and Endo 2011 but no recent FU    Pneumothorax 05/27/2015    left    Psoriatic arthritis ([address]     Stroke ([address]     x3    Thrombocytopenia ([address]       Past Surgical History:   Procedure Laterality Date    CHEST TUBE INSERTION Left     out now    COLONOSCOPY cyanocobalamin 1000 MCG tablet Take 1,000 mcg by mouth daily      cetirizine (ZYRTEC ALLERGY) 10 MG tablet Take 10 mg by mouth daily       omeprazole (PRILOSEC) 20 MG delayed release capsule Take 2 capsules by mouth daily 30 capsule 0     No current facility-administered medications for this visit. Allergies   Allergen Reactions    Cosentyx [Secukinumab] Rash     Other reaction(s): Unknown  Nausea, vomiting fever  Nausea, vomiting fever    Lantus [Insulin Glargine] Itching, Swelling and Rash     Patient started Lantus about a month ago, only new medication. His skin reaction started on his abdomen where he injects the Lantus and moved up to the face. This is similar to his response to Cosentyx.      Infliximab      Other reaction(s): Nerve damage    Methotrexate Derivatives Other (See Comments)     Bone marrow toxicity    Seasonal     Adhesive Tape Rash    Keflex [Cephalexin] Rash    Valetta Isidro [Apremilast] Rash     Other reaction(s): Unknown    Stellaria Rash    Taltz [Ixekizumab] Rash     Other reaction(s): Unknown    Ustekinumab Rash     Other reaction(s): rash-allergic and acute renal failure  Other reaction(s): Unknown       Health Maintenance   Topic Date Due    Annual Wellness Visit (AWV)  05/29/2019    Diabetic retinal exam  08/07/2020 (Originally 10/30/2018)    Hepatitis B vaccine (1 of 3 - Risk 3-dose series) 08/30/2020 (Originally 12/23/1981)    Pneumococcal 0-64 years Vaccine (1 of 1 - PPSV23) 08/30/2020 (Originally 12/23/1968)    Shingles Vaccine (1 of 2) 12/02/2020 (Originally 12/23/2012)    Lipid screen  08/24/2020    A1C test (Diabetic or Prediabetic)  12/02/2020    Potassium monitoring  12/02/2020    Creatinine monitoring  12/02/2020    Diabetic foot exam  06/08/2021    Colon cancer screen colonoscopy  08/08/2023    DTaP/Tdap/Td vaccine (2 - Td) 09/03/2028    Hepatitis C screen  Addressed    HIV screen  Addressed    Hepatitis A vaccine  Aged Out    Hib vaccine  Aged Out  Meningococcal (ACWY) vaccine  Aged Out       Subjective:      Review of Systems   HENT: Positive for tinnitus (chronic, slightly improved recently). Skin: Positive for color change (erythema of both lower arms - was outside for only a few mins and has bright sunburn appearance). Psychiatric/Behavioral: Positive for sleep disturbance (usually related to his chronic tinnitus; no longer using Trazodone or Nortriptyline, as it was not working ). Objective:     Vitals:    06/08/20 0917   BP: 110/84   Site: Right Upper Arm   Position: Sitting   Cuff Size: Medium Adult   Pulse: 64   Temp: 97.8 °F (36.6 °C)   TempSrc: Tympanic   Weight: 182 lb 10.1 oz (82.8 kg)   Height: 5' 8.4\" (1.737 m)     Physical Exam  Vitals signs and nursing note reviewed. Constitutional:       General: He is not in acute distress. Appearance: He is well-developed. HENT:      Head: Normocephalic and atraumatic. Right Ear: Tympanic membrane, ear canal and external ear normal.      Left Ear: Tympanic membrane, ear canal and external ear normal.      Nose: Nose normal.      Mouth/Throat:      Mouth: Mucous membranes are moist.      Pharynx: Oropharynx is clear. No oropharyngeal exudate. Eyes:      Conjunctiva/sclera: Conjunctivae normal.   Cardiovascular:      Rate and Rhythm: Normal rate and regular rhythm. Heart sounds: Normal heart sounds. Pulmonary:      Effort: Pulmonary effort is normal. No respiratory distress. Breath sounds: Normal breath sounds. Abdominal:      General: Bowel sounds are normal. There is no distension. Palpations: Abdomen is soft. Tenderness: There is no abdominal tenderness. Skin:     General: Skin is warm and dry. Neurological:      Mental Status: He is alert and oriented to person, place, and time. Diabetic foot check: Normal sensation with the monofilament bilaterally. Dorsalis pedis pulses intact bilaterally.  No skin breakdown, erythema, blisters, scaling,

## 2020-06-08 NOTE — PROGRESS NOTES
Medicare Annual Wellness Visit  Name: Sandie Ramos Date: 2020   MRN: [id number] Sex: Male   Age: 64 y.o. Ethnicity: Non-/Non    : 1960 Race: Tiffanie Pope is here for Medicare AWV; Hypertension; Blood Sugar Problem; and Gastroesophageal Reflux (states the meds given isn't working)    Screenings for behavioral, psychosocial and functional/safety risks, and cognitive dysfunction are all negative except as indicated below. These results, as well as other patient data from the [address] form, are documented in Flowsheets linked to this Encounter. Allergies   Allergen Reactions    Cosentyx [Secukinumab] Rash     Other reaction(s): Unknown  Nausea, vomiting fever  Nausea, vomiting fever    Lantus [Insulin Glargine] Itching, Swelling and Rash     Patient started Lantus about a month ago, only new medication. His skin reaction started on his abdomen where he injects the Lantus and moved up to the face. This is similar to his response to Cosentyx.  Infliximab      Other reaction(s): Nerve damage    Methotrexate Derivatives Other (See Comments)     Bone marrow toxicity    Seasonal     Adhesive Tape Rash    Keflex [Cephalexin] Rash    Otezla [Apremilast] Rash     Other reaction(s): Unknown    Stellaria Rash    Taltz [Ixekizumab] Rash     Other reaction(s): Unknown    Ustekinumab Rash     Other reaction(s): rash-allergic and acute renal failure  Other reaction(s): Unknown       Prior to Visit Medications    Medication Sig Taking?  Authorizing Provider   betamethasone dipropionate (DIPROLENE) 0.05 % cream  Yes Historical Provider, MD   lisinopril (PRINIVIL;ZESTRIL) 10 MG tablet Take 1 tablet by mouth daily Take when BP elevated after [employer] S Yvon, DO   famotidine (PEPCID) 20 MG tablet TAKE ONE TABLET BY MOUTH TWICE A DAY Yes Chandrakant Chen,    sucralfate (CARAFATE) 1 GM tablet Take 1 tab 4 times daily, on empty stomach, 1 hr before meals & bed. Do not take antacids within 30 mins & separate other meds by 2 hrs. Yes Oseas Chen, DO   Clobetasol Propionate 0.05 % SHAM APPLY TO SCALP, LEAVE ON FOR 15 MINS THEN RINSE THOROUGHLY. MAY USE 3-4 TIMES WEEKLY FOR MAINTENANCE. Yes Osesa Chen, DO   nortriptyline (PAMELOR) 10 MG capsule Take 1 capsule by mouth nightly May increase to 2 capsules nightly after 7 nights, if not effective.  Yes Oseas Chen, DO   SKYRIZI, 150 MG DOSE, 75 MG/0.83ML PSKT injection  Yes Historical Provider, MD   Ascorbic Acid (VITAMIN C PO) Take by mouth 2 times daily Yes Historical Provider, MD   zinc sulfate (ZINCATE) 220 (50 Zn) MG capsule Take 220 mg by mouth daily Yes Historical Provider, MD   metoprolol (LOPRESSOR) 100 MG tablet Take 100 mg by mouth 2 times daily Yes Historical Provider, MD   cyanocobalamin 1000 MCG tablet Take 1,000 mcg by mouth daily Yes Historical Provider, MD   cetirizine (ZYRTEC ALLERGY) 10 MG tablet Take 10 mg by mouth daily  Yes Historical Provider, MD       Past Medical History:   Diagnosis Date    Allergic reaction caused by a drug 8/13/2016    Arthritis     psoritic arthritis    Chronic kidney disease     Follows with Nephro    COPD (chronic obstructive pulmonary disease) ([address]     pt  denies    CVA (cerebral vascular accident) ([address]     Neuro eval with Dr Milagros Nguyễn here    Depression     Diabetes mellitus ([address]     History of blood transfusion     History of migraine headaches     Hyperlipidemia     Hypertension     Pituitary microadenoma ([address]     Eval by NS and Endo 2011 but no recent FU    Pneumothorax 05/27/2015    left    Psoriatic arthritis ([address]     Stroke ([address]     x3    Thrombocytopenia (HCC)        Past Surgical History:   Procedure Laterality Date    CHEST TUBE INSERTION Left     out now    COLONOSCOPY      11/13 SIS 10 y    OTHER SURGICAL HISTORY Right     stent r kidney    OTHER SURGICAL HISTORY  5/27/15    resection of bleb    PILONIDAL CYST DRAINAGE      SHOULDER SURGERY Left     Rotator cuff    THORACOSCOPY  5/27/15    THORACOTOMY  5/27/15    UPPER GASTROINTESTINAL ENDOSCOPY         Family History   Problem Relation Age of Onset    Diabetes Sister     Alzheimer's Disease Maternal Grandmother     Other Maternal Grandmother         dementia    High Blood Pressure Maternal Grandfather     Cancer Maternal Grandfather         Unknown    High Blood Pressure Mother     Other Mother         blood clots    Other Paternal Grandmother         dementia       CareTeam (Including outside providers/suppliers regularly involved in providing care):   Patient Care Team:  Duran Home, DO as PCP - General (Family Medicine)  [employer], DO as PCP - [facility] Provider  Corbin Harrison as Referring Physician (Internal Medicine)  Amy Antoine MD as Surgeon (Cardiothoracic Surgery)  Emmanuel Garcia MD (Nephrology)  Marylen Re, MD as Consulting Physician (Pediatric Allergy & Immunology)  Marcus Rico MD as Consulting Physician (Pulmonology)  Mara Olmedo (Dermatology)  George Gayle MD as Consulting Physician (Cardiology)  Tommie Long MD as Consulting Physician (Endocrinology)  Eboni Painter MD (Dermatology)    Wt Readings from Last 3 Encounters:   06/08/20 182 lb 10.1 oz (82.8 kg)   05/02/20 183 lb (83 kg)   04/15/20 182 lb (82.6 kg)     Vitals:    06/08/20 0917   BP: 110/84   Site: Right Upper Arm   Position: Sitting   Cuff Size: Medium Adult   Pulse: 64   Temp: 97.8 °F (36.6 °C)   TempSrc: Tympanic   Weight: 182 lb 10.1 oz (82.8 kg)   Height: 5' 8.4\" (1.737 m)     Body mass index is 27.44 kg/m². Based upon direct observation of the patient, evaluation of cognition reveals recent and remote memory intact. Patient's complete Health Risk Assessment and screening values have been reviewed and are found in Flowsheets.  The following problems were reviewed today and where indicated follow up appointments were made and/or referrals necessary. Patient identification was verified at the start of the visit: Yes    Services were provided through a video synchronous discussion virtually to substitute for in-person clinic visit. Patient and provider were located at their individual homes. --Leann Adams DO on 6/8/2020 at 9:42 AM    An electronic signature was used to authenticate this note.

## 2020-06-09 ENCOUNTER — PRE-PROCEDURE TELEPHONE (OUTPATIENT)
Dept: PREADMISSION TESTING | Age: 60
End: 2020-06-09

## 2020-06-09 ENCOUNTER — INITIAL CONSULT (OUTPATIENT)
Dept: SURGERY | Age: 60
End: 2020-06-09
Payer: MEDICARE

## 2020-06-09 VITALS
SYSTOLIC BLOOD PRESSURE: 120 MMHG | BODY MASS INDEX: 27.74 KG/M2 | DIASTOLIC BLOOD PRESSURE: 70 MMHG | HEIGHT: 68 IN | HEART RATE: 60 BPM | RESPIRATION RATE: 16 BRPM | TEMPERATURE: 97.5 F | WEIGHT: 183 LBS

## 2020-06-09 PROCEDURE — 99203 OFFICE O/P NEW LOW 30 MIN: CPT | Performed by: SURGERY

## 2020-06-09 PROCEDURE — 3017F COLORECTAL CA SCREEN DOC REV: CPT | Performed by: SURGERY

## 2020-06-09 PROCEDURE — 1036F TOBACCO NON-USER: CPT | Performed by: SURGERY

## 2020-06-09 PROCEDURE — G8417 CALC BMI ABV UP PARAM F/U: HCPCS | Performed by: SURGERY

## 2020-06-09 PROCEDURE — G8427 DOCREV CUR MEDS BY ELIG CLIN: HCPCS | Performed by: SURGERY

## 2020-06-09 PROCEDURE — 99214 OFFICE O/P EST MOD 30 MIN: CPT

## 2020-06-09 RX ORDER — OMEPRAZOLE 20 MG/1
40 CAPSULE, DELAYED RELEASE ORAL DAILY
Qty: 30 CAPSULE | Refills: 0 | Status: SHIPPED | OUTPATIENT
Start: 2020-06-09 | End: 2020-06-23

## 2020-06-09 NOTE — PROGRESS NOTES
[facility] General Surgery   History & Physical  Pattie RomerodickDO  Pt Name: Akash Tan  MRN: [id number]  YOB: 1960  Date of evaluation: 6/9/2020  Primary Care Physician: Sridhar Painter DO    Chief Complaint:   Chief Complaint   Patient presents with    Nausea    Dysphagia         SUBJECTIVE:    History of Present Illness: This is a 64 y.o.  male who presents for evaluation of worsening hearburn with now worsening dysphagia, has had heartburn for greater than 10yrs, had been taking Pepcid and Carafate with some initial success in symptom reduction but recently not working, worse in the last month without significant lifestyle changes, was placed on Prilosec 20mg yesterday but unsure if this has changed significantly. New onset occasional dysphagia to solid foods in the last several weeks, occasionally will need to regurgitate food bolus, no issues with liquids or pills. Past Medical History   has a past medical history of Allergic reaction caused by a drug, Arthritis, Chronic kidney disease, COPD (chronic obstructive pulmonary disease) ([address] CVA (cerebral vascular accident) ([address] Depression, Diabetes mellitus ([address] History of blood transfusion, History of migraine headaches, Hyperlipidemia, Hypertension, Pituitary microadenoma ([address] Pneumothorax, Psoriatic arthritis ([address] Stroke ([address] and Thrombocytopenia ([address] Past Surgical History   has a past surgical history that includes other surgical history (Right); shoulder surgery (Left); Colonoscopy; Upper gastrointestinal endoscopy; Pilonidal cyst drainage; chest tube insertion (Left); Thoracoscopy (5/27/15); thoracotomy (5/27/15); and other surgical history (5/27/15). Family History  family history includes Alzheimer's Disease in his maternal grandmother; Cancer in his maternal grandfather; Diabetes in his sister; High Blood Pressure in his maternal grandfather and mother;  Other in his maternal grandmother, mother, and paternal grandmother. Social History  Tobacco use:  reports that he quit smoking about 5 years ago. His smoking use included cigarettes. He has a 20.00 pack-year smoking history. He has never used smokeless tobacco.  Alcohol use:  reports no history of alcohol use. Drug use:  reports no history of drug use. Medications  Current Medications:   Current Outpatient Medications   Medication Sig Dispense Refill    omeprazole (PRILOSEC) 20 MG delayed release capsule Take 2 capsules by mouth daily 30 capsule 0    betamethasone dipropionate (DIPROLENE) 0.05 % cream       lisinopril (PRINIVIL;ZESTRIL) 10 MG tablet Take 1 tablet by mouth daily Take when BP elevated after Skyrizi 90 tablet 1    sucralfate (CARAFATE) 1 GM tablet Take 1 tab 4 times daily, on empty stomach, 1 hr before meals & bed. Do not take antacids within 30 mins & separate other meds by 2 hrs. 120 tablet 2    Clobetasol Propionate 0.05 % SHAM APPLY TO SCALP, LEAVE ON FOR 15 MINS THEN RINSE THOROUGHLY. MAY USE 3-4 TIMES WEEKLY FOR MAINTENANCE. 118 mL 3    SKYRIZI, 150 MG DOSE, 75 MG/0.83ML PSKT injection       Ascorbic Acid (VITAMIN C PO) Take by mouth 2 times daily      zinc sulfate (ZINCATE) 220 (50 Zn) MG capsule Take 220 mg by mouth daily      metoprolol (LOPRESSOR) 100 MG tablet Take 100 mg by mouth 2 times daily      cyanocobalamin 1000 MCG tablet Take 1,000 mcg by mouth daily      cetirizine (ZYRTEC ALLERGY) 10 MG tablet Take 10 mg by mouth daily        No current facility-administered medications for this visit. Home Medications:   Prior to Admission medications    Medication Sig Start Date End Date Taking?  Authorizing Provider   omeprazole (PRILOSEC) 20 MG delayed release capsule Take 2 capsules by mouth daily 6/9/20  Yes Clemente Herrera,    betamethasone dipropionate (DIPROLENE) 0.05 % cream  5/12/20  Yes Historical Provider, MD   lisinopril (PRINIVIL;ZESTRIL) 10 MG tablet Take 1 tablet by mouth daily Take when BP elevated affect  Hematologic: Denies bruising or bleeding easily. PHYSICAL EXAMINATION  Vitals:   Vitals:    06/09/20 0920   BP: 120/70   Pulse: 60   Resp: 16   Temp: 97.5 °F (36.4 °C)       General Appearance:  awake, alert, no acute distress, well developed, well nourished   Skin:  Skin color, texture, turgor normal. No rashes or lesions. Head/face:  NCAT, face symmetrical  Eyes:  PERRL, no evidence of conjunctivitis or ptosis bilaterally  Ears:  External ears and canals grossly normal, no evidence of otorrhea. Nose/Sinuses:  Nares normal. Septum midline. Mucosa normal. No external drainage noted. Mouth/Neck:  Mucosa moist.  No external oral lesions. Trachea midline. No visible masses. Lungs:  Normal chest expansion, unlabored breathing without accessory muscle use. No audible rales, rhonchi, or wheezing. Cardiovascular: S1S2. No evidence of JVD. No evidence of pulsatile masses in abdomen  Abdomen:  Soft, non-tender, no organomegaly, no masses. Musculoskeletal: No evidence of bony/muscular deformities, trauma, atrophy of either left/right upper/lower extremity. No evidence of digital clubbing or cyanosis. Neurologic:  CN 2-12 grossly intact without obvious deficits. Grossly normal sensation in all extremities. Psychiatric: appropriate judgement and insight, appropriate recall of recent and remote memory, no evidence of depression/anxiety/agitation    DIAGNOSES:   Diagnosis Orders   1. Dysphagia, unspecified type  FL ESOPHAGRAM   2. Dyspepsia  omeprazole (PRILOSEC) 20 MG delayed release capsule    FL ESOPHAGRAM   3.  Gastroesophageal reflux disease, esophagitis presence not specified  omeprazole (PRILOSEC) 20 MG delayed release capsule    FL ESOPHAGRAM       PLAN:  · Start workup with esophagram  · Stop pepcid and carafate  · Increase prilosec to 40mg daily  · Chew food slowly and with small bites, drink plenty of liquids  · Further intervention recommendations pending esophagram results, clinic will follow up

## 2020-06-09 NOTE — LETTER
[address] [facility]  Phone: [phone]  Fax: [phone]      20    Patient: Honey Saenz  MRN: [id number]  : 1960  Date of visit: 2020    Dear Dr Fan Bobo: Thank you for the request for consultation for Honey Saenz to me for the evaluation of heartburn and dysphagia. Below are the relevant portions of my assessment and plan of care. If you have questions, please do not hesitate to call me. I look forward to following Honey Saenz along with you.     Sincerely,        Nargis Frye, DO

## 2020-06-12 ENCOUNTER — HOSPITAL ENCOUNTER (OUTPATIENT)
Dept: PREADMISSION TESTING | Age: 60
Setting detail: SPECIMEN
Discharge: HOME | End: 2020-06-16
Payer: MEDICARE

## 2020-06-12 PROCEDURE — U0004 COV-19 TEST NON-CDC HGH THRU: HCPCS

## 2020-06-14 LAB
SARS-COV-2, PCR: NOT DETECTED
SARS-COV-2, RAPID: NORMAL
SARS-COV-2: NORMAL
SOURCE: NORMAL

## 2020-06-15 ENCOUNTER — TELEPHONE (OUTPATIENT)
Dept: PRIMARY CARE CLINIC | Age: 60
End: 2020-06-15

## 2020-06-16 ENCOUNTER — HOSPITAL ENCOUNTER (OUTPATIENT)
Dept: GENERAL RADIOLOGY | Age: 60
Discharge: HOME | End: 2020-06-18
Payer: MEDICARE

## 2020-06-16 PROCEDURE — 2500000003 HC RX 250 WO HCPCS: Performed by: SURGERY

## 2020-06-16 PROCEDURE — 74220 X-RAY XM ESOPHAGUS 1CNTRST: CPT

## 2020-06-16 RX ADMIN — BARIUM SULFATE 750 ML: 960 POWDER, FOR SUSPENSION ORAL at 09:02

## 2020-06-16 RX ADMIN — BARIUM SULFATE 140 ML: 980 POWDER, FOR SUSPENSION ORAL at 09:02

## 2020-06-23 ENCOUNTER — OFFICE VISIT (OUTPATIENT)
Dept: SURGERY | Age: 60
End: 2020-06-23
Payer: MEDICARE

## 2020-06-23 VITALS
BODY MASS INDEX: 28.01 KG/M2 | DIASTOLIC BLOOD PRESSURE: 82 MMHG | WEIGHT: 184.8 LBS | HEART RATE: 60 BPM | TEMPERATURE: 97.1 F | HEIGHT: 68 IN | SYSTOLIC BLOOD PRESSURE: 126 MMHG

## 2020-06-23 PROCEDURE — 3017F COLORECTAL CA SCREEN DOC REV: CPT | Performed by: SURGERY

## 2020-06-23 PROCEDURE — G8417 CALC BMI ABV UP PARAM F/U: HCPCS | Performed by: SURGERY

## 2020-06-23 PROCEDURE — 99213 OFFICE O/P EST LOW 20 MIN: CPT | Performed by: SURGERY

## 2020-06-23 PROCEDURE — G8427 DOCREV CUR MEDS BY ELIG CLIN: HCPCS | Performed by: SURGERY

## 2020-06-23 PROCEDURE — 1036F TOBACCO NON-USER: CPT | Performed by: SURGERY

## 2020-06-23 PROCEDURE — 99215 OFFICE O/P EST HI 40 MIN: CPT

## 2020-06-23 RX ORDER — OMEPRAZOLE 40 MG/1
40 CAPSULE, DELAYED RELEASE ORAL 2 TIMES DAILY
Qty: 60 CAPSULE | Refills: 0 | Status: SHIPPED | OUTPATIENT
Start: 2020-06-23 | End: 2020-07-27 | Stop reason: SDUPTHER

## 2020-06-23 NOTE — PROGRESS NOTES
[facility]  Progress Note    PATIENT NAME: Valeriano Hedrick     CLINIC VISIT DATE: 6/23/2020    SUBJECTIVE:  Valeriano Hedrick is a 64 y.o. male who presents to the clinic today for evaluation post esophagram with the following findings:    Reflux and evidence of mild reflux esophagitis.       Small hiatal hernia.       No obstructing lesion.  Direct visualization might be considered. Pt reports improvement on heartburn symptoms with prilosec but not completely gone. His wife is also present. OBJECTIVE:    /82   Pulse 60   Temp 97.1 °F (36.2 °C)   Ht 5' 8.39\" (1.737 m)   Wt 184 lb 12.8 oz (83.8 kg)   BMI 27.78 kg/m²     General Appearance:  awake, alert, no acute distress, well developed, well nourished   Skin:  Skin color, texture, turgor normal. No rashes or lesions. Lungs:  Normal chest expansion, unlabored breathing without accessory muscle use. No audible rales, rhonchi, or wheezing. Cardiovascular: S1S2. No evidence of JVD. No evidence of pulsatile masses in abdomen  Abdomen:  Soft, non-tender, no organomegaly, no masses. Musculoskeletal: No evidence of bony/muscular deformities, trauma, atrophy of either left/right upper/lower extremity. No evidence of digital clubbing or cyanosis. ASSESSMENT:   Diagnosis Orders   1. Dyspepsia     2. Gastroesophageal reflux disease, esophagitis presence not specified     3. Dysphagia, unspecified type     4. Hiatal hernia         PLAN:  We discussed several treatment options, we will proceed with EGD to investigate the UGI and r/o any other occult pathologies including H pylori infection and changes to the distal esophagus. Pt is leaning towards surgical management of his hiatal hernia, we did briefly discuss esophageal manometry in workup to hiatal hernia repair. The risks include bleeding, infection, scarring, perforation, damage to surrounding tissues, need for further surgery in the future.  The benefits,

## 2020-06-29 ENCOUNTER — PRE-PROCEDURE TELEPHONE (OUTPATIENT)
Dept: PREADMISSION TESTING | Age: 60
End: 2020-06-29

## 2020-07-02 ENCOUNTER — HOSPITAL ENCOUNTER (OUTPATIENT)
Dept: PREADMISSION TESTING | Age: 60
Setting detail: SPECIMEN
Discharge: HOME | End: 2020-07-06
Payer: MEDICARE

## 2020-07-02 PROCEDURE — U0004 COV-19 TEST NON-CDC HGH THRU: HCPCS

## 2020-07-03 LAB
SARS-COV-2, PCR: NOT DETECTED
SARS-COV-2, RAPID: NORMAL
SARS-COV-2: NORMAL
SOURCE: NORMAL

## 2020-07-04 ENCOUNTER — TELEPHONE (OUTPATIENT)
Dept: PRIMARY CARE CLINIC | Age: 60
End: 2020-07-04

## 2020-07-06 RX ORDER — OMEPRAZOLE 20 MG/1
CAPSULE, DELAYED RELEASE ORAL
Qty: 30 CAPSULE | Refills: 0 | OUTPATIENT
Start: 2020-07-06

## 2020-07-06 NOTE — TELEPHONE ENCOUNTER
Pt no longer on this dose- Dr Bianca Gallego increased Omeprazole to 40 mg BID on June 23. EGD is scheduled for this Wed.

## 2020-07-08 ENCOUNTER — ANESTHESIA (OUTPATIENT)
Dept: OPERATING ROOM | Age: 60
End: 2020-07-08
Payer: MEDICARE

## 2020-07-08 ENCOUNTER — ANESTHESIA EVENT (OUTPATIENT)
Dept: OPERATING ROOM | Age: 60
End: 2020-07-08
Payer: MEDICARE

## 2020-07-08 ENCOUNTER — HOSPITAL ENCOUNTER (OUTPATIENT)
Age: 60
Setting detail: OUTPATIENT SURGERY
Discharge: HOME | End: 2020-07-08
Attending: SURGERY | Admitting: SURGERY
Payer: MEDICARE

## 2020-07-08 VITALS — OXYGEN SATURATION: 99 % | SYSTOLIC BLOOD PRESSURE: 113 MMHG | DIASTOLIC BLOOD PRESSURE: 69 MMHG | TEMPERATURE: 97 F

## 2020-07-08 VITALS
SYSTOLIC BLOOD PRESSURE: 116 MMHG | HEART RATE: 88 BPM | HEIGHT: 68 IN | RESPIRATION RATE: 16 BRPM | TEMPERATURE: 98.1 F | OXYGEN SATURATION: 98 % | DIASTOLIC BLOOD PRESSURE: 70 MMHG | BODY MASS INDEX: 27.89 KG/M2 | WEIGHT: 184 LBS

## 2020-07-08 PROCEDURE — 88305 TISSUE EXAM BY PATHOLOGIST: CPT

## 2020-07-08 PROCEDURE — 7100000011 HC PHASE II RECOVERY - ADDTL 15 MIN: Performed by: SURGERY

## 2020-07-08 PROCEDURE — 2580000003 HC RX 258: Performed by: SURGERY

## 2020-07-08 PROCEDURE — 3700000001 HC ADD 15 MINUTES (ANESTHESIA): Performed by: SURGERY

## 2020-07-08 PROCEDURE — 2500000003 HC RX 250 WO HCPCS: Performed by: NURSE ANESTHETIST, CERTIFIED REGISTERED

## 2020-07-08 PROCEDURE — 3700000000 HC ANESTHESIA ATTENDED CARE: Performed by: SURGERY

## 2020-07-08 PROCEDURE — 6360000002 HC RX W HCPCS: Performed by: NURSE ANESTHETIST, CERTIFIED REGISTERED

## 2020-07-08 PROCEDURE — 3609017100 HC EGD: Performed by: SURGERY

## 2020-07-08 PROCEDURE — 43239 EGD BIOPSY SINGLE/MULTIPLE: CPT | Performed by: SURGERY

## 2020-07-08 PROCEDURE — 7100000010 HC PHASE II RECOVERY - FIRST 15 MIN: Performed by: SURGERY

## 2020-07-08 PROCEDURE — 2709999900 HC NON-CHARGEABLE SUPPLY: Performed by: SURGERY

## 2020-07-08 RX ORDER — SODIUM CHLORIDE, SODIUM LACTATE, POTASSIUM CHLORIDE, CALCIUM CHLORIDE 600; 310; 30; 20 MG/100ML; MG/100ML; MG/100ML; MG/100ML
INJECTION, SOLUTION INTRAVENOUS CONTINUOUS
Status: DISCONTINUED | OUTPATIENT
Start: 2020-07-08 | End: 2020-07-08 | Stop reason: HOSPADM

## 2020-07-08 RX ORDER — SODIUM CHLORIDE 0.9 % (FLUSH) 0.9 %
10 SYRINGE (ML) INJECTION PRN
Status: DISCONTINUED | OUTPATIENT
Start: 2020-07-08 | End: 2020-07-08 | Stop reason: HOSPADM

## 2020-07-08 RX ORDER — LIDOCAINE HYDROCHLORIDE 40 MG/ML
INJECTION, SOLUTION RETROBULBAR; TOPICAL PRN
Status: DISCONTINUED | OUTPATIENT
Start: 2020-07-08 | End: 2020-07-08 | Stop reason: SDUPTHER

## 2020-07-08 RX ORDER — PROPOFOL 10 MG/ML
INJECTION, EMULSION INTRAVENOUS PRN
Status: DISCONTINUED | OUTPATIENT
Start: 2020-07-08 | End: 2020-07-08 | Stop reason: SDUPTHER

## 2020-07-08 RX ORDER — GLYCOPYRROLATE 0.2 MG/ML
INJECTION INTRAMUSCULAR; INTRAVENOUS PRN
Status: DISCONTINUED | OUTPATIENT
Start: 2020-07-08 | End: 2020-07-08 | Stop reason: SDUPTHER

## 2020-07-08 RX ORDER — SODIUM CHLORIDE 0.9 % (FLUSH) 0.9 %
10 SYRINGE (ML) INJECTION EVERY 12 HOURS SCHEDULED
Status: DISCONTINUED | OUTPATIENT
Start: 2020-07-08 | End: 2020-07-08 | Stop reason: HOSPADM

## 2020-07-08 RX ADMIN — GLYCOPYRROLATE 0.2 MG: 0.2 INJECTION INTRAMUSCULAR; INTRAVENOUS at 09:01

## 2020-07-08 RX ADMIN — SODIUM CHLORIDE, POTASSIUM CHLORIDE, SODIUM LACTATE AND CALCIUM CHLORIDE: 600; 310; 30; 20 INJECTION, SOLUTION INTRAVENOUS at 08:00

## 2020-07-08 RX ADMIN — PROPOFOL 220 MG: 10 INJECTION, EMULSION INTRAVENOUS at 09:09

## 2020-07-08 RX ADMIN — LIDOCAINE HYDROCHLORIDE 100 MG: 40 INJECTION, SOLUTION RETROBULBAR; TOPICAL at 09:08

## 2020-07-08 ASSESSMENT — PAIN SCALES - GENERAL
PAINLEVEL_OUTOF10: 0

## 2020-07-08 ASSESSMENT — PAIN - FUNCTIONAL ASSESSMENT: PAIN_FUNCTIONAL_ASSESSMENT: 0-10

## 2020-07-08 ASSESSMENT — COPD QUESTIONNAIRES: CAT_SEVERITY: MILD

## 2020-07-08 NOTE — H&P
[facility]      Patient's Name/ Date of Birth/ Gender: Smita Christine / 1960 (64 y.o.) / male     Attending physician: Trace Odell DO    CC: dyspepsia, GERD, dysphagia, hiatal hernia    History of present Illness: Smita Chirstine is a 64 y.o. male, presents today for EGD. Past Medical History:  has a past medical history of Allergic reaction caused by a drug, Arthritis, Chronic kidney disease, COPD (chronic obstructive pulmonary disease) ([address] CVA (cerebral vascular accident) ([address] Depression, Diabetes mellitus ([address] History of blood transfusion, History of migraine headaches, Hyperlipidemia, Hypertension, Pituitary microadenoma ([address] Pneumothorax, Psoriatic arthritis ([address] Stroke ([address] and Thrombocytopenia ([address] Past Surgical History:   Past Surgical History:   Procedure Laterality Date    CHEST TUBE INSERTION Left     out now    COLONOSCOPY      11/13 SIS 10 y    OTHER SURGICAL HISTORY Right     stent r kidney    OTHER SURGICAL HISTORY  5/27/15    resection of bleb    PILONIDAL CYST DRAINAGE      SHOULDER SURGERY Left     Rotator cuff    THORACOSCOPY  5/27/15    THORACOTOMY  5/27/15    UPPER GASTROINTESTINAL ENDOSCOPY         Social History:  reports that he quit smoking about 5 years ago. His smoking use included cigarettes. He has a 20.00 pack-year smoking history. He has never used smokeless tobacco. He reports that he does not drink alcohol or use drugs. Family History: family history includes Alzheimer's Disease in his maternal grandmother; Cancer in his maternal grandfather; Diabetes in his sister; High Blood Pressure in his maternal grandfather and mother; Other in his maternal grandmother, mother, and paternal grandmother.     Review of Systems:   General: Denies fever, chills, night sweats, weight loss, malaise, fatigue  HEENT: Denies sore throat, sinus problems, allergic rhinosinusitis  Card: Denies chest pain, palpitations, orthopnea/PND. Denies h/o murmurs  Pulm: Denies cough, shortness of breath, TRACY  GI:  Dyspepsia, heartburn, dysphagia  : Denies polyuria, dysuria, hematuria  Endo: Denies diabetes, thyroid problems. Heme: Denies anemia, h/o bleeding or clotting problems. Neuro: Denies h/o CVA, TIA  Skin: Denies rashes, ulcers  Musculoskeletal: Denies muscle, joint, back pain. Allergies: Cosentyx [secukinumab]; Lantus [insulin glargine]; Infliximab; Methotrexate derivatives; Seasonal; Adhesive tape; Keflex [cephalexin]; Otezla [apremilast]; Georgeanne Warren;  Taltz [ixekizumab]; and Ustekinumab    Current Meds:  Current Facility-Administered Medications:     lactated ringers infusion, , Intravenous, Continuous, Daphine Milad, DO, Last Rate: 25 mL/hr at 07/08/20 0800    sodium chloride flush 0.9 % injection 10 mL, 10 mL, Intravenous, 2 times per day, Daphine Milad, DO    sodium chloride flush 0.9 % injection 10 mL, 10 mL, Intravenous, PRN, Daphine Milad, DO    Facility-Administered Medications Ordered in Other Encounters:     Glycopyrrolate injection, , , PRN, Dafne Daigle APRN - CRNA, 0.2 mg at 07/08/20 0901    lidocaine PF 4 % injection, , , PRN, Dafne Daigle, APRN - CRNA, 100 mg at 07/08/20 0908    propofol injection, , , PRN, Dafne Daigle APRN - CRNA, 220 mg at 07/08/20 0909    Vital Signs:  Vitals:    07/08/20 0756   BP: (!) 150/92   Pulse: 59   Resp: 16   Temp: 97 °F (36.1 °C)   SpO2: 97%       Physical Exam:  Vital signs and Nurse's note reviewed  Gen:  A&Ox3, NAD  HEENT: NCAT, PERRLA, EOMI, no scleral icterus, oral mucosa moist  Neck: Trachea midline without obvious masses or lesions  Chest: Symmetric rise with inhalation, no evidence of trauma  CVS: S1S2  Resp: Good bilateral air entry, no audible wheeze or rhonchi  Abd: soft, non-tender, non-distended, no hepatosplenomegaly or palpable masses  Ext: No clubbing, cyanosis, edema, peripheral pulses 2+ Rad/Fem/DP/PT  CNS: Moves all extremities, no gross focal motor deficits  Skin: No erythema or ulcerations         Assessment:    Magalys Hilton is a 64 y.o. male with dyspepsia, GERD, dysphagia, hiatal hernia    Plan:    1.  To OR for EGD, all questions answered, written informed consent obtained      Brandy Fuentes  7/8/2020

## 2020-07-08 NOTE — ANESTHESIA POSTPROCEDURE EVALUATION
Department of Anesthesiology  Postprocedure Note    Patient: Sharan Gamboa  MRN: [id number]  [place]: 1960  Date of evaluation: 7/8/2020  Time:  9:24 AM     Procedure Summary     Date:  07/08/20 Room / Location:  [address]    Anesthesia Start:  [facility] Anesthesia Stop:  5763    Procedure:  EGD with biopsies (N/A ) Diagnosis:  (GERD, dysphagia)    Surgeon:  Tricia Jarvis DO Responsible Provider:  XOCHILT Dougherty CRNA    Anesthesia Type:  general, TIVA ASA Status:  2          Anesthesia Type: general, TIVA    Vashti Phase I: Vashti Score: 10    Vashti Phase II: Vashti Score: 9    Last vitals: Reviewed and per EMR flowsheets.        Anesthesia Post Evaluation    Patient location during evaluation: PACU  Patient participation: complete - patient participated  Level of consciousness: awake and alert  Pain score: 0  Airway patency: patent  Nausea & Vomiting: no nausea and no vomiting  Complications: no  Cardiovascular status: blood pressure returned to baseline and hemodynamically stable  Respiratory status: acceptable, spontaneous ventilation and room air  Hydration status: euvolemic

## 2020-07-08 NOTE — OP NOTE
Operative Note      Patient: Rahel Christensen  YOB: 1960  MRN: [id number]    Date of Procedure: 7/8/2020    Pre-Op Diagnosis: GERD, dysphagia    Post-Op Diagnosis:   Same  3-4cm hiatal hernia  Normal appearing stomach and duodenum  Normal esophagus       Procedure(s):  EGD with biopsies    Surgeon(s):  Erik Bennett DO    Assistant:   * No surgical staff found *    Anesthesia: MAC    Estimated Blood Loss (mL): Minimal    Complications: None    Specimens:   ID Type Source Tests Collected by Time Destination   A : antrum biopsy Tissue Stomach SURGICAL PATHOLOGY Erik Bennett DO 7/8/2020 0912    B : distal esophagus biopsy Tissue Esophagus SURGICAL PATHOLOGY Erik Bennett,  7/8/2020 0913    C : mid esophagus biopsy Tissue Esophagus SURGICAL PATHOLOGY Erik Chenwater,  7/8/2020 0914        Implants:  * No implants in log *      Drains: * No LDAs found *    Findings: as above    Detailed Description of Procedure:     HISTORY: The patient is a 64y.o. year old male with history of above preop diagnosis. The risk, benefits, expected outcome, and alternatives to the procedure were explained to the patient's understanding and written informed consent was obtained. OPERATIVE DETAIL     The risks and benefits were explained in detail to the patient who agreed and consented to the procedure. The patient was taken to the endoscopic suite and placed in a lateral position. Oxygen was administered via nasal cannula and a bite guard was placed. MAC was administered via the anesthetic team.      The endoscope was then advanced into the oropharynx and down into the esophagus under direct visualization. The scope was further advanced through the esophagus, GE junction and stomach to the pylorus under visualization. The scope was passed through the pylorus and duodenal sweep performed, advancing and visualizing to the second portion of the duodenum.       On slow withdrawal to the stomach, the following findings noted:    Duodenum: normal  Stomach: normal    The scope was then retroflexed to visualize the GE junction, evidence of a hiatal hernia was noted, measures ~3-4cm. The endoscope was then withdrawn to the distal esophagus. Z line seen and found to be normal.     Biopsies were taken of the antrum and mid/distal esophagus with cold biopsy forceps. Hemostasis was excellent. The stomach was then decompressed. The scope was slowly withdrawn visualizing the remainder of the esophagus and no other lesions/abnormalities noted. The scope was completely removed from the patient as well as the bite block. The patient tolerated the procedure well. ASSESSMENT/DISPOSITION    1. Clinic will call with biopsy results.       Electronically signed by Alma Lundborg, DO on 7/8/2020 at 9:17 AM

## 2020-07-08 NOTE — ANESTHESIA PRE PROCEDURE
Department of Anesthesiology  Preprocedure Note       Name:  Shameka Tabares   Age:  64 y.o.  :  1960                                          MRN:  [id number]         Date:  2020      Surgeon: Rasta Kothari):  Vamshi Hong DO    Procedure: Procedure(s):  EGD    Medications prior to admission:   Prior to Admission medications    Medication Sig Start Date End Date Taking? Authorizing Provider   omeprazole (PRILOSEC) 40 MG delayed release capsule Take 1 capsule by mouth 2 times daily 20 Yes Vamshi Hong DO   lisinopril (PRINIVIL;ZESTRIL) 10 MG tablet Take 1 tablet by mouth daily Take when BP elevated after Skyrizi 20  Yes Rod Sanz,    metoprolol (LOPRESSOR) 100 MG tablet Take 100 mg by mouth 2 times daily   Yes Historical Provider, MD   betamethasone dipropionate (DIPROLENE) 0.05 % cream  20   Historical Provider, MD   Clobetasol Propionate 0.05 % SHAM APPLY TO SCALP, LEAVE ON FOR 15 MINS THEN RINSE THOROUGHLY. MAY USE 3-4 TIMES WEEKLY FOR MAINTENANCE. 3/16/20   Cara Chen DO   SKYRIZI, 150 MG DOSE, 75 MG/0.83ML PSKT injection Inject 150 mg into the skin Every 2 months  19   Historical Provider, MD   cetirizine (ZYRTEC ALLERGY) 10 MG tablet Take 10 mg by mouth daily  19   Historical Provider, MD       Current medications:    Current Facility-Administered Medications   Medication Dose Route Frequency Provider Last Rate Last Dose    lactated ringers infusion   Intravenous Continuous Vamshi Hong DO 25 mL/hr at 20 0800      sodium chloride flush 0.9 % injection 10 mL  10 mL Intravenous 2 times per day Vamshi Hong DO        sodium chloride flush 0.9 % injection 10 mL  10 mL Intravenous PRN Vamshi Hong DO           Allergies: Allergies   Allergen Reactions    Cosentyx [Secukinumab] Nausea And Vomiting and Rash      fever    Lantus [Insulin Glargine] Itching, Swelling and Rash     Patient started Lantus about a month ago, only new medication.  His skin reaction started on his abdomen where he injects the Lantus and moved up to the face. This is similar to his response to Cosentyx.      Infliximab Other (See Comments)     Nerve damage    Methotrexate Derivatives Other (See Comments)     Bone marrow toxicity    Seasonal     Adhesive Tape Rash    Keflex [Cephalexin] Rash    Otezla [Apremilast] Rash     Other reaction(s): Unknown    Stellaria Rash    Taltz [Ixekizumab] Rash     Other reaction(s): Unknown    Ustekinumab Rash     Other reaction(s): rash-allergic and acute renal failure  Other reaction(s): Unknown       Problem List:    Patient Active Problem List   Diagnosis Code    Hypertension I10    Chronic kidney disease (CKD), stage III (moderate) ([facility]) N18.3    CVA (cerebral vascular accident) ([address] I63.9    History of migraine headaches Z86.69    Hyperlipidemia E78.5    Pituitary microadenoma ([facility]) D35.2    Chronic bullous emphysema ([facility]) J43.9    Sinus tachycardia R00.0    Acute kidney injury ([address] N17.9    Hyponatremia E87.1    Uncontrolled diabetes mellitus type 2 without complications ([facility]) Y20.86    Psoriasis L40.9    Drug-induced skin rash L27.0    Leukopenia D72.819       Past Medical History:        Diagnosis Date    Allergic reaction caused by a drug 8/13/2016    Arthritis     psoritic arthritis    Chronic kidney disease     Follows with Nephro    COPD (chronic obstructive pulmonary disease) ([facility])     pt  denies    CVA (cerebral vascular accident) ([address]     Neuro eval with Dr Morey Nissen here    Depression     Diabetes mellitus ([address]     History of blood transfusion     History of migraine headaches     Hyperlipidemia     Hypertension     Pituitary microadenoma ([address]     Eval by NS and Endo 2011 but no recent FU    Pneumothorax 05/27/2015    left    Psoriatic arthritis ([address]     Stroke ([address]     x3    Thrombocytopenia ([facility])        Past Surgical History:        Procedure Laterality Date    CHEST TUBE INSERTION Left     out now 12/02/2019    BUN 11 12/02/2019    CREATININE 1.80 12/02/2019    CREATININE 1.9 06/13/2018    GFRAA 48 12/02/2019    LABGLOM 39 12/02/2019    GLUCOSE 114 12/02/2019    PROT 7.1 10/16/2019    CALCIUM 9.4 12/02/2019    BILITOT 0.51 10/16/2019    ALKPHOS 65 10/16/2019    AST 22 10/16/2019    ALT 28 10/16/2019       POC Tests: No results for input(s): POCGLU, POCNA, POCK, POCCL, POCBUN, POCHEMO, POCHCT in the last 72 hours. Coags:   Lab Results   Component Value Date    PROTIME 9.9 09/22/2017    INR 0.9 09/22/2017    APTT 28.5 08/13/2016       HCG (If Applicable): No results found for: PREGTESTUR, PREGSERUM, HCG, HCGQUANT     ABGs: No results found for: PHART, PO2ART, IFZ6WKO, UPL6TND, BEART, P7IOLKMW     Type & Screen (If Applicable):  No results found for: LABABO, LABRH    Drug/Infectious Status (If Applicable):  Lab Results   Component Value Date    HEPCAB NONREACTIVE 08/14/2016       COVID-19 Screening (If Applicable):   Lab Results   Component Value Date    COVID19 Not Detected 07/02/2020         Anesthesia Evaluation  Patient summary reviewed and Nursing notes reviewed no history of anesthetic complications:   Airway: Mallampati: II  TM distance: >3 FB   Neck ROM: full  Mouth opening: > = 3 FB Dental:    (+) upper dentures and lower dentures      Pulmonary: breath sounds clear to auscultation  (+) COPD: mild,                             Cardiovascular:  Exercise tolerance: good (>4 METS),   (+) hypertension: mild, hyperlipidemia    (-)  angina      Rhythm: regular  Rate: normal                    Neuro/Psych:   (+) CVA:, depression/anxiety             GI/Hepatic/Renal:   (+) renal disease: CRI, bowel prep,           Endo/Other:    (+) Diabetes, . Abdominal:           Vascular: negative vascular ROS. Anesthesia Plan      general and TIVA     ASA 2       Induction: intravenous. Anesthetic plan and risks discussed with patient.       Plan discussed with surgical team.                  XOCHILT Yanez - DANIEL   7/8/2020

## 2020-07-09 LAB — SURGICAL PATHOLOGY REPORT: NORMAL

## 2020-07-27 RX ORDER — OMEPRAZOLE 40 MG/1
40 CAPSULE, DELAYED RELEASE ORAL 2 TIMES DAILY
Qty: 60 CAPSULE | Refills: 2 | Status: SHIPPED | OUTPATIENT
Start: 2020-07-27 | End: 2020-10-30

## 2020-07-27 NOTE — TELEPHONE ENCOUNTER
Per fax from [facility] patient needs a refill   Requested Prescriptions     Pending Prescriptions Disp Refills    omeprazole (PRILOSEC) 40 MG delayed release capsule 60 capsule 0     Sig: Take 1 capsule by mouth 2 times daily     Last Appt:  6/8/2020  Next Appt:   9/11/2020  Med verified in Epic

## 2020-07-31 ENCOUNTER — PRE-PROCEDURE TELEPHONE (OUTPATIENT)
Dept: PREADMISSION TESTING | Age: 60
End: 2020-07-31

## 2020-08-05 ENCOUNTER — HOSPITAL ENCOUNTER (OUTPATIENT)
Dept: PREADMISSION TESTING | Age: 60
Setting detail: SPECIMEN
Discharge: HOME | End: 2020-08-09
Payer: MEDICARE

## 2020-08-05 PROCEDURE — U0003 INFECTIOUS AGENT DETECTION BY NUCLEIC ACID (DNA OR RNA); SEVERE ACUTE RESPIRATORY SYNDROME CORONAVIRUS 2 (SARS-COV-2) (CORONAVIRUS DISEASE [COVID-19]), AMPLIFIED PROBE TECHNIQUE, MAKING USE OF HIGH THROUGHPUT TECHNOLOGIES AS DESCRIBED BY CMS-2020-01-R: HCPCS

## 2020-08-07 LAB — SARS-COV-2, NAA: NOT DETECTED

## 2020-08-10 ENCOUNTER — HOSPITAL ENCOUNTER (OUTPATIENT)
Dept: PREOP | Age: 60
Discharge: HOME | End: 2020-08-10
Payer: MEDICARE

## 2020-08-10 VITALS
RESPIRATION RATE: 16 BRPM | OXYGEN SATURATION: 97 % | SYSTOLIC BLOOD PRESSURE: 152 MMHG | DIASTOLIC BLOOD PRESSURE: 92 MMHG | HEART RATE: 76 BPM | TEMPERATURE: 97.2 F

## 2020-08-10 PROCEDURE — 3609015500 HC GASTRIC/DUODENAL MOTILITY &/OR MANOMETRY STUDY

## 2020-08-10 RX ORDER — LIDOCAINE HYDROCHLORIDE 20 MG/ML
15 SOLUTION OROPHARYNGEAL
Status: DISCONTINUED | OUTPATIENT
Start: 2020-08-10 | End: 2020-08-11 | Stop reason: HOSPADM

## 2020-08-10 ASSESSMENT — PAIN - FUNCTIONAL ASSESSMENT: PAIN_FUNCTIONAL_ASSESSMENT: 0-10

## 2020-08-10 NOTE — PROGRESS NOTES
Viscous Lidocaine 2% was instilled into right nare and manometry probe inserted and secured with paper tape @ 52 cm. Esophageal manometry study completed and probe removed. Patient tolerated procedure well and swallowing without difficulty post-procedure. Discharged ambulatory.

## 2020-08-11 ENCOUNTER — TELEPHONE (OUTPATIENT)
Dept: SURGERY | Age: 60
End: 2020-08-11

## 2020-08-11 NOTE — TELEPHONE ENCOUNTER
Patient notified, verbalized understanding, and would like to see Dr. Chun Burgess here in [facility].

## 2020-08-25 RX ORDER — METOPROLOL TARTRATE 100 MG/1
TABLET ORAL
Qty: 60 TABLET | Refills: 5 | Status: SHIPPED | OUTPATIENT
Start: 2020-08-25 | End: 2021-03-12

## 2020-09-11 ENCOUNTER — OFFICE VISIT (OUTPATIENT)
Dept: FAMILY MEDICINE CLINIC | Age: 60
End: 2020-09-11
Payer: MEDICARE

## 2020-09-11 VITALS
BODY MASS INDEX: 27.28 KG/M2 | WEIGHT: 180 LBS | TEMPERATURE: 96.7 F | OXYGEN SATURATION: 98 % | HEIGHT: 68 IN | DIASTOLIC BLOOD PRESSURE: 84 MMHG | SYSTOLIC BLOOD PRESSURE: 118 MMHG | HEART RATE: 60 BPM

## 2020-09-11 PROCEDURE — 3017F COLORECTAL CA SCREEN DOC REV: CPT | Performed by: FAMILY MEDICINE

## 2020-09-11 PROCEDURE — 1036F TOBACCO NON-USER: CPT | Performed by: FAMILY MEDICINE

## 2020-09-11 PROCEDURE — G8417 CALC BMI ABV UP PARAM F/U: HCPCS | Performed by: FAMILY MEDICINE

## 2020-09-11 PROCEDURE — G8427 DOCREV CUR MEDS BY ELIG CLIN: HCPCS | Performed by: FAMILY MEDICINE

## 2020-09-11 PROCEDURE — 99211 OFF/OP EST MAY X REQ PHY/QHP: CPT

## 2020-09-11 PROCEDURE — 99214 OFFICE O/P EST MOD 30 MIN: CPT | Performed by: FAMILY MEDICINE

## 2020-09-11 ASSESSMENT — PATIENT HEALTH QUESTIONNAIRE - PHQ9
2. FEELING DOWN, DEPRESSED OR HOPELESS: 0
1. LITTLE INTEREST OR PLEASURE IN DOING THINGS: 0
SUM OF ALL RESPONSES TO PHQ9 QUESTIONS 1 & 2: 0
SUM OF ALL RESPONSES TO PHQ QUESTIONS 1-9: 0
SUM OF ALL RESPONSES TO PHQ QUESTIONS 1-9: 0

## 2020-09-11 NOTE — PATIENT INSTRUCTIONS
Patient Education        Foot Pain: Care Instructions  Your Care Instructions  Foot injuries that cause pain and swelling are fairly common. Almost all sports or home repair projects can cause a misstep that ends up as foot pain. Normal wear and tear, especially as you get older, also can cause foot pain. Most minor foot injuries will heal on their own, and home treatment is usually all you need to do. If you have a severe injury, you may need tests and treatment. Follow-up care is a key part of your treatment and safety. Be sure to make and go to all appointments, and call your doctor if you are having problems. It's also a good idea to know your test results and keep a list of the medicines you take. How can you care for yourself at home? · Take pain medicines exactly as directed. ? If the doctor gave you a prescription medicine for pain, take it as prescribed. ? If you are not taking a prescription pain medicine, ask your doctor if you can take an over-the-counter medicine. · Rest and protect your foot. Take a break from any activity that may cause pain. · Put ice or a cold pack on your foot for 10 to 20 minutes at a time. Put a thin cloth between the ice and your skin. · Prop up the sore foot on a pillow when you ice it or anytime you sit or lie down during the next 3 days. Try to keep it above the level of your heart. This will help reduce swelling. · Your doctor may recommend that you wrap your foot with an elastic bandage. Keep your foot wrapped for as long as your doctor advises. · If your doctor recommends crutches, use them as directed. · Wear roomy footwear. · As soon as pain and swelling end, begin gentle exercises of your foot. Your doctor can tell you which exercises will help. When should you call for help? [id number] anytime you think you may need emergency care. For example, call if:  · Your foot turns pale, white, blue, or cold.   Call your doctor now or seek immediate medical care if:  · You cannot move or stand on your foot. · Your foot looks twisted or out of its normal position. · Your foot is not stable when you step down. · You have signs of infection, such as:  ? Increased pain, swelling, warmth, or redness. ? Red streaks leading from the sore area. ? Pus draining from a place on your foot. ? A fever. · Your foot is numb or tingly. Watch closely for changes in your health, and be sure to contact your doctor if:  · You do not get better as expected. · You have bruises from an injury that last longer than 2 weeks. Where can you learn more? Go to [email or web address]. org and sign in to your [employer] account. Enter B175 in the [employer] box to learn more about \"Foot Pain: Care Instructions. \"     If you do not have an account, please click on the \"Sign Up Now\" link. Current as of: March 2, 2020               Content Version: 12.5  © 2006-2020 [employer]. Care instructions adapted under license by [facility] ([facility]). If you have questions about a medical condition or this instruction, always ask your healthcare professional. [place] [address] any warranty or liability for your use of this information.

## 2020-09-11 NOTE — PROGRESS NOTES
[address]  [address] [place]  [phone]      Lorraine Galindo is a 64 y.o. male who presents today for his medical conditions/complaints as noted below. Lorraine Galindo is c/o of 3 Month Follow-Up and Foot Pain (x 3-4 weeks - intermittant)      HPI:     Pt still taking Omeprazole 40 mg BID; tried Pepto, but it will come back up. Feels food getting caught in mid-chest; will throw up sometimes. Feels he cannot eat. Sometimes foods give him issue and then won't the next time. Scheduled to see Dr. Tobias Patel on 10/1 to discuss hiatal hernia and results from manometry testing. Pt has been having R foot pain x past 3-4 weeks - no injury. Hurts on top of foot - feels unstable, like something is \"moving in there\". Aching pain, 7/10. Lasts all day and night; hard to get to sleep. Has tried Voltaren gel, which only worked sometimes. Has mild swelling when it gets \"bad\". No h/o old injury that he can remember. Taking Metoprolol 100 mg BID for HTN and tachycardia - stable. Still has higher BP related to his Skyrizi injections, so will sometimes take a week or so of Lisinopril 10 mg daily until BP lowers. Last took this approx 10-14 days ago. BP well-controlled today - 118/84.         Past Medical History:   Diagnosis Date    Allergic reaction caused by a drug 8/13/2016    Arthritis     psoritic arthritis    Chronic kidney disease     Follows with Nephro    COPD (chronic obstructive pulmonary disease) ([address]     pt  denies    CVA (cerebral vascular accident) ([address]     Neuro eval with Dr Ovalle here    Depression     Diabetes mellitus ([address]     History of blood transfusion     History of migraine headaches     Hyperlipidemia     Hypertension     Pituitary microadenoma ([address]     Eval by NS and Endo 2011 but no recent FU    Pneumothorax 05/27/2015    left    Psoriatic arthritis ([address]     Stroke ([address]     x3    Thrombocytopenia ([address] Inject 150 mg into the skin Every 2 months       cetirizine (ZYRTEC ALLERGY) 10 MG tablet Take 10 mg by mouth daily        No current facility-administered medications for this visit. Allergies   Allergen Reactions    Cosentyx [Secukinumab] Nausea And Vomiting and Rash      fever    Lantus [Insulin Glargine] Itching, Swelling and Rash     Patient started Lantus about a month ago, only new medication. His skin reaction started on his abdomen where he injects the Lantus and moved up to the face. This is similar to his response to Cosentyx.  Infliximab Other (See Comments)     Nerve damage    Methotrexate Derivatives Other (See Comments)     Bone marrow toxicity    Seasonal     Adhesive Tape Rash    Keflex [Cephalexin] Rash    Otezla [Apremilast] Rash     Other reaction(s): Unknown    Stellaria Rash    Taltz [Ixekizumab] Rash     Other reaction(s): Unknown    Ustekinumab Rash     Other reaction(s): rash-allergic and acute renal failure  Other reaction(s): Unknown       Health Maintenance   Topic Date Due    Pneumococcal 0-64 years Vaccine (1 of 1 - PPSV23) 12/23/1968    Hepatitis B vaccine (1 of 3 - Risk 3-dose series) 12/23/1981    Diabetic retinal exam  10/30/2018    Lipid screen  08/24/2020    Shingles Vaccine (1 of 2) 12/02/2020 (Originally 12/23/2012)    A1C test (Diabetic or Prediabetic)  12/02/2020    Potassium monitoring  12/02/2020    Creatinine monitoring  12/02/2020    Diabetic foot exam  06/08/2021    Annual Wellness Visit (AWV)  06/09/2021    Colon cancer screen colonoscopy  08/08/2023    DTaP/Tdap/Td vaccine (2 - Td) 09/03/2028    Hepatitis C screen  Addressed    HIV screen  Addressed    Hepatitis A vaccine  Aged Out    Hib vaccine  Aged Out    Meningococcal (ACWY) vaccine  Aged Out       Subjective:      Review of Systems   HENT: Positive for tinnitus (still intermittent, but frequent).     Psychiatric/Behavioral: Positive for sleep disturbance (slightly improved from

## 2020-09-23 ENCOUNTER — TELEPHONE (OUTPATIENT)
Dept: FAMILY MEDICINE CLINIC | Age: 60
End: 2020-09-23

## 2020-09-24 ENCOUNTER — HOSPITAL ENCOUNTER (OUTPATIENT)
Dept: LAB | Age: 60
Discharge: HOME | End: 2020-09-24
Payer: MEDICARE

## 2020-09-24 ENCOUNTER — HOSPITAL ENCOUNTER (OUTPATIENT)
Dept: GENERAL RADIOLOGY | Age: 60
Discharge: HOME | End: 2020-09-26
Payer: MEDICARE

## 2020-09-24 LAB
ALBUMIN SERPL-MCNC: 3.6 G/DL (ref 3.5–5.2)
ALBUMIN/GLOBULIN RATIO: 1.1 (ref 1–2.5)
ALP BLD-CCNC: 110 U/L (ref 40–129)
ALT SERPL-CCNC: 9 U/L (ref 5–41)
ANION GAP SERPL CALCULATED.3IONS-SCNC: 5 MMOL/L (ref 9–17)
AST SERPL-CCNC: 12 U/L
BILIRUB SERPL-MCNC: 0.62 MG/DL (ref 0.3–1.2)
BUN BLDV-MCNC: 11 MG/DL (ref 6–20)
BUN/CREAT BLD: 5 (ref 9–20)
CALCIUM SERPL-MCNC: 9.7 MG/DL (ref 8.6–10.4)
CHLORIDE BLD-SCNC: 91 MMOL/L (ref 98–107)
CHOLESTEROL/HDL RATIO: 3.8
CHOLESTEROL: 130 MG/DL
CO2: 34 MMOL/L (ref 20–31)
CREAT SERPL-MCNC: 2.43 MG/DL (ref 0.7–1.2)
ESTIMATED AVERAGE GLUCOSE: 163 MG/DL
GFR AFRICAN AMERICAN: 34 ML/MIN
GFR NON-AFRICAN AMERICAN: 28 ML/MIN
GFR SERPL CREATININE-BSD FRML MDRD: ABNORMAL ML/MIN/{1.73_M2}
GFR SERPL CREATININE-BSD FRML MDRD: ABNORMAL ML/MIN/{1.73_M2}
GLUCOSE BLD-MCNC: 166 MG/DL (ref 70–99)
HBA1C MFR BLD: 7.3 % (ref 4.8–5.9)
HDLC SERPL-MCNC: 34 MG/DL
LDL CHOLESTEROL: 79 MG/DL (ref 0–130)
POTASSIUM SERPL-SCNC: 4.3 MMOL/L (ref 3.7–5.3)
PROSTATE SPECIFIC ANTIGEN: 0.75 UG/L
SODIUM BLD-SCNC: 130 MMOL/L (ref 135–144)
TOTAL PROTEIN: 6.9 G/DL (ref 6.4–8.3)
TRIGL SERPL-MCNC: 85 MG/DL
VITAMIN D 25-HYDROXY: 33.1 NG/ML (ref 30–100)
VLDLC SERPL CALC-MCNC: ABNORMAL MG/DL (ref 1–30)

## 2020-09-24 PROCEDURE — 73630 X-RAY EXAM OF FOOT: CPT

## 2020-09-24 PROCEDURE — 80053 COMPREHEN METABOLIC PANEL: CPT

## 2020-09-24 PROCEDURE — 36415 COLL VENOUS BLD VENIPUNCTURE: CPT

## 2020-09-24 PROCEDURE — 82306 VITAMIN D 25 HYDROXY: CPT

## 2020-09-24 PROCEDURE — 83036 HEMOGLOBIN GLYCOSYLATED A1C: CPT

## 2020-09-24 PROCEDURE — 80061 LIPID PANEL: CPT

## 2020-09-24 PROCEDURE — G0103 PSA SCREENING: HCPCS

## 2020-09-30 ENCOUNTER — INITIAL CONSULT (OUTPATIENT)
Dept: BARIATRICS/WEIGHT MGMT | Age: 60
End: 2020-09-30
Payer: MEDICARE

## 2020-09-30 VITALS
TEMPERATURE: 97.1 F | SYSTOLIC BLOOD PRESSURE: 122 MMHG | HEIGHT: 68 IN | DIASTOLIC BLOOD PRESSURE: 72 MMHG | RESPIRATION RATE: 20 BRPM | HEART RATE: 82 BPM | WEIGHT: 180 LBS | BODY MASS INDEX: 27.28 KG/M2

## 2020-09-30 PROCEDURE — G8427 DOCREV CUR MEDS BY ELIG CLIN: HCPCS | Performed by: SURGERY

## 2020-09-30 PROCEDURE — 1036F TOBACCO NON-USER: CPT | Performed by: SURGERY

## 2020-09-30 PROCEDURE — 99204 OFFICE O/P NEW MOD 45 MIN: CPT | Performed by: SURGERY

## 2020-09-30 PROCEDURE — G8417 CALC BMI ABV UP PARAM F/U: HCPCS | Performed by: SURGERY

## 2020-09-30 PROCEDURE — 3017F COLORECTAL CA SCREEN DOC REV: CPT | Performed by: SURGERY

## 2020-09-30 RX ORDER — CLOBETASOL PROPIONATE 0.05 G/100ML
SHAMPOO TOPICAL
Qty: 118 ML | Refills: 5 | Status: SHIPPED | OUTPATIENT
Start: 2020-09-30 | End: 2021-11-23

## 2020-10-18 NOTE — PROGRESS NOTES
[facility]  [facility]  [address] [place] [address] [place]  [address]  [facility] [address]  Dept: [phone]    ROBOTIC & MINIMALLY INVASIVE SURGERY  PROGRESS NOTE INITIAL EVALUATION     Patient: Gus Brasher        Service Date: 9/30/2020      HPI:     Chief Complaint   Patient presents with    Consultation     hitalal hernia    Hiatal Hernia       The patient is a pleasant 64y.o. year old male  with a hiatal hernia, who stands Height: 5' 8\" (172.7 cm) tall with a weight of Weight: 180 lb (81.6 kg) , resulting in a BMI of Body mass index is 27.37 kg/m². He presents today to discuss GERD and dysphagia. He states he has more dysphagia than GERD. He underwent manometry with slightly elevate mIRP with EGJ outflow obstruction. He states food gets \"stuck\" and he has to force himself to vomit. He states he vomits undigested foods. He denies melena, hematochezia, hematemesis. He would like further evaluation. Most recent UGI showed a hiatal hernia with reflux. He is currently on PPI, but has limited improvement of symptoms. The patient denies  a history of myocardial infarction, deep vein thrombosis, pulmonary embolism, renal failure and hepatic failure.     Medical History:  Past Medical History:   Diagnosis Date    Allergic reaction caused by a drug 8/13/2016    Arthritis     psoritic arthritis    Chronic kidney disease     Follows with Nephro    COPD (chronic obstructive pulmonary disease) ([address]     pt  denies    CVA (cerebral vascular accident) ([address]     Neuro eval with Dr Ovalle here    Depression     Diabetes mellitus ([address]     History of blood transfusion     History of migraine headaches     Hyperlipidemia     Hypertension     Pituitary microadenoma ([address]     Eval by NS and Endo 2011 but no recent FU    Pneumothorax 05/27/2015    left    Psoriatic arthritis ([address]     Stroke ([address]     x3    Thrombocytopenia ([address]        Surgical History:  Past Surgical History: Procedure Laterality Date    CHEST TUBE INSERTION Left     out now    COLONOSCOPY       SIS 10 y    OTHER SURGICAL HISTORY Right     stent r kidney    OTHER SURGICAL HISTORY  5/27/15    resection of bleb    PILONIDAL CYST DRAINAGE      SHOULDER SURGERY Left     Rotator cuff    THORACOSCOPY  5/27/15    THORACOTOMY  5/27/15    UPPER GASTROINTESTINAL ENDOSCOPY      UPPER GASTROINTESTINAL ENDOSCOPY N/A 2020    EGD with biopsies performed by Pelon Buchanan DO at [facility] OR       Family History:      Problem Relation Age of Onset    Diabetes Sister     Alzheimer's Disease Maternal Grandmother     Other Maternal Grandmother         dementia    High Blood Pressure Maternal Grandfather     Cancer Maternal Grandfather         Unknown    High Blood Pressure Mother     Other Mother         blood clots    Other Paternal Grandmother         dementia       Social History:   Social History     Tobacco Use    Smoking status: Former Smoker     Packs/day: 1.00     Years: 20.00     Pack years: 20.00     Types: Cigarettes     Last attempt to quit: 2015     Years since quittin.4    Smokeless tobacco: Never Used    Tobacco comment: Quit smoking 2016, CARMEN GEEP, 10/20/2016.    Substance Use Topics    Alcohol use: No     Alcohol/week: 0.0 standard drinks    Drug use: No       Current Med List:  Current Outpatient Medications   Medication Sig Dispense Refill    metoprolol (LOPRESSOR) 100 MG tablet TAKE ONE TABLET BY MOUTH TWICE A DAY 60 tablet 5    omeprazole (PRILOSEC) 40 MG delayed release capsule Take 1 capsule by mouth 2 times daily 60 capsule 2    betamethasone dipropionate (DIPROLENE) 0.05 % cream       lisinopril (PRINIVIL;ZESTRIL) 10 MG tablet Take 1 tablet by mouth daily Take when BP elevated after Skyrizi 90 tablet 1    SKYRIZI, 150 MG DOSE, 75 MG/0.83ML PSKT injection Inject 150 mg into the skin Every 2 months       cetirizine (ZYRTEC ALLERGY) 10 MG tablet Take 10 mg by mouth daily       Clobetasol Propionate 0.05 % SHAM APPLY TO SCALP, LEAVE ON FOR 15 MINUTES THEN RINSE THOROUGHLY. MAY USE 3-4 TIMES A WEEK FOR MAINTENANCE 118 mL 5     No current facility-administered medications for this visit. Allergies   Allergen Reactions    Cosentyx [Secukinumab] Nausea And Vomiting and Rash      fever    Lantus [Insulin Glargine] Itching, Swelling and Rash     Patient started Lantus about a month ago, only new medication. His skin reaction started on his abdomen where he injects the Lantus and moved up to the face. This is similar to his response to Cosentyx.  Infliximab Other (See Comments)     Nerve damage    Methotrexate Derivatives Other (See Comments)     Bone marrow toxicity    Seasonal     Adhesive Tape Rash    Keflex [Cephalexin] Rash    Otezla [Apremilast] Rash     Other reaction(s): Unknown    Stellaria Rash    Taltz [Ixekizumab] Rash     Other reaction(s): Unknown    Ustekinumab Rash     Other reaction(s): rash-allergic and acute renal failure  Other reaction(s): Unknown       SOCIAL:      This patient is alone for the evaluation today.       [] HIV Risk Factors (i.e.) intravenous drug abuser; at risk sexual behavior; received blood products    [] TB Risk Factors (i.e.) Medically underserved, institutional care, foreign born, endemic area; exposure to active case    [] Hepatitis B&C Risk Factors (i.e.) Received blood transfusion prior to 1992; recreational drug use; high risk sexual behaviors; tattoos or body piercings; contact with blood or needle sticks in the workplace    Comprehension    Ability to grasp concepts and respond to questions:   [x] High   [] Medium   [] Low    Motivation    [x] Asks Questions; eager to learn   [] Needs education   [] Extreme anxiety    [] uncooperative   [] Denies need for education    English Speaking Ability    [x] Speaks English well   [x] Reads English well   [x] Understands spoken Elena Perez    [] Understands written English   [] No need for interpretive support      [] Might benefit from interpretive support   []  required for all services     REVIEW OF SYSTEMS: (Negative unless marked otherwise)       Do you or have you had any of the following? Cardiovascular YES NO Respiratory YES NO   High Blood Pressure   []   [] COPD   []   []   Heart Attack   []   [] TB/Positive skin Test   []   []   Congestive Heart Failure   []   [] Obstructive Sleep Apnea   []   []   Coronary Artery Disease   []   [] Asthma   []   [x]   Circulation Problems   []   []      Activity Intolerance   []   [] Gastrointestinal YES NO   Peripheral Vascular Disease   []   [] Gastric Problems   []   []        Colorectal problems   []   []   Hematological YES NO Ulcer disease   []   []   Bleeding Tendencies   []   [] Liver disease   []   []   Blood Transfusion last 30d   []   [] Gallstones   []   []   Anemia   []   [] Refulx or Heartburn   []   []   Blood Clots   []   []      High Cholesterol   []   [] Muscoloskeletal YES NO   High Triglycerides   []   [] Joint Limitations   []   []      Muscle Weakness   []   []   Eyes, Ears, Nose, Throat YES NO Multiple Sclerosis   []   []   Cataracts   []   [] Arthritis   []   []   Glasses   []   []      Blurred Vision   []   [] Cancer   []   []   Hearing Aids   []   [] Type:     Ringing in Ears   []   []      Difficulty Swallowing   []   [] Encodrine YES NO      Diabetes   []   []   Neurological YES NO Thyroid   []   []   Stroke   []   []      Seizure   []   [] Psychiatric Disorder YES NO   Dizziness/Blackouts/Fainting   []   [] Depression   []   []   Memory Impairement   []   [] Bipolar   []   []   Parkinson's   []   [] Anxiety disorder   []   []           Genitourinary/Gyn YES NO Skin Intact   [x]   []   Urinary Infection   []   [] Rash  x   Stones   []   []      Kidney Disease   []   []      Incontinent   []   []      Irregular menstrual cycles   []   []      Possibly Pregnant?    []     []      Date of LMP: PRESENT ILLNESS:     Weight Parameters  Weight 180 lb (81.6 kg)   Height 5' 8\" (1.727 m)   BMI Body mass index is 27.37 kg/m². IBW     EBW               IMMUNIZATION STATUS  Immunization History   Administered Date(s) Administered    Influenza Virus Vaccine 10/01/2014, 10/15/2018, 10/01/2019    Tdap (Boostrix, Adacel) 09/03/2018     SMOKING CESSATION     [x] Not needed     [] Instructed to stop smoking    [] Pamphlet community resources given     VTE SCREEN    [] Family hx DVT/PE  /   [] Personal hx of DVT/PE    [x] Denies any family or personal hx of DVT/PE    Physician Review    [x] Past medical, family, & social history reviewed and discussed with patient. PHYSICAL EXAMINATION:      /72 (Site: Right Upper Arm, Position: Sitting, Cuff Size: Medium Adult)   Pulse 82   Temp 97.1 °F (36.2 °C) (Infrared)   Resp 20   Ht 5' 8\" (1.727 m)   Wt 180 lb (81.6 kg)   BMI 27.37 kg/m²     Constitutional:  Vital signs are normal. The patient appears well-developed   HEENT:      Head: Normocephalic. Atraumatic     Eyes: pupils are equal and reactive. No scleral icterus is present. Neck: No mass and no thyromegaly present. Cardiovascular: Normal rate, regular rhythm, S1 normal and S2 normal.  Bilateral pulses present. Pulmonary/Chest: Effort normal and breath sounds normal. No retractions. Abdominal: Soft. Normal appearance. There is no organomegaly. No tenderness. There is no rigidity, no rebound, no guarding and no Tineo's sign. Musculoskeletal:      Right lower leg: Normal. No tenderness and no edema. Left lower leg: Normal. No tenderness and no edema. Lymphadenopathy:     No cervical adenopathy, No Exrtemity Adenopathy. Neurological: The patient is alert and oriented. Moving all four extremities equally, sensation grossly intact bilateral.  Skin: Skin is warm, dry and intact. Psychiatric: The patient has a normal mood and affect.  Speech is normal and behavior is normal. Judgment and thought content normal. Cognition and memory are normal.         PLAN:       Diagnosis Orders   1.  Esophageal dysphagia  NM GASTRIC EMPTYING            Check gastric emptying study  EGD and UGI reviewed with patient  Continue PPI  Discussed hiatal hernia repair and fundoplication  Avoid meals 3 hours prior to bedtime  Avoid Caffeine  Avoid tobacco and alcohol  Follow up after testing  All questions answered  Electronically signed by Tristen Dean DO on 10/18/2020 at 4:15 PM

## 2020-10-28 NOTE — TELEPHONE ENCOUNTER
Rodriguez Dawkins called requesting a refill of the below medication which has been pended for you:     Requested Prescriptions     Pending Prescriptions Disp Refills    omeprazole (PRILOSEC) 40 MG delayed release capsule [Pharmacy Med Name: OMEPRAZOLE DR 40 MG CAPSULE] 60 capsule 1     Sig: TAKE ONE CAPSULE BY MOUTH TWICE A DAY       Last Appointment Date: 9/11/2020  Next Appointment Date: 12/21/2020    Allergies   Allergen Reactions    Cosentyx [Secukinumab] Nausea And Vomiting and Rash      fever    Lantus [Insulin Glargine] Itching, Swelling and Rash     Patient started Lantus about a month ago, only new medication. His skin reaction started on his abdomen where he injects the Lantus and moved up to the face. This is similar to his response to Cosentyx.      Infliximab Other (See Comments)     Nerve damage    Methotrexate Derivatives Other (See Comments)     Bone marrow toxicity    Seasonal     Adhesive Tape Rash    Keflex [Cephalexin] Rash    Otezla [Apremilast] Rash     Other reaction(s): Unknown    Stellaria Rash    Taltz [Ixekizumab] Rash     Other reaction(s): Unknown    Ustekinumab Rash     Other reaction(s): rash-allergic and acute renal failure  Other reaction(s): Unknown

## 2020-10-30 ENCOUNTER — HOSPITAL ENCOUNTER (OUTPATIENT)
Dept: NUCLEAR MEDICINE | Age: 60
Discharge: HOME | End: 2020-11-01
Payer: MEDICARE

## 2020-10-30 PROCEDURE — 3430000000 HC RX DIAGNOSTIC RADIOPHARMACEUTICAL: Performed by: SURGERY

## 2020-10-30 PROCEDURE — 78264 GASTRIC EMPTYING IMG STUDY: CPT

## 2020-10-30 PROCEDURE — A9541 TC99M SULFUR COLLOID: HCPCS | Performed by: SURGERY

## 2020-10-30 RX ORDER — OMEPRAZOLE 40 MG/1
CAPSULE, DELAYED RELEASE ORAL
Qty: 60 CAPSULE | Refills: 3 | Status: SHIPPED | OUTPATIENT
Start: 2020-10-30 | End: 2021-01-14

## 2020-10-30 RX ADMIN — Medication 500 MICRO CURIE: at 13:48

## 2020-11-05 ENCOUNTER — TELEMEDICINE (OUTPATIENT)
Dept: CARDIOLOGY | Age: 60
End: 2020-11-05
Payer: MEDICARE

## 2020-11-05 PROBLEM — I20.8 OTHER FORMS OF ANGINA PECTORIS: Status: ACTIVE | Noted: 2020-11-05

## 2020-11-05 PROBLEM — I20.89 OTHER FORMS OF ANGINA PECTORIS: Status: ACTIVE | Noted: 2020-11-05

## 2020-11-05 PROCEDURE — 3017F COLORECTAL CA SCREEN DOC REV: CPT | Performed by: INTERNAL MEDICINE

## 2020-11-05 PROCEDURE — 99211 OFF/OP EST MAY X REQ PHY/QHP: CPT

## 2020-11-05 PROCEDURE — G8428 CUR MEDS NOT DOCUMENT: HCPCS | Performed by: INTERNAL MEDICINE

## 2020-11-05 PROCEDURE — G8417 CALC BMI ABV UP PARAM F/U: HCPCS | Performed by: INTERNAL MEDICINE

## 2020-11-05 PROCEDURE — G8483 FLU IMM NO ADMIN DOC REA: HCPCS | Performed by: INTERNAL MEDICINE

## 2020-11-05 PROCEDURE — 99214 OFFICE O/P EST MOD 30 MIN: CPT | Performed by: INTERNAL MEDICINE

## 2020-11-05 PROCEDURE — 1036F TOBACCO NON-USER: CPT | Performed by: INTERNAL MEDICINE

## 2020-11-05 NOTE — PROGRESS NOTES
2020    TELEHEALTH EVALUATION -- Audio/Visual (During OGRAN-89 public health emergency)    CC: Follow up  Cad, htn, hlp    HPI:    Cecilia Little (:  1960) has requested an audio/video evaluation for the following concern(s): COVID 19 Pandemic. Patient is doing well from a cardiac standpoint. His blood pressure is better controlled after starting lisinopril. He reports that the systolic numbers are running 661-184 and diastolic 60 to 70 mmHg  Good functional capacity with no significant change in functional capacity. No chest pain, no dyspnea, no PND, no syncope or pre-syncope, no orthopnea. REVIEW OF SYSTEMS:    · Constitutional: there has been no unanticipated weight loss. There's been No change in energy level, No change in activity level. · Eyes: No visual changes or diplopia. No scleral icterus. · ENT: No Headaches, hearing loss or vertigo. No mouth sores or sore throat. · Cardiovascular: per hpi  · Respiratory: per hpi  · Gastrointestinal: No abdominal pain, appetite loss, blood in stools. No change in bowel or bladder habits. · Genitourinary: No dysuria, trouble voiding, or hematuria. · Musculoskeletal:  No gait disturbance, No weakness or joint complaints. · Integumentary: No rash or pruritis. · Neurological: No headache, diplopia, change in muscle strength, numbness or tingling. No change in gait, balance, coordination, mood, affect, memory, mentation, behavior. · Psychiatric: No new anxiety or depression. · Endocrine: No temperature intolerance. No excessive thirst, fluid intake, or urination. No tremor. · Hematologic/Lymphatic: No abnormal bruising or bleeding, blood clots or swollen lymph nodes. · Allergic/Immunologic: No nasal congestion or hives. Prior to Visit Medications    Medication Sig Taking?  Authorizing Provider   omeprazole (PRILOSEC) 40 MG delayed release capsule TAKE ONE CAPSULE BY MOUTH TWICE A DAY  Natalie Chen, DO   Clobetasol Propionate 0.05 % SHAM APPLY TO SCALP, LEAVE ON FOR 15 MINUTES THEN RINSE THOROUGHLY. MAY USE 3-4 TIMES A WEEK FOR MAINTENANCE  Killian Chen, DO   metoprolol (LOPRESSOR) 100 MG tablet TAKE ONE TABLET BY MOUTH TWICE A DAY  Vero Chen, DO   betamethasone dipropionate (DIPROLENE) 0.05 % cream   Historical Provider, MD   lisinopril (PRINIVIL;ZESTRIL) 10 MG tablet Take 1 tablet by mouth daily Take when BP elevated after Eldon Yvon, DO   SKYRIZI, 150 MG DOSE, 75 MG/0.83ML PSKT injection Inject 150 mg into the skin Every 2 months   Historical Provider, MD   cetirizine (ZYRTEC ALLERGY) 10 MG tablet Take 10 mg by mouth daily   Historical Provider, MD       Social History     Tobacco Use    Smoking status: Former Smoker     Packs/day: 1.00     Years: 20.00     Pack years: 20.00     Types: Cigarettes     Last attempt to quit: 2015     Years since quittin.4    Smokeless tobacco: Never Used    Tobacco comment: Quit smoking 2016, CARMEN Mack RCP, 10/20/2016.    Substance Use Topics    Alcohol use: No     Alcohol/week: 0.0 standard drinks    Drug use: No        Past Medical History:   Diagnosis Date    Allergic reaction caused by a drug 2016    Arthritis     psoritic arthritis    Chronic kidney disease     Follows with Nephro    COPD (chronic obstructive pulmonary disease) ([address]     pt  denies    CVA (cerebral vascular accident) ([address]     Neuro eval with Dr Abhijit Bagley here    Depression     Diabetes mellitus ([address]     History of blood transfusion     History of migraine headaches     Hyperlipidemia     Hypertension     Pituitary microadenoma ([address]     Eval by NS and Endo  but no recent FU    Pneumothorax 2015    left    Psoriatic arthritis ([address]     Stroke ([address]     x3    Thrombocytopenia ([address]        Past Surgical History:   Procedure Laterality Date    CHEST TUBE INSERTION Left     out now    COLONOSCOPY       SIS 10 y    OTHER SURGICAL HISTORY Right     stent r kidney    OTHER SURGICAL HISTORY  5/27/15    resection of bleb    PILONIDAL CYST DRAINAGE      SHOULDER SURGERY Left     Rotator cuff    THORACOSCOPY  5/27/15    THORACOTOMY  5/27/15    UPPER GASTROINTESTINAL ENDOSCOPY      UPPER GASTROINTESTINAL ENDOSCOPY N/A 2020    EGD with biopsies performed by Álvaro Palma DO at [facility] OR       Family History   Problem Relation Age of Onset    Diabetes Sister     Alzheimer's Disease Maternal Grandmother     Other Maternal Grandmother         dementia    High Blood Pressure Maternal Grandfather     Cancer Maternal Grandfather         Unknown    High Blood Pressure Mother     Other Mother         blood clots    Other Paternal Grandmother         dementia       Social History     Socioeconomic History    Marital status:      Spouse name: Not on file    Number of children: Not on file    Years of education: Not on file    Highest education level: Not on file   Occupational History    Not on file   Social Needs    Financial resource strain: Not on file    Food insecurity     Worry: Not on file     Inability: Not on file    Transportation needs     Medical: Not on file     Non-medical: Not on file   Tobacco Use    Smoking status: Former Smoker     Packs/day: 1.00     Years: 20.00     Pack years: 20.00     Types: Cigarettes     Last attempt to quit: 2015     Years since quittin.4    Smokeless tobacco: Never Used    Tobacco comment: Quit smoking 2016, CARMEN Mack RCP, 10/20/2016.    Substance and Sexual Activity    Alcohol use: No     Alcohol/week: 0.0 standard drinks    Drug use: No    Sexual activity: Not on file   Lifestyle    Physical activity     Days per week: Not on file     Minutes per session: Not on file    Stress: Not on file   Relationships    Social connections     Talks on phone: Not on file     Gets together: Not on file     Attends Latter day service: Not on file     Active member of club or organization: Not on file Attends meetings of clubs or organizations: Not on file     Relationship status: Not on file    Intimate partner violence     Fear of current or ex partner: Not on file     Emotionally abused: Not on file     Physically abused: Not on file     Forced sexual activity: Not on file   Other Topics Concern    Not on file   Social History Narrative    Not on file       Allergies   Allergen Reactions    Cosentyx [Secukinumab] Nausea And Vomiting and Rash      fever    Lantus [Insulin Glargine] Itching, Swelling and Rash     Patient started Lantus about a month ago, only new medication. His skin reaction started on his abdomen where he injects the Lantus and moved up to the face. This is similar to his response to Cosentyx.  Infliximab Other (See Comments)     Nerve damage    Methotrexate Derivatives Other (See Comments)     Bone marrow toxicity    Seasonal     Adhesive Tape Rash    Keflex [Cephalexin] Rash    Otezla [Apremilast] Rash     Other reaction(s): Unknown    Stellaria Rash    Taltz [Ixekizumab] Rash     Other reaction(s): Unknown    Ustekinumab Rash     Other reaction(s): rash-allergic and acute renal failure  Other reaction(s): Unknown       Current Outpatient Medications   Medication Sig Dispense Refill    omeprazole (PRILOSEC) 40 MG delayed release capsule TAKE ONE CAPSULE BY MOUTH TWICE A DAY 60 capsule 3    Clobetasol Propionate 0.05 % SHAM APPLY TO SCALP, LEAVE ON FOR 15 MINUTES THEN RINSE THOROUGHLY.  MAY USE 3-4 TIMES A WEEK FOR MAINTENANCE 118 mL 5    metoprolol (LOPRESSOR) 100 MG tablet TAKE ONE TABLET BY MOUTH TWICE A DAY 60 tablet 5    betamethasone dipropionate (DIPROLENE) 0.05 % cream       lisinopril (PRINIVIL;ZESTRIL) 10 MG tablet Take 1 tablet by mouth daily Take when BP elevated after Skyrizi 90 tablet 1    SKYRIZI, 150 MG DOSE, 75 MG/0.83ML PSKT injection Inject 150 mg into the skin Every 2 months       cetirizine (ZYRTEC ALLERGY) 10 MG tablet Take 10 mg by mouth daily No current facility-administered medications for this visit. Patient Active Problem List   Diagnosis    Hypertension    Chronic kidney disease (CKD), stage III (moderate) (HCC)    [facility]    History of migraine headaches    Hyperlipidemia    Pituitary microadenoma (HCC)    Chronic bullous emphysema (HCC)    Sinus tachycardia    Acute kidney injury ([facility]    Hyponatremia    Uncontrolled diabetes mellitus type 2 without complications    Psoriasis    Drug-induced skin rash    Leukopenia          PHYSICAL EXAMINATION:  [ INSTRUCTIONS:  \"[x]\" Indicates a positive item  \"[]\" Indicates a negative item  -- DELETE ALL ITEMS NOT EXAMINED]    Vital Signs: (As obtained by patient/caregiver or practitioner observation)    Blood pressure-  Heart rate-    Respiratory rate-    Temperature-  Pulse oximetry-     Constitutional: [x] Appears well-developed and well-nourished [x] No apparent distress      [] Abnormal-   Mental status  [x] Alert and awake  [x] Oriented to person/place/time [x]Able to follow commands      Eyes:  EOM    [x]  Normal  [] Abnormal-  Sclera  [x]  Normal  [] Abnormal -         Discharge [x]  None visible  [] Abnormal -    HENT:   [x] Normocephalic, atraumatic.   [] Abnormal   [x] Mouth/Throat: Mucous membranes are moist.     External Ears [x] Normal  [] Abnormal-     Neck: [x] No visualized mass     Pulmonary/Chest: [x] Respiratory effort normal.  [x] No visualized signs of difficulty breathing or respiratory distress        [] Abnormal-      Musculoskeletal:   [x] Normal gait with no signs of ataxia         [x] Normal range of motion of neck        [] Abnormal-       Neurological:        [x] No Facial Asymmetry (Cranial nerve 7 motor function) (limited exam to video visit)          [x] No gaze palsy        [] Abnormal-         Skin:        [x] No significant exanthematous lesions or discoloration noted on facial skin         [] Abnormal-

## 2020-12-04 ENCOUNTER — TELEPHONE (OUTPATIENT)
Dept: GASTROENTEROLOGY | Age: 60
End: 2020-12-04

## 2020-12-04 NOTE — TELEPHONE ENCOUNTER
Writer called patient. No answer. LVM informing patient we are changing OV to VV. He was advised to call the office to either confirm or reschedule if this does not work for him.

## 2020-12-07 ENCOUNTER — VIRTUAL VISIT (OUTPATIENT)
Dept: GASTROENTEROLOGY | Age: 60
End: 2020-12-07
Payer: MEDICARE

## 2020-12-07 PROCEDURE — G8483 FLU IMM NO ADMIN DOC REA: HCPCS | Performed by: INTERNAL MEDICINE

## 2020-12-07 PROCEDURE — 99202 OFFICE O/P NEW SF 15 MIN: CPT | Performed by: INTERNAL MEDICINE

## 2020-12-07 PROCEDURE — G8427 DOCREV CUR MEDS BY ELIG CLIN: HCPCS | Performed by: INTERNAL MEDICINE

## 2020-12-07 PROCEDURE — G8417 CALC BMI ABV UP PARAM F/U: HCPCS | Performed by: INTERNAL MEDICINE

## 2020-12-07 PROCEDURE — 3017F COLORECTAL CA SCREEN DOC REV: CPT | Performed by: INTERNAL MEDICINE

## 2020-12-07 PROCEDURE — 1036F TOBACCO NON-USER: CPT | Performed by: INTERNAL MEDICINE

## 2020-12-07 RX ORDER — METOCLOPRAMIDE 5 MG/1
5 TABLET ORAL 3 TIMES DAILY
Qty: 120 TABLET | Refills: 3 | Status: SHIPPED | OUTPATIENT
Start: 2020-12-07 | End: 2020-12-21

## 2020-12-07 ASSESSMENT — ENCOUNTER SYMPTOMS
ALLERGIC/IMMUNOLOGIC NEGATIVE: 1
NAUSEA: 1
CONSTIPATION: 1
VOMITING: 1
TROUBLE SWALLOWING: 1
CHOKING: 1
EYES NEGATIVE: 1

## 2020-12-07 NOTE — PROGRESS NOTES
department in the past 7 days or scheduled within the next 24 hours. Total Time: minutes: 11-20 minutes    Luiz Mckinnon is a 64 y.o. male being evaluated by a Virtual Visit (telephone visit) encounter to address concerns as mentioned above. A caregiver was present when appropriate. Due to this being a TeleHealth encounter (During YZAWV-39 public health emergency), evaluation of the following organ systems was limited: Vitals/Constitutional/EENT/Resp/CV/GI//MS/Neuro/Skin/Heme-Lymph-Imm. Pursuant to the emergency declaration under the [address] [place] [address], [address] [place] [address] authority and the [employer] Act, this Virtual Visit was conducted with patient's (and/or legal guardian's) consent, to reduce the patient's risk of exposure to COVID-19 and provide necessary medical care. The patient (and/or legal guardian) has also been advised to contact this office for worsening conditions or problems, and seek emergency medical treatment and/or call 911 if deemed necessary. Services were provided through a telephone synchronous discussion virtually to substitute for in-person clinic visit. Patient and provider were located at their individual homes. --Ava Smith MD on 12/7/2020 at 1:27 PM    An electronic signature was used to authenticate this note.        [address] [facility]

## 2020-12-21 ENCOUNTER — OFFICE VISIT (OUTPATIENT)
Dept: FAMILY MEDICINE CLINIC | Age: 60
End: 2020-12-21
Payer: MEDICARE

## 2020-12-21 VITALS
HEART RATE: 112 BPM | BODY MASS INDEX: 25.98 KG/M2 | SYSTOLIC BLOOD PRESSURE: 90 MMHG | TEMPERATURE: 97.6 F | HEIGHT: 68 IN | OXYGEN SATURATION: 98 % | DIASTOLIC BLOOD PRESSURE: 80 MMHG | WEIGHT: 171.4 LBS

## 2020-12-21 DIAGNOSIS — L40.50 PSORIATIC ARTHRITIS (HCC): ICD-10-CM

## 2020-12-21 DIAGNOSIS — I10 ESSENTIAL HYPERTENSION: ICD-10-CM

## 2020-12-21 DIAGNOSIS — E11.65 TYPE 2 DIABETES MELLITUS WITH HYPERGLYCEMIA, UNSPECIFIED WHETHER LONG TERM INSULIN USE (HCC): Primary | ICD-10-CM

## 2020-12-21 DIAGNOSIS — K31.84 GASTROPARESIS: ICD-10-CM

## 2020-12-21 DIAGNOSIS — E09.9 STEROID-INDUCED DIABETES MELLITUS, SUBSEQUENT ENCOUNTER (HCC): ICD-10-CM

## 2020-12-21 DIAGNOSIS — D84.9 IMMUNOSUPPRESSED STATUS (HCC): ICD-10-CM

## 2020-12-21 DIAGNOSIS — T38.0X5D STEROID-INDUCED DIABETES MELLITUS, SUBSEQUENT ENCOUNTER (HCC): ICD-10-CM

## 2020-12-21 PROCEDURE — 2022F DILAT RTA XM EVC RTNOPTHY: CPT | Performed by: FAMILY MEDICINE

## 2020-12-21 PROCEDURE — G8483 FLU IMM NO ADMIN DOC REA: HCPCS | Performed by: FAMILY MEDICINE

## 2020-12-21 PROCEDURE — 1036F TOBACCO NON-USER: CPT | Performed by: FAMILY MEDICINE

## 2020-12-21 PROCEDURE — 3051F HG A1C>EQUAL 7.0%<8.0%: CPT | Performed by: FAMILY MEDICINE

## 2020-12-21 PROCEDURE — G8427 DOCREV CUR MEDS BY ELIG CLIN: HCPCS | Performed by: FAMILY MEDICINE

## 2020-12-21 PROCEDURE — 99213 OFFICE O/P EST LOW 20 MIN: CPT

## 2020-12-21 PROCEDURE — 3017F COLORECTAL CA SCREEN DOC REV: CPT | Performed by: FAMILY MEDICINE

## 2020-12-21 PROCEDURE — G8417 CALC BMI ABV UP PARAM F/U: HCPCS | Performed by: FAMILY MEDICINE

## 2020-12-21 PROCEDURE — 99214 OFFICE O/P EST MOD 30 MIN: CPT | Performed by: FAMILY MEDICINE

## 2020-12-21 NOTE — PROGRESS NOTES
[address]  [address], [address] [place] More[place]  [phone]      Juanito Buerger is a 64 y.o. male who presents today for his medical conditions/complaints as noted below. Juanito Buerger is c/o of Diabetes (3 MO)      HPI:     Pt here today for f/u HTN and DM. Pt saw GI on 12/7 for video visit - started Reglan 5 mg TID 30 mins prior to meals - pt stated he saw improvement for only 3 days (no trouble swallowing or vomiting), but then he started to have significantly decreased urination, so he stopped the Reglan and the urinary sx's resolved. Supposed to hear from GI again today for a 2-week check-up; has sent him a [employer] message about not being able to take the Reglan and having trouble eating again. Will f/u with them again on 1/12/21. Was also recommended by GI to continue Omeprazole 40 mg daily - taking this between breakfast and lunch. Has lost 9 lbs in the past 3 months due to not being able to eat. Checking his glucose once daily after breakfast - highest reading has been 108; average 90's. Last A1c was 7.3% on 9/24/20 - will re-check next week. Has not used any DM meds in the past several months. Has not been able to see Dr. Deepak Taveras for diabetic eye exam recently. BP slightly low today - 90/80. States it is usually low like this at home when he checks BP at home, and then will go up to 110's in the afternoon. Taking Lopressor 100 mg BID - has not yet taken his AM dose yet today. HR is slightly high today - 112. Usually 70's with the Metoprolol. Only takes Lisinopril 10 mg as needed when his BP is >150/110; has only needed it 3 times in the past month. Will usually take it in the afternoon when he gets high. Does not have sx's of hypotension. Using [employer] injection every 12 weeks - has not gone to [facility] recently due to [place]. Does feel that the [employer] is still helping with his psoriasis - still has some breakouts intermittently that are random. Pain from his psoriatic arthritis in b/l hands, knees, hips, ankles is about the same. R foot pain from last OV has resolved. Takes warm shower and uses heat as needed for arthritic pain.           Past Medical History:   Diagnosis Date    Allergic reaction caused by a drug 8/13/2016    Arthritis     psoritic arthritis    Chronic kidney disease     Follows with Nephro    COPD (chronic obstructive pulmonary disease) ([address]     pt  denies    CVA (cerebral vascular accident) ([address]     Neuro eval with Dr Ovalle here    Depression     Diabetes mellitus ([address]     History of blood transfusion     History of migraine headaches     Hyperlipidemia     Hypertension     Pituitary microadenoma ([address]     Eval by NS and Endo 2011 but no recent FU    Pneumothorax 05/27/2015    left    Psoriatic arthritis ([address]     Stroke ([address]     x3    Thrombocytopenia ([address]       Past Surgical History:   Procedure Laterality Date    CHEST TUBE INSERTION Left     out now    COLONOSCOPY      11/13 SIS 10 y    OTHER SURGICAL HISTORY Right     stent r kidney    OTHER SURGICAL HISTORY  5/27/15    resection of bleb    PILONIDAL CYST DRAINAGE      SHOULDER SURGERY Left     Rotator cuff    THORACOSCOPY  5/27/15    THORACOTOMY  5/27/15    UPPER GASTROINTESTINAL ENDOSCOPY      UPPER GASTROINTESTINAL ENDOSCOPY N/A 7/8/2020    EGD with biopsies performed by Sher Tracy DO at [facility] History   Problem Relation Age of Onset    Diabetes Sister     Alzheimer's Disease Maternal Grandmother     Other Maternal Grandmother         dementia    High Blood Pressure Maternal Grandfather     Cancer Maternal Grandfather         Unknown    High Blood Pressure Mother     Other Mother         blood clots  Other Paternal Grandmother         dementia     Social History     Tobacco Use    Smoking status: Former Smoker     Packs/day: 1.00     Years: 20.00     Pack years: 20.00     Types: Cigarettes     Quit date: 2015     Years since quittin.6    Smokeless tobacco: Never Used    Tobacco comment: Quit smoking 2016, CARMEN Mack P, 10/20/2016. Substance Use Topics    Alcohol use: No     Alcohol/week: 0.0 standard drinks      Current Outpatient Medications   Medication Sig Dispense Refill    triamcinolone (KENALOG) 0.1 % ointment APPLY TOPICALLY TO THE AFFECTED AREA TWICE DAILY.  omeprazole (PRILOSEC) 40 MG delayed release capsule TAKE ONE CAPSULE BY MOUTH TWICE A DAY 60 capsule 3    Clobetasol Propionate 0.05 % SHAM APPLY TO SCALP, LEAVE ON FOR 15 MINUTES THEN RINSE THOROUGHLY. MAY USE 3-4 TIMES A WEEK FOR MAINTENANCE 118 mL 5    metoprolol (LOPRESSOR) 100 MG tablet TAKE ONE TABLET BY MOUTH TWICE A DAY 60 tablet 5    betamethasone dipropionate (DIPROLENE) 0.05 % cream       lisinopril (PRINIVIL;ZESTRIL) 10 MG tablet Take 1 tablet by mouth daily Take when BP elevated after Skyrizi 90 tablet 1    SKYRIZI, 150 MG DOSE, 75 MG/0.83ML PSKT injection Inject 150 mg into the skin Every 2 months       cetirizine (ZYRTEC ALLERGY) 10 MG tablet Take 10 mg by mouth daily        No current facility-administered medications for this visit. Allergies   Allergen Reactions    Cosentyx [Secukinumab] Nausea And Vomiting and Rash      fever    Lantus [Insulin Glargine] Itching, Swelling and Rash     Patient started Lantus about a month ago, only new medication. His skin reaction started on his abdomen where he injects the Lantus and moved up to the face. This is similar to his response to Cosentyx.      Infliximab Other (See Comments)     Nerve damage    Methotrexate Derivatives Other (See Comments)     Bone marrow toxicity    Seasonal     Adhesive Tape Rash    Keflex [Cephalexin] Rash Cardiovascular:      Rate and Rhythm: Normal rate and regular rhythm. Heart sounds: Normal heart sounds. Pulmonary:      Effort: Pulmonary effort is normal. No respiratory distress. Breath sounds: Normal breath sounds. Abdominal:      General: Bowel sounds are normal. There is no distension. Palpations: Abdomen is soft. Tenderness: There is no abdominal tenderness. Skin:     General: Skin is warm and dry. Neurological:      Mental Status: He is alert and oriented to person, place, and time. Assessment:       Diagnosis Orders   1. Type 2 diabetes mellitus with hyperglycemia, unspecified whether long term insulin use (HCC)  Hemoglobin A1C   2. Steroid-induced diabetes mellitus, subsequent encounter ([facility]  Hemoglobin A1C   3. Gastroparesis     4. Essential hypertension     5. Immunosuppressed status ([facility]     6. Psoriatic arthritis ([facility]           Plan:      Return in about 3 months (around 3/21/2021) for Follow-up HTN, DM, gastroparesis. Orders Placed This Encounter   Procedures    Hemoglobin A1C     Standing Status:   Future     Standing Expiration Date:   12/21/2021     No orders of the defined types were placed in this encounter. Patient given educational materials - see patient instructions. Discussed use, benefit, and side effects of prescribed medications. All patient questions answered. Pt voiced understanding. Reviewed health maintenance.             Electronically signed by Victor Manuel Kline DO on 1/3/2021 at 11:41 PM

## 2020-12-21 NOTE — PATIENT INSTRUCTIONS
Patient Education        Gastroparesis: Care Instructions  Your Care Instructions     When you have gastroparesis, your stomach takes a lot longer to empty. This delay can cause belly pain, bloating, and belching. It also can cause hiccups, heartburn, nausea or vomiting. You may not feel like eating. These symptoms may come and go. They most often occur during and after meals. You may feel full after only a few bites of food. This condition occurs when the nerves to the stomach don't work properly. Diabetes is the most common cause of this nerve damage. Gastroparesis can make it harder to control your blood sugar levels. But keeping your blood sugar levels under control may help with your symptoms. Parkinson's disease, stroke, and some medicines can also cause this condition. Home treatment can often help. Follow-up care is a key part of your treatment and safety. Be sure to make and go to all appointments, and call your doctor if you are having problems. It's also a good idea to know your test results and keep a list of the medicines you take. How can you care for yourself at home? · Eat several small meals each day rather than three large meals. · Eat foods that are low in fiber and fat. · If your doctor suggests it, take medicines that help the stomach empty more quickly. These are called motility agents. When should you call for help? Call your doctor now or seek immediate medical care if:    · You are vomiting.     · You have new or worse belly pain.     · You have a fever.     · You cannot pass stools or gas. Watch closely for changes in your health, and be sure to contact your doctor if you have any problems. Where can you learn more? Go to [email or web address]. org and sign in to your "[employer]" account. Enter M106 in the [employer] box to learn more about \"Gastroparesis: Care Instructions. \"     If you do not have an account, please click on the \"Sign Up Now\" link.

## 2020-12-26 ENCOUNTER — PATIENT MESSAGE (OUTPATIENT)
Dept: GASTROENTEROLOGY | Age: 60
End: 2020-12-26

## 2020-12-28 NOTE — TELEPHONE ENCOUNTER
From: Marino Bailey  To: Rhoda Apley, MD  Sent: 12/26/2020 3:54 PM EST  Subject: Prescription Question    Can't take medicine. Shutting down my one kidney. Anything else I can take.

## 2020-12-28 NOTE — TELEPHONE ENCOUNTER
Per Patients visit with [facility] 12/21/20:    Pt saw GI on 12/7 for video visit - started Reglan 5 mg TID 30 mins prior to meals - pt stated he saw improvement for only 3 days (no trouble swallowing or vomiting), but then he started to have significantly decreased urination, so he stopped the Reglan and the urinary sx's resolved. Supposed to hear from GI again today for a 2-week check-up; has sent him a [employer] message about not being able to take the Reglan and having trouble eating again. Patient is no longer taking Reglan and wants to know what medication he can take instead. Please Advise.

## 2021-01-03 ASSESSMENT — ENCOUNTER SYMPTOMS
VOMITING: 1
TROUBLE SWALLOWING: 1

## 2021-01-04 ENCOUNTER — PATIENT MESSAGE (OUTPATIENT)
Dept: GASTROENTEROLOGY | Age: 61
End: 2021-01-04

## 2021-01-12 ENCOUNTER — OFFICE VISIT (OUTPATIENT)
Dept: GASTROENTEROLOGY | Age: 61
End: 2021-01-12
Payer: MEDICARE

## 2021-01-12 VITALS — DIASTOLIC BLOOD PRESSURE: 99 MMHG | WEIGHT: 174 LBS | BODY MASS INDEX: 26.46 KG/M2 | SYSTOLIC BLOOD PRESSURE: 142 MMHG

## 2021-01-12 DIAGNOSIS — R13.10 DYSPHAGIA, UNSPECIFIED TYPE: ICD-10-CM

## 2021-01-12 DIAGNOSIS — K31.84 GASTROPARESIS: ICD-10-CM

## 2021-01-12 DIAGNOSIS — R11.2 NAUSEA AND VOMITING, INTRACTABILITY OF VOMITING NOT SPECIFIED, UNSPECIFIED VOMITING TYPE: Primary | ICD-10-CM

## 2021-01-12 PROCEDURE — 99213 OFFICE O/P EST LOW 20 MIN: CPT | Performed by: INTERNAL MEDICINE

## 2021-01-12 PROCEDURE — G8483 FLU IMM NO ADMIN DOC REA: HCPCS | Performed by: INTERNAL MEDICINE

## 2021-01-12 PROCEDURE — 1036F TOBACCO NON-USER: CPT | Performed by: INTERNAL MEDICINE

## 2021-01-12 PROCEDURE — G8417 CALC BMI ABV UP PARAM F/U: HCPCS | Performed by: INTERNAL MEDICINE

## 2021-01-12 PROCEDURE — 3017F COLORECTAL CA SCREEN DOC REV: CPT | Performed by: INTERNAL MEDICINE

## 2021-01-12 PROCEDURE — G8427 DOCREV CUR MEDS BY ELIG CLIN: HCPCS | Performed by: INTERNAL MEDICINE

## 2021-01-12 RX ORDER — FAMOTIDINE 20 MG/1
20 TABLET, FILM COATED ORAL 2 TIMES DAILY
Qty: 60 TABLET | Refills: 3 | Status: SHIPPED | OUTPATIENT
Start: 2021-01-12 | End: 2021-02-24

## 2021-01-12 ASSESSMENT — ENCOUNTER SYMPTOMS
EYES NEGATIVE: 1
TROUBLE SWALLOWING: 1
ALLERGIC/IMMUNOLOGIC NEGATIVE: 1
CHOKING: 1
VOMITING: 1
NAUSEA: 1

## 2021-01-12 NOTE — PROGRESS NOTES
GI FOLLOW UP    INTERVAL HISTORY:       No referring provider defined for this encounter. Chief Complaint   Patient presents with    Follow-up     3 week follow up gastroparesis. Patient states one day he can eat and one day he can't.  Nausea     Patient states after he takes a few bites of food he gets nauseous and stops eating.  Dysphagia     PAtient states food gets caught in his throat, feels like he can't breath and throws it back up.  EGD     Most recent EGD done 07/2020 @ [facility]. HISTORY OF PRESENT ILLNESS: Briefly, 61-year-old male with a history of diabetes, fluctuating phases of nausea and vomiting with intermittent dysphagia symptoms identified to have a small hiatal hernia which appears to be slightly symptomatic, found to have emptying study that appeared to be consistent with gastroparesis which appears to be a new diagnosis. Patient underwent a recent upper endoscopy in July 2020 was identified to have 3 to 4 cm hiatal hernia with an otherwise normal-appearing esophagus as detailed by the procedure report.       Patient was referred for recurrent episodes of intermittent dysphagia with episodes of nausea and emesis. Appears that the patient is noncompliant with his Reglan which may be a treatment for his gastroparesis. Patient is fearful of side effect profile and defer this medication. Patient was to be continued on Prilosec 40 mg but again to defer this medication appears to be noncompliant. Past Medical,Family, and Social History reviewed and does contribute to the patient presenting condition. Patient's PMH/PSH,SH,PSYCH Hx, MEDs, ALLERGIES, and ROS were all reviewed and updated in the appropriate sections.     PAST MEDICAL HISTORY:  Past Medical History:   Diagnosis Date    Allergic reaction caused by a drug 8/13/2016    Arthritis psoritic arthritis    Chronic kidney disease     Follows with Nephro    COPD (chronic obstructive pulmonary disease) (HCC)     pt  denies    CVA (cerebral vascular accident) ([address]     Neuro eval with Dr Ovalle here    Depression     Diabetes mellitus ([address]     History of blood transfusion     History of migraine headaches     Hyperlipidemia     Hypertension     Pituitary microadenoma ([address]     Eval by NS and Endo 2011 but no recent FU    Pneumothorax 05/27/2015    left    Psoriatic arthritis ([address]     Stroke ([address]     x3    Thrombocytopenia ([address]        Past Surgical History:   Procedure Laterality Date    CHEST TUBE INSERTION Left     out now    COLONOSCOPY      11/13 SIS 10 y    OTHER SURGICAL HISTORY Right     stent r kidney    OTHER SURGICAL HISTORY  5/27/15    resection of bleb    PILONIDAL CYST DRAINAGE      SHOULDER SURGERY Left     Rotator cuff    THORACOSCOPY  5/27/15    THORACOTOMY  5/27/15    UPPER GASTROINTESTINAL ENDOSCOPY      UPPER GASTROINTESTINAL ENDOSCOPY N/A 7/8/2020    EGD with biopsies performed by Bethany Pate DO at [address] [place] [address] Pkwy:    Current Outpatient Medications:     triamcinolone (KENALOG) 0.1 % ointment, APPLY TOPICALLY TO THE AFFECTED AREA TWICE DAILY. , Disp: , Rfl:     omeprazole (PRILOSEC) 40 MG delayed release capsule, TAKE ONE CAPSULE BY MOUTH TWICE A DAY, Disp: 60 capsule, Rfl: 3    Clobetasol Propionate 0.05 % SHAM, APPLY TO SCALP, LEAVE ON FOR 15 MINUTES THEN RINSE THOROUGHLY.  MAY USE 3-4 TIMES A WEEK FOR MAINTENANCE, Disp: 118 mL, Rfl: 5    metoprolol (LOPRESSOR) 100 MG tablet, TAKE ONE TABLET BY MOUTH TWICE A DAY, Disp: 60 tablet, Rfl: 5    betamethasone dipropionate (DIPROLENE) 0.05 % cream, , Disp: , Rfl:     lisinopril (PRINIVIL;ZESTRIL) 10 MG tablet, Take 1 tablet by mouth daily Take when BP elevated after Skyrizi, Disp: 90 tablet, Rfl: 1 Packs/day: 1.00     Years: 20.00     Pack years: 20.00     Types: Cigarettes     Quit date: 2015     Years since quittin.6    Smokeless tobacco: Never Used    Tobacco comment: Quit smoking 2016, CARMEN Mack RCP, 10/20/2016. Substance and Sexual Activity    Alcohol use: No     Alcohol/week: 0.0 standard drinks    Drug use: No    Sexual activity: Not on file   Lifestyle    Physical activity     Days per week: Not on file     Minutes per session: Not on file    Stress: Not on file   Relationships    Social connections     Talks on phone: Not on file     Gets together: Not on file     Attends [employer] service: Not on file     Active member of club or organization: Not on file     Attends meetings of clubs or organizations: Not on file     Relationship status: Not on file    Intimate partner violence     Fear of current or ex partner: Not on file     Emotionally abused: Not on file     Physically abused: Not on file     Forced sexual activity: Not on file   Other Topics Concern    Not on file   Social History Narrative    Not on file       REVIEW OF SYSTEMS: A 12-point review of systems was obtained and pertinent positives and negatives were listed below. REVIEW OF SYSTEMS:     Constitutional: No fever, no chills, no lethargy, no weakness. HEENT:  No headache, otalgia, itchy eyes, nasal discharge or sore throat. Cardiac:  No chest pain, dyspnea, orthopnea or PND. Chest:   No cough, phlegm or wheezing. Abdomen:      Detailed by MA   Neuro:  No focal weakness, abnormal movements or seizure like activity. Skin:   No rashes, no itching. :   No hematuria, no pyuria, no dysuria, no flank pain. Extremities:  No swelling or joint pains. ROS was otherwise negative    Review of Systems   Constitutional: Positive for appetite change. HENT: Positive for trouble swallowing. Eyes: Negative. Respiratory: Positive for choking. Cardiovascular: Negative. Gastrointestinal: Positive for nausea and vomiting. Endocrine: Negative. Genitourinary: Negative. Musculoskeletal: Negative. Skin: Negative. Allergic/Immunologic: Negative. Neurological: Negative. Hematological: Negative. Psychiatric/Behavioral: Negative. All other systems reviewed and are negative. PHYSICAL EXAMINATION: Vital signs reviewed per the nursing documentation. BP (!) 141/93   Wt 174 lb (78.9 kg)   BMI 26.46 kg/m²   Body mass index is 26.46 kg/m². Physical Exam    Physical Exam   Constitutional: Patient is oriented to person, place, and time. Patient appears well-developed and well-nourished. HENT:   Head: Normocephalic and atraumatic. Eyes: Pupils are equal, round, and reactive to light. EOM are normal.   Neck: Normal range of motion. Neck supple. No JVD present. No tracheal deviation present. No thyromegaly present. Cardiovascular: Normal rate, regular rhythm, normal heart sounds and intact distal pulses. Pulmonary/Chest: Effort normal and breath sounds normal. No stridor. No respiratory distress. He has no wheezes. He has no rales. He exhibits no tenderness. Abdominal: Soft. Bowel sounds are normal. He exhibits no distension and no mass. There is no tenderness. There is no rebound and no guarding. No hernia. Musculoskeletal: Normal range of motion. Lymphadenopathy:    Patient has no cervical adenopathy. Neurological: Patient is alert and oriented to person, place, and time. Psychiatric: Patient has a normal mood and affect.  Patient behavior is normal.       LABORATORY DATA: Reviewed  Lab Results   Component Value Date    WBC 3.5 10/16/2019    HGB 13.3 (L) 10/16/2019    HCT 39.2 (L) 10/16/2019    MCV 91.8 10/16/2019     10/16/2019     (L) 09/24/2020    K 4.3 09/24/2020    CL 91 (L) 09/24/2020    CO2 34 (H) 09/24/2020    BUN 11 09/24/2020    CREATININE 2.43 (H) 09/24/2020    LABALBU 3.6 09/24/2020    BILITOT 0.62 09/24/2020 ALKPHOS 110 09/24/2020    AST 12 09/24/2020    ALT 9 09/24/2020    INR 0.9 09/22/2017         Lab Results   Component Value Date    RBC 4.27 (L) 10/16/2019    HGB 13.3 (L) 10/16/2019    MCV 91.8 10/16/2019    MCH 31.2 10/16/2019    MCHC 34.0 10/16/2019    RDW 14.1 10/16/2019    MPV 8.8 10/16/2019    BASOPCT 0 10/16/2019    LYMPHSABS 0.84 (L) 10/16/2019    MONOSABS 1.35 (H) 10/16/2019    NEUTROABS 0.46 (L) 10/16/2019    EOSABS 0.11 10/16/2019    BASOSABS 0.00 10/16/2019         DIAGNOSTIC TESTING:     No results found. IMPRESSION: Mr. Paula Peña is a 64 y.o. male with history of diabetes, and has had fluctuating symptoms of nausea and emesis with intermittent dysphagia symptoms. He additionally has a history of gastroparesis which was recently diagnosed and recently underwent an upper endoscopy in July 2020 which revealed a 3 to 4 cm hiatal hernia. Otherwise normal-appearing esophagus. Patient was prescribed Reglan 5 mg 3 times daily but not taking due to side effect profile. PLAN:    1) ongoing symptoms of fluctuating nausea and emesis with intermittent dysphagiawe will plan for diagnostic EGD to further evaluate. Patient may have eosinophilic esophagitis will need proximal and distal esophageal biopsies. 2) recommend gastroparesis diet. Patient is deferring prokinetic therapy at this time. 3) further recommendations until after the procedure    Thank you for allowing me to participate in the care of Mr. Pope. For any further questions please do not hesitate to contact me. I have reviewed and agree with the ROS entered by the MA/LPN from today's encounter documented in a separate note.         Eric Yanes MD, MPH   [facility]  Office #: [phone] this note is created with the assistance of a speech recognition program.  While intending to generate a document that actually reflects the content of the visit, the document can still have some errors including those of syntax and sound a like substitutions which may escape proof reading. It such instances, actual meaning can be extrapolated by contextual diversion.

## 2021-01-13 ENCOUNTER — PRE-PROCEDURE TELEPHONE (OUTPATIENT)
Dept: PREADMISSION TESTING | Age: 61
End: 2021-01-13

## 2021-01-13 NOTE — TELEPHONE ENCOUNTER
Spoke with patient and confirmed a covid swab appt for Jan 15th at 0730. Previous appt here and knows the routine.

## 2021-01-14 ENCOUNTER — ANESTHESIA EVENT (OUTPATIENT)
Dept: OPERATING ROOM | Age: 61
End: 2021-01-14
Payer: MEDICARE

## 2021-01-15 ENCOUNTER — HOSPITAL ENCOUNTER (OUTPATIENT)
Dept: PREADMISSION TESTING | Age: 61
Setting detail: SPECIMEN
Discharge: HOME | End: 2021-01-19
Payer: MEDICARE

## 2021-01-15 DIAGNOSIS — Z11.59 ENCOUNTER FOR SCREENING FOR OTHER VIRAL DISEASES: Primary | ICD-10-CM

## 2021-01-15 PROCEDURE — U0003 INFECTIOUS AGENT DETECTION BY NUCLEIC ACID (DNA OR RNA); SEVERE ACUTE RESPIRATORY SYNDROME CORONAVIRUS 2 (SARS-COV-2) (CORONAVIRUS DISEASE [COVID-19]), AMPLIFIED PROBE TECHNIQUE, MAKING USE OF HIGH THROUGHPUT TECHNOLOGIES AS DESCRIBED BY CMS-2020-01-R: HCPCS

## 2021-01-16 LAB — SARS-COV-2, NAA: NOT DETECTED

## 2021-01-20 ENCOUNTER — HOSPITAL ENCOUNTER (OUTPATIENT)
Age: 61
Setting detail: OUTPATIENT SURGERY
Discharge: HOME | End: 2021-01-20
Attending: INTERNAL MEDICINE | Admitting: INTERNAL MEDICINE
Payer: MEDICARE

## 2021-01-20 ENCOUNTER — ANESTHESIA (OUTPATIENT)
Dept: OPERATING ROOM | Age: 61
End: 2021-01-20
Payer: MEDICARE

## 2021-01-20 VITALS
OXYGEN SATURATION: 97 % | SYSTOLIC BLOOD PRESSURE: 117 MMHG | BODY MASS INDEX: 26.37 KG/M2 | WEIGHT: 174 LBS | DIASTOLIC BLOOD PRESSURE: 89 MMHG | HEART RATE: 72 BPM | RESPIRATION RATE: 20 BRPM | HEIGHT: 68 IN | TEMPERATURE: 97.1 F

## 2021-01-20 VITALS — SYSTOLIC BLOOD PRESSURE: 112 MMHG | OXYGEN SATURATION: 100 % | DIASTOLIC BLOOD PRESSURE: 73 MMHG

## 2021-01-20 LAB — GLUCOSE BLD-MCNC: 160 MG/DL (ref 75–110)

## 2021-01-20 PROCEDURE — 3609012400 HC EGD TRANSORAL BIOPSY SINGLE/MULTIPLE: Performed by: INTERNAL MEDICINE

## 2021-01-20 PROCEDURE — 2500000003 HC RX 250 WO HCPCS: Performed by: NURSE ANESTHETIST, CERTIFIED REGISTERED

## 2021-01-20 PROCEDURE — 7100000011 HC PHASE II RECOVERY - ADDTL 15 MIN: Performed by: INTERNAL MEDICINE

## 2021-01-20 PROCEDURE — 6360000002 HC RX W HCPCS: Performed by: ANESTHESIOLOGY

## 2021-01-20 PROCEDURE — 7100000010 HC PHASE II RECOVERY - FIRST 15 MIN: Performed by: INTERNAL MEDICINE

## 2021-01-20 PROCEDURE — 3700000000 HC ANESTHESIA ATTENDED CARE: Performed by: INTERNAL MEDICINE

## 2021-01-20 PROCEDURE — 2709999900 HC NON-CHARGEABLE SUPPLY: Performed by: INTERNAL MEDICINE

## 2021-01-20 PROCEDURE — 43239 EGD BIOPSY SINGLE/MULTIPLE: CPT | Performed by: INTERNAL MEDICINE

## 2021-01-20 PROCEDURE — 7100000000 HC PACU RECOVERY - FIRST 15 MIN: Performed by: INTERNAL MEDICINE

## 2021-01-20 PROCEDURE — 88305 TISSUE EXAM BY PATHOLOGIST: CPT

## 2021-01-20 PROCEDURE — 2580000003 HC RX 258: Performed by: ANESTHESIOLOGY

## 2021-01-20 PROCEDURE — 82947 ASSAY GLUCOSE BLOOD QUANT: CPT

## 2021-01-20 RX ORDER — SODIUM CHLORIDE 0.9 % (FLUSH) 0.9 %
10 SYRINGE (ML) INJECTION PRN
Status: DISCONTINUED | OUTPATIENT
Start: 2021-01-20 | End: 2021-01-20 | Stop reason: HOSPADM

## 2021-01-20 RX ORDER — PROPOFOL 10 MG/ML
INJECTION, EMULSION INTRAVENOUS PRN
Status: DISCONTINUED | OUTPATIENT
Start: 2021-01-20 | End: 2021-01-20 | Stop reason: SDUPTHER

## 2021-01-20 RX ORDER — GLYCOPYRROLATE 1 MG/5 ML
SYRINGE (ML) INTRAVENOUS PRN
Status: DISCONTINUED | OUTPATIENT
Start: 2021-01-20 | End: 2021-01-20 | Stop reason: SDUPTHER

## 2021-01-20 RX ORDER — SODIUM CHLORIDE 9 MG/ML
INJECTION, SOLUTION INTRAVENOUS CONTINUOUS
Status: DISCONTINUED | OUTPATIENT
Start: 2021-01-20 | End: 2021-01-20 | Stop reason: HOSPADM

## 2021-01-20 RX ORDER — SODIUM CHLORIDE, SODIUM LACTATE, POTASSIUM CHLORIDE, CALCIUM CHLORIDE 600; 310; 30; 20 MG/100ML; MG/100ML; MG/100ML; MG/100ML
INJECTION, SOLUTION INTRAVENOUS CONTINUOUS
Status: DISCONTINUED | OUTPATIENT
Start: 2021-01-20 | End: 2021-01-20 | Stop reason: HOSPADM

## 2021-01-20 RX ORDER — SODIUM CHLORIDE 0.9 % (FLUSH) 0.9 %
10 SYRINGE (ML) INJECTION EVERY 12 HOURS SCHEDULED
Status: DISCONTINUED | OUTPATIENT
Start: 2021-01-20 | End: 2021-01-20 | Stop reason: HOSPADM

## 2021-01-20 RX ADMIN — PROPOFOL 20 MG: 10 INJECTION, EMULSION INTRAVENOUS at 13:11

## 2021-01-20 RX ADMIN — SODIUM CHLORIDE: 9 INJECTION, SOLUTION INTRAVENOUS at 10:23

## 2021-01-20 RX ADMIN — PROPOFOL 50 MG: 10 INJECTION, EMULSION INTRAVENOUS at 13:08

## 2021-01-20 RX ADMIN — SODIUM CHLORIDE, POTASSIUM CHLORIDE, SODIUM LACTATE AND CALCIUM CHLORIDE: 600; 310; 30; 20 INJECTION, SOLUTION INTRAVENOUS at 11:36

## 2021-01-20 RX ADMIN — Medication 0.2 MG: at 13:07

## 2021-01-20 ASSESSMENT — PULMONARY FUNCTION TESTS
PIF_VALUE: 1
PIF_VALUE: 2
PIF_VALUE: 1
PIF_VALUE: 1

## 2021-01-20 ASSESSMENT — PAIN SCALES - GENERAL: PAINLEVEL_OUTOF10: 0

## 2021-01-20 NOTE — ANESTHESIA PRE PROCEDURE
Department of Anesthesiology  Preprocedure Note       Name:  Hillard Angelucci   Age:  64 y.o.  :  1960                                          MRN:  [id number]         Date:  2021      Surgeon: Kita Mejias):  Edie Denver, MD    Procedure: Procedure(s):  EGD ESOPHAGOGASTRODUODENOSCOPY    Medications prior to admission:   Prior to Admission medications    Medication Sig Start Date End Date Taking? Authorizing Provider   famotidine (PEPCID) 20 MG tablet Take 1 tablet by mouth 2 times daily 21  Yes Edie Denver, MD   Clobetasol Propionate 0.05 % SHAM APPLY TO SCALP, LEAVE ON FOR 15 MINUTES THEN RINSE THOROUGHLY. MAY USE 3-4 TIMES A WEEK FOR MAINTENANCE 20  Yes Carmen Chen, DO   metoprolol (LOPRESSOR) 100 MG tablet TAKE ONE TABLET BY MOUTH TWICE A DAY 20  Yes Carmen Rita Chen, DO   betamethasone dipropionate (DIPROLENE) 0.05 % cream  20  Yes Historical Provider, MD   lisinopril (PRINIVIL;ZESTRIL) 10 MG tablet Take 1 tablet by mouth daily Take when BP elevated after Skyrizi 20  Yes Rod Cardonao, DO   SKYRIZI, 150 MG DOSE, 75 MG/0.83ML PSKT injection Inject 150 mg into the skin Every 2 months  19  Yes Historical Provider, MD   cetirizine (ZYRTEC ALLERGY) 10 MG tablet Take 10 mg by mouth daily  19  Yes Historical Provider, MD   triamcinolone (KENALOG) 0.1 % ointment APPLY TOPICALLY TO THE AFFECTED AREA TWICE DAILY. 20  Historical Provider, MD       Current medications:    Current Facility-Administered Medications   Medication Dose Route Frequency Provider Last Rate Last Admin    0.9 % sodium chloride infusion   Intravenous Continuous Shashank Story MD        lactated ringers infusion   Intravenous Continuous Shashank Story MD        sodium chloride flush 0.9 % injection 10 mL  10 mL Intravenous 2 times per day Shashank Story MD        sodium chloride flush 0.9 % injection 10 mL  10 mL Intravenous PRN Shashank Story MD           Allergies:     Allergies   Allergen Reactions  Cosentyx [Secukinumab] Nausea And Vomiting and Rash      fever    Lantus [Insulin Glargine] Itching, Swelling and Rash     Patient started Lantus about a month ago, only new medication. His skin reaction started on his abdomen where he injects the Lantus and moved up to the face. This is similar to his response to Cosentyx.      Infliximab Other (See Comments)     Nerve damage    Methotrexate Derivatives Other (See Comments)     Bone marrow toxicity    Seasonal     Adhesive Tape Rash    Keflex [Cephalexin] Rash    Otezla [Apremilast] Rash     Other reaction(s): Unknown    Stellaria Rash    Taltz [Ixekizumab] Rash     Other reaction(s): Unknown    Ustekinumab Rash     Other reaction(s): rash-allergic and acute renal failure  Other reaction(s): Unknown       Problem List:    Patient Active Problem List   Diagnosis Code    Hypertension I10    Chronic kidney disease (CKD), stage III (moderate) ([facility]) N18.30    CVA (cerebral vascular accident) ([address] I63.9    History of migraine headaches Z86.69    Hyperlipidemia E78.5    Pituitary microadenoma ([facility]) D35.2    Chronic bullous emphysema ([facility]) J43.9    Sinus tachycardia R00.0    Acute kidney injury ([facility]) N17.9    Hyponatremia E87.1    Uncontrolled diabetes mellitus type 2 without complications [id number]    Psoriasis L40.9    Drug-induced skin rash L27.0    Leukopenia D72.819    Other forms of angina pectoris ([facility]) I20.8    Type 2 diabetes mellitus with hyperglycemia ([facility]) E11.65    Immunosuppressed status ([facility]) D84.9       Past Medical History:        Diagnosis Date    Allergic reaction caused by a drug 8/13/2016    Arthritis     psoritic arthritis    Chronic kidney disease     Follows with Nephro    COPD (chronic obstructive pulmonary disease) ([facility])     pt  denies    CVA (cerebral vascular accident) ([address]     Neuro eval with Dr Ovalle here    Depression     Diabetes mellitus ([address]     History of blood transfusion  History of migraine headaches     Hyperlipidemia     Hypertension     [facility]     Eval by NS and Endo  but no recent FU    Pneumothorax 2015    left    Psoriatic arthritis [facility]     Stroke ([facility]     x3    Thrombocytopenia ([facility]        Past Surgical History:        Procedure Laterality Date    CHEST TUBE INSERTION Left     out now    COLONOSCOPY       SIS 10 y    OTHER SURGICAL HISTORY Right     stent r kidney    OTHER SURGICAL HISTORY  5/27/15    resection of bleb    PILONIDAL CYST DRAINAGE      SHOULDER SURGERY Left     Rotator cuff    THORACOSCOPY  5/27/15    THORACOTOMY  5/27/15    UPPER GASTROINTESTINAL ENDOSCOPY      UPPER GASTROINTESTINAL ENDOSCOPY N/A 2020    EGD with biopsies performed by Stephen Fernandez DO at [place] [address] History:    Social History     Tobacco Use    Smoking status: Former Smoker     Packs/day: 1.00     Years: 20.00     Pack years: 20.00     Types: Cigarettes     Quit date: 2015     Years since quittin.6    Smokeless tobacco: Never Used    Tobacco comment: Quit smoking 2016, CARMEN Mack RCP, 10/20/2016. Substance Use Topics    Alcohol use: No     Alcohol/week: 0.0 standard drinks                                Counseling given: Not Answered  Comment: Quit smoking 2016, CARMEN Mack RCP, 10/20/2016. Vital Signs (Current):   Vitals:    21 1551   Weight: 174 lb (78.9 kg)   Height: 5' 8\" (1.727 m)                                              BP Readings from Last 3 Encounters:   21 (!) 142/99   20 90/80   20 122/72       NPO Status:                                                                                 BMI:   Wt Readings from Last 3 Encounters:   21 174 lb (78.9 kg)   21 174 lb (78.9 kg)   20 171 lb 6.4 oz (77.7 kg)     Body mass index is 26.46 kg/m².     CBC:   Lab Results   Component Value Date    WBC 3.5 10/16/2019    RBC 4.27 10/16/2019 HGB 13.3 10/16/2019    HCT 39.2 10/16/2019    MCV 91.8 10/16/2019    RDW 14.1 10/16/2019     10/16/2019       CMP:   Lab Results   Component Value Date     09/24/2020    K 4.3 09/24/2020    CL 91 09/24/2020    CO2 34 09/24/2020    BUN 11 09/24/2020    CREATININE 2.43 09/24/2020    CREATININE 1.9 06/13/2018    GFRAA 34 09/24/2020    LABGLOM 28 09/24/2020    GLUCOSE 166 09/24/2020    PROT 6.9 09/24/2020    CALCIUM 9.7 09/24/2020    BILITOT 0.62 09/24/2020    ALKPHOS 110 09/24/2020    AST 12 09/24/2020    ALT 9 09/24/2020       POC Tests: No results for input(s): POCGLU, POCNA, POCK, POCCL, POCBUN, POCHEMO, POCHCT in the last 72 hours.     Coags:   Lab Results   Component Value Date    PROTIME 9.9 09/22/2017    INR 0.9 09/22/2017    APTT 28.5 08/13/2016       HCG (If Applicable): No results found for: PREGTESTUR, PREGSERUM, HCG, HCGQUANT     ABGs: No results found for: PHART, PO2ART, FIZ9NYW, XOG9CHY, BEART, C7CWLUBZ     Type & Screen (If Applicable):  No results found for: LABABO, LABRH    Drug/Infectious Status (If Applicable):  Lab Results   Component Value Date    HEPCAB NONREACTIVE 08/14/2016       COVID-19 Screening (If Applicable):   Lab Results   Component Value Date    COVID19 Not Detected 01/15/2021    COVID19 Not Detected 07/02/2020         Anesthesia Evaluation  Patient summary reviewed and Nursing notes reviewed no history of anesthetic complications:   Airway: Mallampati: II  TM distance: >3 FB   Neck ROM: full  Mouth opening: > = 3 FB Dental: normal exam         Pulmonary:normal exam  breath sounds clear to auscultation  (+) COPD:                             Cardiovascular:    (+) hypertension: no interval change, angina: no interval change, hyperlipidemia        Rhythm: regular  Rate: normal           Beta Blocker:  Dose within 24 Hrs         Neuro/Psych:   (+) CVA:, headaches: migraine headaches, psychiatric history: stable with treatmentdepression/anxiety ROS comment: Pituitary microadenoma  GI/Hepatic/Renal:   (+) renal disease: CRI and no interval change,           Endo/Other:    (+) DiabetesType II DM, no interval change, , : arthritis: OA., .                 Abdominal:       Abdomen: soft. Vascular: negative vascular ROS. Anesthesia Plan      MAC and general     ASA 3             Anesthetic plan and risks discussed with patient. Plan discussed with CRNA.                   Consuelo Gottlieb MD   1/20/2021

## 2021-01-20 NOTE — ANESTHESIA POSTPROCEDURE EVALUATION
POST- ANESTHESIA EVALUATION       Pt Name: Akiko Garcia  MRN: [id number]  [place]: 1960  Date of evaluation: 1/20/2021  Time:  2:53 PM      /89   Pulse 72   Temp 97.1 °F (36.2 °C) (Temporal)   Resp 20   Ht 5' 8\" (1.727 m)   Wt 174 lb (78.9 kg)   SpO2 97%   BMI 26.46 kg/m²      Consciousness Level  Awake  Cardiopulmonary Status  Stable  Pain Adequately Treated YES  Nausea / Vomiting  NO  Adequate Hydration  YES  Anesthesia Related Complications NONE      Electronically signed by Eddie Castillo MD on 1/20/2021 at 2:53 PM       Department of Anesthesiology  Postprocedure Note    Patient: Akiko Garcia  MRN: [id number]  [place]: 1960  Date of evaluation: 1/20/2021  Time:  2:53 PM     Procedure Summary     Date: 01/20/21 Room / Location: [address] [place] [address]    Anesthesia Start: 1043 Anesthesia Stop: 1320    Procedure: EGD BIOPSY (N/A ) Diagnosis: (NAUSEA VOMITTING)    Surgeons: Nikki Felix MD Responsible Provider: Eddie Castillo MD    Anesthesia Type: MAC, general ASA Status: 3          Anesthesia Type: MAC, general    Vashti Phase I: Vashti Score: 10    Vashti Phase II: Vashti Score: 10    Last vitals: Reviewed and per EMR flowsheets.        Anesthesia Post Evaluation

## 2021-01-20 NOTE — OP NOTE
Operative Note    [facility]    [facility]    EGD with cold biopsy    PROCEDURE DATE: 01/20/21    REFERRING PHYSICIAN: No ref. provider found     PRIMARY CARE PROVIDER: Victor Manuel Kline DO    ATTENDING PHYSICIAN: Kathie Sepulveda MD     HISTORY: Mr. Rock Ayala is a 64 y.o. male who presents to the  Endoscopy unit for upper endoscopy. The patient's clinical history is remarkable for HTN, CKD, Migraines, uncontrolled DM, referred for diagnostic EGD for symptoms of intermittent esophageal dysphagia symptoms. He is currently medically stable and appropriate for the planned procedure. PREOPERATIVE DIAGNOSIS: Dysphagia     PROCEDURES:   1) Transoral Upper Endoscopy with cold biopsy of the stomach. POSTOPERATIVE DIAGNOSIS:     1) Severe erosive reflux esophagitis, LA grade D. Linear erosions extending from GEJ to mid-esophagus  2) Small type 1 hiatal hernia, 2 cm in size  3) Mild non-specific gastritis     MEDICATIONS:   MAC per anesthesia     EBL: <10cc    INSTRUMENT: Olympus GIF-H190  flexible Gastroscope. PREPARATION: The nature and character of the procedure as well as risks, benefits, and alternatives were discussed with the patient and informed consent was obtained. Complications were said to include, but were not limited to: medication allergy, medication reaction, cardiovascular and respiratory problems, bleeding, perforation, infection, and/or missed diagnosis. Following arrival in the endoscopy room, the patient was placed in the left lateral decubitus position and final time-out accomplished in the presence of the nursing staff. Baseline vital signs were obtained and reviewed, and IV sedation was subsequently initiated. FINDINGS:   Esophagus: The esophagus was inspected to the Z-line. The endoscopic exam showed severe LA grade D esophagitis, small hiatal hernia, unable to thoroughly examen the mucosa due to the severity of the inflammation. Vira Libman Electronically signed by Deborah Khan MD on 1/20/2021 at 1:22 PM

## 2021-01-20 NOTE — H&P
 sodium chloride flush 0.9 % injection 10 mL  10 mL Intravenous PRN Tia Fletcher MD           Allergies: Allergies   Allergen Reactions    Cosentyx [Secukinumab] Nausea And Vomiting and Rash      fever    Lantus [Insulin Glargine] Itching, Swelling and Rash     Patient started Lantus about a month ago, only new medication. His skin reaction started on his abdomen where he injects the Lantus and moved up to the face. This is similar to his response to Cosentyx.  Infliximab Other (See Comments)     Nerve damage    Methotrexate Derivatives Other (See Comments)     Bone marrow toxicity    Seasonal     Adhesive Tape Rash    Keflex [Cephalexin] Rash    Otezla [Apremilast] Rash     Other reaction(s): Unknown    Stellaria Rash    Taltz [Ixekizumab] Rash     Other reaction(s): Unknown    Ustekinumab Rash     Other reaction(s): rash-allergic and acute renal failure  Other reaction(s): Unknown                 Social   Social History     Tobacco Use    Smoking status: Former Smoker     Packs/day: 1.00     Years: 20.00     Pack years: 20.00     Types: Cigarettes     Quit date: 2015     Years since quittin.6    Smokeless tobacco: Never Used    Tobacco comment: Quit smoking 2016, [facility], 10/20/2016.    Substance Use Topics    Alcohol use: No     Alcohol/week: 0.0 standard drinks        PSYCH HISTORY:  Depression No  Anxiety No  Suicide No       Family History   Problem Relation Age of Onset    Diabetes Sister     Alzheimer's Disease Maternal Grandmother     Other Maternal Grandmother         dementia    High Blood Pressure Maternal Grandfather     Cancer Maternal Grandfather         Unknown    High Blood Pressure Mother     Other Mother         blood clots    Other Paternal Grandmother         dementia      No family history of colon cancer, Crohn's disease, or ulcerative colitis    Problems with Sedation/Anesthesia in the past? no    REVIEW OF SYSTEMS: 12 point review of systems negative other than mentioned above. PHYSICAL EXAM:    Vitals:  BP (!) 121/92   Pulse 66   Temp 96.9 °F (36.1 °C) (Temporal)   Resp 19   Ht 5' 8\" (1.727 m)   Wt 174 lb (78.9 kg)   SpO2 99%   BMI 26.46 kg/m²     Focused Exam related to procedure:    General appearance: NAD, conversant   Eyes: anicteric sclerae, moist conjunctivae; no lid-lag; PERRLA   Lungs: CTA, with normal respiratory effort and no intercostal retractions   CV: RRR, no MRGs   Abdomen: Soft, non-tender; no masses or HSM   Skin: Normal temperature, turgor and texture; no rash, ulcers or subcutaneous nodules     DATA:  CBC:   Lab Results   Component Value Date    WBC 3.5 10/16/2019    HGB 13.3 (L) 10/16/2019    HCT 39.2 (L) 10/16/2019    MCV 91.8 10/16/2019     10/16/2019     BUN/Cr:   Lab Results   Component Value Date    BUN 11 09/24/2020   ,   Lab Results   Component Value Date    CREATININE 2.43 (H) 09/24/2020     Potassium:   Lab Results   Component Value Date    K 4.3 09/24/2020     PT/INR:   Lab Results   Component Value Date    INR 0.9 09/22/2017    INR 0.9 10/19/2016    INR 1.1 08/13/2016    PROTIME 9.9 09/22/2017    PROTIME 10.2 10/19/2016    PROTIME 12.2 08/13/2016       ASSESSMENT AND PLAN:       1. Patient is a 64 y.o. male with above specified procedure planned. Expected Sedation/Anesthesia Type: MAC    2. ASA ([address] Anesthesiology) Anesthesia Status: Class 2 - A normal healthy patient with mild systemic disease    3. Mallampati: II (soft palate, uvula, fauces visible)  4. Procedure options, risks and benefits reviewed with Patient. Patient expresses understanding.     5.  Consent has been signed:  Yes    Eric Yanes

## 2021-01-21 LAB — SURGICAL PATHOLOGY REPORT: NORMAL

## 2021-02-01 ENCOUNTER — TELEPHONE (OUTPATIENT)
Dept: GASTROENTEROLOGY | Age: 61
End: 2021-02-01

## 2021-02-01 DIAGNOSIS — K21.00 GASTROESOPHAGEAL REFLUX DISEASE WITH ESOPHAGITIS WITHOUT HEMORRHAGE: Primary | ICD-10-CM

## 2021-02-01 RX ORDER — PANTOPRAZOLE SODIUM 40 MG/1
40 TABLET, DELAYED RELEASE ORAL
Qty: 90 TABLET | Refills: 1 | Status: SHIPPED | OUTPATIENT
Start: 2021-02-01 | End: 2021-02-01 | Stop reason: ALTCHOICE

## 2021-02-01 RX ORDER — PANTOPRAZOLE SODIUM 40 MG/1
40 TABLET, DELAYED RELEASE ORAL
Qty: 90 TABLET | Refills: 2 | Status: SHIPPED | OUTPATIENT
Start: 2021-02-01 | End: 2022-01-10

## 2021-02-10 ENCOUNTER — OFFICE VISIT (OUTPATIENT)
Dept: GASTROENTEROLOGY | Age: 61
End: 2021-02-10
Payer: MEDICARE

## 2021-02-10 ENCOUNTER — PATIENT MESSAGE (OUTPATIENT)
Dept: FAMILY MEDICINE CLINIC | Age: 61
End: 2021-02-10

## 2021-02-10 VITALS — BODY MASS INDEX: 26.15 KG/M2 | WEIGHT: 172 LBS | SYSTOLIC BLOOD PRESSURE: 133 MMHG | DIASTOLIC BLOOD PRESSURE: 79 MMHG

## 2021-02-10 DIAGNOSIS — L27.0 DRUG-INDUCED SKIN RASH: Primary | ICD-10-CM

## 2021-02-10 DIAGNOSIS — L30.9 DERMATITIS: ICD-10-CM

## 2021-02-10 DIAGNOSIS — K20.90 ESOPHAGITIS: Primary | ICD-10-CM

## 2021-02-10 PROCEDURE — 3017F COLORECTAL CA SCREEN DOC REV: CPT | Performed by: INTERNAL MEDICINE

## 2021-02-10 PROCEDURE — G8427 DOCREV CUR MEDS BY ELIG CLIN: HCPCS | Performed by: INTERNAL MEDICINE

## 2021-02-10 PROCEDURE — G8417 CALC BMI ABV UP PARAM F/U: HCPCS | Performed by: INTERNAL MEDICINE

## 2021-02-10 PROCEDURE — G8483 FLU IMM NO ADMIN DOC REA: HCPCS | Performed by: INTERNAL MEDICINE

## 2021-02-10 PROCEDURE — 1036F TOBACCO NON-USER: CPT | Performed by: INTERNAL MEDICINE

## 2021-02-10 PROCEDURE — 99213 OFFICE O/P EST LOW 20 MIN: CPT | Performed by: INTERNAL MEDICINE

## 2021-02-10 ASSESSMENT — ENCOUNTER SYMPTOMS
GASTROINTESTINAL NEGATIVE: 1
ALLERGIC/IMMUNOLOGIC NEGATIVE: 1
EYES NEGATIVE: 1
RESPIRATORY NEGATIVE: 1

## 2021-02-10 NOTE — TELEPHONE ENCOUNTER
From: Nisa Beasley  To: Rosendo Espinoza DO  Sent: 2/10/2021 4:42 PM EST  Subject: Non-Urgent Medical Question    Is there any way you can set me up with a referral to a allergy specialist. Thank you.

## 2021-02-10 NOTE — PROGRESS NOTES
PAST MEDICAL HISTORY:  Past Medical History:   Diagnosis Date    Allergic reaction caused by a drug 8/13/2016    Arthritis     psoritic arthritis    Chronic kidney disease     Follows with Nephro    COPD (chronic obstructive pulmonary disease) ([address]     pt  denies    CVA (cerebral vascular accident) ([address]     Neuro eval with Dr Ovalle here    Depression     Diabetes mellitus ([address]     History of blood transfusion     History of migraine headaches     Hyperlipidemia     Hypertension     Pituitary microadenoma ([address]     Eval by NS and Endo 2011 but no recent FU    Pneumothorax 05/27/2015    left    Psoriatic arthritis ([address]     Stroke ([address]     x3    Thrombocytopenia ([address]        Past Surgical History:   Procedure Laterality Date    CHEST TUBE INSERTION Left     out now    COLONOSCOPY      11/13 SIS 10 y    OTHER SURGICAL HISTORY Right     stent r kidney    OTHER SURGICAL HISTORY  5/27/15    resection of bleb    PILONIDAL CYST DRAINAGE      SHOULDER SURGERY Left     Rotator cuff    THORACOSCOPY  5/27/15    THORACOTOMY  5/27/15    UPPER GASTROINTESTINAL ENDOSCOPY      UPPER GASTROINTESTINAL ENDOSCOPY N/A 7/8/2020    EGD with biopsies performed by Akilah Peña DO at [address]  01/20/2021    UPPER GASTROINTESTINAL ENDOSCOPY N/A 1/20/2021    EGD BIOPSY performed by Kanu Redman MD at [address]:    Current Outpatient Medications:     pantoprazole (PROTONIX) 40 MG tablet, Take 1 tablet by mouth every morning (before breakfast), Disp: 90 tablet, Rfl: 2    famotidine (PEPCID) 20 MG tablet, Take 1 tablet by mouth 2 times daily, Disp: 60 tablet, Rfl: 3    triamcinolone (KENALOG) 0.1 % ointment, APPLY TOPICALLY TO THE AFFECTED AREA TWICE DAILY. , Disp: , Rfl:     Clobetasol Propionate 0.05 % SHAM, APPLY TO SCALP, LEAVE ON FOR 15 MINUTES THEN RINSE THOROUGHLY.  MAY USE 3-4 TIMES A WEEK FOR MAINTENANCE, Disp: 118 mL, Rfl: 5   metoprolol (LOPRESSOR) 100 MG tablet, TAKE ONE TABLET BY MOUTH TWICE A DAY, Disp: 60 tablet, Rfl: 5    betamethasone dipropionate (DIPROLENE) 0.05 % cream, , Disp: , Rfl:     lisinopril (PRINIVIL;ZESTRIL) 10 MG tablet, Take 1 tablet by mouth daily Take when BP elevated after Skyrizi, Disp: 90 tablet, Rfl: 1    SKYRIZI, 150 MG DOSE, 75 MG/0.83ML PSKT injection, Inject 150 mg into the skin Every 2 months , Disp: , Rfl:     cetirizine (ZYRTEC ALLERGY) 10 MG tablet, Take 10 mg by mouth daily , Disp: , Rfl:     ALLERGIES:   Allergies   Allergen Reactions    Cosentyx [Secukinumab] Nausea And Vomiting and Rash      fever    Lantus [Insulin Glargine] Itching, Swelling and Rash     Patient started Lantus about a month ago, only new medication. His skin reaction started on his abdomen where he injects the Lantus and moved up to the face. This is similar to his response to Cosentyx.      Infliximab Other (See Comments)     Nerve damage    Methotrexate Derivatives Other (See Comments)     Bone marrow toxicity    Seasonal     Adhesive Tape Rash    Keflex [Cephalexin] Rash    Otezla [Apremilast] Rash     Other reaction(s): Unknown    Stellaria Rash    Taltz [Ixekizumab] Rash     Other reaction(s): Unknown    Ustekinumab Rash     Other reaction(s): rash-allergic and acute renal failure  Other reaction(s): Unknown       FAMILY HISTORY:       Problem Relation Age of Onset    Diabetes Sister     Alzheimer's Disease Maternal Grandmother     Other Maternal Grandmother         dementia    High Blood Pressure Maternal Grandfather     Cancer Maternal Grandfather         Unknown    High Blood Pressure Mother     Other Mother         blood clots    Other Paternal Grandmother         dementia         SOCIAL HISTORY:   Social History     Socioeconomic History    Marital status:      Spouse name: Not on file    Number of children: Not on file    Years of education: Not on file    Highest education level: Not on file   Occupational History    Not on file   Social Needs    Financial resource strain: Not on file    Food insecurity     Worry: Not on file     Inability: Not on file    Transportation needs     Medical: Not on file     Non-medical: Not on file   Tobacco Use    Smoking status: Former Smoker     Packs/day: 1.00     Years: 20.00     Pack years: 20.00     Types: Cigarettes     Quit date: 2015     Years since quittin.7    Smokeless tobacco: Never Used    Tobacco comment: Quit smoking 2016, [facility], 10/20/2016. Substance and Sexual Activity    Alcohol use: No     Alcohol/week: 0.0 standard drinks    Drug use: No    Sexual activity: Yes     Partners: Female   Lifestyle    Physical activity     Days per week: Not on file     Minutes per session: Not on file    Stress: Not on file   Relationships    Social connections     Talks on phone: Not on file     Gets together: Not on file     Attends Quaker service: Not on file     Active member of club or organization: Not on file     Attends meetings of clubs or organizations: Not on file     Relationship status: Not on file    Intimate partner violence     Fear of current or ex partner: Not on file     Emotionally abused: Not on file     Physically abused: Not on file     Forced sexual activity: Not on file   Other Topics Concern    Not on file   Social History Narrative    Not on file       REVIEW OF SYSTEMS: A 12-point review of systems was obtained and pertinent positives and negatives were listed below. REVIEW OF SYSTEMS:     Constitutional: No fever, no chills, no lethargy, no weakness. HEENT:  No headache, otalgia, itchy eyes, nasal discharge or sore throat. Cardiac:  No chest pain, dyspnea, orthopnea or PND. Chest:   No cough, phlegm or wheezing. Abdomen:      Detailed by MA   Neuro:  No focal weakness, abnormal movements or seizure like activity. Skin:   No rashes, no itching.   :   No hematuria, no pyuria, no dysuria, no flank pain. Extremities:  No swelling or joint pains. ROS was otherwise negative    Review of Systems   Constitutional: Negative. HENT: Negative. Eyes: Negative. Respiratory: Negative. Cardiovascular: Negative. Gastrointestinal: Negative. Endocrine: Negative. Genitourinary: Negative. Musculoskeletal: Negative. Skin: Negative. Allergic/Immunologic: Negative. Neurological: Negative. Hematological: Negative. Psychiatric/Behavioral: Negative. All other systems reviewed and are negative. PHYSICAL EXAMINATION: Vital signs reviewed per the nursing documentation. /79   Wt 172 lb (78 kg)   BMI 26.15 kg/m²   Body mass index is 26.15 kg/m². Physical Exam    Physical Exam   Constitutional: Patient is oriented to person, place, and time. Patient appears well-developed and well-nourished. HENT:   Head: Normocephalic and atraumatic. Eyes: Pupils are equal, round, and reactive to light. EOM are normal.   Neck: Normal range of motion. Neck supple. No JVD present. No tracheal deviation present. No thyromegaly present. Cardiovascular: Normal rate, regular rhythm, normal heart sounds and intact distal pulses. Pulmonary/Chest: Effort normal and breath sounds normal. No stridor. No respiratory distress. He has no wheezes. He has no rales. He exhibits no tenderness. Abdominal: Soft. Bowel sounds are normal. He exhibits no distension and no mass. There is no tenderness. There is no rebound and no guarding. No hernia. Musculoskeletal: Normal range of motion. Lymphadenopathy:    Patient has no cervical adenopathy. Neurological: Patient is alert and oriented to person, place, and time. Psychiatric: Patient has a normal mood and affect.  Patient behavior is normal.       LABORATORY DATA: Reviewed  Lab Results   Component Value Date    WBC 3.5 10/16/2019    HGB 13.3 (L) 10/16/2019    HCT 39.2 (L) 10/16/2019    MCV 91.8 10/16/2019     10/16/2019  (L) 09/24/2020    K 4.3 09/24/2020    CL 91 (L) 09/24/2020    CO2 34 (H) 09/24/2020    BUN 11 09/24/2020    CREATININE 2.43 (H) 09/24/2020    LABALBU 3.6 09/24/2020    BILITOT 0.62 09/24/2020    ALKPHOS 110 09/24/2020    AST 12 09/24/2020    ALT 9 09/24/2020    INR 0.9 09/22/2017         Lab Results   Component Value Date    RBC 4.27 (L) 10/16/2019    HGB 13.3 (L) 10/16/2019    MCV 91.8 10/16/2019    MCH 31.2 10/16/2019    MCHC 34.0 10/16/2019    RDW 14.1 10/16/2019    MPV 8.8 10/16/2019    BASOPCT 0 10/16/2019    LYMPHSABS 0.84 (L) 10/16/2019    MONOSABS 1.35 (H) 10/16/2019    NEUTROABS 0.46 (L) 10/16/2019    EOSABS 0.11 10/16/2019    BASOSABS 0.00 10/16/2019         DIAGNOSTIC TESTING:     No results found. IMPRESSION: Mr. Kayla Ta is a 64 y.o. male with history of diabetes, and has had fluctuating symptoms of nausea and emesis with intermittent dysphagia symptoms. He additionally has a history of gastroparesis which was recently diagnosed and recently underwent an upper endoscopy in July 2020 which revealed a 3 to 4 cm hiatal hernia. Recent upper endoscopy performed on January 20, 2021 where he was identified to have severe LA grade D erosive esophagitis. Since that the patient was placed on Protonix 40 mg daily and is noted significant improvement in his upper GI symptoms      PLAN:    1) LA grade D erosive esophagitislikely reflux related. Patient to continue with Protonix 40mg daily for now. Patient had some concerns related to side effect profile and was provided education with regards to all of his medications. Agreed to continue this PPI therapy for minimum of 8 weeks to complete his treatment course    2) repeat upper endoscopy to be considered after patient has completed 8 weeks of PPI therapy    3) RTC in 4 weeks to follow-up clinical symptoms    Thank you for allowing me to participate in the care of Mr. Pope. For any further questions please do not hesitate to contact me. I have reviewed and agree with the ROS entered by the MA/LPN from today's encounter documented in a separate note. Florence Herron MD, MPH   [facility]  Office #: [phone]    this note is created with the assistance of a speech recognition program.  While intending to generate a document that actually reflects the content of the visit, the document can still have some errors including those of syntax and sound a like substitutions which may escape proof reading. It such instances, actual meaning can be extrapolated by contextual diversion.

## 2021-02-24 ENCOUNTER — OFFICE VISIT (OUTPATIENT)
Dept: FAMILY MEDICINE CLINIC | Age: 61
End: 2021-02-24
Payer: MEDICARE

## 2021-02-24 VITALS
DIASTOLIC BLOOD PRESSURE: 78 MMHG | WEIGHT: 173 LBS | BODY MASS INDEX: 26.22 KG/M2 | HEART RATE: 68 BPM | HEIGHT: 68 IN | SYSTOLIC BLOOD PRESSURE: 132 MMHG | TEMPERATURE: 97.8 F

## 2021-02-24 DIAGNOSIS — R21 SKIN RASH: Primary | ICD-10-CM

## 2021-02-24 PROCEDURE — G8427 DOCREV CUR MEDS BY ELIG CLIN: HCPCS | Performed by: NURSE PRACTITIONER

## 2021-02-24 PROCEDURE — G8417 CALC BMI ABV UP PARAM F/U: HCPCS | Performed by: NURSE PRACTITIONER

## 2021-02-24 PROCEDURE — 99212 OFFICE O/P EST SF 10 MIN: CPT | Performed by: NURSE PRACTITIONER

## 2021-02-24 PROCEDURE — 99213 OFFICE O/P EST LOW 20 MIN: CPT | Performed by: NURSE PRACTITIONER

## 2021-02-24 PROCEDURE — G8483 FLU IMM NO ADMIN DOC REA: HCPCS | Performed by: NURSE PRACTITIONER

## 2021-02-24 RX ORDER — BETAMETHASONE DIPROPIONATE 0.5 MG/G
OINTMENT TOPICAL
Qty: 50 G | Refills: 3 | Status: SHIPPED | OUTPATIENT
Start: 2021-02-24 | End: 2021-03-26

## 2021-02-24 ASSESSMENT — PATIENT HEALTH QUESTIONNAIRE - PHQ9
SUM OF ALL RESPONSES TO PHQ QUESTIONS 1-9: 0
1. LITTLE INTEREST OR PLEASURE IN DOING THINGS: 0
SUM OF ALL RESPONSES TO PHQ QUESTIONS 1-9: 0
SUM OF ALL RESPONSES TO PHQ QUESTIONS 1-9: 0

## 2021-02-24 NOTE — PROGRESS NOTES
2021    Akiko Garcia (:  1960) is a 64 y.o. male, here for evaluation of the following medical concerns: referred by Dr. Nakul Lomeli for allergy evaluation. Rash  This is a chronic problem. The current episode started more than 1 year ago. The problem is unchanged. The rash is diffuse. The rash is characterized by redness and itchiness. He was exposed to nothing. (None) Past treatments include antihistamine, topical steroids, oral steroids, moisturizer and cold compress. The treatment provided no relief. His past medical history is significant for allergies. There is no history of asthma, eczema or varicella. Patient explains he is allergic to all medications used to treat his psoriasis. He has previously been seen by allergy and dermatology. He reports no testing has been conclusive. He does not want any further testing. One topical medication prescribed by an allergist at [facility] in the past was helpful. He thinks it was diprolene. He would like a refill. He is not willing to provide any further history as he says being out of his house is causing too much anxiety. Review of Systems   Skin: Positive for rash. All other systems reviewed and are negative. Prior to Visit Medications    Medication Sig Taking? Authorizing Provider   augmented betamethasone dipropionate (DIPROLENE) 0.05 % ointment Apply topically 2 times daily. Yes XOCHILT Pedro - CNP   pantoprazole (PROTONIX) 40 MG tablet Take 1 tablet by mouth every morning (before breakfast) Yes Nikki Felix MD   triamcinolone (KENALOG) 0.1 % ointment APPLY TOPICALLY TO THE AFFECTED AREA TWICE DAILY. Yes Historical Provider, MD   Clobetasol Propionate 0.05 % SHAM APPLY TO SCALP, LEAVE ON FOR 15 MINUTES THEN RINSE THOROUGHLY.  MAY USE 3-4 TIMES A WEEK FOR MAINTENANCE Yes María Chen, DO   metoprolol (LOPRESSOR) 100 MG tablet TAKE ONE TABLET BY MOUTH TWICE A DAY Yes María Chen, DO betamethasone dipropionate (DIPROLENE) 0.05 % cream  Yes Historical Provider, MD   lisinopril (PRINIVIL;ZESTRIL) 10 MG tablet Take 1 tablet by mouth daily Take when BP elevated after [employer] S DO Yvon   SKYRIZI, 150 MG DOSE, 75 MG/0.83ML PSKT injection Inject 150 mg into the skin Every 2 months  Yes Historical Provider, MD   cetirizine (ZYRTEC ALLERGY) 10 MG tablet Take 10 mg by mouth daily  Yes Historical Provider, MD        Allergies   Allergen Reactions    Cosentyx [Secukinumab] Nausea And Vomiting and Rash      fever    Lantus [Insulin Glargine] Itching, Swelling and Rash     Patient started Lantus about a month ago, only new medication. His skin reaction started on his abdomen where he injects the Lantus and moved up to the face. This is similar to his response to Cosentyx.      Infliximab Other (See Comments)     Nerve damage    Methotrexate Derivatives Other (See Comments)     Bone marrow toxicity    Seasonal     Adhesive Tape Rash    Keflex [Cephalexin] Rash    Janna Simpers [Apremilast] Rash     Other reaction(s): Unknown    Stellaria Rash    Taltz [Ixekizumab] Rash     Other reaction(s): Unknown    Ustekinumab Rash     Other reaction(s): rash-allergic and acute renal failure  Other reaction(s): Unknown       Past Medical History:   Diagnosis Date    Allergic reaction caused by a drug 8/13/2016    Arthritis     psoritic arthritis    Chronic kidney disease     Follows with Nephro    COPD (chronic obstructive pulmonary disease) ([address]     pt  denies    CVA (cerebral vascular accident) ([address]     Neuro eval with Dr Marella Runner here    Depression     Diabetes mellitus ([address]     History of blood transfusion     History of migraine headaches     Hyperlipidemia     Hypertension     Pituitary microadenoma ([address]     Eval by NS and Endo 2011 but no recent FU    Pneumothorax 05/27/2015    left    Psoriatic arthritis ([address]     Stroke (HCC)     x3    Thrombocytopenia ([address] Past Surgical History:   Procedure Laterality Date    CHEST TUBE INSERTION Left     out now    COLONOSCOPY       SIS 10 y    OTHER SURGICAL HISTORY Right     stent r kidney    OTHER SURGICAL HISTORY  5/27/15    resection of bleb    PILONIDAL CYST DRAINAGE      SHOULDER SURGERY Left     Rotator cuff    THORACOSCOPY  5/27/15    THORACOTOMY  5/27/15    UPPER GASTROINTESTINAL ENDOSCOPY      UPPER GASTROINTESTINAL ENDOSCOPY N/A 2020    EGD with biopsies performed by Akilah Peña DO at [address] [facility] ENDOSCOPY  2021    UPPER GASTROINTESTINAL ENDOSCOPY N/A 2021    EGD BIOPSY performed by Kanu Redman MD at [facility] History     Socioeconomic History    Marital status:      Spouse name: Not on file    Number of children: Not on file    Years of education: Not on file    Highest education level: Not on file   Occupational History    Not on file   Social Needs    Financial resource strain: Not on file    Food insecurity     Worry: Not on file     Inability: Not on file    Transportation needs     Medical: Not on file     Non-medical: Not on file   Tobacco Use    Smoking status: Former Smoker     Packs/day: 1.00     Years: 20.00     Pack years: 20.00     Types: Cigarettes     Quit date: 2015     Years since quittin.7    Smokeless tobacco: Never Used    Tobacco comment: Quit smoking 2016, [facility], 10/20/2016.    Substance and Sexual Activity    Alcohol use: No     Alcohol/week: 0.0 standard drinks    Drug use: No    Sexual activity: Yes     Partners: Female   Lifestyle    Physical activity     Days per week: Not on file     Minutes per session: Not on file    Stress: Not on file   Relationships    Social connections     Talks on phone: Not on file     Gets together: Not on file     Attends Taoist service: Not on file     Active member of club or organization: Not on file Attends meetings of clubs or organizations: Not on file     Relationship status: Not on file    Intimate partner violence     Fear of current or ex partner: Not on file     Emotionally abused: Not on file     Physically abused: Not on file     Forced sexual activity: Not on file   Other Topics Concern    Not on file   Social History Narrative    Not on file        Family History   Problem Relation Age of Onset    Diabetes Sister     Alzheimer's Disease Maternal Grandmother     Other Maternal Grandmother         dementia    High Blood Pressure Maternal Grandfather     Cancer Maternal Grandfather         Unknown    High Blood Pressure Mother     Other Mother         blood clots    Other Paternal Grandmother         dementia       Vitals:    02/24/21 0841   BP: 132/78   Site: Right Upper Arm   Position: Sitting   Cuff Size: Large Adult   Pulse: 68   Temp: 97.8 °F (36.6 °C)   TempSrc: Tympanic   Weight: 173 lb (78.5 kg)   Height: 5' 8\" (1.727 m)     Estimated body mass index is 26.3 kg/m² as calculated from the following:    Height as of this encounter: 5' 8\" (1.727 m). Weight as of this encounter: 173 lb (78.5 kg). Physical Exam  Vitals signs and nursing note reviewed. Constitutional:       Appearance: Normal appearance. HENT:      Head: Normocephalic and atraumatic. Right Ear: External ear normal.      Left Ear: External ear normal.      Nose: Nose normal.      Mouth/Throat:      Pharynx: Oropharynx is clear. Eyes:      Conjunctiva/sclera: Conjunctivae normal.   Neck:      Musculoskeletal: Normal range of motion. Pulmonary:      Effort: Pulmonary effort is normal.   Musculoskeletal: Normal range of motion. Skin:     General: Skin is warm and dry. Findings: Rash (petechial rash on all exposed skin) present. Neurological:      General: No focal deficit present. Mental Status: He is alert and oriented to person, place, and time.    Psychiatric: Attention and Perception: He is inattentive. Mood and Affect: Mood is anxious. Behavior: Behavior is agitated. Cognition and Memory: Cognition and memory normal.         ASSESSMENT/PLAN:  1. Skin rash  - augmented betamethasone dipropionate (DIPROLENE) 0.05 % ointment; Apply topically 2 times daily. Dispense: 50 g; Refill: 3  - declines any further testing, referral or treatments      Return if symptoms worsen or fail to improve. An  electronic signature was used to authenticate this note.     --XOCHILT Lara CNP on 2/24/2021 at 2:20 PM

## 2021-03-02 RX ORDER — LISINOPRIL 10 MG/1
TABLET ORAL
Qty: 90 TABLET | Refills: 3 | Status: SHIPPED | OUTPATIENT
Start: 2021-03-02 | End: 2022-01-03

## 2021-03-11 DIAGNOSIS — I10 ESSENTIAL HYPERTENSION: ICD-10-CM

## 2021-03-12 RX ORDER — METOPROLOL TARTRATE 100 MG/1
TABLET ORAL
Qty: 180 TABLET | Refills: 3 | Status: SHIPPED | OUTPATIENT
Start: 2021-03-12 | End: 2021-08-23

## 2021-03-18 ENCOUNTER — OFFICE VISIT (OUTPATIENT)
Dept: GASTROENTEROLOGY | Age: 61
End: 2021-03-18
Payer: MEDICARE

## 2021-03-18 VITALS — DIASTOLIC BLOOD PRESSURE: 82 MMHG | BODY MASS INDEX: 26.46 KG/M2 | SYSTOLIC BLOOD PRESSURE: 133 MMHG | WEIGHT: 174 LBS

## 2021-03-18 DIAGNOSIS — R13.19 ESOPHAGEAL DYSPHAGIA: Primary | ICD-10-CM

## 2021-03-18 PROCEDURE — 1036F TOBACCO NON-USER: CPT | Performed by: INTERNAL MEDICINE

## 2021-03-18 PROCEDURE — 99213 OFFICE O/P EST LOW 20 MIN: CPT | Performed by: INTERNAL MEDICINE

## 2021-03-18 PROCEDURE — 3017F COLORECTAL CA SCREEN DOC REV: CPT | Performed by: INTERNAL MEDICINE

## 2021-03-18 PROCEDURE — G8417 CALC BMI ABV UP PARAM F/U: HCPCS | Performed by: INTERNAL MEDICINE

## 2021-03-18 PROCEDURE — G8427 DOCREV CUR MEDS BY ELIG CLIN: HCPCS | Performed by: INTERNAL MEDICINE

## 2021-03-18 PROCEDURE — G8483 FLU IMM NO ADMIN DOC REA: HCPCS | Performed by: INTERNAL MEDICINE

## 2021-03-18 ASSESSMENT — ENCOUNTER SYMPTOMS
RESPIRATORY NEGATIVE: 1
ALLERGIC/IMMUNOLOGIC NEGATIVE: 1
EYES NEGATIVE: 1
GASTROINTESTINAL NEGATIVE: 1

## 2021-03-18 NOTE — PROGRESS NOTES
GI FOLLOW UP    INTERVAL HISTORY:     Dysphagia  Clinically improved with PPI therapy without issue    Chief Complaint   Patient presents with    Follow-up     4 week follow up- evaluate PPI therapy    Dysphagia     Patient states is now able to eat his entire meal without any problems. Denies nausea/vomiting/abdominal pain. 1. Esophageal dysphagia          HISTORY OF PRESENT ILLNESS: Briefly, 51-year-old male with a history of diabetes, fluctuating phases of nausea and vomiting with intermittent dysphagia symptoms identified to have a small hiatal hernia which appears to be slightly symptomatic, found to have emptying study that appeared to be consistent with gastroparesis which appears to be a new diagnosis.  Patient underwent a recent upper endoscopy in July 2020 was identified to have 3 to 4 cm hiatal hernia with an otherwise normal-appearing esophagus as detailed by the procedure report.        Patient was referred for recurrent episodes of intermittent dysphagia with episodes of nausea and emesis.  Appears that the patient is noncompliant with his Reglan which may be a treatment for his gastroparesis.  Patient is fearful of side effect profile and defer this medication.  Patient was to be continued on Prilosec 40 mg but again to defer this medication appears to be noncompliant.       Past Medical,Family, and Social History reviewed and does contribute to the patient presenting condition. Patient's PMH/PSH,SH,PSYCH Hx, MEDs, ALLERGIES, and ROS were all reviewed and updated in the appropriate sections.     PAST MEDICAL HISTORY:  Past Medical History:   Diagnosis Date    Allergic reaction caused by a drug 8/13/2016    Arthritis     psoritic arthritis    Chronic kidney disease     Follows with Nephro    COPD (chronic obstructive pulmonary disease) [address] [address]     pt  denies    CVA (cerebral vascular accident) [facility] [address]     Neuro eval with Dr Vasquez yost    Depression     Diabetes mellitus ([address]     History of blood transfusion     History of migraine headaches     Hyperlipidemia     Hypertension     Pituitary microadenoma ([address]     Eval by NS and Endo 2011 but no recent FU    Pneumothorax 05/27/2015    left    Psoriatic arthritis ([address]     Stroke ([address]     x3    Thrombocytopenia ([address]        Past Surgical History:   Procedure Laterality Date    CHEST TUBE INSERTION Left     out now    COLONOSCOPY      11/13 SIS 10 y    OTHER SURGICAL HISTORY Right     stent r kidney    OTHER SURGICAL HISTORY  5/27/15    resection of bleb    PILONIDAL CYST DRAINAGE      SHOULDER SURGERY Left     Rotator cuff    THORACOSCOPY  5/27/15    THORACOTOMY  5/27/15    UPPER GASTROINTESTINAL ENDOSCOPY      UPPER GASTROINTESTINAL ENDOSCOPY N/A 7/8/2020    EGD with biopsies performed by Noel Amaya DO at [address]  01/20/2021    UPPER GASTROINTESTINAL ENDOSCOPY N/A 1/20/2021    EGD BIOPSY performed by Barby Hernandez MD at [address] [id number]:    Current Outpatient Medications:     metoprolol (LOPRESSOR) 100 MG tablet, TAKE ONE TABLET BY MOUTH TWICE A DAY, Disp: 180 tablet, Rfl: 3    lisinopril (PRINIVIL;ZESTRIL) 10 MG tablet, TAKE ONE TABLET BY MOUTH DAILY WHEN BLOOD PRESSURE IS ELEVATED AFTER SKYRIZI., Disp: 90 tablet, Rfl: 3    augmented betamethasone dipropionate (DIPROLENE) 0.05 % ointment, Apply topically 2 times daily. , Disp: 50 g, Rfl: 3    pantoprazole (PROTONIX) 40 MG tablet, Take 1 tablet by mouth every morning (before breakfast), Disp: 90 tablet, Rfl: 2    triamcinolone (KENALOG) 0.1 % ointment, APPLY TOPICALLY TO THE AFFECTED AREA TWICE DAILY. , Disp: , Rfl:     Clobetasol Propionate 0.05 % SHAM, APPLY TO SCALP, LEAVE ON FOR 15 MINUTES THEN RINSE THOROUGHLY.  MAY USE 3-4 TIMES A WEEK FOR MAINTENANCE, Disp: 118 mL, Rfl: 5    betamethasone dipropionate (DIPROLENE) 0.05 % cream, , Disp: , Rfl:     SKYRIZI, 150 MG DOSE, 75 MG/0.83ML PSKT injection, Inject 150 mg into the skin Every 2 months , Disp: , Rfl:     cetirizine (ZYRTEC ALLERGY) 10 MG tablet, Take 10 mg by mouth daily , Disp: , Rfl:     ALLERGIES:   Allergies   Allergen Reactions    Cosentyx [Secukinumab] Nausea And Vomiting and Rash      fever    Lantus [Insulin Glargine] Itching, Swelling and Rash     Patient started Lantus about a month ago, only new medication. His skin reaction started on his abdomen where he injects the Lantus and moved up to the face. This is similar to his response to Cosentyx.      Infliximab Other (See Comments)     Nerve damage    Methotrexate Derivatives Other (See Comments)     Bone marrow toxicity    Seasonal     Adhesive Tape Rash    Keflex [Cephalexin] Rash    Otezla [Apremilast] Rash     Other reaction(s): Unknown    Stellaria Rash    Taltz [Ixekizumab] Rash     Other reaction(s): Unknown    Ustekinumab Rash     Other reaction(s): rash-allergic and acute renal failure  Other reaction(s): Unknown       FAMILY HISTORY:       Problem Relation Age of Onset    Diabetes Sister     Alzheimer's Disease Maternal Grandmother     Other Maternal Grandmother         dementia    High Blood Pressure Maternal Grandfather     Cancer Maternal Grandfather         Unknown    High Blood Pressure Mother     Other Mother         blood clots    Other Paternal Grandmother         dementia         SOCIAL HISTORY:   Social History     Socioeconomic History    Marital status:      Spouse name: Not on file    Number of children: Not on file    Years of education: Not on file    Highest education level: Not on file   Occupational History    Not on file   Social Needs    Financial resource strain: Not on file    Food insecurity     Worry: Not on file     Inability: Not on file    Transportation needs     Medical: Not on file     Non-medical: Not on file Tobacco Use    Smoking status: Former Smoker     Packs/day: 1.00     Years: 20.00     Pack years: 20.00     Types: Cigarettes     Quit date: 2015     Years since quittin.8    Smokeless tobacco: Never Used    Tobacco comment: Quit smoking 2016, CARMEN Mack RCP, 10/20/2016. Substance and Sexual Activity    Alcohol use: No     Alcohol/week: 0.0 standard drinks    Drug use: No    Sexual activity: Yes     Partners: Female   Lifestyle    Physical activity     Days per week: Not on file     Minutes per session: Not on file    Stress: Not on file   Relationships    Social connections     Talks on phone: Not on file     Gets together: Not on file     Attends Mandaen service: Not on file     Active member of club or organization: Not on file     Attends meetings of clubs or organizations: Not on file     Relationship status: Not on file    Intimate partner violence     Fear of current or ex partner: Not on file     Emotionally abused: Not on file     Physically abused: Not on file     Forced sexual activity: Not on file   Other Topics Concern    Not on file   Social History Narrative    Not on file       REVIEW OF SYSTEMS: A 12-point review of systems was obtained and pertinent positives and negatives were listed below. REVIEW OF SYSTEMS:     Constitutional: No fever, no chills, no lethargy, no weakness. HEENT:  No headache, otalgia, itchy eyes, nasal discharge or sore throat. Cardiac:  No chest pain, dyspnea, orthopnea or PND. Chest:   No cough, phlegm or wheezing. Abdomen:      Detailed by MA   Neuro:  No focal weakness, abnormal movements or seizure like activity. Skin:   No rashes, no itching. :   No hematuria, no pyuria, no dysuria, no flank pain. Extremities:  No swelling or joint pains. ROS was otherwise negative    Review of Systems   Constitutional: Negative. HENT: Negative. Eyes: Negative. Respiratory: Negative. Cardiovascular: Negative.     Gastrointestinal: Negative. Endocrine: Negative. Genitourinary: Negative. Musculoskeletal: Negative. Skin: Negative. Allergic/Immunologic: Negative. Neurological: Negative. Hematological: Negative. Psychiatric/Behavioral: Negative. All other systems reviewed and are negative. PHYSICAL EXAMINATION: Vital signs reviewed per the nursing documentation. /82   Wt 174 lb (78.9 kg)   BMI 26.46 kg/m²   Body mass index is 26.46 kg/m². Physical Exam    Physical Exam   Constitutional: Patient is oriented to person, place, and time. Patient appears well-developed and well-nourished. HENT:   Head: Normocephalic and atraumatic. Eyes: Pupils are equal, round, and reactive to light. EOM are normal.   Neck: Normal range of motion. Neck supple. No JVD present. No tracheal deviation present. No thyromegaly present. Cardiovascular: Normal rate, regular rhythm, normal heart sounds and intact distal pulses. Pulmonary/Chest: Effort normal and breath sounds normal. No stridor. No respiratory distress. He has no wheezes. He has no rales. He exhibits no tenderness. Abdominal: Soft. Bowel sounds are normal. He exhibits no distension and no mass. There is no tenderness. There is no rebound and no guarding. No hernia. Musculoskeletal: Normal range of motion. Lymphadenopathy:    Patient has no cervical adenopathy. Neurological: Patient is alert and oriented to person, place, and time. Psychiatric: Patient has a normal mood and affect.  Patient behavior is normal.       LABORATORY DATA: Reviewed  Lab Results   Component Value Date    WBC 3.5 10/16/2019    HGB 13.3 (L) 10/16/2019    HCT 39.2 (L) 10/16/2019    MCV 91.8 10/16/2019     10/16/2019     (L) 09/24/2020    K 4.3 09/24/2020    CL 91 (L) 09/24/2020    CO2 34 (H) 09/24/2020    BUN 11 09/24/2020    CREATININE 2.43 (H) 09/24/2020    LABALBU 3.6 09/24/2020    BILITOT 0.62 09/24/2020    ALKPHOS 110 09/24/2020    AST 12 09/24/2020    ALT 9 09/24/2020    INR 0.9 09/22/2017         Lab Results   Component Value Date    RBC 4.27 (L) 10/16/2019    HGB 13.3 (L) 10/16/2019    MCV 91.8 10/16/2019    MCH 31.2 10/16/2019    MCHC 34.0 10/16/2019    RDW 14.1 10/16/2019    MPV 8.8 10/16/2019    BASOPCT 0 10/16/2019    LYMPHSABS 0.84 (L) 10/16/2019    MONOSABS 1.35 (H) 10/16/2019    NEUTROABS 0.46 (L) 10/16/2019    EOSABS 0.11 10/16/2019    BASOSABS 0.00 10/16/2019         DIAGNOSTIC TESTING:     No results found. Assessment  1. Esophageal dysphagia          IMPRESSION: Mr. Pope is a 64 y. o. male with history of diabetes, and has had fluctuating symptoms of nausea and emesis with intermittent dysphagia symptoms.  He additionally has a history of gastroparesis which was recently diagnosed and recently underwent an upper endoscopy in July 2020 which revealed a 3 to 4 cm hiatal hernia. Recent upper endoscopy performed on January 20, 2021 where he was identified to have severe LA grade D erosive esophagitis. Since that the patient was placed on Protonix 40 mg daily and is noted significant improvement in his upper GI symptoms      PLAN:    1) LA grade D erosive esophagitisreflux related Protonix 40mg to be continued for an additional 4 weeks we will plan for repeat upper endoscopy at that time. This is to assess for healing and to assess for Broderick esophagus    2) patient denies any symptoms of active dysphagia with associated healed significantly with the use of PPI therapy     3) RTC in 6 weeks. Thank you for allowing me to participate in the care of Mr. Pope. For any further questions please do not hesitate to contact me. I have reviewed and agree with the ROS entered by the MA/LPN from today's encounter documented in a separate note.         Deanne Renee MD, MPH   [facility]  Office #: [phone]    this note is created with the assistance of a speech recognition program.  While intending to generate a document that actually reflects the content of the visit, the document can still have some errors including those of syntax and sound a like substitutions which may escape proof reading. It such instances, actual meaning can be extrapolated by contextual diversion.

## 2021-03-26 ENCOUNTER — PRE-PROCEDURE TELEPHONE (OUTPATIENT)
Dept: PREADMISSION TESTING | Age: 61
End: 2021-03-26

## 2021-03-26 NOTE — TELEPHONE ENCOUNTER
Voicemail message left regarding a covid swab appt for April 2nd at 1030. Instructed to call [phone] and confirm this appt.

## 2021-03-29 ENCOUNTER — TELEPHONE (OUTPATIENT)
Dept: PREADMISSION TESTING | Age: 61
End: 2021-03-29

## 2021-03-31 ENCOUNTER — ANESTHESIA EVENT (OUTPATIENT)
Dept: OPERATING ROOM | Age: 61
End: 2021-03-31
Payer: MEDICARE

## 2021-04-01 ENCOUNTER — HOSPITAL ENCOUNTER (OUTPATIENT)
Dept: LAB | Age: 61
Discharge: HOME | End: 2021-04-01
Payer: MEDICARE

## 2021-04-01 ENCOUNTER — OFFICE VISIT (OUTPATIENT)
Dept: FAMILY MEDICINE CLINIC | Age: 61
End: 2021-04-01
Payer: MEDICARE

## 2021-04-01 VITALS
HEIGHT: 68 IN | TEMPERATURE: 96.9 F | BODY MASS INDEX: 26.73 KG/M2 | HEART RATE: 72 BPM | DIASTOLIC BLOOD PRESSURE: 80 MMHG | OXYGEN SATURATION: 97 % | SYSTOLIC BLOOD PRESSURE: 100 MMHG | WEIGHT: 176.4 LBS

## 2021-04-01 DIAGNOSIS — E11.65 TYPE 2 DIABETES MELLITUS WITH HYPERGLYCEMIA, UNSPECIFIED WHETHER LONG TERM INSULIN USE (HCC): ICD-10-CM

## 2021-04-01 DIAGNOSIS — E09.9 STEROID-INDUCED DIABETES MELLITUS, SUBSEQUENT ENCOUNTER (HCC): ICD-10-CM

## 2021-04-01 DIAGNOSIS — E55.9 VITAMIN D DEFICIENCY: ICD-10-CM

## 2021-04-01 DIAGNOSIS — T38.0X5D STEROID-INDUCED DIABETES MELLITUS, SUBSEQUENT ENCOUNTER (HCC): ICD-10-CM

## 2021-04-01 DIAGNOSIS — N52.9 ERECTILE DYSFUNCTION, UNSPECIFIED ERECTILE DYSFUNCTION TYPE: ICD-10-CM

## 2021-04-01 DIAGNOSIS — R21 SKIN RASH: ICD-10-CM

## 2021-04-01 DIAGNOSIS — I10 ESSENTIAL HYPERTENSION: ICD-10-CM

## 2021-04-01 DIAGNOSIS — L40.9 SCALP PSORIASIS: ICD-10-CM

## 2021-04-01 DIAGNOSIS — I10 ESSENTIAL HYPERTENSION: Primary | ICD-10-CM

## 2021-04-01 LAB
ABSOLUTE EOS #: 0.49 K/UL (ref 0–0.44)
ABSOLUTE IMMATURE GRANULOCYTE: 0.07 K/UL (ref 0–0.3)
ABSOLUTE LYMPH #: 1.82 K/UL (ref 1.1–3.7)
ABSOLUTE MONO #: 0.98 K/UL (ref 0.1–1.2)
ANION GAP SERPL CALCULATED.3IONS-SCNC: 9 MMOL/L (ref 9–17)
BASOPHILS # BLD: 1 % (ref 0–2)
BASOPHILS ABSOLUTE: 0.12 K/UL (ref 0–0.2)
BUN BLDV-MCNC: 14 MG/DL (ref 6–20)
BUN/CREAT BLD: 6 (ref 9–20)
CALCIUM SERPL-MCNC: 9.3 MG/DL (ref 8.6–10.4)
CHLORIDE BLD-SCNC: 92 MMOL/L (ref 98–107)
CO2: 31 MMOL/L (ref 20–31)
CREAT SERPL-MCNC: 2.24 MG/DL (ref 0.7–1.2)
DIFFERENTIAL TYPE: ABNORMAL
EOSINOPHILS RELATIVE PERCENT: 4 % (ref 1–4)
GFR AFRICAN AMERICAN: 37 ML/MIN
GFR NON-AFRICAN AMERICAN: 30 ML/MIN
GFR SERPL CREATININE-BSD FRML MDRD: ABNORMAL ML/MIN/{1.73_M2}
GFR SERPL CREATININE-BSD FRML MDRD: ABNORMAL ML/MIN/{1.73_M2}
GLUCOSE BLD-MCNC: 323 MG/DL (ref 70–99)
HCT VFR BLD CALC: 48.8 % (ref 40.7–50.3)
HEMOGLOBIN: 17.1 G/DL (ref 13–17)
IMMATURE GRANULOCYTES: 1 %
LYMPHOCYTES # BLD: 15 % (ref 24–43)
MCH RBC QN AUTO: 35 PG (ref 25.2–33.5)
MCHC RBC AUTO-ENTMCNC: 35 G/DL (ref 25.2–33.5)
MCV RBC AUTO: 100 FL (ref 82.6–102.9)
MONOCYTES # BLD: 8 % (ref 3–12)
NRBC AUTOMATED: 0 PER 100 WBC
PDW BLD-RTO: 14.3 % (ref 11.8–14.4)
PLATELET # BLD: 266 K/UL (ref 138–453)
PLATELET ESTIMATE: ABNORMAL
PMV BLD AUTO: 9.3 FL (ref 8.1–13.5)
POTASSIUM SERPL-SCNC: 5 MMOL/L (ref 3.7–5.3)
RBC # BLD: 4.88 M/UL (ref 4.21–5.77)
RBC # BLD: ABNORMAL 10*6/UL
SEG NEUTROPHILS: 71 % (ref 36–65)
SEGMENTED NEUTROPHILS ABSOLUTE COUNT: 8.8 K/UL (ref 1.5–8.1)
SODIUM BLD-SCNC: 132 MMOL/L (ref 135–144)
TSH SERPL DL<=0.05 MIU/L-ACNC: 1.13 MIU/L (ref 0.3–5)
VITAMIN D 25-HYDROXY: 42.6 NG/ML (ref 30–100)
WBC # BLD: 12.3 K/UL (ref 3.5–11.3)
WBC # BLD: ABNORMAL 10*3/UL

## 2021-04-01 PROCEDURE — 80048 BASIC METABOLIC PNL TOTAL CA: CPT

## 2021-04-01 PROCEDURE — 3017F COLORECTAL CA SCREEN DOC REV: CPT | Performed by: FAMILY MEDICINE

## 2021-04-01 PROCEDURE — 84443 ASSAY THYROID STIM HORMONE: CPT

## 2021-04-01 PROCEDURE — 3046F HEMOGLOBIN A1C LEVEL >9.0%: CPT | Performed by: FAMILY MEDICINE

## 2021-04-01 PROCEDURE — 2022F DILAT RTA XM EVC RTNOPTHY: CPT | Performed by: FAMILY MEDICINE

## 2021-04-01 PROCEDURE — 83036 HEMOGLOBIN GLYCOSYLATED A1C: CPT

## 2021-04-01 PROCEDURE — 85025 COMPLETE CBC W/AUTO DIFF WBC: CPT

## 2021-04-01 PROCEDURE — 36415 COLL VENOUS BLD VENIPUNCTURE: CPT

## 2021-04-01 PROCEDURE — 99214 OFFICE O/P EST MOD 30 MIN: CPT | Performed by: FAMILY MEDICINE

## 2021-04-01 PROCEDURE — G8417 CALC BMI ABV UP PARAM F/U: HCPCS | Performed by: FAMILY MEDICINE

## 2021-04-01 PROCEDURE — G8427 DOCREV CUR MEDS BY ELIG CLIN: HCPCS | Performed by: FAMILY MEDICINE

## 2021-04-01 PROCEDURE — 1036F TOBACCO NON-USER: CPT | Performed by: FAMILY MEDICINE

## 2021-04-01 PROCEDURE — 99212 OFFICE O/P EST SF 10 MIN: CPT | Performed by: FAMILY MEDICINE

## 2021-04-01 PROCEDURE — 82306 VITAMIN D 25 HYDROXY: CPT

## 2021-04-01 RX ORDER — SILDENAFIL 100 MG/1
TABLET, FILM COATED ORAL
Qty: 30 TABLET | Refills: 2 | Status: SHIPPED | OUTPATIENT
Start: 2021-04-01 | End: 2021-07-09

## 2021-04-01 ASSESSMENT — ENCOUNTER SYMPTOMS
BLOOD IN STOOL: 0
SHORTNESS OF BREATH: 0

## 2021-04-01 NOTE — PROGRESS NOTES
[address] [address]  [address] [place]  [phone]      Braden Gregg is a 64 y.o. male who presents today for his medical conditions/complaints as noted below. Braden Gregg is c/o of Hypertension (3 mo ) and Diabetes (3 mo)      HPI:     Pt here today for follow-up of DM and HTN. Has EGD on 4/8 with Dr. Kika Richardson for 8-week re-check. Has completely avoided all tomato products. Taking Protonix 40 mg daily, which is working well. No significant belching or trouble with swallowing. BP slightly low today - 100/80. Taking Lopressor 100 mg BID - usually afternoon and at bedtime. States BPis usually low like this at home in the mornings when he checks and then will go up to 120's in the afternoon. Only takes Lisinopril 10 mg as needed when his BP is >150/110; last dose 3 weeks ago or so. Sometimes he will feel his BP getting low - never <47'T systolic; will feel sluggish, but it doesn't last long and will improve if he gets up to move around. Has not been checking glucose at home recently. Last A1c was 7.3% on 9/24/20. Has not used any DM meds in the past several months. Has not had diabetic eye exam yet, but did just get new glasses. Will schedule to go back for dilated exam when his wife can go with him. Having more trouble with ED - has been getting worse recently, but is intermittent. Cannot achieve a complete erection. Had Covid vaccine (Skippy Gary) at [facility] on 3/16. No upcoming appt's at [facility] yet for Derm or Allergy; will schedule at later date, as he is now vaccinated. Had not been comfortable going up until now. Using Diprolene ointment maybe 3x's per month as needed for the spots that bother him the most with itching. Getting Skyrizi injections every 3 months; last one was 3 weeks ago. Using Clobetasol shampoo twice weekly for scalp psoriasis.     Taking Zyrtec 10 mg as needed - if he uses this every day, it will not work as well for him.         Past Medical History:   Diagnosis Date    Allergic reaction caused by a drug 8/13/2016    Arthritis     psoritic arthritis    Chronic kidney disease     Follows with Nephro    COPD (chronic obstructive pulmonary disease) ([address]     pt  denies    CVA (cerebral vascular accident) ([address]     Neuro eval with Dr Ovalle here    Depression     Diabetes mellitus ([address]     History of blood transfusion     History of migraine headaches     Hyperlipidemia     Hypertension     Pituitary microadenoma ([address]     Eval by NS and Endo 2011 but no recent FU    Pneumothorax 05/27/2015    left    Psoriatic arthritis ([address]     Stroke ([address]     x3    Thrombocytopenia ([address]       Past Surgical History:   Procedure Laterality Date    CHEST TUBE INSERTION Left     out now    COLONOSCOPY      11/13 SIS 10 y    ENDOSCOPY, COLON, DIAGNOSTIC  04/08/2021    EGD BIOPSY GASTRIC AND GE JUNCTION     OTHER SURGICAL HISTORY Right     stent r kidney    OTHER SURGICAL HISTORY  5/27/15    resection of bleb    PILONIDAL CYST DRAINAGE      SHOULDER SURGERY Left     Rotator cuff    THORACOSCOPY  5/27/15    THORACOTOMY  5/27/15    UPPER GASTROINTESTINAL ENDOSCOPY      UPPER GASTROINTESTINAL ENDOSCOPY N/A 7/8/2020    EGD with biopsies performed by Sondra Rice DO at [address]  01/20/2021    UPPER GASTROINTESTINAL ENDOSCOPY N/A 1/20/2021    EGD BIOPSY performed by Trip Ochoa MD at [address] [facility] N/A 4/8/2021    EGD BIOPSY GASTRIC AND GE JUNCTION  performed by Trip Ochoa MD at [address] [facility].     Family History   Problem Relation Age of Onset    Diabetes Sister     Alzheimer's Disease Maternal Grandmother     Other Maternal Grandmother         dementia    High Blood Pressure Maternal Grandfather     Cancer Maternal Grandfather         Unknown    High Blood Pressure Mother     Other Mother Right Ear: Tympanic membrane, ear canal and external ear normal.      Left Ear: Tympanic membrane, ear canal and external ear normal.      Nose: Nose normal.      Mouth/Throat:      Mouth: Mucous membranes are moist.      Pharynx: Oropharynx is clear. No oropharyngeal exudate. Eyes:      Conjunctiva/sclera: Conjunctivae normal.   Cardiovascular:      Rate and Rhythm: Normal rate and regular rhythm. Heart sounds: Normal heart sounds. Pulmonary:      Effort: Pulmonary effort is normal. No respiratory distress. Breath sounds: Normal breath sounds. Abdominal:      General: Bowel sounds are normal. There is no distension. Palpations: Abdomen is soft. Tenderness: There is no abdominal tenderness. Skin:     General: Skin is warm and dry. Neurological:      Mental Status: He is alert and oriented to person, place, and time. Assessment:       Diagnosis Orders   1. Essential hypertension  Basic Metabolic Panel   2. Type 2 diabetes mellitus with hyperglycemia, unspecified whether long term insulin use (HCC)  CBC Auto Differential    TSH With Reflex Ft4    Basic Metabolic Panel   3. Skin rash     4. Erectile dysfunction, unspecified erectile dysfunction type  sildenafil (VIAGRA) 100 MG tablet   5. Scalp psoriasis     6. Vitamin D deficiency  Vitamin D 25 Hydroxy         Plan:      Return for already scheduled appt on 6/8.     Orders Placed This Encounter   Procedures    CBC Auto Differential     Standing Status:   Future     Number of Occurrences:   1     Standing Expiration Date:   4/1/2022    Vitamin D 25 Hydroxy     Standing Status:   Future     Number of Occurrences:   1     Standing Expiration Date:   4/1/2022    TSH With Reflex Ft4     Standing Status:   Future     Number of Occurrences:   1     Standing Expiration Date:   4/1/2022    Basic Metabolic Panel     Standing Status:   Future     Number of Occurrences:   1     Standing Expiration Date:   4/1/2022     Orders Placed This

## 2021-04-02 ENCOUNTER — HOSPITAL ENCOUNTER (OUTPATIENT)
Dept: PREADMISSION TESTING | Age: 61
Setting detail: SPECIMEN
Discharge: HOME | End: 2021-04-06
Payer: MEDICARE

## 2021-04-02 DIAGNOSIS — Z11.59 ENCOUNTER FOR SCREENING FOR OTHER VIRAL DISEASES: Primary | ICD-10-CM

## 2021-04-02 PROCEDURE — U0003 INFECTIOUS AGENT DETECTION BY NUCLEIC ACID (DNA OR RNA); SEVERE ACUTE RESPIRATORY SYNDROME CORONAVIRUS 2 (SARS-COV-2) (CORONAVIRUS DISEASE [COVID-19]), AMPLIFIED PROBE TECHNIQUE, MAKING USE OF HIGH THROUGHPUT TECHNOLOGIES AS DESCRIBED BY CMS-2020-01-R: HCPCS

## 2021-04-02 PROCEDURE — U0005 INFEC AGEN DETEC AMPLI PROBE: HCPCS

## 2021-04-04 LAB
SARS-COV-2: NORMAL
SARS-COV-2: NOT DETECTED
SOURCE: NORMAL

## 2021-04-05 LAB
ESTIMATED AVERAGE GLUCOSE: 237 MG/DL
HBA1C MFR BLD: 9.9 % (ref 4–6)

## 2021-04-08 ENCOUNTER — HOSPITAL ENCOUNTER (OUTPATIENT)
Age: 61
Setting detail: OUTPATIENT SURGERY
Discharge: HOME | End: 2021-04-08
Attending: INTERNAL MEDICINE | Admitting: INTERNAL MEDICINE
Payer: MEDICARE

## 2021-04-08 ENCOUNTER — ANESTHESIA (OUTPATIENT)
Dept: OPERATING ROOM | Age: 61
End: 2021-04-08
Payer: MEDICARE

## 2021-04-08 VITALS — DIASTOLIC BLOOD PRESSURE: 54 MMHG | OXYGEN SATURATION: 99 % | SYSTOLIC BLOOD PRESSURE: 87 MMHG

## 2021-04-08 VITALS
WEIGHT: 177 LBS | HEART RATE: 58 BPM | TEMPERATURE: 97 F | SYSTOLIC BLOOD PRESSURE: 116 MMHG | OXYGEN SATURATION: 96 % | HEIGHT: 68 IN | RESPIRATION RATE: 20 BRPM | DIASTOLIC BLOOD PRESSURE: 85 MMHG | BODY MASS INDEX: 26.83 KG/M2

## 2021-04-08 LAB — GLUCOSE BLD-MCNC: 201 MG/DL (ref 75–110)

## 2021-04-08 PROCEDURE — 7100000000 HC PACU RECOVERY - FIRST 15 MIN: Performed by: INTERNAL MEDICINE

## 2021-04-08 PROCEDURE — 3609012400 HC EGD TRANSORAL BIOPSY SINGLE/MULTIPLE: Performed by: INTERNAL MEDICINE

## 2021-04-08 PROCEDURE — 3700000001 HC ADD 15 MINUTES (ANESTHESIA): Performed by: INTERNAL MEDICINE

## 2021-04-08 PROCEDURE — 2709999900 HC NON-CHARGEABLE SUPPLY: Performed by: INTERNAL MEDICINE

## 2021-04-08 PROCEDURE — 43239 EGD BIOPSY SINGLE/MULTIPLE: CPT | Performed by: INTERNAL MEDICINE

## 2021-04-08 PROCEDURE — 7100000010 HC PHASE II RECOVERY - FIRST 15 MIN: Performed by: INTERNAL MEDICINE

## 2021-04-08 PROCEDURE — 3700000000 HC ANESTHESIA ATTENDED CARE: Performed by: INTERNAL MEDICINE

## 2021-04-08 PROCEDURE — 82947 ASSAY GLUCOSE BLOOD QUANT: CPT

## 2021-04-08 PROCEDURE — 88305 TISSUE EXAM BY PATHOLOGIST: CPT

## 2021-04-08 PROCEDURE — 2580000003 HC RX 258: Performed by: ANESTHESIOLOGY

## 2021-04-08 PROCEDURE — 6360000002 HC RX W HCPCS: Performed by: NURSE ANESTHETIST, CERTIFIED REGISTERED

## 2021-04-08 PROCEDURE — 2500000003 HC RX 250 WO HCPCS: Performed by: NURSE ANESTHETIST, CERTIFIED REGISTERED

## 2021-04-08 PROCEDURE — 7100000011 HC PHASE II RECOVERY - ADDTL 15 MIN: Performed by: INTERNAL MEDICINE

## 2021-04-08 RX ORDER — HYDRALAZINE HYDROCHLORIDE 20 MG/ML
5 INJECTION INTRAMUSCULAR; INTRAVENOUS EVERY 10 MIN PRN
Status: DISCONTINUED | OUTPATIENT
Start: 2021-04-08 | End: 2021-04-08 | Stop reason: HOSPADM

## 2021-04-08 RX ORDER — LIDOCAINE HYDROCHLORIDE 20 MG/ML
INJECTION, SOLUTION INFILTRATION; PERINEURAL PRN
Status: DISCONTINUED | OUTPATIENT
Start: 2021-04-08 | End: 2021-04-08 | Stop reason: SDUPTHER

## 2021-04-08 RX ORDER — SODIUM CHLORIDE 0.9 % (FLUSH) 0.9 %
10 SYRINGE (ML) INJECTION PRN
Status: DISCONTINUED | OUTPATIENT
Start: 2021-04-08 | End: 2021-04-08 | Stop reason: HOSPADM

## 2021-04-08 RX ORDER — PROMETHAZINE HYDROCHLORIDE 25 MG/ML
6.25 INJECTION, SOLUTION INTRAMUSCULAR; INTRAVENOUS
Status: DISCONTINUED | OUTPATIENT
Start: 2021-04-08 | End: 2021-04-08 | Stop reason: HOSPADM

## 2021-04-08 RX ORDER — DIPHENHYDRAMINE HYDROCHLORIDE 50 MG/ML
12.5 INJECTION INTRAMUSCULAR; INTRAVENOUS
Status: DISCONTINUED | OUTPATIENT
Start: 2021-04-08 | End: 2021-04-08 | Stop reason: HOSPADM

## 2021-04-08 RX ORDER — ONDANSETRON 2 MG/ML
4 INJECTION INTRAMUSCULAR; INTRAVENOUS
Status: DISCONTINUED | OUTPATIENT
Start: 2021-04-08 | End: 2021-04-08 | Stop reason: HOSPADM

## 2021-04-08 RX ORDER — PROPOFOL 10 MG/ML
INJECTION, EMULSION INTRAVENOUS PRN
Status: DISCONTINUED | OUTPATIENT
Start: 2021-04-08 | End: 2021-04-08 | Stop reason: SDUPTHER

## 2021-04-08 RX ORDER — HYDROCODONE BITARTRATE AND ACETAMINOPHEN 5; 325 MG/1; MG/1
2 TABLET ORAL PRN
Status: DISCONTINUED | OUTPATIENT
Start: 2021-04-08 | End: 2021-04-08 | Stop reason: HOSPADM

## 2021-04-08 RX ORDER — SODIUM CHLORIDE, SODIUM LACTATE, POTASSIUM CHLORIDE, CALCIUM CHLORIDE 600; 310; 30; 20 MG/100ML; MG/100ML; MG/100ML; MG/100ML
INJECTION, SOLUTION INTRAVENOUS CONTINUOUS
Status: DISCONTINUED | OUTPATIENT
Start: 2021-04-08 | End: 2021-04-08 | Stop reason: HOSPADM

## 2021-04-08 RX ORDER — MORPHINE SULFATE 1 MG/ML
1 INJECTION, SOLUTION EPIDURAL; INTRATHECAL; INTRAVENOUS EVERY 5 MIN PRN
Status: DISCONTINUED | OUTPATIENT
Start: 2021-04-08 | End: 2021-04-08 | Stop reason: HOSPADM

## 2021-04-08 RX ORDER — GLYCOPYRROLATE 1 MG/5 ML
SYRINGE (ML) INTRAVENOUS PRN
Status: DISCONTINUED | OUTPATIENT
Start: 2021-04-08 | End: 2021-04-08 | Stop reason: SDUPTHER

## 2021-04-08 RX ORDER — FENTANYL CITRATE 50 UG/ML
25 INJECTION, SOLUTION INTRAMUSCULAR; INTRAVENOUS EVERY 5 MIN PRN
Status: DISCONTINUED | OUTPATIENT
Start: 2021-04-08 | End: 2021-04-08 | Stop reason: HOSPADM

## 2021-04-08 RX ORDER — HYDROCODONE BITARTRATE AND ACETAMINOPHEN 5; 325 MG/1; MG/1
1 TABLET ORAL PRN
Status: DISCONTINUED | OUTPATIENT
Start: 2021-04-08 | End: 2021-04-08 | Stop reason: HOSPADM

## 2021-04-08 RX ORDER — SODIUM CHLORIDE 9 MG/ML
INJECTION, SOLUTION INTRAVENOUS CONTINUOUS
Status: DISCONTINUED | OUTPATIENT
Start: 2021-04-08 | End: 2021-04-08 | Stop reason: HOSPADM

## 2021-04-08 RX ORDER — MEPERIDINE HYDROCHLORIDE 50 MG/ML
12.5 INJECTION INTRAMUSCULAR; INTRAVENOUS; SUBCUTANEOUS EVERY 5 MIN PRN
Status: DISCONTINUED | OUTPATIENT
Start: 2021-04-08 | End: 2021-04-08 | Stop reason: HOSPADM

## 2021-04-08 RX ORDER — SODIUM CHLORIDE 0.9 % (FLUSH) 0.9 %
10 SYRINGE (ML) INJECTION EVERY 12 HOURS SCHEDULED
Status: DISCONTINUED | OUTPATIENT
Start: 2021-04-08 | End: 2021-04-08 | Stop reason: HOSPADM

## 2021-04-08 RX ADMIN — PROPOFOL 50 MG: 10 INJECTION, EMULSION INTRAVENOUS at 08:28

## 2021-04-08 RX ADMIN — Medication 0.2 MG: at 08:24

## 2021-04-08 RX ADMIN — PROPOFOL 100 MG: 10 INJECTION, EMULSION INTRAVENOUS at 08:26

## 2021-04-08 RX ADMIN — SODIUM CHLORIDE: 9 INJECTION, SOLUTION INTRAVENOUS at 08:22

## 2021-04-08 RX ADMIN — LIDOCAINE HYDROCHLORIDE 100 MG: 20 INJECTION, SOLUTION INFILTRATION; PERINEURAL at 08:23

## 2021-04-08 RX ADMIN — PROPOFOL 100 MG: 10 INJECTION, EMULSION INTRAVENOUS at 08:23

## 2021-04-08 RX ADMIN — PROPOFOL 50 MG: 10 INJECTION, EMULSION INTRAVENOUS at 08:30

## 2021-04-08 ASSESSMENT — PAIN SCALES - GENERAL
PAINLEVEL_OUTOF10: 0
PAINLEVEL_OUTOF10: 0

## 2021-04-08 ASSESSMENT — PULMONARY FUNCTION TESTS
PIF_VALUE: 0

## 2021-04-08 ASSESSMENT — COPD QUESTIONNAIRES: CAT_SEVERITY: NO INTERVAL CHANGE

## 2021-04-08 NOTE — H&P
Procedure History and Physical    Pre-Procedural Diagnosis:  Esophagitis    Indications:  same    Procedure Planned: endoscopy     History Obtained From:  patient    HISTORY OF PRESENT ILLNESS:       The patient is a 64 y.o. male who presents for the above procedure.         Past Medical History:    Past Medical History:   Diagnosis Date    Allergic reaction caused by a drug 8/13/2016    Arthritis     psoritic arthritis    Chronic kidney disease     Follows with Nephro    COPD (chronic obstructive pulmonary disease) ([address]     pt  denies    CVA (cerebral vascular accident) ([address]     Neuro eval with Dr Vasquez yost    Depression     Diabetes mellitus ([address]     History of blood transfusion     History of migraine headaches     Hyperlipidemia     Hypertension     Pituitary microadenoma ([address]     Eval by NS and Endo 2011 but no recent FU    Pneumothorax 05/27/2015    left    Psoriatic arthritis ([address]     Stroke ([address]     x3    Thrombocytopenia ([address]        Past Surgical History:    Past Surgical History:   Procedure Laterality Date    CHEST TUBE INSERTION Left     out now    COLONOSCOPY      11/13 SIS 10 y    OTHER SURGICAL HISTORY Right     stent r kidney    OTHER SURGICAL HISTORY  5/27/15    resection of bleb    PILONIDAL CYST DRAINAGE      SHOULDER SURGERY Left     Rotator cuff    THORACOSCOPY  5/27/15    THORACOTOMY  5/27/15    UPPER GASTROINTESTINAL ENDOSCOPY      UPPER GASTROINTESTINAL ENDOSCOPY N/A 7/8/2020    EGD with biopsies performed by Sb Martinez DO at [address] [place] [address]  01/20/2021    UPPER GASTROINTESTINAL ENDOSCOPY N/A 1/20/2021    EGD BIOPSY performed by Jefe Obrien MD at [id number] [place] [address].       Medications:  Current Facility-Administered Medications   Medication Dose Route Frequency Provider Last Rate Last Admin    0.9 % sodium chloride infusion   Intravenous Continuous Asuncion Pugh MD        lactated ringers infusion   Intravenous Continuous Asuncion Pugh MD  sodium chloride flush 0.9 % injection 10 mL  10 mL Intravenous 2 times per day Amelia Wilhelm MD        sodium chloride flush 0.9 % injection 10 mL  10 mL Intravenous PRN Amelia Wilhelm MD        meperidine (DEMEROL) injection 12.5 mg  12.5 mg Intravenous Q5 Min PRN Amelia Wilhelm MD        morphine (PF) injection 1 mg  1 mg Intravenous Q5 Min PRN Amelia Wilhelm MD        HYDROmorphone (DILAUDID) injection 0.5 mg  0.5 mg Intravenous Q5 Min PRN Amelia Wilhelm MD        fentaNYL (SUBLIMAZE) injection 25 mcg  25 mcg Intravenous Q5 Min PRN Amelia Wilhelm MD        HYDROcodone-acetaminophen (NORCO) 5-325 MG per tablet 1 tablet  1 tablet Oral PRN Amelia Wilhelm MD        Or    HYDROcodone-acetaminophen (NORCO) 5-325 MG per tablet 2 tablet  2 tablet Oral PRN Amelia Wilhelm MD        ondansetron [facility]) injection 4 mg  4 mg Intravenous Once PRN Amelia Wilhelm MD        promethazine (PHENERGAN) injection 6.25 mg  6.25 mg Intramuscular Once PRN Amelia Wilhelm MD        diphenhydrAMINE (BENADRYL) injection 12.5 mg  12.5 mg Intravenous Once PRN Amelia Wilhelm MD        hydrALAZINE (APRESOLINE) injection 5 mg  5 mg Intravenous Q10 Min PRN Amelia Wilhelm MD           Allergies: Allergies   Allergen Reactions    Cosentyx [Secukinumab] Nausea And Vomiting and Rash      fever    Lantus [Insulin Glargine] Itching, Swelling and Rash     Patient started Lantus about a month ago, only new medication. His skin reaction started on his abdomen where he injects the Lantus and moved up to the face. This is similar to his response to Cosentyx.      Infliximab Other (See Comments)     Nerve damage    Methotrexate Derivatives Other (See Comments)     Bone marrow toxicity    Seasonal     Adhesive Tape Rash    Keflex [Cephalexin] Rash    Otezla [Apremilast] Rash     Other reaction(s): Unknown    Stellaria Rash    Taltz [Ixekizumab] Rash     Other reaction(s): Unknown    Ustekinumab Rash     Other reaction(s): rash-allergic and acute renal failure  Other reaction(s): Unknown Social   Social History     Tobacco Use    Smoking status: Former Smoker     Packs/day: 1.00     Years: 20.00     Pack years: 20.00     Types: Cigarettes     Quit date: 2015     Years since quittin.8    Smokeless tobacco: Never Used    Tobacco comment: Quit smoking 2016, CARMEN Mack RCP, 10/20/2016. Substance Use Topics    Alcohol use: No     Alcohol/week: 0.0 standard drinks        PSYCH HISTORY:  Depression No  Anxiety No  Suicide No       Family History   Problem Relation Age of Onset    Diabetes Sister     Alzheimer's Disease Maternal Grandmother     Other Maternal Grandmother         dementia    High Blood Pressure Maternal Grandfather     Cancer Maternal Grandfather         Unknown    High Blood Pressure Mother     Other Mother         blood clots    Other Paternal Grandmother         dementia      No family history of colon cancer, Crohn's disease, or ulcerative colitis    Problems with Sedation/Anesthesia in the past? no    REVIEW OF SYSTEMS:  12 point review of systems negative other than mentioned above.       PHYSICAL EXAM:    Vitals:  BP (!) 142/100   Pulse 61   Temp 97 °F (36.1 °C)   Resp 22   Ht 5' 8\" (1.727 m)   Wt 177 lb (80.3 kg)   SpO2 98%   BMI 26.91 kg/m²     Focused Exam related to procedure:    General appearance: NAD, conversant   Eyes: anicteric sclerae, moist conjunctivae; no lid-lag; PERRLA   Lungs: CTA, with normal respiratory effort and no intercostal retractions   CV: RRR, no MRGs   Abdomen: Soft, non-tender; no masses or HSM   Skin: Normal temperature, turgor and texture; no rash, ulcers or subcutaneous nodules     DATA:  CBC:   Lab Results   Component Value Date    WBC 12.3 (H) 2021    HGB 17.1 (H) 2021    HCT 48.8 2021    .0 2021     2021     BUN/Cr:   Lab Results   Component Value Date    BUN 14 2021   ,   Lab Results   Component Value Date    CREATININE 2.24 (H) 2021     Potassium:   Lab Results   Component Value Date    K 5.0 04/01/2021     PT/INR:   Lab Results   Component Value Date    INR 0.9 09/22/2017    INR 0.9 10/19/2016    INR 1.1 08/13/2016    PROTIME 9.9 09/22/2017    PROTIME 10.2 10/19/2016    PROTIME 12.2 08/13/2016       ASSESSMENT AND PLAN:       1. Patient is a 64 y.o. male with above specified procedure planned. Expected Sedation/Anesthesia Type: MAC    2. ASA ([address] Anesthesiology) Anesthesia Status: Class 2 - A normal healthy patient with mild systemic disease    3. Mallampati: II (soft palate, uvula, fauces visible)  4. Procedure options, risks and benefits reviewed with Patient. Patient expresses understanding.     5.  Consent has been signed:  Mejia Turner

## 2021-04-08 NOTE — OP NOTE
Operative Note      Patient: Tami Larsen  YOB: 1960  MRN: [id number]    Date of Procedure: 4/8/2021    Pre-Op Diagnosis: K20.9 ESOPHAGITIS    [facility]    [facility]    EGD    PROCEDURE DATE: 04/08/21    REFERRING PHYSICIAN: No ref. provider found     PRIMARY CARE PROVIDER: Oriana Harvey DO    ATTENDING PHYSICIAN: Laith Walden MD     HISTORY: Mr. Tami Larsen is a 64 y.o. male who presents to the  Endoscopy unit for upper endoscopy. The patient's clinical history is remarkable for HTN, CKD, CVA, migraines, prior history of severe reflux esophagitis, LA grade D, placed on PPI referred for repeat EGD to evaluate for healing and Broderick's. He is currently medically stable and appropriate for the planned procedure. PREOPERATIVE DIAGNOSIS: Severe esophagitis. PROCEDURES:   1) Transoral Upper Endoscopy with cold biopsy of the GEJ and stomach. POSTOPERATIVE DIAGNOSIS:     1) Mild LA grade A esophagitis, significant healing observed compared to prior EGD. 2) Cold biopsy taken of the GEJ to evaluate for Broderick's  3) 3 cm hiatal hernia  4) Mild antritis, no ulcerations, no erosions. Cold biopsy taken  5) Normal appearing duodenal mucosa     MEDICATIONS:   MAC per anesthesia     EBL: <10cc    INSTRUMENT: Olympus GIF-H190  flexible Gastroscope. PREPARATION: The nature and character of the procedure as well as risks, benefits, and alternatives were discussed with the patient and informed consent was obtained. Complications were said to include, but were not limited to: medication allergy, medication reaction, cardiovascular and respiratory problems, bleeding, perforation, infection, and/or missed diagnosis. Following arrival in the endoscopy room, the patient was placed in the left lateral decubitus position and final time-out accomplished in the presence of the nursing staff.  Baseline vital signs were obtained and reviewed, and IV sedation was discussed. The patient DOES wish to proceed with the procedure at this time.     Specimens:   ID Type Source Tests Collected by Time Destination   A : GASTRIC BIOPSY Tissue Gastric SURGICAL PATHOLOGY Aimee Olmedo MD 4/8/2021 9250    B : [address] Tissue GE Junction Biopsy SURGICAL PATHOLOGY Aimee Olmedo MD 4/8/2021 1381        Electronically signed by Aimee Olmedo MD on 4/8/2021 at 8:37 AM

## 2021-04-08 NOTE — ANESTHESIA PRE PROCEDURE
Department of Anesthesiology  Preprocedure Note       Name:  Kevin Saavedra   Age:  64 y.o.  :  1960                                          MRN:  [id number]         Date:  2021      Surgeon: Edu Beverly):  Gray Palma MD    Procedure: Procedure(s):  EGD BIOPSY    Medications prior to admission:   Prior to Admission medications    Medication Sig Start Date End Date Taking? Authorizing Provider   metoprolol (LOPRESSOR) 100 MG tablet TAKE ONE TABLET BY MOUTH TWICE A DAY 3/12/21  Yes Vero Chen, DO   lisinopril (PRINIVIL;ZESTRIL) 10 MG tablet TAKE ONE TABLET BY MOUTH DAILY WHEN BLOOD PRESSURE IS ELEVATED AFTER SKYRIZI. 3/2/21  Yes Rod Sanz, DO   pantoprazole (PROTONIX) 40 MG tablet Take 1 tablet by mouth every morning (before breakfast) 21  Yes Gray Palma MD   Clobetasol Propionate 0.05 % SHAM APPLY TO SCALP, LEAVE ON FOR 15 MINUTES THEN RINSE THOROUGHLY. MAY USE 3-4 TIMES A WEEK FOR MAINTENANCE 20  Yes Jacky Chen, DO   betamethasone dipropionate (DIPROLENE) 0.05 % cream  20  Yes Historical Provider, MD   SKYRIZI, 150 MG DOSE, 75 MG/0.83ML PSKT injection Inject 150 mg into the skin Every 2 months  19  Yes Historical Provider, MD   cetirizine (ZYRTEC ALLERGY) 10 MG tablet Take 10 mg by mouth daily  19  Yes Historical Provider, MD   sildenafil (VIAGRA) 100 MG tablet Take 1/2 - 1 tab by mouth as needed. 21   Maryse Torrez DO       Current medications:    Current Facility-Administered Medications   Medication Dose Route Frequency Provider Last Rate Last Admin    0.9 % sodium chloride infusion   Intravenous Continuous Amelia Wilhelm MD        lactated ringers infusion   Intravenous Continuous Amelia Wilhelm MD        sodium chloride flush 0.9 % injection 10 mL  10 mL Intravenous 2 times per day Amelia Wilhelm MD        sodium chloride flush 0.9 % injection 10 mL  10 mL Intravenous PRN Amelia Wilhelm MD           Allergies:     Allergies   Allergen Reactions    Cosentyx [Secukinumab] Nausea And Vomiting and Rash      fever    Lantus [Insulin Glargine] Itching, Swelling and Rash     Patient started Lantus about a month ago, only new medication. His skin reaction started on his abdomen where he injects the Lantus and moved up to the face. This is similar to his response to Cosentyx.      Infliximab Other (See Comments)     Nerve damage    Methotrexate Derivatives Other (See Comments)     Bone marrow toxicity    Seasonal     Adhesive Tape Rash    Keflex [Cephalexin] Rash    Otezla [Apremilast] Rash     Other reaction(s): Unknown    Stellaria Rash    Taltz [Ixekizumab] Rash     Other reaction(s): Unknown    Ustekinumab Rash     Other reaction(s): rash-allergic and acute renal failure  Other reaction(s): Unknown       Problem List:    Patient Active Problem List   Diagnosis Code    Hypertension I10    Chronic kidney disease (CKD), stage III (moderate) ([facility] N18.30    CVA (cerebral vascular accident) ([facility] I63.9    History of migraine headaches Z86.69    Hyperlipidemia E78.5    Pituitary microadenoma ([facility]) D35.2    Chronic bullous emphysema ([facility]) J43.9    Sinus tachycardia R00.0    Acute kidney injury ([facility]) N17.9    Hyponatremia E87.1    Uncontrolled diabetes mellitus type 2 without complications [id number]    Psoriasis L40.9    Drug-induced skin rash L27.0    Leukopenia D72.819    Other forms of angina pectoris ([facility]) I20.8    Type 2 diabetes mellitus with hyperglycemia ([facility]) E11.65    Immunosuppressed status ([facility]) D84.9    Esophageal dysphagia R13.10       Past Medical History:        Diagnosis Date    Allergic reaction caused by a drug 8/13/2016    Arthritis     psoritic arthritis    Chronic kidney disease     Follows with Nephro    COPD (chronic obstructive pulmonary disease) ([facility]     pt  denies    CVA (cerebral vascular accident) ([facility]     Neuro eval with Dr Ovalle here    Depression     Diabetes mellitus ([facility]     History of blood transfusion     History of migraine headaches     Hyperlipidemia     Hypertension     Pituitary microadenoma ([facility]     Eval by NS and Endo  but no recent FU    Pneumothorax 2015    left    Psoriatic arthritis ([address]     Stroke ([facility]     x3    Thrombocytopenia ([facility]        Past Surgical History:        Procedure Laterality Date    CHEST TUBE INSERTION Left     out now    COLONOSCOPY       SIS 10 y    OTHER SURGICAL HISTORY Right     stent r kidney    OTHER SURGICAL HISTORY  5/27/15    resection of bleb    PILONIDAL CYST DRAINAGE      SHOULDER SURGERY Left     Rotator cuff    THORACOSCOPY  5/27/15    THORACOTOMY  5/27/15    UPPER GASTROINTESTINAL ENDOSCOPY      UPPER GASTROINTESTINAL ENDOSCOPY N/A 2020    EGD with biopsies performed by Radha Rodriguez DO at [address] [place] [address]  2021    UPPER GASTROINTESTINAL ENDOSCOPY N/A 2021    EGD BIOPSY performed by Margaret Nielson MD at [address] [place] [address] History:    Social History     Tobacco Use    Smoking status: Former Smoker     Packs/day: 1.00     Years: 20.00     Pack years: 20.00     Types: Cigarettes     Quit date: 2015     Years since quittin.8    Smokeless tobacco: Never Used    Tobacco comment: Quit smoking 2016, CARMEN Mack RCP, 10/20/2016. Substance Use Topics    Alcohol use: No     Alcohol/week: 0.0 standard drinks                                Counseling given: Not Answered  Comment: Quit smoking 2016, CARMEN Mack RCP, 10/20/2016.       Vital Signs (Current):   Vitals:    21 0714   BP: (!) 142/100   Pulse: 61   Resp: 22   Temp: 97 °F (36.1 °C)   SpO2: 98%   Weight: 177 lb (80.3 kg)   Height: 5' 8\" (1.727 m)                                              BP Readings from Last 3 Encounters:   21 (!) 142/100   21 100/80   21 133/82       NPO Status:                                                                                 BMI:   Wt Readings from Last 3 Encounters: 04/08/21 177 lb (80.3 kg)   04/01/21 176 lb 6.4 oz (80 kg)   03/18/21 174 lb (78.9 kg)     Body mass index is 26.91 kg/m².     CBC:   Lab Results   Component Value Date    WBC 12.3 04/01/2021    RBC 4.88 04/01/2021    HGB 17.1 04/01/2021    HCT 48.8 04/01/2021    .0 04/01/2021    RDW 14.3 04/01/2021     04/01/2021       CMP:   Lab Results   Component Value Date     04/01/2021    K 5.0 04/01/2021    CL 92 04/01/2021    CO2 31 04/01/2021    BUN 14 04/01/2021    CREATININE 2.24 04/01/2021    CREATININE 1.9 06/13/2018    GFRAA 37 04/01/2021    LABGLOM 30 04/01/2021    GLUCOSE 323 04/01/2021    PROT 6.9 09/24/2020    CALCIUM 9.3 04/01/2021    BILITOT 0.62 09/24/2020    ALKPHOS 110 09/24/2020    AST 12 09/24/2020    ALT 9 09/24/2020       POC Tests:   Recent Labs     04/08/21  0705   POCGLU 201*       Coags:   Lab Results   Component Value Date    PROTIME 9.9 09/22/2017    INR 0.9 09/22/2017    APTT 28.5 08/13/2016       HCG (If Applicable): No results found for: PREGTESTUR, PREGSERUM, HCG, HCGQUANT     ABGs: No results found for: PHART, PO2ART, XST8PVB, TNK9KOV, BEART, D3XAFXIA     Type & Screen (If Applicable):  No results found for: LABABO, LABRH    Drug/Infectious Status (If Applicable):  Lab Results   Component Value Date    HEPCAB NONREACTIVE 08/14/2016       COVID-19 Screening (If Applicable):   Lab Results   Component Value Date    COVID19 Not Detected 04/02/2021    COVID19 Not Detected 01/15/2021    COVID19 Not Detected 07/02/2020           Anesthesia Evaluation  Patient summary reviewed and Nursing notes reviewed no history of anesthetic complications:   Airway: Mallampati: II  TM distance: >3 FB   Neck ROM: full  Mouth opening: > = 3 FB Dental: normal exam         Pulmonary:normal exam  breath sounds clear to auscultation  (+) COPD: no interval change,                             Cardiovascular:    (+) hypertension: no interval change, angina: no interval change,         Rhythm: regular Rate: normal                    Neuro/Psych:   (+) CVA: no interval change, psychiatric history:depression/anxiety             GI/Hepatic/Renal:   (+) renal disease: CRI and no interval change,           Endo/Other:    (+) DiabetesType II DM, no interval change, , .                 Abdominal:       Abdomen: soft. Vascular: negative vascular ROS. Anesthesia Plan      MAC     ASA 3             Anesthetic plan and risks discussed with patient. Plan discussed with CRNA.                   Franky Sandy MD   4/8/2021

## 2021-04-12 LAB — SURGICAL PATHOLOGY REPORT: NORMAL

## 2021-05-06 ENCOUNTER — OFFICE VISIT (OUTPATIENT)
Dept: CARDIOLOGY | Age: 61
End: 2021-05-06
Payer: MEDICARE

## 2021-05-06 VITALS
HEIGHT: 68 IN | DIASTOLIC BLOOD PRESSURE: 93 MMHG | WEIGHT: 180 LBS | HEART RATE: 52 BPM | SYSTOLIC BLOOD PRESSURE: 147 MMHG | BODY MASS INDEX: 27.28 KG/M2

## 2021-05-06 DIAGNOSIS — I10 ESSENTIAL HYPERTENSION: Primary | ICD-10-CM

## 2021-05-06 PROCEDURE — 93010 ELECTROCARDIOGRAM REPORT: CPT | Performed by: INTERNAL MEDICINE

## 2021-05-06 PROCEDURE — 93005 ELECTROCARDIOGRAM TRACING: CPT | Performed by: INTERNAL MEDICINE

## 2021-05-06 PROCEDURE — 99214 OFFICE O/P EST MOD 30 MIN: CPT | Performed by: INTERNAL MEDICINE

## 2021-05-06 PROCEDURE — G8427 DOCREV CUR MEDS BY ELIG CLIN: HCPCS | Performed by: INTERNAL MEDICINE

## 2021-05-06 PROCEDURE — 1036F TOBACCO NON-USER: CPT | Performed by: INTERNAL MEDICINE

## 2021-05-06 PROCEDURE — 3017F COLORECTAL CA SCREEN DOC REV: CPT | Performed by: INTERNAL MEDICINE

## 2021-05-06 PROCEDURE — 99213 OFFICE O/P EST LOW 20 MIN: CPT | Performed by: INTERNAL MEDICINE

## 2021-05-06 PROCEDURE — G8417 CALC BMI ABV UP PARAM F/U: HCPCS | Performed by: INTERNAL MEDICINE

## 2021-05-06 NOTE — PROGRESS NOTES
Today's Date: 5/6/2021  Patient Name: Jemima Martin  Patient's age: 64 y. o., 1960      HPI:   The patient is a 64 y.o.  male is in the office for f/u  Significant history of chronic kidney disease due to solitary kidney and psoriasis for which he is on Skyrizi injection every 12 weeks  From cardiac standpoint he is pretty stable. He denies any episodes of chest pain or dyspnea. Active without any exertional symptoms  No orthopnea, lower extremity edema, palpitation  Blood pressure has been running high and it usually does after his Skyrizi injection for 1 to 2 weeks when he takes lisinopril on as-needed basis. Past Medical History:   has a past medical history of Allergic reaction caused by a drug, Arthritis, Chronic kidney disease, COPD (chronic obstructive pulmonary disease) ([address] CVA (cerebral vascular accident) ([address] Depression, Diabetes mellitus ([address] History of blood transfusion, History of migraine headaches, Hyperlipidemia, Hypertension, Pituitary microadenoma ([address] Pneumothorax, Psoriatic arthritis ([address] Stroke ([address] and Thrombocytopenia ([address] Past Surgical History:   has a past surgical history that includes other surgical history (Right); shoulder surgery (Left); Colonoscopy; Upper gastrointestinal endoscopy; Pilonidal cyst drainage; chest tube insertion (Left); Thoracoscopy (5/27/15); thoracotomy (5/27/15); other surgical history (5/27/15); Upper gastrointestinal endoscopy (N/A, 7/8/2020); Upper gastrointestinal endoscopy (01/20/2021); Upper gastrointestinal endoscopy (N/A, 1/20/2021); Endoscopy, colon, diagnostic (04/08/2021); and Upper gastrointestinal endoscopy (N/A, 4/8/2021). Home Medications:    Prior to Admission medications    Medication Sig Start Date End Date Taking? Authorizing Provider   sildenafil (VIAGRA) 100 MG tablet Take 1/2 - 1 tab by mouth as needed.  4/1/21  Yes Sanna Chen, DO   metoprolol (LOPRESSOR) 100 MG tablet TAKE ONE TABLET BY MOUTH TWICE A DAY 3/12/21  Yes Vero Chen, DO   lisinopril (PRINIVIL;ZESTRIL) 10 MG tablet TAKE ONE TABLET BY MOUTH DAILY WHEN BLOOD PRESSURE IS ELEVATED AFTER SKYRIZI. 3/2/21  Yes Rod Sanz, DO   pantoprazole (PROTONIX) 40 MG tablet Take 1 tablet by mouth every morning (before breakfast) 2/1/21  Yes Shahana Eaton MD   Clobetasol Propionate 0.05 % SHAM APPLY TO SCALP, LEAVE ON FOR 15 MINUTES THEN RINSE THOROUGHLY. MAY USE 3-4 TIMES A WEEK FOR MAINTENANCE 9/30/20  Yes Marilu Chen, DO   betamethasone dipropionate (DIPROLENE) 0.05 % cream  5/12/20  Yes Historical Provider, MD   SKYRIZI, 150 MG DOSE, 75 MG/0.83ML PSKT injection Inject 150 mg into the skin Every 2 months  11/25/19  Yes Historical Provider, MD   cetirizine (ZYRTEC ALLERGY) 10 MG tablet Take 10 mg by mouth daily  1/22/19  Yes Historical Provider, MD       Allergies:  Cosentyx [secukinumab], Lantus [insulin glargine], Infliximab, Methotrexate derivatives, Seasonal, Adhesive tape, Keflex [cephalexin], Otezla [apremilast], Stellaria, Taltz [ixekizumab], and Ustekinumab    Social History:   reports that he quit smoking about 5 years ago. His smoking use included cigarettes. He has a 20.00 pack-year smoking history. He has never used smokeless tobacco. He reports that he does not drink alcohol or use drugs. REVIEW OF SYSTEMS:  CONSTITUTIONAL:NEGATIVE  HEENT:NEG  Cardiovascular: No chest pain, No dyspnea on exertion, No palpitations. Lower extremity edema: No  RESPIRATORY: neg  GASTROINTESTINAL:  negative  GENITOURINARY:  negative  INTEGUMENT:  negative  MUSCULOSKELETAL:  positive for  pain  NEUROLOGICAL:  negative    PHYSICAL EXAM:      BP (!) 158/96   Pulse 52   Ht 5' 8\" (1.727 m)   Wt 180 lb (81.6 kg)   BMI 27.37 kg/m²    HEENT: PERRL, no cervical lymphadenopathy. No masses palpable.   Cardiovascular:  · The apical impulse is not displaced  · Heart  Sounds:RRR, S4  · Jugular venous pulsation Normal  · The carotid upstroke is normal  · Peripheral pulses are symmetrical and full  Respiratory: Good respiratory effort. On auscultation: clear to auscultation bilaterally  Abdomen:  · No masses or tenderness  · Bowel sounds present  Extremities:  ·  No Cyanosis or Clubbing  ·  Lower extremity edema: No  Skin: Warm and dry    Cardiac data:      ECG 5/6/2021, sinus bradycardia, otherwise normal    Labs:     CBC: No results for input(s): WBC, HGB, HCT, PLT in the last 72 hours. BMP: No results for input(s): NA, K, CO2, BUN, CREATININE, LABGLOM, GLUCOSE in the last 72 hours. PT/INR: No results for input(s): PROTIME, INR in the last 72 hours. FASTING LIPID PANEL:  Lab Results   Component Value Date    HDL 34 09/24/2020    TRIG 85 09/24/2020     LIVER PROFILE:No results for input(s): AST, ALT, LABALBU in the last 72 hours. Assessment:    · HTN  · DM  · HLP  · Previous history of smoking. · CKD with solitary kidney. · COPD  · History of CVA. · Preserved LV systolic function  · NEGATIVE STRESS TEST 10/2017      Patient Active Problem List   Diagnosis    Hypertension    Chronic kidney disease (CKD), stage III (moderate) (HCC)    CVA (cerebral vascular accident) ([address]    History of migraine headaches    Hyperlipidemia    Pituitary microadenoma (HCC)    Chronic bullous emphysema (HCC)    Sinus tachycardia    Acute kidney injury ([address]    Hyponatremia    Uncontrolled diabetes mellitus type 2 without complications    Psoriasis    Drug-induced skin rash    Leukopenia    Other forms of angina pectoris (HCC)    Type 2 diabetes mellitus with hyperglycemia ([address]    Immunosuppressed status (HCC)    Esophageal dysphagia    Hiatal hernia    Esophagitis       Recommendations:  Recommend starting aspirin 81 mg daily for primary prevention and that he has multiple coronary risk factor as well as previous reported history of CVA. Patient would like to discuss with his primary care physician.   Continue metoprolol 100 mg BID  Lisinopril 10 mg daily  Follow-up with nephrology for CKD  Aggressive risk factor modification discussed with him    Also educated regarding symptoms of ACS or CHF in which she will need to seek emergent medical attention otherwise we will see him back in 9-12 months for routine follow-up        Piero Pelayo, [address] [facility]           [phone]

## 2021-05-18 ENCOUNTER — OFFICE VISIT (OUTPATIENT)
Dept: GASTROENTEROLOGY | Age: 61
End: 2021-05-18
Payer: MEDICARE

## 2021-05-18 VITALS
WEIGHT: 178 LBS | SYSTOLIC BLOOD PRESSURE: 136 MMHG | HEART RATE: 76 BPM | DIASTOLIC BLOOD PRESSURE: 86 MMHG | BODY MASS INDEX: 27.06 KG/M2

## 2021-05-18 DIAGNOSIS — R13.19 ESOPHAGEAL DYSPHAGIA: Primary | ICD-10-CM

## 2021-05-18 DIAGNOSIS — K21.00 GASTROESOPHAGEAL REFLUX DISEASE WITH ESOPHAGITIS, UNSPECIFIED WHETHER HEMORRHAGE: ICD-10-CM

## 2021-05-18 PROCEDURE — 3017F COLORECTAL CA SCREEN DOC REV: CPT | Performed by: INTERNAL MEDICINE

## 2021-05-18 PROCEDURE — 99213 OFFICE O/P EST LOW 20 MIN: CPT | Performed by: INTERNAL MEDICINE

## 2021-05-18 PROCEDURE — G8427 DOCREV CUR MEDS BY ELIG CLIN: HCPCS | Performed by: INTERNAL MEDICINE

## 2021-05-18 PROCEDURE — G8417 CALC BMI ABV UP PARAM F/U: HCPCS | Performed by: INTERNAL MEDICINE

## 2021-05-18 PROCEDURE — 1036F TOBACCO NON-USER: CPT | Performed by: INTERNAL MEDICINE

## 2021-05-18 ASSESSMENT — ENCOUNTER SYMPTOMS
GASTROINTESTINAL NEGATIVE: 1
CHOKING: 1
TROUBLE SWALLOWING: 1
EYES NEGATIVE: 1

## 2021-05-18 NOTE — PROGRESS NOTES
GI FOLLOW UP    INTERVAL HISTORY:       Status post upper endoscopy with significant improvement of patient's esophagitis  Significant improvement with Protonix 40 mg daily    Chief Complaint   Patient presents with    Follow-up     EGD follow up.  Dysphagia     Patient states he has boughts where he can't eat anything. He will have it happen about 2 times a week where he can not swallow/ food comes back up. Then will habe no problems for weeks or months at a time. 1. Esophageal dysphagia    2. Gastroesophageal reflux disease with esophagitis, unspecified whether hemorrhage          HISTORY OF PRESENT ILLNESS:  Briefly, 56-year-old male with a history of diabetes, fluctuating phases of nausea and vomiting with intermittent dysphagia symptoms identified to have a small hiatal hernia which appears to be slightly symptomatic, found to have emptying study that appeared to be consistent with gastroparesis which appears to be a new diagnosis.  Patient underwent a recent upper endoscopy in July 2020 was identified to have 3 to 4 cm hiatal hernia with an otherwise normal-appearing esophagus as detailed by the procedure report.        Patient was referred for recurrent episodes of intermittent dysphagia with episodes of nausea and emesis.  Appears that the patient is noncompliant with his Reglan which may be a treatment for his gastroparesis.  Patient is fearful of side effect profile and defer this medication.  Patient was to be continued on Prilosec 40 mg      Past Medical,Family, and Social History reviewed and does contribute to the patient presenting condition. Patient's PMH/PSH,SH,PSYCH Hx, MEDs, ALLERGIES, and ROS were all reviewed and updated in the appropriate sections.     PAST MEDICAL HISTORY:  Past Medical History:   Diagnosis Date    Allergic reaction caused by a drug 8/13/2016    Take 1 tablet by mouth every morning (before breakfast), Disp: 90 tablet, Rfl: 2    Clobetasol Propionate 0.05 % SHAM, APPLY TO SCALP, LEAVE ON FOR 15 MINUTES THEN RINSE THOROUGHLY. MAY USE 3-4 TIMES A WEEK FOR MAINTENANCE, Disp: 118 mL, Rfl: 5    betamethasone dipropionate (DIPROLENE) 0.05 % cream, , Disp: , Rfl:     SKYRIZI, 150 MG DOSE, 75 MG/0.83ML PSKT injection, Inject 150 mg into the skin Every 2 months , Disp: , Rfl:     cetirizine (ZYRTEC ALLERGY) 10 MG tablet, Take 10 mg by mouth daily , Disp: , Rfl:     ALLERGIES:   Allergies   Allergen Reactions    Cosentyx [Secukinumab] Nausea And Vomiting and Rash      fever    Lantus [Insulin Glargine] Itching, Swelling and Rash     Patient started Lantus about a month ago, only new medication. His skin reaction started on his abdomen where he injects the Lantus and moved up to the face. This is similar to his response to Cosentyx.      Infliximab Other (See Comments)     Nerve damage    Methotrexate Derivatives Other (See Comments)     Bone marrow toxicity    Seasonal     Adhesive Tape Rash    Keflex [Cephalexin] Rash    Otezla [Apremilast] Rash     Other reaction(s): Unknown    Stellaria Rash    Taltz [Ixekizumab] Rash     Other reaction(s): Unknown    Ustekinumab Rash     Other reaction(s): rash-allergic and acute renal failure  Other reaction(s): Unknown       FAMILY HISTORY:       Problem Relation Age of Onset    Diabetes Sister     Alzheimer's Disease Maternal Grandmother     Other Maternal Grandmother         dementia    High Blood Pressure Maternal Grandfather     Cancer Maternal Grandfather         Unknown    High Blood Pressure Mother     Other Mother         blood clots    Other Paternal Grandmother         dementia         SOCIAL HISTORY:   Social History     Socioeconomic History    Marital status:      Spouse name: Not on file    Number of children: Not on file    Years of education: Not on file    Highest education abnormal movements or seizure like activity. Skin:   No rashes, no itching. :   No hematuria, no pyuria, no dysuria, no flank pain. Extremities:  No swelling or joint pains. ROS was otherwise negative    Review of Systems   Constitutional: Negative. HENT: Positive for trouble swallowing. Eyes: Negative. Respiratory: Positive for choking. Cardiovascular: Negative. Gastrointestinal: Negative. Endocrine: Negative. Genitourinary: Negative. Musculoskeletal: Negative. Skin: Negative. Allergic/Immunologic: Positive for food allergies. Neurological: Negative. Hematological: Negative. Psychiatric/Behavioral: Negative. All other systems reviewed and are negative. PHYSICAL EXAMINATION: Vital signs reviewed per the nursing documentation. /86   Pulse 76   Wt 178 lb (80.7 kg)   BMI 27.06 kg/m²   Body mass index is 27.06 kg/m². Physical Exam    Physical Exam   Constitutional: Patient is oriented to person, place, and time. Patient appears well-developed and well-nourished. HENT:   Head: Normocephalic and atraumatic. Eyes: Pupils are equal, round, and reactive to light. EOM are normal.   Neck: Normal range of motion. Neck supple. No JVD present. No tracheal deviation present. No thyromegaly present. Cardiovascular: Normal rate, regular rhythm, normal heart sounds and intact distal pulses. Pulmonary/Chest: Effort normal and breath sounds normal. No stridor. No respiratory distress. He has no wheezes. He has no rales. He exhibits no tenderness. Abdominal: Soft. Bowel sounds are normal. He exhibits no distension and no mass. There is no tenderness. There is no rebound and no guarding. No hernia. Musculoskeletal: Normal range of motion. Lymphadenopathy:    Patient has no cervical adenopathy. Neurological: Patient is alert and oriented to person, place, and time. Psychiatric: Patient has a normal mood and affect.  Patient behavior is normal.

## 2021-06-08 ENCOUNTER — OFFICE VISIT (OUTPATIENT)
Dept: FAMILY MEDICINE CLINIC | Age: 61
End: 2021-06-08
Payer: MEDICARE

## 2021-06-08 VITALS
HEART RATE: 60 BPM | WEIGHT: 178 LBS | BODY MASS INDEX: 26.98 KG/M2 | SYSTOLIC BLOOD PRESSURE: 90 MMHG | OXYGEN SATURATION: 98 % | HEIGHT: 68 IN | DIASTOLIC BLOOD PRESSURE: 60 MMHG

## 2021-06-08 DIAGNOSIS — Z00.00 ROUTINE GENERAL MEDICAL EXAMINATION AT A HEALTH CARE FACILITY: ICD-10-CM

## 2021-06-08 DIAGNOSIS — N52.9 ERECTILE DYSFUNCTION, UNSPECIFIED ERECTILE DYSFUNCTION TYPE: ICD-10-CM

## 2021-06-08 DIAGNOSIS — I10 ESSENTIAL HYPERTENSION: ICD-10-CM

## 2021-06-08 DIAGNOSIS — E11.65 TYPE 2 DIABETES MELLITUS WITH HYPERGLYCEMIA, UNSPECIFIED WHETHER LONG TERM INSULIN USE (HCC): Primary | ICD-10-CM

## 2021-06-08 DIAGNOSIS — L40.50 PSORIATIC ARTHRITIS (HCC): ICD-10-CM

## 2021-06-08 PROBLEM — D84.9 IMMUNOSUPPRESSED STATUS (HCC): Status: RESOLVED | Noted: 2020-12-21 | Resolved: 2021-06-08

## 2021-06-08 PROCEDURE — 99212 OFFICE O/P EST SF 10 MIN: CPT | Performed by: FAMILY MEDICINE

## 2021-06-08 PROCEDURE — G0439 PPPS, SUBSEQ VISIT: HCPCS | Performed by: FAMILY MEDICINE

## 2021-06-08 PROCEDURE — 3017F COLORECTAL CA SCREEN DOC REV: CPT | Performed by: FAMILY MEDICINE

## 2021-06-08 PROCEDURE — 3046F HEMOGLOBIN A1C LEVEL >9.0%: CPT | Performed by: FAMILY MEDICINE

## 2021-06-08 PROCEDURE — 99213 OFFICE O/P EST LOW 20 MIN: CPT | Performed by: FAMILY MEDICINE

## 2021-06-08 PROCEDURE — 1036F TOBACCO NON-USER: CPT | Performed by: FAMILY MEDICINE

## 2021-06-08 PROCEDURE — G8427 DOCREV CUR MEDS BY ELIG CLIN: HCPCS | Performed by: FAMILY MEDICINE

## 2021-06-08 PROCEDURE — G8417 CALC BMI ABV UP PARAM F/U: HCPCS | Performed by: FAMILY MEDICINE

## 2021-06-08 PROCEDURE — 2022F DILAT RTA XM EVC RTNOPTHY: CPT | Performed by: FAMILY MEDICINE

## 2021-06-08 ASSESSMENT — PATIENT HEALTH QUESTIONNAIRE - PHQ9
2. FEELING DOWN, DEPRESSED OR HOPELESS: 0
SUM OF ALL RESPONSES TO PHQ QUESTIONS 1-9: 0
1. LITTLE INTEREST OR PLEASURE IN DOING THINGS: 0
SUM OF ALL RESPONSES TO PHQ9 QUESTIONS 1 & 2: 0
SUM OF ALL RESPONSES TO PHQ QUESTIONS 1-9: 0
SUM OF ALL RESPONSES TO PHQ QUESTIONS 1-9: 0

## 2021-06-08 ASSESSMENT — ENCOUNTER SYMPTOMS: BLOOD IN STOOL: 0

## 2021-06-08 NOTE — PROGRESS NOTES
[address]  [address], [address]  [phone]      Alvaro Loya is a 64 y.o. male who presents today for his medical conditions/complaints as noted below. Alvaro Loya is c/o of Medicare AWV and Diabetes (discuss resuming insulin)      HPI:     Pt here today for follow-up of psoriasis, DM, and MAW visit (see other note). Next appt with Derm at [facility] is on 9/30 - wants to discuss a medication change, as he is still having worsening tinnitus after each Skyrizi injection. Taking injection every 2 months; the tinnitus can last up to 6 weeks after each injection. Has sun sensitivity - will turn \"beet red\" after only being in the sun 10 minutes or so. Has been worse since starting the [place]. Psoriasis is fairly controlled; however, pt is having almost constant itching over most areas. Worst over his back; does have some redness/breakouts on his back. Nothing seems to work for the itching; even went to [facility] and got compounded medication, which has not helped. Last A1c was elevated at 9.9% on 4/1/21; prior to that, it was 7.3% in 9/2020. Has stopped drinking all sugar drinks and only puts Splenda in his iced tea. Otherwise, drinks water. Has cut way back on his bread intake. Does not hardly eat sweets. Pt is more active now - mowing 2-3x's per week and re-doing things in his new house. Has not checked glucose at home recently, but can tell that his levels have been lower, as he states he can \"feel it\". Still due for dilated DM eye exam.    BP slightly low today - 90/60. States it is usually low in the mornings when he wakes up, but then goes up to approx 110/70 by noon. Also states his BP is very variable for approx 3-4 weeks after he takes his Skyrizi injection - can get up to 150/110.   Will only take his Lisinopril 10 mg if his BP goes >145/100 - ends up taking it maybe 5x's per month  Still taking Lopressor 100 mg BID for his h/o tachycardia; however, it will wait until approx noon to take his first dose to make sure his HR is high enough. Had re-check EGD on 4/8 with Dr. Adela Sewell, which showed significant healing of esophagitis since previous EGD (now only mild). Taking Protonix 40 mg daily - stable. Feels it is getting easier to eat again; still has avoid tomato sauce and acidic foods. Sees Dr. Adela Sewell next month for follow-up. May need another f/u EGD in the future. Just saw Cardiology on 5/6 - no changes to regimen; however, they did discuss having him re-start ASA 81 mg daily again, which pt is agreeable to. Taking OTC allergy pill as needed for allergic rhinitis - has to alternate between different pills to keep them working well for him. Taking Viagra 50 mg (1/2 of a 100 mg tab) as needed for ED - working well for pt.         Past Medical History:   Diagnosis Date    Allergic reaction caused by a drug 8/13/2016    Arthritis     psoritic arthritis    Chronic kidney disease     Follows with Nephro    COPD (chronic obstructive pulmonary disease) ([address]     pt  denies    CVA (cerebral vascular accident) ([address]     Neuro eval with Dr Ovalle here    Depression     Diabetes mellitus ([address]     History of blood transfusion     History of migraine headaches     Hyperlipidemia     Hypertension     Pituitary microadenoma ([address]     Eval by NS and Endo 2011 but no recent FU    Pneumothorax 05/27/2015    left    Psoriatic arthritis ([address]     Stroke ([address]     x3    Thrombocytopenia ([address]       Past Surgical History:   Procedure Laterality Date    CHEST TUBE INSERTION Left     out now    COLONOSCOPY      11/13 SIS 10 y    ENDOSCOPY, COLON, DIAGNOSTIC  04/08/2021    EGD BIOPSY GASTRIC AND GE JUNCTION     OTHER SURGICAL HISTORY Right     stent r kidney    OTHER SURGICAL HISTORY  5/27/15    resection of bleb    PILONIDAL CYST DRAINAGE      SHOULDER SURGERY Left     Rotator cuff    THORACOSCOPY  5/27/15    THORACOTOMY  5/27/15    UPPER GASTROINTESTINAL ENDOSCOPY      UPPER GASTROINTESTINAL ENDOSCOPY N/A 2020    EGD with biopsies performed by Jacob Bah DO at [address] ENDOSCOPY  2021    UPPER GASTROINTESTINAL ENDOSCOPY N/A 2021    EGD BIOPSY performed by Faith Maciel MD at [address] [facility] N/A 2021    EGD BIOPSY GASTRIC AND GE JUNCTION  performed by Faith Maciel MD at [address] [facility].     Family History   Problem Relation Age of Onset    Diabetes Sister     Alzheimer's Disease Maternal Grandmother     Other Maternal Grandmother         dementia    High Blood Pressure Maternal Grandfather     Cancer Maternal Grandfather         Unknown    High Blood Pressure Mother     Other Mother         blood clots    Other Paternal Grandmother         dementia     Social History     Tobacco Use    Smoking status: Former Smoker     Packs/day: 1.00     Years: 20.00     Pack years: 20.00     Types: Cigarettes     Quit date: 2015     Years since quittin.1    Smokeless tobacco: Never Used    Tobacco comment: Quit smoking 2016, CARMEN Mack P, 10/20/2016. Substance Use Topics    Alcohol use: No     Alcohol/week: 0.0 standard drinks      Current Outpatient Medications   Medication Sig Dispense Refill    Handicap Placard MISC by Does not apply route Issue parking placard for person with disability. [facility] [phone]   Applicant meets the qualifying disability criteria. Expires 5 years from issuing date. 1 each 0    sildenafil (VIAGRA) 100 MG tablet Take 1/2 - 1 tab by mouth as needed. 30 tablet 2    metoprolol (LOPRESSOR) 100 MG tablet TAKE ONE TABLET BY MOUTH TWICE A  tablet 3    lisinopril (PRINIVIL;ZESTRIL) 10 MG tablet TAKE ONE TABLET BY MOUTH DAILY WHEN BLOOD PRESSURE IS ELEVATED AFTER SKYRIZI.  90 tablet 3    pantoprazole (PROTONIX) 40 MG tablet Take 1 tablet by mouth every morning (before breakfast) 90 tablet 2    Clobetasol Propionate 0.05 % SHAM APPLY TO SCALP, LEAVE ON FOR 15 MINUTES THEN RINSE THOROUGHLY. MAY USE 3-4 TIMES A WEEK FOR MAINTENANCE 118 mL 5    betamethasone dipropionate (DIPROLENE) 0.05 % cream       SKYRIZI, 150 MG DOSE, 75 MG/0.83ML PSKT injection Inject 150 mg into the skin Every 2 months        No current facility-administered medications for this visit. Allergies   Allergen Reactions    Cosentyx [Secukinumab] Nausea And Vomiting and Rash      fever    Lantus [Insulin Glargine] Itching, Swelling and Rash     Patient started Lantus about a month ago, only new medication. His skin reaction started on his abdomen where he injects the Lantus and moved up to the face. This is similar to his response to Cosentyx.      Infliximab Other (See Comments)     Nerve damage    Methotrexate Derivatives Other (See Comments)     Bone marrow toxicity    Seasonal     Adhesive Tape Rash    Keflex [Cephalexin] Rash    Otezla [Apremilast] Rash     Other reaction(s): Unknown    Stellaria Rash    Taltz [Ixekizumab] Rash     Other reaction(s): Unknown    Ustekinumab Rash     Other reaction(s): rash-allergic and acute renal failure  Other reaction(s): Unknown       Health Maintenance   Topic Date Due    Pneumococcal 0-64 years Vaccine (1 of 4 - PCV13) Never done    Hepatitis B vaccine (1 of 3 - Risk 3-dose series) Never done    Shingles Vaccine (1 of 2) Never done    Diabetic retinal exam  10/30/2018    Annual Wellness Visit (AWV)  Never done    A1C test (Diabetic or Prediabetic)  07/01/2021    Lipid screen  09/24/2021    Potassium monitoring  04/01/2022    Creatinine monitoring  04/01/2022    Diabetic foot exam  06/08/2022    Colon cancer screen colonoscopy  08/08/2023    DTaP/Tdap/Td vaccine (2 - Td or Tdap) 09/03/2028    COVID-19 Vaccine  Completed    Hepatitis C screen  Completed    HIV screen  Addressed    Hepatitis A vaccine  Aged Out    Hib vaccine  Aged Out    Meningococcal (ACWY) vaccine  Aged Out       Subjective:      Review of Systems   Constitutional: Negative for unexpected weight change. Cardiovascular: Negative for chest pain and palpitations. Gastrointestinal: Negative for blood in stool. Genitourinary: Negative for dysuria and hematuria. Skin: Negative for wound. Neurological: Negative for dizziness, light-headedness and headaches. Psychiatric/Behavioral: Negative for dysphoric mood. Objective:     Vitals:    06/08/21 0919   BP: 90/60   Site: Right Upper Arm   Position: Sitting   Cuff Size: Medium Adult   Pulse: 60   SpO2: 98%   Weight: 178 lb (80.7 kg)   Height: 5' 8\" (1.727 m)     Physical Exam  Vitals and nursing note reviewed. Constitutional:       General: He is not in acute distress. Appearance: He is well-developed. HENT:      Head: Normocephalic and atraumatic. Right Ear: Tympanic membrane, ear canal and external ear normal.      Left Ear: Tympanic membrane, ear canal and external ear normal.      Nose: Nose normal.      Mouth/Throat:      Mouth: Mucous membranes are moist.      Pharynx: Oropharynx is clear. No oropharyngeal exudate. Eyes:      Conjunctiva/sclera: Conjunctivae normal.   Cardiovascular:      Rate and Rhythm: Normal rate and regular rhythm. Heart sounds: Normal heart sounds. Pulmonary:      Effort: Pulmonary effort is normal. No respiratory distress. Breath sounds: Normal breath sounds. Abdominal:      General: Bowel sounds are normal. There is no distension. Palpations: Abdomen is soft. Tenderness: There is no abdominal tenderness. Skin:     General: Skin is warm and dry. Neurological:      Mental Status: He is alert and oriented to person, place, and time. Diabetic foot check: Normal sensation with the monofilament bilaterally. Dorsalis pedis pulses intact bilaterally. No skin breakdown, erythema, blisters, scaling, or ulcers. Toenails thin and not ingrown.  No evidence of fungal infection. Assessment:       Diagnosis Orders   1. Type 2 diabetes mellitus with hyperglycemia, unspecified whether long term insulin use (HCC)  Hemoglobin A1C    Comprehensive Metabolic Panel    Lipid Panel     DIABETES FOOT EXAM   2. Essential hypertension  CBC Auto Differential    Comprehensive Metabolic Panel    Lipid Panel   3. Psoriatic arthritis ([address]  Handicap Placard MISC   4. Erectile dysfunction, unspecified erectile dysfunction type     5. Routine general medical examination at a health care facility           Plan:      Return in 4 months (on 10/1/2021) for f/u DM, HTN, labs. Orders Placed This Encounter   Procedures    Hemoglobin A1C     Standing Status:   Future     Standing Expiration Date:   6/8/2022    CBC Auto Differential     Standing Status:   Future     Standing Expiration Date:   6/8/2022    Comprehensive Metabolic Panel     Standing Status:   Future     Standing Expiration Date:   6/8/2022    Lipid Panel     Standing Status:   Future     Standing Expiration Date:   6/8/2022     Order Specific Question:   Is Patient Fasting?/# of Hours     Answer:   12     DIABETES FOOT EXAM     Orders Placed This Encounter   Medications    Handicap Placard MISC     Sig: by Does not apply route Issue parking placard for person with disability. [facility] [phone]   Applicant meets the qualifying disability criteria. Expires 5 years from issuing date. Dispense:  1 each     Refill:  0       Patient given educational materials - see patient instructions. Discussed use, benefit, and side effects of prescribed medications. All patient questions answered. Pt voiced understanding. Reviewed health maintenance.             Electronically signed by Sandro Vang DO, DO on 7/5/2021 at 12:07 AM

## 2021-06-08 NOTE — PROGRESS NOTES
Medicare Annual Wellness Visit  Name: Florian Giron Date: 2021   MRN: [id number] Sex: Male   Age: 64 y.o. Ethnicity: Non-/Non    : 1960 Race: Jacqui Pope is here for Medicare AWV and Diabetes (discuss resuming insulin)    Screenings for behavioral, psychosocial and functional/safety risks, and cognitive dysfunction are all negative except as indicated below. These results, as well as other patient data from the [address] [facility] form, are documented in Flowsheets linked to this Encounter. Allergies   Allergen Reactions    Cosentyx [Secukinumab] Nausea And Vomiting and Rash      fever    Lantus [Insulin Glargine] Itching, Swelling and Rash     Patient started Lantus about a month ago, only new medication. His skin reaction started on his abdomen where he injects the Lantus and moved up to the face. This is similar to his response to Cosentyx.  Infliximab Other (See Comments)     Nerve damage    Methotrexate Derivatives Other (See Comments)     Bone marrow toxicity    Seasonal     Adhesive Tape Rash    Keflex [Cephalexin] Rash    Otezla [Apremilast] Rash     Other reaction(s): Unknown    Stellaria Rash    Taltz [Ixekizumab] Rash     Other reaction(s): Unknown    Ustekinumab Rash     Other reaction(s): rash-allergic and acute renal failure  Other reaction(s): Unknown       Prior to Visit Medications    Medication Sig Taking? Authorizing Provider   sildenafil (VIAGRA) 100 MG tablet Take 1/2 - 1 tab by mouth as needed. Yes Virginia Chen, DO   metoprolol (LOPRESSOR) 100 MG tablet TAKE ONE TABLET BY MOUTH TWICE A DAY Yes Vero Chen, DO   lisinopril (PRINIVIL;ZESTRIL) 10 MG tablet TAKE ONE TABLET BY MOUTH DAILY WHEN BLOOD PRESSURE IS ELEVATED AFTER SKYRIZI.  Yes Rod Sanz, DO   pantoprazole (PROTONIX) 40 MG tablet Take 1 tablet by mouth every morning (before breakfast) Yes Ramona Colbert MD   Clobetasol Propionate 0.05 % SHAM APPLY TO SCALP, LEAVE ON FOR 15 MINUTES THEN RINSE THOROUGHLY.  MAY USE 3-4 TIMES A WEEK FOR MAINTENANCE Yes Juaquin Gilford Black, DO   betamethasone dipropionate (DIPROLENE) 0.05 % cream  Yes Historical Provider, MD   SKYRIZI, 150 MG DOSE, 75 MG/0.83ML PSKT injection Inject 150 mg into the skin Every 2 months  Yes Historical Provider, MD   cetirizine (ZYRTEC ALLERGY) 10 MG tablet Take 10 mg by mouth daily  Yes Historical Provider, MD       Past Medical History:   Diagnosis Date    Allergic reaction caused by a drug 8/13/2016    Arthritis     psoritic arthritis    Chronic kidney disease     Follows with Nephro    COPD (chronic obstructive pulmonary disease) ([address]     pt  denies    CVA (cerebral vascular accident) ([address]     Neuro eval with Dr Ladonna Barros here    Depression     Diabetes mellitus ([address]     History of blood transfusion     History of migraine headaches     Hyperlipidemia     Hypertension     Pituitary microadenoma ([address]     Eval by NS and Endo 2011 but no recent FU    Pneumothorax 05/27/2015    left    Psoriatic arthritis ([address]     Stroke ([address]     x3    Thrombocytopenia ([address]        Past Surgical History:   Procedure Laterality Date    CHEST TUBE INSERTION Left     out now    COLONOSCOPY      11/13 SIS 10 y    ENDOSCOPY, COLON, DIAGNOSTIC  04/08/2021    EGD BIOPSY GASTRIC AND GE JUNCTION     OTHER SURGICAL HISTORY Right     stent r kidney    OTHER SURGICAL HISTORY  5/27/15    resection of bleb    PILONIDAL CYST DRAINAGE      SHOULDER SURGERY Left     Rotator cuff    THORACOSCOPY  5/27/15    THORACOTOMY  5/27/15    UPPER GASTROINTESTINAL ENDOSCOPY      UPPER GASTROINTESTINAL ENDOSCOPY N/A 7/8/2020    EGD with biopsies performed by Manda Larsen DO at [address]  01/20/2021    UPPER GASTROINTESTINAL ENDOSCOPY N/A 1/20/2021    EGD BIOPSY performed by Ricardo Valencia MD at [address] 4/8/2021    EGD BIOPSY GASTRIC AND GE JUNCTION  performed by Althea Wells MD at [address] [facility].       Family History   Problem Relation Age of Onset    Diabetes Sister     Alzheimer's Disease Maternal Grandmother     Other Maternal Grandmother         dementia    High Blood Pressure Maternal Grandfather     Cancer Maternal Grandfather         Unknown    High Blood Pressure Mother     Other Mother         blood clots    Other Paternal Grandmother         dementia       CareTeam (Including outside providers/suppliers regularly involved in providing care):   Patient Care Team:  Shanique Casanova DO as PCP - General (Family Medicine)  Shanique Casanova DO as PCP - [facility] Empaneled Provider  Mariajose Sales as Referring Physician (Internal Medicine)  Sabi Acosta MD as Surgeon (Cardiothoracic Surgery)  Ayush Case MD (Nephrology)  Reyna Mcclendon MD as Consulting Physician (Pediatric Allergy & Immunology)  Talya Thompson MD as Consulting Physician (Pulmonology)  Hudson Coronado (Dermatology)  Sanjeev Tam MD as Consulting Physician (Cardiology)  Lei Rey MD as Consulting Physician (Endocrinology)  Sadie Ahuja MD (Dermatology)    Wt Readings from Last 3 Encounters:   06/08/21 178 lb (80.7 kg)   05/18/21 178 lb (80.7 kg)   05/06/21 180 lb (81.6 kg)     Vitals:    06/08/21 0919   BP: 90/60   Site: Right Upper Arm   Position: Sitting   Cuff Size: Medium Adult   Pulse: 60   SpO2: 98%   Weight: 178 lb (80.7 kg)   Height: 5' 8\" (1.727 m)     Body mass index is 27.06 kg/m². Based upon direct observation of the patient, evaluation of cognition reveals recent and remote memory intact. Patient's complete Health Risk Assessment and screening values have been reviewed and are found in Flowsheets. The following problems were reviewed today and where indicated follow up appointments were made and/or referrals ordered.     Positive Risk Factor Screenings with Interventions:          General Health and ACP:  General  In general, how would you say your health is?: Fair  In the past 7 days, have you experienced any of the following? New or Increased Pain, New or Increased Fatigue, Loneliness, Social Isolation, Stress or Anger?: None of These  Do you get the social and emotional support that you need?: Yes  Do you have a Living Will?: Yes  Advance Directives     Power of  Living Will ACP-Advance Directive ACP-Power of     Not on File Not on File Not on File Not on File      General Health Risk Interventions:  · No Living Will: ACP documents already completed- patient asked to provide copy to the office        Personalized Preventive Plan   Current Health Maintenance Status  Immunization History   Administered Date(s) Administered    COVID-19, J&J, PF, 0.5 mL 03/16/2021    Influenza Virus Vaccine 10/01/2014, 10/15/2018, 10/01/2019    Tdap (Boostrix, Adacel) 09/03/2018        Health Maintenance   Topic Date Due    Pneumococcal 0-64 years Vaccine (1 of 4 - PCV13) Never done    Hepatitis B vaccine (1 of 3 - Risk 3-dose series) Never done    Shingles Vaccine (1 of 2) Never done    Diabetic retinal exam  10/30/2018    Diabetic foot exam  06/08/2021    Annual Wellness Visit (AWV)  06/09/2021    A1C test (Diabetic or Prediabetic)  07/01/2021    Lipid screen  09/24/2021    Potassium monitoring  04/01/2022    Creatinine monitoring  04/01/2022    Colon cancer screen colonoscopy  08/08/2023    DTaP/Tdap/Td vaccine (2 - Td or Tdap) 09/03/2028    COVID-19 Vaccine  Completed    Hepatitis C screen  Completed    HIV screen  Addressed    Hepatitis A vaccine  Aged Out    Hib vaccine  Aged Out    Meningococcal (ACWY) vaccine  Aged Out     Recommendations for [employer] Due: see orders and patient instructions/AVS.  . Recommended screening schedule for the next 5-10 years is provided to the patient in written form: see Patient Rosita Lizarraga was seen today for medicare awv and diabetes.     Diagnoses and all orders for this visit:    Routine general medical examination at a health care facility

## 2021-07-08 DIAGNOSIS — N52.9 ERECTILE DYSFUNCTION, UNSPECIFIED ERECTILE DYSFUNCTION TYPE: ICD-10-CM

## 2021-07-08 NOTE — TELEPHONE ENCOUNTER
Yamilka Suarez called requesting a refill of the below medication which has been pended for you:     Requested Prescriptions     Pending Prescriptions Disp Refills    sildenafil (VIAGRA) 100 MG tablet [Pharmacy Med Name: SILDENAFIL 100 MG TABLET] 30 tablet 1     Sig: TAKE 1/2 - 1 TABLET BY MOUTH AS NEEDED       Last Appointment Date: 6/8/2021  Next Appointment Date: 10/11/2021    Allergies   Allergen Reactions    Cosentyx [Secukinumab] Nausea And Vomiting and Rash      fever    Lantus [Insulin Glargine] Itching, Swelling and Rash     Patient started Lantus about a month ago, only new medication. His skin reaction started on his abdomen where he injects the Lantus and moved up to the face. This is similar to his response to Cosentyx.      Infliximab Other (See Comments)     Nerve damage    Methotrexate Derivatives Other (See Comments)     Bone marrow toxicity    Seasonal     Adhesive Tape Rash    Keflex [Cephalexin] Rash    Otezla [Apremilast] Rash     Other reaction(s): Unknown    Stellaria Rash    Taltz [Ixekizumab] Rash     Other reaction(s): Unknown    Ustekinumab Rash     Other reaction(s): rash-allergic and acute renal failure  Other reaction(s): Unknown

## 2021-07-09 RX ORDER — SILDENAFIL 100 MG/1
TABLET, FILM COATED ORAL
Qty: 30 TABLET | Refills: 5 | Status: SHIPPED | OUTPATIENT
Start: 2021-07-09 | End: 2022-09-21 | Stop reason: SDUPTHER

## 2021-07-12 ENCOUNTER — TELEPHONE (OUTPATIENT)
Dept: FAMILY MEDICINE CLINIC | Age: 61
End: 2021-07-12

## 2021-07-13 ENCOUNTER — OFFICE VISIT (OUTPATIENT)
Dept: GASTROENTEROLOGY | Age: 61
End: 2021-07-13
Payer: MEDICARE

## 2021-07-13 VITALS — BODY MASS INDEX: 27.22 KG/M2 | WEIGHT: 179 LBS | SYSTOLIC BLOOD PRESSURE: 132 MMHG | DIASTOLIC BLOOD PRESSURE: 89 MMHG

## 2021-07-13 DIAGNOSIS — K20.90 ESOPHAGITIS: Primary | ICD-10-CM

## 2021-07-13 PROCEDURE — 1036F TOBACCO NON-USER: CPT | Performed by: INTERNAL MEDICINE

## 2021-07-13 PROCEDURE — 3017F COLORECTAL CA SCREEN DOC REV: CPT | Performed by: INTERNAL MEDICINE

## 2021-07-13 PROCEDURE — G8427 DOCREV CUR MEDS BY ELIG CLIN: HCPCS | Performed by: INTERNAL MEDICINE

## 2021-07-13 PROCEDURE — G8417 CALC BMI ABV UP PARAM F/U: HCPCS | Performed by: INTERNAL MEDICINE

## 2021-07-13 PROCEDURE — 99213 OFFICE O/P EST LOW 20 MIN: CPT | Performed by: INTERNAL MEDICINE

## 2021-07-13 RX ORDER — PANTOPRAZOLE SODIUM 20 MG/1
20 TABLET, DELAYED RELEASE ORAL NIGHTLY
Qty: 30 TABLET | Refills: 3 | Status: SHIPPED | OUTPATIENT
Start: 2021-07-13 | End: 2021-11-08

## 2021-07-13 ASSESSMENT — ENCOUNTER SYMPTOMS
GASTROINTESTINAL NEGATIVE: 1
EYES NEGATIVE: 1
TROUBLE SWALLOWING: 1
CHOKING: 1

## 2021-07-13 NOTE — PROGRESS NOTES
GI FOLLOW UP    INTERVAL HISTORY:     Mild residual symptoms of dyspepsia, heartburn and isolated episode of dysphagia  Known history of esophagitis  On PPI once daily      Chief Complaint   Patient presents with    Follow-up     2 month follow up esophagitis    GI Problem     Patient states taking protonix BID. Has had one incident where it felt like food was getting stuck. 1. Esophagitis          HISTORY OF PRESENT ILLNESS: Briefly, 53-year-old male with a history of diabetes, fluctuating phases of nausea and vomiting with intermittent dysphagia symptoms identified to have a small hiatal hernia which appears to be slightly symptomatic, found to have emptying study that appeared to be consistent with gastroparesis which appears to be a new diagnosis.  Patient underwent a recent upper endoscopy in July 2020 was identified to have 3 to 4 cm hiatal hernia with an otherwise normal-appearing esophagus as detailed by the procedure report.        Patient was referred for recurrent episodes of intermittent dysphagia with episodes of nausea and emesis.  Appears that the patient is noncompliant with his Reglan which may be a treatment for his gastroparesis.  Patient is fearful of side effect profile and defer this medication.  Patient was to be continued on Prilosec 40 mg    Past Medical,Family, and Social History reviewed and does contribute to the patient presenting condition. Patient's PMH/PSH,SH,PSYCH Hx, MEDs, ALLERGIES, and ROS were all reviewed and updated in the appropriate sections.     PAST MEDICAL HISTORY:  Past Medical History:   Diagnosis Date    Allergic reaction caused by a drug 8/13/2016    Arthritis     psoritic arthritis    Chronic kidney disease     Follows with Nephro    COPD (chronic obstructive pulmonary disease) ([address]     pt  denies    [facility]re[facility]) [facility] Neuro eval with Dr Ovalle here    Depression     Diabetes mellitus [facility]     History of blood transfusion     History of migraine headaches     Hyperlipidemia     Hypertension     Pituitary microadenoma ([facility]     Eval by NS and Endo 2011 but no recent FU    Pneumothorax 05/27/2015    left    Psoriatic arthritis ([facility]     Stroke ([facility]     x3    Thrombocytopenia ([facility]        Past Surgical History:   Procedure Laterality Date    CHEST TUBE INSERTION Left     out now    COLONOSCOPY      11/13 SIS 10 y    ENDOSCOPY, COLON, DIAGNOSTIC  04/08/2021    EGD BIOPSY GASTRIC AND GE JUNCTION     OTHER SURGICAL HISTORY Right     stent r kidney    OTHER SURGICAL HISTORY  5/27/15    resection of bleb    PILONIDAL CYST DRAINAGE      SHOULDER SURGERY Left     Rotator cuff    THORACOSCOPY  5/27/15    THORACOTOMY  5/27/15    UPPER GASTROINTESTINAL ENDOSCOPY      UPPER GASTROINTESTINAL ENDOSCOPY N/A 7/8/2020    EGD with biopsies performed by Manda Larsen DO at [address]  01/20/2021    UPPER GASTROINTESTINAL ENDOSCOPY N/A 1/20/2021    EGD BIOPSY performed by Ricardo Valencia MD at [address] [facility] N/A 4/8/2021    EGD BIOPSY GASTRIC AND GE JUNCTION  performed by Ricardo Valencia MD at [address] [id number]:    Current Outpatient Medications:     sildenafil (VIAGRA) 100 MG tablet, TAKE 1/2 - 1 TABLET BY MOUTH AS NEEDED, Disp: 30 tablet, Rfl: 5    Handicap Placard [facility], by Does not apply route Issue parking placard for person with disability. [facility] [phone]   Applicant meets the qualifying disability criteria.   Expires 5 years from issuing date., Disp: 1 each, Rfl: 0    metoprolol (LOPRESSOR) 100 MG tablet, TAKE ONE TABLET BY MOUTH TWICE A DAY, Disp: 180 tablet, Rfl: 3    lisinopril (PRINIVIL;ZESTRIL) 10 MG tablet, TAKE ONE TABLET BY MOUTH DAILY WHEN BLOOD PRESSURE IS ELEVATED AFTER SKYRIZI., Disp: 90 tablet, Rfl: 3   pantoprazole (PROTONIX) 40 MG tablet, Take 1 tablet by mouth every morning (before breakfast), Disp: 90 tablet, Rfl: 2    Clobetasol Propionate 0.05 % SHAM, APPLY TO SCALP, LEAVE ON FOR 15 MINUTES THEN RINSE THOROUGHLY. MAY USE 3-4 TIMES A WEEK FOR MAINTENANCE, Disp: 118 mL, Rfl: 5    betamethasone dipropionate (DIPROLENE) 0.05 % cream, , Disp: , Rfl:     SKYRIZI, 150 MG DOSE, 75 MG/0.83ML PSKT injection, Inject 150 mg into the skin Every 2 months , Disp: , Rfl:     ALLERGIES:   Allergies   Allergen Reactions    Cosentyx [Secukinumab] Nausea And Vomiting and Rash      fever    Lantus [Insulin Glargine] Itching, Swelling and Rash     Patient started Lantus about a month ago, only new medication. His skin reaction started on his abdomen where he injects the Lantus and moved up to the face. This is similar to his response to Cosentyx.      Infliximab Other (See Comments)     Nerve damage    Methotrexate Derivatives Other (See Comments)     Bone marrow toxicity    Seasonal     Adhesive Tape Rash    Keflex [Cephalexin] Rash    Otezla [Apremilast] Rash     Other reaction(s): Unknown    Stellaria Rash    Taltz [Ixekizumab] Rash     Other reaction(s): Unknown    Ustekinumab Rash     Other reaction(s): rash-allergic and acute renal failure  Other reaction(s): Unknown       FAMILY HISTORY:       Problem Relation Age of Onset    Diabetes Sister     Alzheimer's Disease Maternal Grandmother     Other Maternal Grandmother         dementia    High Blood Pressure Maternal Grandfather     Cancer Maternal Grandfather         Unknown    High Blood Pressure Mother     Other Mother         blood clots    Other Paternal Grandmother         dementia         SOCIAL HISTORY:   Social History     Socioeconomic History    Marital status:      Spouse name: Not on file    Number of children: Not on file    Years of education: Not on file    Highest education level: Not on file   Occupational History    Not on file   Tobacco Use    Smoking status: Former Smoker     Packs/day: 1.00     Years: 20.00     Pack years: 20.00     Types: Cigarettes     Quit date: 2015     Years since quittin.1    Smokeless tobacco: Never Used    Tobacco comment: Quit smoking 2016, CARMEN Mack RCP, 10/20/2016. Vaping Use    Vaping Use: Never used   Substance and Sexual Activity    Alcohol use: No     Alcohol/week: 0.0 standard drinks    Drug use: No    Sexual activity: Yes     Partners: Female   Other Topics Concern    Not on file   Social History Narrative    Not on file     Social Determinants of Health     Financial Resource Strain:     Difficulty of Paying Living Expenses:    Food Insecurity:     Worried About Running Out of Food in the Last Year:     [address] in the Last Year:    Transportation Needs:     Lack of Transportation (Medical):  Lack of Transportation (Non-Medical):    Physical Activity:     Days of Exercise per Week:     Minutes of Exercise per Session:    Stress:     Feeling of Stress :    Social Connections:     Frequency of Communication with Friends and Family:     Frequency of Social Gatherings with Friends and Family:     Attends Holiness Services:     Active Member of Clubs or Organizations:     Attends Club or Organization Meetings:     Marital Status:    Intimate Partner Violence:     Fear of Current or Ex-Partner:     Emotionally Abused:     Physically Abused:     Sexually Abused:        REVIEW OF SYSTEMS: A 12-point review of systems was obtained and pertinent positives and negatives were listed below. REVIEW OF SYSTEMS:     Constitutional: No fever, no chills, no lethargy, no weakness. HEENT:  No headache, otalgia, itchy eyes, nasal discharge or sore throat. Cardiac:  No chest pain, dyspnea, orthopnea or PND. Chest:   No cough, phlegm or wheezing. Abdomen:      Detailed by MA   Neuro:  No focal weakness, abnormal movements or seizure like activity.   Skin: No rashes, no itching. :   No hematuria, no pyuria, no dysuria, no flank pain. Extremities:  No swelling or joint pains. ROS was otherwise negative    Review of Systems   Constitutional: Negative. HENT: Positive for trouble swallowing. Eyes: Negative. Respiratory: Positive for choking. Cardiovascular: Negative. Gastrointestinal: Negative. Endocrine: Negative. Genitourinary: Negative. Musculoskeletal: Negative. Skin: Negative. Allergic/Immunologic: Positive for food allergies. Neurological: Negative. Hematological: Negative. Psychiatric/Behavioral: Negative. All other systems reviewed and are negative. PHYSICAL EXAMINATION: Vital signs reviewed per the nursing documentation. BP (!) 147/102   Wt 179 lb (81.2 kg)   BMI 27.22 kg/m²   Body mass index is 27.22 kg/m². Physical Exam    Physical Exam   Constitutional: Patient is oriented to person, place, and time. Patient appears well-developed and well-nourished. HENT:   Head: Normocephalic and atraumatic. Eyes: Pupils are equal, round, and reactive to light. EOM are normal.   Neck: Normal range of motion. Neck supple. No JVD present. No tracheal deviation present. No thyromegaly present. Cardiovascular: Normal rate, regular rhythm, normal heart sounds and intact distal pulses. Pulmonary/Chest: Effort normal and breath sounds normal. No stridor. No respiratory distress. He has no wheezes. He has no rales. He exhibits no tenderness. Abdominal: Soft. Bowel sounds are normal. He exhibits no distension and no mass. There is no tenderness. There is no rebound and no guarding. No hernia. Musculoskeletal: Normal range of motion. Lymphadenopathy:    Patient has no cervical adenopathy. Neurological: Patient is alert and oriented to person, place, and time. Psychiatric: Patient has a normal mood and affect.  Patient behavior is normal.       LABORATORY DATA: Reviewed  Lab Results   Component Value Date WBC 12.3 (H) 04/01/2021    HGB 17.1 (H) 04/01/2021    HCT 48.8 04/01/2021    .0 04/01/2021     04/01/2021     (L) 04/01/2021    K 5.0 04/01/2021    CL 92 (L) 04/01/2021    CO2 31 04/01/2021    BUN 14 04/01/2021    CREATININE 2.24 (H) 04/01/2021    LABALBU 3.6 09/24/2020    BILITOT 0.62 09/24/2020    ALKPHOS 110 09/24/2020    AST 12 09/24/2020    ALT 9 09/24/2020    INR 0.9 09/22/2017         Lab Results   Component Value Date    RBC 4.88 04/01/2021    HGB 17.1 (H) 04/01/2021    .0 04/01/2021    MCH 35.0 (H) 04/01/2021    MCHC 35.0 (H) 04/01/2021    RDW 14.3 04/01/2021    MPV 9.3 04/01/2021    BASOPCT 1 04/01/2021    LYMPHSABS 1.82 04/01/2021    MONOSABS 0.98 04/01/2021    NEUTROABS 8.80 (H) 04/01/2021    EOSABS 0.49 (H) 04/01/2021    BASOSABS 0.12 04/01/2021         DIAGNOSTIC TESTING:     No results found. IMPRESSION: Mr. Pope is a 64 y. o. male with history of diabetes, and has had fluctuating symptoms of nausea and emesis with intermittent dysphagia symptoms.  He additionally has a history of gastroparesis which was recently diagnosed and recently underwent an upper endoscopy in July 2020 which revealed a 3 to 4 cm hiatal hernia.  Recent upper endoscopy performed on January 20, 2021 where he was identified to have severe LA grade D erosive esophagitis.  Since that the patient was placed on Protonix 40 mg daily and is noted significant improvement in his upper GI symptoms     Mild residual symptoms remain      Assessment  1. Esophagitis        PLAN:    1) severe LA grade D esophagitissignificant improvement on prior endoscopy however minimal amount of esophagitis remained. Patient continues to have mild residual symptoms and previously was reluctant to start twice daily dosing of PPI therapy. Now agreeable. Protonix 40mg AM in 20mg nightime recommended    2) Avoid GERD triggers.  Dietary modifications and education provided    3) follow-up after 6 weeks    Thank you for allowing me to participate in the care of Mr. Pope. For any further questions please do not hesitate to contact me. I have reviewed and agree with the ROS entered by the MA/LPN from today's encounter documented in a separate note. Debra Whelan MD, MPH   [facility]  Office #: [phone]    this note is created with the assistance of a speech recognition program.  While intending to generate a document that actually reflects the content of the visit, the document can still have some errors including those of syntax and sound a like substitutions which may escape proof reading. It such instances, actual meaning can be extrapolated by contextual diversion.

## 2021-07-20 ENCOUNTER — HOSPITAL ENCOUNTER (EMERGENCY)
Age: 61
Discharge: HOME | End: 2021-07-20
Attending: EMERGENCY MEDICINE
Payer: MEDICARE

## 2021-07-20 VITALS
RESPIRATION RATE: 16 BRPM | TEMPERATURE: 97.9 F | BODY MASS INDEX: 27.28 KG/M2 | DIASTOLIC BLOOD PRESSURE: 89 MMHG | OXYGEN SATURATION: 96 % | HEART RATE: 66 BPM | SYSTOLIC BLOOD PRESSURE: 150 MMHG | WEIGHT: 180 LBS | HEIGHT: 68 IN

## 2021-07-20 DIAGNOSIS — R21 RASH AND OTHER NONSPECIFIC SKIN ERUPTION: Primary | ICD-10-CM

## 2021-07-20 PROCEDURE — 99285 EMERGENCY DEPT VISIT HI MDM: CPT

## 2021-07-20 RX ORDER — PREDNISONE 10 MG/1
TABLET ORAL
Qty: 28 TABLET | Refills: 0 | Status: SHIPPED | OUTPATIENT
Start: 2021-07-20 | End: 2021-07-30

## 2021-07-20 ASSESSMENT — ENCOUNTER SYMPTOMS
SHORTNESS OF BREATH: 0
TROUBLE SWALLOWING: 0
WHEEZING: 0
SORE THROAT: 0
BACK PAIN: 0
NAUSEA: 0
DIARRHEA: 0
ABDOMINAL PAIN: 0
VOMITING: 0

## 2021-07-20 NOTE — ED PROVIDER NOTES
[facility]  eMERGENCY dEPARTMENT eNCOUnter      Pt Name: Isa Holder  MRN: [id number]  [place] 1960  Date of evaluation: 7/20/2021      CHIEF COMPLAINT       Chief Complaint   Patient presents with    Rash     arms, chest and back          HISTORY OF PRESENT ILLNESS    Isa Holder is a 64 y.o. male who presents with a rash to his arms chest and back he says he occasionally gets this especially after taking his injectable Skyrizi dose said generally is treated with prednisone for 7 days he is taking Benadryl for the itching there is been no fevers chills or cough, there is been no difficulty breathing  This is his typical rash    REVIEW OF SYSTEMS         Review of Systems   Constitutional: Negative for chills and fever. HENT: Negative for congestion, sore throat and trouble swallowing. Eyes: Negative for visual disturbance. Respiratory: Negative for shortness of breath and wheezing. Cardiovascular: Negative for chest pain and leg swelling. Gastrointestinal: Negative for abdominal pain, diarrhea, nausea and vomiting. Genitourinary: Negative for difficulty urinating and dysuria. Musculoskeletal: Negative for back pain, joint swelling and neck pain. Skin: Positive for rash. Neurological: Negative for dizziness, syncope, weakness and headaches. Hematological: Negative for adenopathy. Does not bruise/bleed easily. Psychiatric/Behavioral: Negative for confusion and suicidal ideas. PAST MEDICAL HISTORY    has a past medical history of Allergic reaction caused by a drug, Arthritis, Chronic kidney disease, COPD (chronic obstructive pulmonary disease) ([address] CVA (cerebral vascular accident) ([address] Depression, Diabetes mellitus ([address] History of blood transfusion, History of migraine headaches, Hyperlipidemia, Hypertension, Pituitary microadenoma ([address] Pneumothorax, Psoriatic arthritis ([address] Stroke ([address] and Thrombocytopenia ([address]     SURGICAL HISTORY has a past surgical history that includes other surgical history (Right); shoulder surgery (Left); Colonoscopy; Upper gastrointestinal endoscopy; Pilonidal cyst drainage; chest tube insertion (Left); Thoracoscopy (5/27/15); thoracotomy (5/27/15); other surgical history (5/27/15); Upper gastrointestinal endoscopy (N/A, 7/8/2020); Upper gastrointestinal endoscopy (01/20/2021); Upper gastrointestinal endoscopy (N/A, 1/20/2021); Endoscopy, colon, diagnostic (04/08/2021); and Upper gastrointestinal endoscopy (N/A, 4/8/2021). CURRENT MEDICATIONS       Previous Medications    BETAMETHASONE DIPROPIONATE (DIPROLENE) 0.05 % CREAM        CLOBETASOL PROPIONATE 0.05 % SHAM    APPLY TO SCALP, LEAVE ON FOR 15 MINUTES THEN RINSE THOROUGHLY. MAY USE 3-4 TIMES A WEEK FOR MAINTENANCE    HANDICAP PLACARD MISC    by Does not apply route Issue parking placard for person with disability. [facility] [id number]   Applicant meets the qualifying disability criteria. Expires 5 years from issuing date. LISINOPRIL (PRINIVIL;ZESTRIL) 10 MG TABLET    TAKE ONE TABLET BY MOUTH DAILY WHEN BLOOD PRESSURE IS ELEVATED AFTER SKYRIZI. METOPROLOL (LOPRESSOR) 100 MG TABLET    TAKE ONE TABLET BY MOUTH TWICE A DAY    PANTOPRAZOLE (PROTONIX) 20 MG TABLET    Take 1 tablet by mouth nightly    PANTOPRAZOLE (PROTONIX) 40 MG TABLET    Take 1 tablet by mouth every morning (before breakfast)    SILDENAFIL (VIAGRA) 100 MG TABLET    TAKE 1/2 - 1 TABLET BY MOUTH AS NEEDED    SKYRIZI, 150 MG DOSE, 75 MG/0.83ML PSKT INJECTION    Inject 150 mg into the skin Every 2 months        ALLERGIES     is allergic to cosentyx [secukinumab], lantus [insulin glargine], infliximab, methotrexate derivatives, seasonal, adhesive tape, keflex [cephalexin], otezla [apremilast], stellaria, taltz [ixekizumab], and ustekinumab. FAMILY HISTORY     He indicated that his mother is alive. He indicated that his father is alive. He indicated that his sister is alive.  He indicated that his maternal grandmother is . He indicated that his maternal grandfather is . He indicated that his paternal grandmother is . He indicated that his paternal grandfather is . family history includes Alzheimer's Disease in his maternal grandmother; Cancer in his maternal grandfather; Diabetes in his sister; High Blood Pressure in his maternal grandfather and mother; Other in his maternal grandmother, mother, and paternal grandmother. SOCIAL HISTORY      reports that he quit smoking about 6 years ago. His smoking use included cigarettes. He has a 20.00 pack-year smoking history. He has never used smokeless tobacco. He reports that he does not drink alcohol and does not use drugs. PHYSICAL EXAM     INITIAL VITALS:  height is 5' 8\" (1.727 m) and weight is 180 lb (81.6 kg). His tympanic temperature is 97.9 °F (36.6 °C). His blood pressure is 150/89 (abnormal) and his pulse is 66. His respiration is 16 and oxygen saturation is 96%. Physical Exam  Constitutional:       Appearance: He is well-developed. HENT:      Head: Normocephalic and atraumatic. Mouth/Throat:      Mouth: Mucous membranes are moist.      Pharynx: Oropharynx is clear. Eyes:      Pupils: Pupils are equal, round, and reactive to light. Cardiovascular:      Rate and Rhythm: Normal rate and regular rhythm. Pulmonary:      Effort: Pulmonary effort is normal.      Breath sounds: Normal breath sounds. Abdominal:      General: Bowel sounds are normal.      Palpations: Abdomen is soft. Musculoskeletal:         General: Normal range of motion. Cervical back: Normal range of motion and neck supple. Skin:     General: Skin is warm and dry. Findings: Rash present. Comments: Erythematous macular rash with blanching of the arms and trunk nothing involving the mucosal membranes   Neurological:      General: No focal deficit present.       Mental Status: He is alert and oriented to person, place, and time. Psychiatric:         Behavior: Behavior normal.           DIFFERENTIAL DIAGNOSIS/ MDM:     Dermatitis possible med reaction we will treat with prednisone    DIAGNOSTIC RESULTS     EKG: All EKG's are interpreted by the Emergency Department Physician who either signs or Co-signs this chart in the absence of a cardiologist.        RADIOLOGY:   I directly visualized the following  images and reviewed the radiologist interpretations:          ED BEDSIDE ULTRASOUND:       LABS:  Labs Reviewed - No data to display        EMERGENCY DEPARTMENT COURSE:   Vitals:    Vitals:    07/20/21 1702   BP: (!) 150/89   Pulse: 66   Resp: 16   Temp: 97.9 °F (36.6 °C)   TempSrc: Tympanic   SpO2: 96%   Weight: 180 lb (81.6 kg)   Height: 5' 8\" (1.727 m)     -------------------------  BP: (!) 150/89, Temp: 97.9 °F (36.6 °C), Pulse: 66, Resp: 16        Re-evaluation Notes        CRITICAL CARE:   None        CONSULTS:      PROCEDURES:  None    FINAL IMPRESSION      1. Rash and other nonspecific skin eruption          DISPOSITION/PLAN   DISPOSITION Decision To Discharge    Condition on Disposition    Stable    PATIENT REFERRED TO:  No follow-up provider specified. DISCHARGE MEDICATIONS:  New Prescriptions    PREDNISONE (DELTASONE) 10 MG TABLET    Take 4 tablets by mouth once daily for 7 days       (Please note that portions of this note were completed with a voice recognition program.  Efforts were made to edit the dictations but occasionally words are mis-transcribed.)    Dieudonne Daigle MD,, MD, F.A.A.E.M.   Attending Emergency Physician                          Dieudonne Daigle MD  07/20/21 0615

## 2021-07-26 ENCOUNTER — TELEPHONE (OUTPATIENT)
Dept: FAMILY MEDICINE CLINIC | Age: 61
End: 2021-07-26

## 2021-08-23 ENCOUNTER — OFFICE VISIT (OUTPATIENT)
Dept: CARDIOLOGY | Age: 61
End: 2021-08-23
Payer: MEDICARE

## 2021-08-23 VITALS
HEIGHT: 68 IN | BODY MASS INDEX: 27.13 KG/M2 | HEART RATE: 60 BPM | WEIGHT: 179 LBS | DIASTOLIC BLOOD PRESSURE: 85 MMHG | SYSTOLIC BLOOD PRESSURE: 143 MMHG

## 2021-08-23 DIAGNOSIS — I10 ESSENTIAL HYPERTENSION: Primary | ICD-10-CM

## 2021-08-23 DIAGNOSIS — I63.9 CEREBROVASCULAR ACCIDENT (CVA), UNSPECIFIED MECHANISM (HCC): ICD-10-CM

## 2021-08-23 DIAGNOSIS — L40.9 PSORIASIS: ICD-10-CM

## 2021-08-23 DIAGNOSIS — T50.905D ADVERSE EFFECT OF DRUG, SUBSEQUENT ENCOUNTER: ICD-10-CM

## 2021-08-23 DIAGNOSIS — E78.2 MIXED HYPERLIPIDEMIA: ICD-10-CM

## 2021-08-23 PROCEDURE — 1036F TOBACCO NON-USER: CPT | Performed by: INTERNAL MEDICINE

## 2021-08-23 PROCEDURE — G8427 DOCREV CUR MEDS BY ELIG CLIN: HCPCS | Performed by: INTERNAL MEDICINE

## 2021-08-23 PROCEDURE — G8417 CALC BMI ABV UP PARAM F/U: HCPCS | Performed by: INTERNAL MEDICINE

## 2021-08-23 PROCEDURE — 99213 OFFICE O/P EST LOW 20 MIN: CPT | Performed by: INTERNAL MEDICINE

## 2021-08-23 PROCEDURE — 3017F COLORECTAL CA SCREEN DOC REV: CPT | Performed by: INTERNAL MEDICINE

## 2021-08-23 PROCEDURE — 99214 OFFICE O/P EST MOD 30 MIN: CPT | Performed by: INTERNAL MEDICINE

## 2021-08-23 RX ORDER — CARVEDILOL 25 MG/1
25 TABLET ORAL 2 TIMES DAILY
Qty: 180 TABLET | Refills: 1 | Status: SHIPPED | OUTPATIENT
Start: 2021-08-23 | End: 2022-01-31

## 2021-08-23 NOTE — PROGRESS NOTES
(LOPRESSOR) 100 MG tablet TAKE ONE TABLET BY MOUTH TWICE A DAY 3/12/21  Yes Vero Chen, DO   lisinopril (PRINIVIL;ZESTRIL) 10 MG tablet TAKE ONE TABLET BY MOUTH DAILY WHEN BLOOD PRESSURE IS ELEVATED AFTER SKYRIZI. 3/2/21  Yes Rod Sanz, DO   pantoprazole (PROTONIX) 40 MG tablet Take 1 tablet by mouth every morning (before breakfast) 2/1/21  Yes David Vilchis MD   Clobetasol Propionate 0.05 % SHAM APPLY TO SCALP, LEAVE ON FOR 15 MINUTES THEN RINSE THOROUGHLY. MAY USE 3-4 TIMES A WEEK FOR MAINTENANCE 9/30/20  Yes Kathryn Chen, DO   betamethasone dipropionate (DIPROLENE) 0.05 % cream  5/12/20  Yes Historical Provider, MD   SKYRIZI, 150 MG DOSE, 75 MG/0.83ML PSKT injection Inject 150 mg into the skin Every 2 months  11/25/19  Yes Historical Provider, MD       Allergies:  Cosentyx [secukinumab], Lantus [insulin glargine], Infliximab, Methotrexate derivatives, Seasonal, Adhesive tape, Keflex [cephalexin], Otezla [apremilast], Stellaria, Taltz [ixekizumab], and Ustekinumab    Social History:   reports that he quit smoking about 6 years ago. His smoking use included cigarettes. He has a 20.00 pack-year smoking history. He has never used smokeless tobacco. He reports that he does not drink alcohol and does not use drugs. REVIEW OF SYSTEMS:  · Constitutional: there has been no unanticipated weight loss. There's been No change in energy level, No change in activity level. · Eyes: No visual changes or diplopia. No scleral icterus. · ENT: No Headaches, hearing loss or vertigo. No mouth sores or sore throat. · Cardiovascular: AS HPI  · Respiratory: AS HPI  · Gastrointestinal: No abdominal pain, appetite loss, blood in stools. No change in bowel or bladder habits. · Genitourinary: No dysuria, trouble voiding, or hematuria. · Musculoskeletal:  No gait disturbance, No weakness or joint complaints. · Integumentary: No rash or pruritis.   · Neurological: No headache, diplopia, change in muscle strength, numbness or tingling. No change in gait, balance, coordination, mood, affect, memory, mentation, behavior. · Psychiatric: No new anxiety or depression. · Endocrine: No temperature intolerance. No excessive thirst, fluid intake, or urination. No tremor. · Hematologic/Lymphatic: No abnormal bruising or bleeding, blood clots or swollen lymph nodes. · Allergic/Immunologic: No nasal congestion or hives. PHYSICAL EXAM:      BP (!) 143/85   Pulse 60   Ht 5' 8\" (1.727 m)   Wt 179 lb (81.2 kg)   BMI 27.22 kg/m²    HEENT: PERRL, no cervical lymphadenopathy. No masses palpable. Cardiovascular:  · The apical impulse is not displaced  · Heart  Sounds:RRR, S4  · Jugular venous pulsation Normal  · The carotid upstroke is normal  · Peripheral pulses are symmetrical and full  Respiratory: Good respiratory effort. On auscultation: clear to auscultation bilaterally  Abdomen:  · No masses or tenderness  · Bowel sounds present  Extremities:  ·  No Cyanosis or Clubbing  ·  Lower extremity edema: No  Skin: Warm and dry    Cardiac data:      ECG :  Sinus  Bradycardia   -Anterolateral ST-elevation -repolarization variant.      Labs:   Lab Results   Component Value Date    CHOL 130 09/24/2020    TRIG 85 09/24/2020    HDL 34 (L) 09/24/2020    LDLCHOLESTEROL 79 09/24/2020    VLDL NOT REPORTED 09/24/2020    CHOLHDLRATIO 3.8 09/24/2020     Lab Results   Component Value Date     (L) 04/01/2021    K 5.0 04/01/2021    CL 92 (L) 04/01/2021    CO2 31 04/01/2021    BUN 14 04/01/2021    CREATININE 2.24 (H) 04/01/2021    GLUCOSE 323 (H) 04/01/2021    CALCIUM 9.3 04/01/2021    PROT 6.9 09/24/2020    LABALBU 3.6 09/24/2020    BILITOT 0.62 09/24/2020    ALKPHOS 110 09/24/2020    AST 12 09/24/2020    ALT 9 09/24/2020    LABGLOM 30 (L) 04/01/2021    GFRAA 37 (L) 04/01/2021    GLOB 3.3 10/06/2019     Assessment:    · HTN- not well controlled after skyrizi injections for psoriasis (? Medication reaction)  · DM  · HLP  · Previous history of smoking. · CKD with solitary kidney. · COPD  · History of CVA. · Preserved LV systolic function  · NEGATIVE STRESS TEST 10/2017    Recommendations:  - stop lopressor  - start coreg 25 mg po bid  - continue lisinopril  - continue nephro follow up  - going back to [facility] to see if can use a different medication than skyrizi  - again recommended aspirin daily    The patient is to continue heart healthy diet, weight loss and exercise as tolerated. Patient's medications and side effects were discussed. Medication refills were provided if needed. Follow up appointment timing was discussed. All questions and concerns were addressed to patient's satisfaction. The patient is to follow up in 6 months or sooner if necessary. Thank you for allowing me to participate in the care of this patient, please do not hesitate to call if you have any questions. Demar Millan DO, [facility], [address], [address], [place] [address] [facility]  [facility]. [employer]  [phone]

## 2021-08-27 NOTE — TELEPHONE ENCOUNTER
From: Marino Bailey  To: Vince Loera DO  Sent: 8/26/2021  8:39 PM EDT  Subject: Prescription Question    I tried to go without a pill for my heart rate. But I'm having problems with it being high resting. It is running 95 to 105. Is there anything you can do. Thank you.
Shannan Savage, DO  [facility] Clinical Staff 1 minute ago (10:52 AM)   SA     Lets give the coreg some more time to work  It will eventually lower his HR        Sent via [facility]
Spoke with pt. He states his HR has been 95 while sitting and relaxing. When he got up to take trash out, it went up to 105. I asked pt to clarify meds. He states he has not been taking lisinopril. BP is 120/70 without taking lisinopril. He is concerned that his BP will be too low when he goes back on lisinopril and his HR is too high. Pt states his BP is usually normal, it is only high when he takes the psoriasis med. Please advise.
Detail Level: Zone

## 2021-09-01 ENCOUNTER — OFFICE VISIT (OUTPATIENT)
Dept: GASTROENTEROLOGY | Age: 61
End: 2021-09-01
Payer: MEDICARE

## 2021-09-01 VITALS — BODY MASS INDEX: 27.06 KG/M2 | WEIGHT: 178 LBS | SYSTOLIC BLOOD PRESSURE: 136 MMHG | DIASTOLIC BLOOD PRESSURE: 91 MMHG

## 2021-09-01 DIAGNOSIS — K20.80 ESOPHAGITIS, LOS ANGELES GRADE D: Primary | ICD-10-CM

## 2021-09-01 PROCEDURE — 3017F COLORECTAL CA SCREEN DOC REV: CPT | Performed by: INTERNAL MEDICINE

## 2021-09-01 PROCEDURE — G8427 DOCREV CUR MEDS BY ELIG CLIN: HCPCS | Performed by: INTERNAL MEDICINE

## 2021-09-01 PROCEDURE — 1036F TOBACCO NON-USER: CPT | Performed by: INTERNAL MEDICINE

## 2021-09-01 PROCEDURE — 99213 OFFICE O/P EST LOW 20 MIN: CPT | Performed by: INTERNAL MEDICINE

## 2021-09-01 PROCEDURE — G8417 CALC BMI ABV UP PARAM F/U: HCPCS | Performed by: INTERNAL MEDICINE

## 2021-09-01 ASSESSMENT — ENCOUNTER SYMPTOMS
EYES NEGATIVE: 1
RESPIRATORY NEGATIVE: 1
GASTROINTESTINAL NEGATIVE: 1

## 2021-09-01 NOTE — PROGRESS NOTES
GI FOLLOW UP    INTERVAL HISTORY:       Clinically doing well from GI standpoint  Much control with Protonix 40 mg in the morning and 20 mg at nighttime. Chief Complaint   Patient presents with    Follow-up     esophagitis f/u    GI Problem     Patient states taking protonix BID. Denies having any troubles swallowing. States having heartburn maybe twice since the last visit. 1. Esophagitis, [place] grade D          HISTORY OF PRESENT ILLNESS:  Briefly, 57-year-old male with a history of diabetes, fluctuating phases of nausea and vomiting with intermittent dysphagia symptoms identified to have a small hiatal hernia which appears to be slightly symptomatic, found to have emptying study that appeared to be consistent with gastroparesis which appears to be a new diagnosis.  Patient underwent a recent upper endoscopy in July 2020 was identified to have 3 to 4 cm hiatal hernia with an otherwise normal-appearing esophagus as detailed by the procedure report.        Patient was referred for recurrent episodes of intermittent dysphagia with episodes of nausea and emesis.  Appears that the patient is noncompliant with his Reglan which may be a treatment for his gastroparesis.  Patient is fearful of side effect profile and defer this medication. Patient was placed on Protonix 40 mg daily and observe clinical improvement. Subsequently, patient was placed on Protonix 20 mg at nighttime to optimize his antiacid therapy    Past Medical,Family, and Social History reviewed and does contribute to the patient presenting condition. Patient's PMH/PSH,SH,PSYCH Hx, MEDs, ALLERGIES, and ROS were all reviewed and updated in the appropriate sections.     PAST MEDICAL HISTORY:  Past Medical History:   Diagnosis Date    Allergic reaction caused by a drug 8/13/2016    Arthritis     psoritic arthritis    Chronic kidney disease     Follows with Nephro    COPD (chronic obstructive pulmonary disease) ([address]     pt  denies    CVA (cerebral vascular accident) ([address]     Neuro eval with Dr Ovalle here    Depression     Diabetes mellitus ([address]     History of blood transfusion     History of migraine headaches     Hyperlipidemia     Hypertension     Pituitary microadenoma ([address]     Eval by NS and Endo 2011 but no recent FU    Pneumothorax 05/27/2015    left    Psoriatic arthritis ([address]     Stroke ([address]     x3    Thrombocytopenia ([address]        Past Surgical History:   Procedure Laterality Date    CHEST TUBE INSERTION Left     out now    COLONOSCOPY      11/13 SIS 10 y    ENDOSCOPY, COLON, DIAGNOSTIC  04/08/2021    EGD BIOPSY GASTRIC AND GE JUNCTION     OTHER SURGICAL HISTORY Right     stent r kidney    OTHER SURGICAL HISTORY  5/27/15    resection of bleb    PILONIDAL CYST DRAINAGE      SHOULDER SURGERY Left     Rotator cuff    THORACOSCOPY  5/27/15    THORACOTOMY  5/27/15    UPPER GASTROINTESTINAL ENDOSCOPY      UPPER GASTROINTESTINAL ENDOSCOPY N/A 7/8/2020    EGD with biopsies performed by Jonathan Sanchez DO at [address]  01/20/2021    UPPER GASTROINTESTINAL ENDOSCOPY N/A 1/20/2021    EGD BIOPSY performed by Rebekah Mccarthy MD at [facility] N/A 4/8/2021    EGD BIOPSY GASTRIC AND GE JUNCTION  performed by Rebekah Mccarthy MD at [id number]:    Current Outpatient Medications:     carvedilol (COREG) 25 MG tablet, Take 1 tablet by mouth 2 times daily, Disp: 180 tablet, Rfl: 1    pantoprazole (PROTONIX) 20 MG tablet, Take 1 tablet by mouth nightly, Disp: 30 tablet, Rfl: 3    sildenafil (VIAGRA) 100 MG tablet, TAKE 1/2 - 1 TABLET BY MOUTH AS NEEDED, Disp: 30 tablet, Rfl: 5    Handicap Placard [facility], by Does not apply route Issue parking placard for person with disability.   [facility] [id number]   Applicant meets the qualifying disability criteria. Expires 5 years from issuing date., Disp: 1 each, Rfl: 0    lisinopril (PRINIVIL;ZESTRIL) 10 MG tablet, TAKE ONE TABLET BY MOUTH DAILY WHEN BLOOD PRESSURE IS ELEVATED AFTER SKYRIZI., Disp: 90 tablet, Rfl: 3    pantoprazole (PROTONIX) 40 MG tablet, Take 1 tablet by mouth every morning (before breakfast), Disp: 90 tablet, Rfl: 2    Clobetasol Propionate 0.05 % SHAM, APPLY TO SCALP, LEAVE ON FOR 15 MINUTES THEN RINSE THOROUGHLY. MAY USE 3-4 TIMES A WEEK FOR MAINTENANCE, Disp: 118 mL, Rfl: 5    betamethasone dipropionate (DIPROLENE) 0.05 % cream, , Disp: , Rfl:     SKYRIZI, 150 MG DOSE, 75 MG/0.83ML PSKT injection, Inject 150 mg into the skin Every 2 months , Disp: , Rfl:     ALLERGIES:   Allergies   Allergen Reactions    Cosentyx [Secukinumab] Nausea And Vomiting and Rash      fever    Lantus [Insulin Glargine] Itching, Swelling and Rash     Patient started Lantus about a month ago, only new medication. His skin reaction started on his abdomen where he injects the Lantus and moved up to the face. This is similar to his response to Cosentyx.      Infliximab Other (See Comments)     Nerve damage    Methotrexate Derivatives Other (See Comments)     Bone marrow toxicity    Seasonal     Adhesive Tape Rash    Keflex [Cephalexin] Rash    Otezla [Apremilast] Rash     Other reaction(s): Unknown    Stellaria Rash    Taltz [Ixekizumab] Rash     Other reaction(s): Unknown    Ustekinumab Rash     Other reaction(s): rash-allergic and acute renal failure  Other reaction(s): Unknown       FAMILY HISTORY:       Problem Relation Age of Onset    Diabetes Sister     Alzheimer's Disease Maternal Grandmother     Other Maternal Grandmother         dementia    High Blood Pressure Maternal Grandfather     Cancer Maternal Grandfather         Unknown    High Blood Pressure Mother     Other Mother         blood clots    Other Paternal Grandmother         dementia         SOCIAL HISTORY: Social History     Socioeconomic History    Marital status:      Spouse name: Not on file    Number of children: Not on file    Years of education: Not on file    Highest education level: Not on file   Occupational History    Not on file   Tobacco Use    Smoking status: Former Smoker     Packs/day: 1.00     Years: 20.00     Pack years: 20.00     Types: Cigarettes     Quit date: 2015     Years since quittin.2    Smokeless tobacco: Never Used    Tobacco comment: Quit smoking 2016, CARMEN Mack P, 10/20/2016. Vaping Use    Vaping Use: Never used   Substance and Sexual Activity    Alcohol use: No     Alcohol/week: 0.0 standard drinks    Drug use: No    Sexual activity: Yes     Partners: Female   Other Topics Concern    Not on file   Social History Narrative    Not on file     Social Determinants of Health     Financial Resource Strain:     Difficulty of Paying Living Expenses:    Food Insecurity:     Worried About Running Out of Food in the Last Year:     [address] in the Last Year:    Transportation Needs:     Lack of Transportation (Medical):  Lack of Transportation (Non-Medical):    Physical Activity:     Days of Exercise per Week:     Minutes of Exercise per Session:    Stress:     Feeling of Stress :    Social Connections:     Frequency of Communication with Friends and Family:     Frequency of Social Gatherings with Friends and Family:     Attends Jew Services:     Active Member of Clubs or Organizations:     Attends Club or Organization Meetings:     Marital Status:    Intimate Partner Violence:     Fear of Current or Ex-Partner:     Emotionally Abused:     Physically Abused:     Sexually Abused:        REVIEW OF SYSTEMS: A 12-point review of systems was obtained and pertinent positives and negatives were listed below. REVIEW OF SYSTEMS:     Constitutional: No fever, no chills, no lethargy, no weakness.   HEENT:  No headache, otalgia, itchy eyes, nasal discharge or sore throat. Cardiac:  No chest pain, dyspnea, orthopnea or PND. Chest:   No cough, phlegm or wheezing. Abdomen:      Detailed by MA   Neuro:  No focal weakness, abnormal movements or seizure like activity. Skin:   No rashes, no itching. :   No hematuria, no pyuria, no dysuria, no flank pain. Extremities:  No swelling or joint pains. ROS was otherwise negative    Review of Systems   Constitutional: Negative. HENT: Negative. Eyes: Negative. Respiratory: Negative. Cardiovascular: Negative. Gastrointestinal: Negative. Endocrine: Negative. Genitourinary: Negative. Musculoskeletal: Negative. Skin: Negative. Allergic/Immunologic: Positive for food allergies. Neurological: Negative. Hematological: Negative. Psychiatric/Behavioral: Negative. All other systems reviewed and are negative. PHYSICAL EXAMINATION: Vital signs reviewed per the nursing documentation. BP (!) 153/90   Wt 178 lb (80.7 kg)   BMI 27.06 kg/m²   Body mass index is 27.06 kg/m². Physical Exam    Physical Exam   Constitutional: Patient is oriented to person, place, and time. Patient appears well-developed and well-nourished. HENT:   Head: Normocephalic and atraumatic. Eyes: Pupils are equal, round, and reactive to light. EOM are normal.   Neck: Normal range of motion. Neck supple. No JVD present. No tracheal deviation present. No thyromegaly present. Cardiovascular: Normal rate, regular rhythm, normal heart sounds and intact distal pulses. Pulmonary/Chest: Effort normal and breath sounds normal. No stridor. No respiratory distress. He has no wheezes. He has no rales. He exhibits no tenderness. Abdominal: Soft. Bowel sounds are normal. He exhibits no distension and no mass. There is no tenderness. There is no rebound and no guarding. No hernia. Musculoskeletal: Normal range of motion. Lymphadenopathy:    Patient has no cervical adenopathy. Neurological: Patient is alert and oriented to person, place, and time. Psychiatric: Patient has a normal mood and affect. Patient behavior is normal.       LABORATORY DATA: Reviewed  Lab Results   Component Value Date    WBC 12.3 (H) 04/01/2021    HGB 17.1 (H) 04/01/2021    HCT 48.8 04/01/2021    .0 04/01/2021     04/01/2021     (L) 04/01/2021    K 5.0 04/01/2021    CL 92 (L) 04/01/2021    CO2 31 04/01/2021    BUN 14 04/01/2021    CREATININE 2.24 (H) 04/01/2021    LABALBU 3.6 09/24/2020    BILITOT 0.62 09/24/2020    ALKPHOS 110 09/24/2020    AST 12 09/24/2020    ALT 9 09/24/2020    INR 0.9 09/22/2017         Lab Results   Component Value Date    RBC 4.88 04/01/2021    HGB 17.1 (H) 04/01/2021    .0 04/01/2021    MCH 35.0 (H) 04/01/2021    MCHC 35.0 (H) 04/01/2021    RDW 14.3 04/01/2021    MPV 9.3 04/01/2021    BASOPCT 1 04/01/2021    LYMPHSABS 1.82 04/01/2021    MONOSABS 0.98 04/01/2021    NEUTROABS 8.80 (H) 04/01/2021    EOSABS 0.49 (H) 04/01/2021    BASOSABS 0.12 04/01/2021         DIAGNOSTIC TESTING:     No results found. IMPRESSION: Mr. Pope is a 64 y. o. male with history of diabetes, and has had fluctuating symptoms of nausea and emesis with intermittent dysphagia symptoms.  He additionally has a history of gastroparesis which was recently diagnosed and recently underwent an upper endoscopy in July 2020 which revealed a 3 to 4 cm hiatal hernia.  Recent upper endoscopy performed on January 20, 2021 where he was identified to have severe LA grade D erosive esophagitis.  Since that the patient was placed on Protonix 40 mg daily and is noted significant improvement in his upper GI symptoms     Mild residual symptoms remained, patient was placed on Protonix 20 mg by nighttime to optimize his antiacid therapy. Clinically much improved      Assessment  1.  Esophagitis, [place] grade D        PLAN:    1) chronic GERD with LA grade D esophagitis in the pastcurrently on Protonix 40 mg in a.m. and 20 mg at nighttime. Clinically much improved with this twice daily regimen. Re-evaluate in 6 weeks. If the patient remains asymptomatic will consider repeat EGD to ensure that his esophagitis is resolved and to evaluate for possible Broderick's segment    2) Consider EGD after next visit     3) follow-up in 6 weeks    Thank you for allowing me to participate in the care of Mr. Pope. For any further questions please do not hesitate to contact me. I have reviewed and agree with the ROS entered by the MA/LPN from today's encounter documented in a separate note. Smith Tobin MD, MPH   [facility]  Office #: [phone]    this note is created with the assistance of a speech recognition program.  While intending to generate a document that actually reflects the content of the visit, the document can still have some errors including those of syntax and sound a like substitutions which may escape proof reading. It such instances, actual meaning can be extrapolated by contextual diversion.

## 2021-10-11 ENCOUNTER — TELEPHONE (OUTPATIENT)
Dept: FAMILY MEDICINE CLINIC | Age: 61
End: 2021-10-11

## 2021-10-11 ENCOUNTER — PATIENT MESSAGE (OUTPATIENT)
Dept: FAMILY MEDICINE CLINIC | Age: 61
End: 2021-10-11

## 2021-10-11 ENCOUNTER — HOSPITAL ENCOUNTER (OUTPATIENT)
Dept: LAB | Age: 61
Discharge: HOME | End: 2021-10-11
Payer: MEDICARE

## 2021-10-11 ENCOUNTER — OFFICE VISIT (OUTPATIENT)
Dept: FAMILY MEDICINE CLINIC | Age: 61
End: 2021-10-11
Payer: MEDICARE

## 2021-10-11 VITALS
OXYGEN SATURATION: 98 % | HEART RATE: 87 BPM | DIASTOLIC BLOOD PRESSURE: 60 MMHG | HEIGHT: 69 IN | TEMPERATURE: 96.7 F | SYSTOLIC BLOOD PRESSURE: 106 MMHG | BODY MASS INDEX: 26.04 KG/M2 | WEIGHT: 175.8 LBS

## 2021-10-11 DIAGNOSIS — Z87.891 PERSONAL HISTORY OF TOBACCO USE: ICD-10-CM

## 2021-10-11 DIAGNOSIS — T38.0X5D STEROID-INDUCED DIABETES MELLITUS, SUBSEQUENT ENCOUNTER (HCC): ICD-10-CM

## 2021-10-11 DIAGNOSIS — L27.0 DRUG-INDUCED SKIN RASH: ICD-10-CM

## 2021-10-11 DIAGNOSIS — J43.9 CHRONIC BULLOUS EMPHYSEMA (HCC): ICD-10-CM

## 2021-10-11 DIAGNOSIS — E09.9 STEROID-INDUCED DIABETES MELLITUS, SUBSEQUENT ENCOUNTER (HCC): ICD-10-CM

## 2021-10-11 DIAGNOSIS — E11.65 TYPE 2 DIABETES MELLITUS WITH HYPERGLYCEMIA, UNSPECIFIED WHETHER LONG TERM INSULIN USE (HCC): ICD-10-CM

## 2021-10-11 DIAGNOSIS — I10 ESSENTIAL HYPERTENSION: ICD-10-CM

## 2021-10-11 DIAGNOSIS — I10 ESSENTIAL HYPERTENSION: Primary | ICD-10-CM

## 2021-10-11 DIAGNOSIS — D35.2 PITUITARY MICROADENOMA (HCC): ICD-10-CM

## 2021-10-11 DIAGNOSIS — L40.50 PSORIATIC ARTHRITIS (HCC): ICD-10-CM

## 2021-10-11 PROBLEM — I20.8 OTHER FORMS OF ANGINA PECTORIS (HCC): Status: RESOLVED | Noted: 2020-11-05 | Resolved: 2021-10-11

## 2021-10-11 PROBLEM — I20.89 OTHER FORMS OF ANGINA PECTORIS: Status: RESOLVED | Noted: 2020-11-05 | Resolved: 2021-10-11

## 2021-10-11 LAB
ABSOLUTE EOS #: 0.05 K/UL (ref 0–0.44)
ABSOLUTE IMMATURE GRANULOCYTE: 0.11 K/UL (ref 0–0.3)
ABSOLUTE LYMPH #: 0.77 K/UL (ref 1.1–3.7)
ABSOLUTE MONO #: 0.55 K/UL (ref 0.1–1.2)
ALBUMIN SERPL-MCNC: 4 G/DL (ref 3.5–5.2)
ALBUMIN/GLOBULIN RATIO: 1.4 (ref 1–2.5)
ALP BLD-CCNC: 188 U/L (ref 40–129)
ALT SERPL-CCNC: 12 U/L (ref 5–41)
ANION GAP SERPL CALCULATED.3IONS-SCNC: 12 MMOL/L (ref 9–17)
AST SERPL-CCNC: 9 U/L
BASOPHILS # BLD: 0 % (ref 0–2)
BASOPHILS ABSOLUTE: 0.03 K/UL (ref 0–0.2)
BILIRUB SERPL-MCNC: 0.79 MG/DL (ref 0.3–1.2)
BUN BLDV-MCNC: 38 MG/DL (ref 6–20)
BUN/CREAT BLD: 18 (ref 9–20)
CALCIUM SERPL-MCNC: 8.9 MG/DL (ref 8.6–10.4)
CHLORIDE BLD-SCNC: 89 MMOL/L (ref 98–107)
CHOLESTEROL/HDL RATIO: 4.5
CHOLESTEROL: 177 MG/DL
CO2: 24 MMOL/L (ref 20–31)
CREAT SERPL-MCNC: 2.08 MG/DL (ref 0.7–1.2)
DIFFERENTIAL TYPE: ABNORMAL
EOSINOPHILS RELATIVE PERCENT: 0 % (ref 1–4)
ESTIMATED AVERAGE GLUCOSE: 283 MG/DL
GFR AFRICAN AMERICAN: 40 ML/MIN
GFR NON-AFRICAN AMERICAN: 33 ML/MIN
GFR SERPL CREATININE-BSD FRML MDRD: ABNORMAL ML/MIN/{1.73_M2}
GFR SERPL CREATININE-BSD FRML MDRD: ABNORMAL ML/MIN/{1.73_M2}
GLUCOSE BLD-MCNC: 685 MG/DL (ref 70–99)
HBA1C MFR BLD: 11.5 % (ref 4–6)
HCT VFR BLD CALC: 45.6 % (ref 40.7–50.3)
HDLC SERPL-MCNC: 39 MG/DL
HEMOGLOBIN: 15.9 G/DL (ref 13–17)
IMMATURE GRANULOCYTES: 1 %
LDL CHOLESTEROL: 84 MG/DL (ref 0–130)
LYMPHOCYTES # BLD: 6 % (ref 24–43)
MCH RBC QN AUTO: 31.5 PG (ref 25.2–33.5)
MCHC RBC AUTO-ENTMCNC: 34.9 G/DL (ref 25.2–33.5)
MCV RBC AUTO: 90.5 FL (ref 82.6–102.9)
MONOCYTES # BLD: 4 % (ref 3–12)
NRBC AUTOMATED: 0 PER 100 WBC
PDW BLD-RTO: 13.4 % (ref 11.8–14.4)
PLATELET # BLD: 218 K/UL (ref 138–453)
PLATELET ESTIMATE: ABNORMAL
PMV BLD AUTO: 10.3 FL (ref 8.1–13.5)
POTASSIUM SERPL-SCNC: 5.1 MMOL/L (ref 3.7–5.3)
RBC # BLD: 5.04 M/UL (ref 4.21–5.77)
RBC # BLD: ABNORMAL 10*6/UL
SEG NEUTROPHILS: 89 % (ref 36–65)
SEGMENTED NEUTROPHILS ABSOLUTE COUNT: 11.08 K/UL (ref 1.5–8.1)
SODIUM BLD-SCNC: 125 MMOL/L (ref 135–144)
TOTAL PROTEIN: 6.8 G/DL (ref 6.4–8.3)
TRIGL SERPL-MCNC: 272 MG/DL
VLDLC SERPL CALC-MCNC: ABNORMAL MG/DL (ref 1–30)
WBC # BLD: 12.6 K/UL (ref 3.5–11.3)
WBC # BLD: ABNORMAL 10*3/UL

## 2021-10-11 PROCEDURE — G8926 SPIRO NO PERF OR DOC: HCPCS | Performed by: FAMILY MEDICINE

## 2021-10-11 PROCEDURE — G8483 FLU IMM NO ADMIN DOC REA: HCPCS | Performed by: FAMILY MEDICINE

## 2021-10-11 PROCEDURE — 36415 COLL VENOUS BLD VENIPUNCTURE: CPT

## 2021-10-11 PROCEDURE — G8427 DOCREV CUR MEDS BY ELIG CLIN: HCPCS | Performed by: FAMILY MEDICINE

## 2021-10-11 PROCEDURE — 99212 OFFICE O/P EST SF 10 MIN: CPT | Performed by: FAMILY MEDICINE

## 2021-10-11 PROCEDURE — 85025 COMPLETE CBC W/AUTO DIFF WBC: CPT

## 2021-10-11 PROCEDURE — 99214 OFFICE O/P EST MOD 30 MIN: CPT | Performed by: FAMILY MEDICINE

## 2021-10-11 PROCEDURE — 83036 HEMOGLOBIN GLYCOSYLATED A1C: CPT

## 2021-10-11 PROCEDURE — 1036F TOBACCO NON-USER: CPT | Performed by: FAMILY MEDICINE

## 2021-10-11 PROCEDURE — 80061 LIPID PANEL: CPT

## 2021-10-11 PROCEDURE — 3017F COLORECTAL CA SCREEN DOC REV: CPT | Performed by: FAMILY MEDICINE

## 2021-10-11 PROCEDURE — 3023F SPIROM DOC REV: CPT | Performed by: FAMILY MEDICINE

## 2021-10-11 PROCEDURE — 80053 COMPREHEN METABOLIC PANEL: CPT

## 2021-10-11 PROCEDURE — G0296 VISIT TO DETERM LDCT ELIG: HCPCS | Performed by: FAMILY MEDICINE

## 2021-10-11 PROCEDURE — G8417 CALC BMI ABV UP PARAM F/U: HCPCS | Performed by: FAMILY MEDICINE

## 2021-10-11 RX ORDER — PREDNISONE 20 MG/1
TABLET ORAL
COMMUNITY
Start: 2021-10-03 | End: 2021-11-14

## 2021-10-11 NOTE — PROGRESS NOTES
[address]  [address] [place] [place]me[place]aldo[place] Morelos  [phone]      Margaret Alexandra is a 64 y.o. male who presents today for his medical conditions/complaints as noted below. Margaret Alexandra is c/o of Diabetes and Hypertension      HPI:     Pt here today for follow-up of DM and HTN. Had been off the Paamiut for 4.5 months (as he was trying to go off of it), and the redness/itching was resolved. However, his psoriasis got so bad, he states he could hardly walk. Had to take an injection again in July - within 3 days, he had redness and itching all over again. Went to ER on 7/20 - discharged with Prednisone 40 mg daily x 7 days. Did another injection on 9/2 - states the redness/pruritis returned again, and is worse. Went to [facility] on 9/30 - they recommend patch testing; this is currently being scheduled. Prescribed him Prednisone taper until they could get him in - will be on for 6 weeks. Has already been on the 40 mg daily dose for over 1 week, and the redness is not improved. Itching is slightly improved. Has tried steroid cream, but it does not seem to do anything. Trying to watch his glucose now that he is back on the steroid - has cut out all sugar from his drinks. Only using Splenda now. Last A1c was elevated at 9.9% on 4/1/21; prior to that, it was 7.3% in 9/2020. Has not checked glucose at home recently. Still due for DM eye exam.    Taking 2 apple cider vinegar capsules daily - has noticed some weight loss (down 3 lbs since his last OV), and has made his bowels more regular, which has been good for him. No negative side effects. BP well-controlled today - 106/60. BP has been /60's in the mornings and then 130/80-90's later in the day. States that his HR has been high anytime that he does any physical activity, even walking around the house or showering.   Per BP cuff, his HR can get up to 120-130's after these activities. BP    Changed from Lopressor to Coreg by Cardiology in 8/2021 - has noticed his tinnitus is much improved since this change. Taking Protonix 40 mg each morning and 20 mg nightly for esophagitis - does feel like it is better, but still having some mild indigestion sx's intermittently. Unsure if he is going to get the flu vaccine while he is on the Pa[place]ut. Pituitary adenoma last checked on MRI brain on 12/11/19 - no significant change; will continue to monitor.         Past Medical History:   Diagnosis Date    Allergic reaction caused by a drug 8/13/2016    Arthritis     psoritic arthritis    Chronic kidney disease     Follows with Nephro    COPD (chronic obstructive pulmonary disease) ([address]     pt  denies    CVA (cerebral vascular accident) ([address]     Neuro eval with Dr Vasquez yost    Depression     Diabetes mellitus ([address]     History of blood transfusion     History of migraine headaches     Hyperlipidemia     Hypertension     Pituitary microadenoma ([address]     Eval by NS and Endo 2011 but no recent FU    Pneumothorax 05/27/2015    left    Psoriatic arthritis ([address]     Stroke ([address]     x3    Thrombocytopenia ([address]       Past Surgical History:   Procedure Laterality Date    CHEST TUBE INSERTION Left     out now    COLONOSCOPY      11/13 SIS 10 y    ENDOSCOPY, COLON, DIAGNOSTIC  04/08/2021    EGD BIOPSY GASTRIC AND GE JUNCTION     OTHER SURGICAL HISTORY Right     stent r kidney    OTHER SURGICAL HISTORY  5/27/15    resection of bleb    PILONIDAL CYST DRAINAGE      SHOULDER SURGERY Left     Rotator cuff    THORACOSCOPY  5/27/15    THORACOTOMY  5/27/15    UPPER GASTROINTESTINAL ENDOSCOPY      UPPER GASTROINTESTINAL ENDOSCOPY N/A 7/8/2020    EGD with biopsies performed by Bruno Vides DO [facility]  01/20/2021    UPPER GASTROINTESTINAL ENDOSCOPY N/A 1/20/2021    EGD BIOPSY performed by Susannah Betts MD at [address] [place] [address] GASTROINTESTINAL ENDOSCOPY N/A 2021    EGD BIOPSY GASTRIC AND GE JUNCTION  performed by Beatriz Mccarthy MD at [address] [facility].     Family History   Problem Relation Age of Onset    Diabetes Sister     Alzheimer's Disease Maternal Grandmother     Other Maternal Grandmother         dementia    High Blood Pressure Maternal Grandfather     Cancer Maternal Grandfather         Unknown    High Blood Pressure Mother     Other Mother         blood clots    Other Paternal Grandmother         dementia     Social History     Tobacco Use    Smoking status: Former Smoker     Packs/day: 1.00     Years: 20.00     Pack years: 20.00     Types: Cigarettes     Quit date: 2015     Years since quittin.4    Smokeless tobacco: Never Used    Tobacco comment: Quit smoking 2016, CARMEN Mack RCP, 10/20/2016. Substance Use Topics    Alcohol use: No     Alcohol/week: 0.0 standard drinks      Current Outpatient Medications   Medication Sig Dispense Refill    predniSONE (DELTASONE) 20 MG tablet Take by mouth      carvedilol (COREG) 25 MG tablet Take 1 tablet by mouth 2 times daily 180 tablet 1    pantoprazole (PROTONIX) 20 MG tablet Take 1 tablet by mouth nightly 30 tablet 3    sildenafil (VIAGRA) 100 MG tablet TAKE 1/2 - 1 TABLET BY MOUTH AS NEEDED 30 tablet 5    lisinopril (PRINIVIL;ZESTRIL) 10 MG tablet TAKE ONE TABLET BY MOUTH DAILY WHEN BLOOD PRESSURE IS ELEVATED AFTER SKYRIZI. 90 tablet 3    pantoprazole (PROTONIX) 40 MG tablet Take 1 tablet by mouth every morning (before breakfast) 90 tablet 2    Clobetasol Propionate 0.05 % SHAM APPLY TO SCALP, LEAVE ON FOR 15 MINUTES THEN RINSE THOROUGHLY.  MAY USE 3-4 TIMES A WEEK FOR MAINTENANCE 118 mL 5    betamethasone dipropionate (DIPROLENE) 0.05 % cream       SKYRIZI, 150 MG DOSE, 75 MG/0.83ML PSKT injection Inject 150 mg into the skin Every 2 months       insulin NPH (HUMULIN N;NOVOLIN N) 100 UNIT/ML injection vial Inject 8 Units into the skin 2 times daily microadenoma ([facility]         Plan:        Return in about 4 months (around 2022) for f/u DM, HTN. Orders Placed This Encounter   Procedures    CT Lung Screen (Annual)     Age: Patient is 64 y.o. Smoking History: Social History    Tobacco Use      Smoking status: Former Smoker        Packs/day: 1.00        Years: 20.00        Pack years: 20        Types: Cigarettes        Quit date: 2015        Years since quittin.3      Smokeless tobacco: Never Used      Tobacco comment: Quit smoking 2016, CARMEN Mack P, 10/20/2016. Vaping Use      Vaping Use: Never used    Alcohol use: No      Alcohol/week: 0.0 standard drinks    Drug use: No   Pack years: 20    Date of last lung cancer screening: No previous lung cancer screening exam     Standing Status:   Future     Standing Expiration Date:   2023     Order Specific Question:   Is there documentation of shared decision making? Answer:   Yes     Order Specific Question:   Is this a low dose CT or a routine CT? Answer:   Low Dose CT [1]     Order Specific Question:   Is this the first (baseline) CT or an annual exam?     Answer:   Baseline [1]     Order Specific Question:   Does the patient show any signs or symptoms of lung cancer? Answer:   No     Order Specific Question:   Smoking Status? Answer: Former Smoker [4]     Order Specific Question:   Date quit smoking? (must be within 15 years)     Answer:   2015     Order Specific Question:   Smoking packs per day? Answer:   1     Order Specific Question:   Years smoking? Answer:   20    DC VISIT TO DISCUSS LUNG CA SCREEN W LDCT     No orders of the defined types were placed in this encounter. Patient given educational materials - see patient instructions. Discussed use, benefit, and side effects of prescribed medications. All patient questions answered. Pt voiced understanding. Reviewed health maintenance.             Electronically signed by Ruth Ann Cabrera DO, DO on 10/18/2021 at 12:08 AM             Low Dose CT (LDCT) Lung Screening criteria met:     Age 55-77(Medicare) or 50-80 ([facility])   Pack year smoking >30 (Medicare) or >20 ([facility])   Still smoking or less than 15 year since quit   No sign or symptoms of lung cancer   > 11 months since last LDCT     Risks and benefits of lung cancer screening with LDCT scans discussed:    Significance of positive screen - False-positive LDCT results often occur. 95% of all positive results do not lead to a diagnosis of cancer. Usually further imaging can resolve most false-positive results; however, some patients may require invasive procedures. Over diagnosis risk - 10% to 12% of screen-detected lung cancer cases are over diagnosedthat is, the cancer would not have been detected in the patient's lifetime without the screening. Need for follow up screens annually to continue lung cancer screening effectiveness     Risks associated with radiation from annual LDCT- Radiation exposure is about the same as for a mammogram, which is about 1/3 of the annual background radiation exposure from everyday life. Starting screening at age 54 is not likely to increase cancer risk from radiation exposure. Patients with comorbidities resulting in life expectancy of < 10 years, or that would preclude treatment of an abnormality identified on CT, should not be screened due to lack of benefit.     To obtain maximal benefit from this screening, smoking cessation and long-term abstinence from smoking is critical

## 2021-10-11 NOTE — TELEPHONE ENCOUNTER
Lab calling writer to inform of critical lab glucose result. Repeated glucose evel back to lab for verification. Reported critical lab to PCP.

## 2021-10-12 ENCOUNTER — PATIENT MESSAGE (OUTPATIENT)
Dept: FAMILY MEDICINE CLINIC | Age: 61
End: 2021-10-12

## 2021-10-12 ENCOUNTER — TELEPHONE (OUTPATIENT)
Dept: CARDIOLOGY | Age: 61
End: 2021-10-12

## 2021-10-12 ENCOUNTER — TELEPHONE (OUTPATIENT)
Dept: ONCOLOGY | Age: 61
End: 2021-10-12

## 2021-10-12 DIAGNOSIS — T38.0X5D STEROID-INDUCED DIABETES MELLITUS, SUBSEQUENT ENCOUNTER (HCC): ICD-10-CM

## 2021-10-12 DIAGNOSIS — E09.9 STEROID-INDUCED DIABETES MELLITUS, SUBSEQUENT ENCOUNTER (HCC): ICD-10-CM

## 2021-10-12 NOTE — TELEPHONE ENCOUNTER
From: Elizabeth Humphreys  To: Diane Damon DO  Sent: 10/12/2021 9:08 AM EDT  Subject: Prescription Question    I seen my blood glucose level. We talked yesterday about going back on insulin. That she would send in a prescription for insulin. I would need needles and test strips. Thank you.

## 2021-10-12 NOTE — TELEPHONE ENCOUNTER
From pt message:    I've been taking carvedilol that you prescribed for awhile. Resting heart rate still between 90  and 100. Not used to this. Is their any thing that can be done. Thank you.

## 2021-10-12 NOTE — LETTER
10/12/2021        Angus Wright [address] [place] [id number]    Dear Meredith Ponce:    Your healthcare provider has ordered a low dose CT scan of the chest for lung cancer screening. You will find enclosed, information about CT lung screening. Please review the statement of understanding, you will be asked to sign a copy of this at the time of your CT scan    If you have not already been contacted to make the appointment for your scan, please call our scheduling department at [phone]    Keep in mind that CT lung screening does not take the place of smoking cessation. If you are a current smoker, you will find enclosed smoking cessation resources. Please do not hesitate to contact me if you have any questions or concerns.     [address] [place] [facility]\"",      [facility] Lung Screening Program  [phone]

## 2021-10-13 ENCOUNTER — HOSPITAL ENCOUNTER (OUTPATIENT)
Dept: NON INVASIVE DIAGNOSTICS | Age: 61
Discharge: HOME | End: 2021-10-13
Payer: MEDICARE

## 2021-10-13 ENCOUNTER — PATIENT MESSAGE (OUTPATIENT)
Dept: FAMILY MEDICINE CLINIC | Age: 61
End: 2021-10-13

## 2021-10-13 ENCOUNTER — TELEPHONE (OUTPATIENT)
Dept: CARDIOLOGY | Age: 61
End: 2021-10-13

## 2021-10-13 DIAGNOSIS — I10 ESSENTIAL HYPERTENSION: ICD-10-CM

## 2021-10-13 PROCEDURE — 93005 ELECTROCARDIOGRAM TRACING: CPT

## 2021-10-13 NOTE — TELEPHONE ENCOUNTER
HR on ECG 74, this more accurate than his monitors. Best maybe is for him not to check his HR unless he is having symptoms.

## 2021-10-13 NOTE — TELEPHONE ENCOUNTER
From: Moody Barakat  To: Frankie Hickman DO  Sent: 10/13/2021 3:59 PM EDT  Subject: Prescription Question    I've been checking my glucose levels. Running 370 or higher. Need this prescription. Thank you.

## 2021-10-13 NOTE — TELEPHONE ENCOUNTER
From: Rogelio Deras  To: Alejandra Oscar DO  Sent: 10/13/2021 1:41 PM EDT  Subject: Prescription Question    Wondering what I'm supposed to do. Haven't heard anything back yet.

## 2021-10-13 NOTE — TELEPHONE ENCOUNTER
Gideon Martin LPN 18 hours ago (9:14         Pt aware of Dr. Arlene Garcia order for EKG. I referred him to the EKG lab in the basement, check in at therapy, walk in. Order is in chart.     Pt does not think he can come until 10/14 Thursday at the earliest.  He is going to [place] for a doctor appt tomorrow. He has to plan transport with his wife. They have one car.     He DOES NOT want to go back to Metoprolol due to ringing in his ears and BP not controlled. He reports carvedilol did help his BP but states frustration with his resting HR . Documentation    Gerda Garcia Cardiology Clinical Staff         Check ECG in office  Send me result  Options are to switch back to lopressor and add Everardo Dutta LPN [place], [place] \"Lv\"      We will await Dr. Arlene Garcia response. Gideon Martin LPN routed conversation to Yosef Lawson DO Yesterday (8:13 AM)   Gideon Martin LPN Yesterday (3:65 AM)   DA     From: Delia Hays  To: Phu Sanz DO  Sent: 10/12/2021  7:51 AM EDT  Subject: Prescription Question    I've been taking carvedilol that you prescribed for awhile. Resting heart rate still between 90  and 100. Not used to this. Is their any thing that can be done. Thank you. Documentation    Katarina Henley \"Lv\"  Phu Sanz DO Yesterday (7:51 AM)   [facility] been taking carvedilol that you prescribed for awhile. Resting heart rate still between 90  and 100. Not used to this. Is their any thing that can be done. Thank you.

## 2021-10-14 RX ORDER — BLOOD SUGAR DIAGNOSTIC
STRIP MISCELLANEOUS
Qty: 100 EACH | Refills: 2 | Status: SHIPPED | OUTPATIENT
Start: 2021-10-14 | End: 2022-01-22

## 2021-10-14 RX ORDER — GLUCOSAMINE HCL/CHONDROITIN SU 500-400 MG
CAPSULE ORAL
Qty: 300 STRIP | Refills: 1 | Status: SHIPPED | OUTPATIENT
Start: 2021-10-14 | End: 2022-03-31

## 2021-10-14 NOTE — TELEPHONE ENCOUNTER
I refilled pt's insulin, needles/syringes, and test strips within MyChart message from 10/12 - pt aware.

## 2021-10-15 ENCOUNTER — PATIENT MESSAGE (OUTPATIENT)
Dept: FAMILY MEDICINE CLINIC | Age: 61
End: 2021-10-15

## 2021-10-15 LAB
EKG ATRIAL RATE: 74 BPM
EKG P AXIS: 63 DEGREES
EKG P-R INTERVAL: 170 MS
EKG Q-T INTERVAL: 364 MS
EKG QRS DURATION: 80 MS
EKG QTC CALCULATION (BAZETT): 404 MS
EKG R AXIS: -25 DEGREES
EKG T AXIS: 47 DEGREES
EKG VENTRICULAR RATE: 74 BPM

## 2021-10-15 NOTE — TELEPHONE ENCOUNTER
From: Aditya Wynn  To: Gil Preston DO  Sent: 10/15/2021 10:05 AM EDT  Subject: Non-Urgent Medical Question    I was able to  the Novolog and the needles and test strips. The other will be in today. Thank you very much.

## 2021-10-18 ENCOUNTER — TELEPHONE (OUTPATIENT)
Dept: CARDIOLOGY | Age: 61
End: 2021-10-18

## 2021-10-18 ENCOUNTER — HOSPITAL ENCOUNTER (OUTPATIENT)
Dept: CT IMAGING | Age: 61
Discharge: HOME | End: 2021-10-20
Payer: MEDICARE

## 2021-10-18 DIAGNOSIS — Z87.891 PERSONAL HISTORY OF TOBACCO USE: ICD-10-CM

## 2021-10-18 PROCEDURE — 71271 CT THORAX LUNG CANCER SCR C-: CPT

## 2021-10-18 NOTE — TELEPHONE ENCOUNTER
Vernon Oates,  We are not sure what test you are referring to. If you blood glucose is that high, we recommend you calling your family doctor or go to the ER     Faith Rogers \"Lv\"  Rigoberto Spencer, DO 3 days ago   [address] [place] [address]  Just wondering if my blood glucose level was 471 at the time of the test. Would that make a difference? I was put on Prednisone on the 4 the. Since I've been on Prednisone my blood pressure has been low.

## 2021-10-29 DIAGNOSIS — I10 ESSENTIAL HYPERTENSION: ICD-10-CM

## 2021-10-29 DIAGNOSIS — E11.65 TYPE 2 DIABETES MELLITUS WITH HYPERGLYCEMIA, UNSPECIFIED WHETHER LONG TERM INSULIN USE (HCC): ICD-10-CM

## 2021-10-29 DIAGNOSIS — E78.5 HYPERLIPIDEMIA, UNSPECIFIED HYPERLIPIDEMIA TYPE: Primary | ICD-10-CM

## 2021-10-29 DIAGNOSIS — R74.8 ELEVATED ALKALINE PHOSPHATASE LEVEL: ICD-10-CM

## 2021-11-08 RX ORDER — PANTOPRAZOLE SODIUM 20 MG/1
TABLET, DELAYED RELEASE ORAL
Qty: 30 TABLET | Refills: 3 | Status: SHIPPED | OUTPATIENT
Start: 2021-11-08 | End: 2022-04-19

## 2021-11-19 DIAGNOSIS — L40.9 SCALP PSORIASIS: ICD-10-CM

## 2021-11-21 ENCOUNTER — PATIENT MESSAGE (OUTPATIENT)
Dept: FAMILY MEDICINE CLINIC | Age: 61
End: 2021-11-21

## 2021-11-21 DIAGNOSIS — E11.65 TYPE 2 DIABETES MELLITUS WITH HYPERGLYCEMIA, UNSPECIFIED WHETHER LONG TERM INSULIN USE (HCC): Primary | ICD-10-CM

## 2021-11-22 NOTE — TELEPHONE ENCOUNTER
Shashank Pendleton called requesting a refill of the below medication which has been pended for you:     Requested Prescriptions     Pending Prescriptions Disp Refills    Clobetasol Propionate 0.05 % SHAM [Pharmacy Med Name: CLOBETASOL 0.05% SHAMPOO] 118 mL 5     Sig: APPLY TO SCALP, LEAVE ON FOR 15 MINUTES THEN RINSE THOROUGHLY. MAY USE 3-4 TIMES A WEEK FOR MAINTENANCE       Last Appointment Date: 10/11/2021  Next Appointment Date: 2/11/2022    Allergies   Allergen Reactions    Cosentyx [Secukinumab] Nausea And Vomiting and Rash      fever    Lantus [Insulin Glargine] Itching, Swelling and Rash     Patient started Lantus about a month ago, only new medication. His skin reaction started on his abdomen where he injects the Lantus and moved up to the face. This is similar to his response to Cosentyx.      Infliximab Other (See Comments)     Nerve damage    Methotrexate Derivatives Other (See Comments)     Bone marrow toxicity    Metoprolol      Ringing in ears    Seasonal     Adhesive Tape Rash    Keflex [Cephalexin] Rash    Otezla [Apremilast] Rash     Other reaction(s): Unknown    Stellaria Rash    Taltz [Ixekizumab] Rash     Other reaction(s): Unknown    Ustekinumab Rash     Other reaction(s): rash-allergic and acute renal failure  Other reaction(s): Unknown

## 2021-11-22 NOTE — TELEPHONE ENCOUNTER
From: Delia Hays  To: Dr. Sj Caraballo: 11/21/2021 10:14 AM EST  Subject: Non-Urgent Medical Question    I was wondering if I could go on the dexcom G6. Instead of pricking my finger every time I check my blood glucose level? Thank you.

## 2021-11-23 RX ORDER — BLOOD-GLUCOSE TRANSMITTER
EACH MISCELLANEOUS
Qty: 1 EACH | Refills: 3 | Status: SHIPPED | OUTPATIENT
Start: 2021-11-23 | End: 2022-02-11 | Stop reason: SDUPTHER

## 2021-11-23 RX ORDER — BLOOD-GLUCOSE SENSOR
EACH MISCELLANEOUS
Qty: 3 EACH | Refills: 3 | Status: SHIPPED | OUTPATIENT
Start: 2021-11-23 | End: 2022-02-11 | Stop reason: SDUPTHER

## 2021-11-23 RX ORDER — CLOBETASOL PROPIONATE 0.05 G/100ML
SHAMPOO TOPICAL
Qty: 118 ML | Refills: 5 | Status: SHIPPED | OUTPATIENT
Start: 2021-11-23

## 2021-11-23 RX ORDER — BLOOD-GLUCOSE,RECEIVER,CONT
EACH MISCELLANEOUS
Qty: 1 EACH | Refills: 0 | Status: SHIPPED | OUTPATIENT
Start: 2021-11-23 | End: 2022-02-11 | Stop reason: SDUPTHER

## 2021-12-07 ENCOUNTER — TELEPHONE (OUTPATIENT)
Dept: FAMILY MEDICINE CLINIC | Age: 61
End: 2021-12-07

## 2021-12-13 DIAGNOSIS — T38.0X5D STEROID-INDUCED DIABETES MELLITUS, SUBSEQUENT ENCOUNTER (HCC): ICD-10-CM

## 2021-12-13 DIAGNOSIS — E09.9 STEROID-INDUCED DIABETES MELLITUS, SUBSEQUENT ENCOUNTER (HCC): ICD-10-CM

## 2021-12-13 RX ORDER — BLOOD SUGAR DIAGNOSTIC
STRIP MISCELLANEOUS
Qty: 100 EACH | Refills: 2 | OUTPATIENT
Start: 2021-12-13

## 2021-12-30 DIAGNOSIS — E09.9 STEROID-INDUCED DIABETES MELLITUS, SUBSEQUENT ENCOUNTER (HCC): ICD-10-CM

## 2021-12-30 DIAGNOSIS — T38.0X5D STEROID-INDUCED DIABETES MELLITUS, SUBSEQUENT ENCOUNTER (HCC): ICD-10-CM

## 2022-01-03 ENCOUNTER — PATIENT MESSAGE (OUTPATIENT)
Dept: FAMILY MEDICINE CLINIC | Age: 62
End: 2022-01-03

## 2022-01-03 DIAGNOSIS — T38.0X5D STEROID-INDUCED DIABETES MELLITUS, SUBSEQUENT ENCOUNTER (HCC): ICD-10-CM

## 2022-01-03 DIAGNOSIS — E09.9 STEROID-INDUCED DIABETES MELLITUS, SUBSEQUENT ENCOUNTER (HCC): ICD-10-CM

## 2022-01-03 RX ORDER — LISINOPRIL 10 MG/1
TABLET ORAL
Qty: 90 TABLET | Refills: 3 | Status: SHIPPED | OUTPATIENT
Start: 2022-01-03

## 2022-01-03 NOTE — TELEPHONE ENCOUNTER
From: Verónica Long  To: Dr. Socrates Newton: 1/3/2022 7:32 AM EST  Subject: New medicine. Went to [facility] for the rash. Put me on dexamethasone 1 mg. Tablets. It has changed my 12 hour shots. Went from waking up at 130 to 450. Was not expecting such a difference. Need some help before I run out of 12 hour and regular insulin. Thank you.

## 2022-01-03 NOTE — TELEPHONE ENCOUNTER
Last Appt:  8/23/2021  Next Appt:   2/28/2022  Med verified in [facility] [address]

## 2022-01-03 NOTE — TELEPHONE ENCOUNTER
Julia Ross called requesting a refill of the below medication which has been pended for you:     Requested Prescriptions     Pending Prescriptions Disp Refills    NOVOLOG 100 UNIT/ML injection vial [Pharmacy Med Name: NovoLOG 100 UNIT/ML Subcutaneous Solution] 10 mL 0     Sig: USE AS DIRECTED THREE TIMES DAILY PER SLIDING SCALE: 171-220=3 UNITS; 221-270=5 UNITS; 270-320=7 UNITS; GREATER THAN 320=9 UNITS       Last Appointment Date: 10/11/2021  Next Appointment Date: 2/11/2022    Allergies   Allergen Reactions    Cosentyx [Secukinumab] Nausea And Vomiting and Rash      fever    Lantus [Insulin Glargine] Itching, Swelling and Rash     Patient started Lantus about a month ago, only new medication. His skin reaction started on his abdomen where he injects the Lantus and moved up to the face. This is similar to his response to Cosentyx.      Infliximab Other (See Comments)     Nerve damage    Methotrexate Derivatives Other (See Comments)     Bone marrow toxicity    Metoprolol      Ringing in ears    Seasonal     Adhesive Tape Rash    Keflex [Cephalexin] Rash    Otezla [Apremilast] Rash     Other reaction(s): Unknown    Stellaria Rash    Taltz [Ixekizumab] Rash     Other reaction(s): Unknown    Ustekinumab Rash     Other reaction(s): rash-allergic and acute renal failure  Other reaction(s): Unknown

## 2022-01-04 DIAGNOSIS — T38.0X5D STEROID-INDUCED DIABETES MELLITUS, SUBSEQUENT ENCOUNTER (HCC): ICD-10-CM

## 2022-01-04 DIAGNOSIS — E09.9 STEROID-INDUCED DIABETES MELLITUS, SUBSEQUENT ENCOUNTER (HCC): ICD-10-CM

## 2022-01-06 ENCOUNTER — PATIENT MESSAGE (OUTPATIENT)
Dept: FAMILY MEDICINE CLINIC | Age: 62
End: 2022-01-06

## 2022-01-07 NOTE — TELEPHONE ENCOUNTER
From: Renny Dress  To: Dr. Humphries Close: 1/6/2022 7:37 PM EST  Subject: New medicine. The allergist in [facility] put me on a new medicine dexamethasone and it gave me some side effects. Including what seems like thrush. It sure feels like it. Is there anyway you could prescribe me something for thrush. Thank you.

## 2022-01-10 RX ORDER — PANTOPRAZOLE SODIUM 40 MG/1
TABLET, DELAYED RELEASE ORAL
Qty: 90 TABLET | Refills: 2 | Status: SHIPPED | OUTPATIENT
Start: 2022-01-10

## 2022-01-21 DIAGNOSIS — T38.0X5D STEROID-INDUCED DIABETES MELLITUS, SUBSEQUENT ENCOUNTER (HCC): ICD-10-CM

## 2022-01-21 DIAGNOSIS — E09.9 STEROID-INDUCED DIABETES MELLITUS, SUBSEQUENT ENCOUNTER (HCC): ICD-10-CM

## 2022-01-21 NOTE — TELEPHONE ENCOUNTER
Donte Nunez called requesting a refill of the below medication which has been pended for you:     Requested Prescriptions     Pending Prescriptions Disp Refills    Insulin Syringe-Needle U-100 30G X 5/16\" 0.3 ML MISC [Pharmacy Med Name: Vermell Gaucher INS SYRIN 0.3 ML 30GX5/16\"] 100 each 2     Sig: USE UP TO FIVE TIMES DAILY AS DIRECTED WITH INSULIN       Last Appointment Date: 10/11/2021  Next Appointment Date: 2/11/2022    Allergies   Allergen Reactions    Cosentyx [Secukinumab] Nausea And Vomiting and Rash      fever    Lantus [Insulin Glargine] Itching, Swelling and Rash     Patient started Lantus about a month ago, only new medication. His skin reaction started on his abdomen where he injects the Lantus and moved up to the face. This is similar to his response to Cosentyx.      Infliximab Other (See Comments)     Nerve damage    Methotrexate Derivatives Other (See Comments)     Bone marrow toxicity    Metoprolol      Ringing in ears    Seasonal     Adhesive Tape Rash    Keflex [Cephalexin] Rash    Otezla [Apremilast] Rash     Other reaction(s): Unknown    Stellaria Rash    Taltz [Ixekizumab] Rash     Other reaction(s): Unknown    Ustekinumab Rash     Other reaction(s): rash-allergic and acute renal failure  Other reaction(s): Unknown

## 2022-01-22 RX ORDER — BLOOD SUGAR DIAGNOSTIC
STRIP MISCELLANEOUS
Qty: 300 EACH | Refills: 3 | Status: SHIPPED | OUTPATIENT
Start: 2022-01-22 | End: 2022-09-24

## 2022-01-31 RX ORDER — CARVEDILOL 25 MG/1
TABLET ORAL
Qty: 180 TABLET | Refills: 3 | Status: SHIPPED | OUTPATIENT
Start: 2022-01-31 | End: 2022-02-28

## 2022-02-09 DIAGNOSIS — E09.9 STEROID-INDUCED DIABETES MELLITUS, SUBSEQUENT ENCOUNTER (HCC): ICD-10-CM

## 2022-02-09 DIAGNOSIS — T38.0X5D STEROID-INDUCED DIABETES MELLITUS, SUBSEQUENT ENCOUNTER (HCC): ICD-10-CM

## 2022-02-11 ENCOUNTER — OFFICE VISIT (OUTPATIENT)
Dept: FAMILY MEDICINE CLINIC | Age: 62
End: 2022-02-11
Payer: MEDICARE

## 2022-02-11 VITALS
BODY MASS INDEX: 27.58 KG/M2 | HEART RATE: 87 BPM | WEIGHT: 186.2 LBS | OXYGEN SATURATION: 98 % | DIASTOLIC BLOOD PRESSURE: 70 MMHG | SYSTOLIC BLOOD PRESSURE: 118 MMHG | HEIGHT: 69 IN | TEMPERATURE: 97.2 F

## 2022-02-11 DIAGNOSIS — I10 ESSENTIAL HYPERTENSION: ICD-10-CM

## 2022-02-11 DIAGNOSIS — R74.8 ELEVATED ALKALINE PHOSPHATASE LEVEL: ICD-10-CM

## 2022-02-11 DIAGNOSIS — T38.0X5D STEROID-INDUCED DIABETES MELLITUS, SUBSEQUENT ENCOUNTER (HCC): ICD-10-CM

## 2022-02-11 DIAGNOSIS — E11.65 TYPE 2 DIABETES MELLITUS WITH HYPERGLYCEMIA, UNSPECIFIED WHETHER LONG TERM INSULIN USE (HCC): Primary | ICD-10-CM

## 2022-02-11 DIAGNOSIS — L27.0 DRUG-INDUCED SKIN RASH: ICD-10-CM

## 2022-02-11 DIAGNOSIS — E09.9 STEROID-INDUCED DIABETES MELLITUS, SUBSEQUENT ENCOUNTER (HCC): ICD-10-CM

## 2022-02-11 DIAGNOSIS — L40.50 PSORIATIC ARTHRITIS (HCC): ICD-10-CM

## 2022-02-11 PROCEDURE — 99214 OFFICE O/P EST MOD 30 MIN: CPT | Performed by: FAMILY MEDICINE

## 2022-02-11 PROCEDURE — 1036F TOBACCO NON-USER: CPT | Performed by: FAMILY MEDICINE

## 2022-02-11 PROCEDURE — 3046F HEMOGLOBIN A1C LEVEL >9.0%: CPT | Performed by: FAMILY MEDICINE

## 2022-02-11 PROCEDURE — G8482 FLU IMMUNIZE ORDER/ADMIN: HCPCS | Performed by: FAMILY MEDICINE

## 2022-02-11 PROCEDURE — G8417 CALC BMI ABV UP PARAM F/U: HCPCS | Performed by: FAMILY MEDICINE

## 2022-02-11 PROCEDURE — 3017F COLORECTAL CA SCREEN DOC REV: CPT | Performed by: FAMILY MEDICINE

## 2022-02-11 PROCEDURE — 2022F DILAT RTA XM EVC RTNOPTHY: CPT | Performed by: FAMILY MEDICINE

## 2022-02-11 PROCEDURE — G8427 DOCREV CUR MEDS BY ELIG CLIN: HCPCS | Performed by: FAMILY MEDICINE

## 2022-02-11 PROCEDURE — 99212 OFFICE O/P EST SF 10 MIN: CPT | Performed by: FAMILY MEDICINE

## 2022-02-11 RX ORDER — BLOOD-GLUCOSE TRANSMITTER
EACH MISCELLANEOUS
Qty: 1 EACH | Refills: 3 | Status: SHIPPED | OUTPATIENT
Start: 2022-02-11

## 2022-02-11 RX ORDER — BLOOD-GLUCOSE,RECEIVER,CONT
EACH MISCELLANEOUS
Qty: 1 EACH | Refills: 0 | Status: SHIPPED | OUTPATIENT
Start: 2022-02-11

## 2022-02-11 RX ORDER — BLOOD-GLUCOSE SENSOR
EACH MISCELLANEOUS
Qty: 9 EACH | Refills: 3 | Status: SHIPPED | OUTPATIENT
Start: 2022-02-11

## 2022-02-11 ASSESSMENT — ENCOUNTER SYMPTOMS
COUGH: 0
BLOOD IN STOOL: 0
SHORTNESS OF BREATH: 0

## 2022-02-11 NOTE — PROGRESS NOTES
[address]  [address] [place]  [phone]      Kimberlee Marvin is a 63 y.o. male who presents today for his medical conditions/complaints as noted below. Kimberlee Marvin is c/o of Diabetes and Hypertension      HPI:     Pt here today for follow-up of DM and HTN. Pt took course of Decadron in December for [place] 42 prior to skin allergy testing - states it caused pain in the bottom of his feet and palms of his hands. Took it for 7 days only (course was for 21 days); pain resolved within 2-3 days of stopping. He had previously tried a 10-day course of Prednisone prior to the Decadron to clear up his skin, but it had no effect. Pt feels like the time he used Prednisone prior that it had also not been as effective as it has in the past.  Between both steroid courses in December, pt noted 16-lb weight gain. Had skin patch test done in [facility] on [facility] - was not found to be allergic to anything that he is currently using on daily basis. They are now going to consult with his Dermatologist in [facility] to see what the next step is. Has appt with them both at the end of this month. Took his Skyrizi in 9/2021 and then not again until yesterday; was supposed to have one in 12/2021 but it was not sent from [facility] until this week. Can tell that his psoriatic arthritis is worse right now - worst in his feet, ankles, hands, and knees. Not using anything for pain. Has small patch of psoriasis on his L shin. Will likely break out in the next 1-2 days from his [place], if he follows typical pattern - will take OTC allergy pill as needed. Last A1c was 11.5% on 10/11/21 - pt states that he knows he needs to get his next one drawn, but he didn't want to check right after his steroid courses, as he knows it would make A1c worse.   Fasting AM glucose have been 110-120's; better since recently adjusted his Novolin N dose to 4 units BID in the past week. Using Novolog infrequently - maybe 1-2x's per day at most, usually 5 units or so. Glucose readings had jumped up to 300-400's when he was on the [address] Eating slowly improving since he has been off the steroids for awhile. Going to schedule DM eye exam sometime later this year. Dysphagia/GERD has been improved recently; has been able to eat small amounts of tomato products again intermittently. Was supposed to see GI in [place] on 10/13, but could not make that appt; unsure if he will reschedule. Taking Protonix 20 mg nightly at bedtime; has 40 mg tabs on hand at home to use only as needed when he has an episode of dysphagia. Some nights, he is noticing tachycardia when he lies in bed - will check and it can be ~116. Taking Coreg 25 mg BID for this, but admits he sometimes takes an extra dose at night, if he his heart is racing, but it doesn't seem like it helps. Seemed like the Metoprolol helped more in the past, but he had stopped this when he thought it was causing tinnitus. However, this has not resolved, so he may talk to Cardiology about switching back to the Metoprolol. Has appt with them on 2/28. Taking Lisinopril 10 mg as needed for HTN - takes it maybe 5-7 times per week, as he admits that he takes it multiple times in a day, if his BP is elevated. Never takes more than 10 mg at a time, and never more than 30 mg in a day. Will take one if he is >145/100. BP well-controlled today - 118/70; he reports the past 2 days at home, his BP has been good.             Past Medical History:   Diagnosis Date    Allergic reaction caused by a drug 8/13/2016    Arthritis     psoritic arthritis    Chronic kidney disease     Follows with Nephro    COPD (chronic obstructive pulmonary disease) ([address]     pt  denies    CVA (cerebral vascular accident) ([address]     Neuro eval with Dr Ovalle here    Depression     Diabetes mellitus ([address]     History of blood transfusion     History of migraine headaches     Hyperlipidemia     Hypertension     [facility]aden[facility]     Eval by NS and Endo  but no recent FU    Pneumothorax 2015    left    Psoriatic arthritis ([address]     Stroke ([facility]     x3    Thrombocytopenia ([facility]       Past Surgical History:   Procedure Laterality Date    CHEST TUBE INSERTION Left     out now    COLONOSCOPY       SIS 10 y    ENDOSCOPY, COLON, DIAGNOSTIC  2021    EGD BIOPSY GASTRIC AND GE JUNCTION     OTHER SURGICAL HISTORY Right     stent r kidney    OTHER SURGICAL HISTORY  5/27/15    resection of bleb    PILONIDAL CYST DRAINAGE      SHOULDER SURGERY Left     Rotator cuff    THORACOSCOPY  5/27/15    THORACOTOMY  5/27/15    UPPER GASTROINTESTINAL ENDOSCOPY      UPPER GASTROINTESTINAL ENDOSCOPY N/A 2020    EGD with biopsies performed by Bib Clayton DO at [facility]  2021    UPPER GASTROINTESTINAL ENDOSCOPY N/A 2021    EGD BIOPSY performed by Julio Brock MD at [facility] N/A 2021    EGD BIOPSY GASTRIC AND GE JUNCTION  performed by Julio Brock MD at [id number] [place] [address].     Family History   Problem Relation Age of Onset    Diabetes Sister     Alzheimer's Disease Maternal Grandmother     Other Maternal Grandmother         dementia    High Blood Pressure Maternal Grandfather     Cancer Maternal Grandfather         Unknown    High Blood Pressure Mother     Other Mother         blood clots    Other Paternal Grandmother         dementia     Social History     Tobacco Use    Smoking status: Former Smoker     Packs/day: 1.00     Years: 20.00     Pack years: 20.00     Types: Cigarettes     Quit date: 2015     Years since quittin.7    Smokeless tobacco: Never Used    Tobacco comment: Quit smoking 2016, CARMEN GEEP, 10/20/2016.    Substance Use Topics    Alcohol use: No     Alcohol/week: 0.0 standard drinks      Current Outpatient Medications   Medication Sig Dispense Refill    triamcinolone (KENALOG) 0.1 % ointment       Continuous Blood Gluc  (DEXCOM G6 ) SILVANA Use to continuously check blood glucose. 1 each 0    Continuous Blood Gluc Transmit (DEXCOM G6 TRANSMITTER) MISC Use to check blood glucose continuously. 1 each 3    Continuous Blood Gluc Sensor (DEXCOM G6 SENSOR) MISC Use to check blood glucose continuously. 9 each 3    carvedilol (COREG) 25 MG tablet TAKE ONE TABLET BY MOUTH TWICE A  tablet 3    Insulin Syringe-Needle U-100 30G X 5/16\" 0.3 ML MISC USE UP TO FIVE TIMES DAILY AS DIRECTED WITH INSULIN 300 each 3    pantoprazole (PROTONIX) 40 MG tablet TAKE ONE TABLET BY MOUTH EVERY MORNING BEFORE BREAKFAST 90 tablet 2    insulin aspart (NOVOLOG) 100 UNIT/ML injection vial USE AS DIRECTED THREE TIMES DAILY PER SLIDING SCALE: 171-220=3 UNITS; 221-270=5 UNITS; 271-320=7 UNITS; GREATER THAN 320=9 UNITS 10 mL 3    insulin NPH (HUMULIN N;NOVOLIN N) 100 UNIT/ML injection vial Inject 8 Units into the skin 2 times daily 10 mL 2    lisinopril (PRINIVIL;ZESTRIL) 10 MG tablet TAKE ONE TABLET BY MOUTH DAILY WHEN BLOOD PRESSURE IS ELEVATED AFTER SKYRIZI. 90 tablet 3    Clobetasol Propionate 0.05 % SHAM APPLY TO SCALP, LEAVE ON FOR 15 MINUTES THEN RINSE THOROUGHLY. MAY USE 3-4 TIMES A WEEK FOR MAINTENANCE 118 mL 5    pantoprazole (PROTONIX) 20 MG tablet TAKE ONE TABLET BY MOUTH ONCE NIGHTLY 30 tablet 3    blood glucose monitor strips Test 3 times a day & as needed for symptoms of irregular blood glucose. 300 strip 1    sildenafil (VIAGRA) 100 MG tablet TAKE 1/2 - 1 TABLET BY MOUTH AS NEEDED 30 tablet 5    betamethasone dipropionate (DIPROLENE) 0.05 % cream       SKYRIZI, 150 MG DOSE, 75 MG/0.83ML PSKT injection Inject 150 mg into the skin Every 2 months        No current facility-administered medications for this visit.      Allergies   Allergen Reactions    Cosentyx [Secukinumab] Nausea And Vomiting and Rash      fever    Lantus [Insulin Glargine] Itching, Swelling and Rash     Patient started Lantus about a month ago, only new medication. His skin reaction started on his abdomen where he injects the Lantus and moved up to the face. This is similar to his response to Cosentyx.  Infliximab Other (See Comments)     Nerve damage    Methotrexate Derivatives Other (See Comments)     Bone marrow toxicity    Metoprolol      Ringing in ears    Seasonal     Adhesive Tape Rash    Keflex [Cephalexin] Rash    Yoel Lazier [Apremilast] Rash     Other reaction(s): Unknown    Stellaria Rash    Taltz [Ixekizumab] Rash     Other reaction(s): Unknown    Ustekinumab Rash     Other reaction(s): rash-allergic and acute renal failure  Other reaction(s): Unknown       Health Maintenance   Topic Date Due    Pneumococcal 0-64 years Vaccine (1 of 2 - PPSV23) Never done    Hepatitis B vaccine (1 of 3 - Risk 3-dose series) Never done    Shingles Vaccine (1 of 2) Never done    Diabetic retinal exam  10/30/2018    A1C test (Diabetic or Prediabetic)  01/11/2022    Diabetic foot exam  06/08/2022    Depression Screen  06/08/2022    Annual Wellness Visit (AWV)  06/09/2022    Lipid screen  10/11/2022    Potassium monitoring  10/11/2022    Creatinine monitoring  10/11/2022    Low dose CT lung screening  10/18/2022    Colorectal Cancer Screen  08/08/2023    DTaP/Tdap/Td vaccine (2 - Td or Tdap) 09/03/2028    COVID-19 Vaccine  Completed    Hepatitis C screen  Completed    HIV screen  Addressed    Hepatitis A vaccine  Aged Out    Hib vaccine  Aged Out    Meningococcal (ACWY) vaccine  Aged Out       Subjective:      Review of Systems   Constitutional: Negative for fever. HENT: Positive for tinnitus (chronic - seems to be worse when he has not slept well). Respiratory: Negative for cough and shortness of breath. Cardiovascular: Negative for chest pain and palpitations. Gastrointestinal: Negative for blood in stool. Genitourinary: Negative for dysuria and hematuria. Musculoskeletal: Positive for arthralgias (from psoriatic arthritis). Skin: Positive for rash. Neurological: Negative for dizziness and light-headedness. Psychiatric/Behavioral: Positive for sleep disturbance (due to pain). Objective:     Vitals:    02/11/22 0845   BP: 118/70   Site: Right Upper Arm   Position: Sitting   Cuff Size: Large Adult   Pulse: 87   Temp: 97.2 °F (36.2 °C)   TempSrc: Temporal   SpO2: 98%   Weight: 186 lb 3.2 oz (84.5 kg)   Height: 5' 8.5\" (1.74 m)     Physical Exam  Vitals and nursing note reviewed. Constitutional:       General: He is not in acute distress. Appearance: He is well-developed. HENT:      Head: Normocephalic and atraumatic. Right Ear: Tympanic membrane, ear canal and external ear normal.      Left Ear: Tympanic membrane, ear canal and external ear normal.      Nose: Nose normal.      Mouth/Throat:      Mouth: Mucous membranes are moist.      Pharynx: Oropharynx is clear. No oropharyngeal exudate. Eyes:      Conjunctiva/sclera: Conjunctivae normal.   Cardiovascular:      Rate and Rhythm: Normal rate and regular rhythm. Heart sounds: Normal heart sounds. Pulmonary:      Effort: Pulmonary effort is normal. No respiratory distress. Breath sounds: Normal breath sounds. Abdominal:      General: Bowel sounds are normal. There is no distension. Palpations: Abdomen is soft. Tenderness: There is no abdominal tenderness. Skin:     General: Skin is warm and dry. Neurological:      Mental Status: He is alert and oriented to person, place, and time. Assessment:      1.  Type 2 diabetes mellitus with hyperglycemia, unspecified whether long term insulin use (HCC)  -     Continuous Blood Gluc  ([address]) SILVANA; Use to continuously check blood glucose., Disp-1 each, R-0Normal  -     Continuous Blood Gluc Transmit (DEXCOM G6 TRANSMITTER) MISC; Use to check blood glucose continuously. , Disp-1 each, R-3Normal  -     Continuous Blood Gluc Sensor (DEXCOM G6 SENSOR) MISC; Use to check blood glucose continuously. , Disp-9 each, R-3Normal  2. Steroid-induced diabetes mellitus, subsequent encounter ([facility]  3. Essential hypertension  4. Psoriatic arthritis ([facility]  5. Elevated alkaline phosphatase level  -     Alkaline Phosphatase, Isoenzymes; Future  6. Drug-induced skin rash         Plan:      Declined Pneumovax today. Return in about 4 months (around 6/11/2022) for f/u DM, HTN. Orders Placed This Encounter   Procedures    Alkaline Phosphatase, Isoenzymes     Standing Status:   Future     Standing Expiration Date:   2/11/2023     Orders Placed This Encounter   Medications    Continuous Blood Gluc  ([address]) SILVANA     Sig: Use to continuously check blood glucose. Dispense:  1 each     Refill:  0    Continuous Blood Gluc Transmit (DEXCOM G6 TRANSMITTER) MISC     Sig: Use to check blood glucose continuously. Dispense:  1 each     Refill:  3    Continuous Blood Gluc Sensor (DEXCOM G6 SENSOR) MISC     Sig: Use to check blood glucose continuously. Dispense:  9 each     Refill:  3       Patient given educational materials - see patient instructions. Discussed use, benefit, and side effects of prescribed medications. All patient questions answered. Pt voiced understanding. Reviewed health maintenance.             Electronically signed by Radha Angeles DO, DO on 2/20/2022 at 11:52 PM

## 2022-02-23 NOTE — TELEPHONE ENCOUNTER
From: Charlie Patiño  To: Julieta Casper DO  Sent: 10/11/2021 2:07 PM EDT  Subject: Prescription Question    Are you going to sand in a prescription for my diabetes? Thank you.
See [employer] message from 10/12 back to pt.
[Follow-Up: _____] : a [unfilled] follow-up visit

## 2022-02-28 ENCOUNTER — OFFICE VISIT (OUTPATIENT)
Dept: CARDIOLOGY | Age: 62
End: 2022-02-28
Payer: MEDICARE

## 2022-02-28 VITALS
DIASTOLIC BLOOD PRESSURE: 76 MMHG | SYSTOLIC BLOOD PRESSURE: 111 MMHG | WEIGHT: 184 LBS | HEIGHT: 69 IN | HEART RATE: 77 BPM | BODY MASS INDEX: 27.25 KG/M2

## 2022-02-28 DIAGNOSIS — I10 ESSENTIAL HYPERTENSION: ICD-10-CM

## 2022-02-28 DIAGNOSIS — I63.9 CEREBROVASCULAR ACCIDENT (CVA), UNSPECIFIED MECHANISM (HCC): ICD-10-CM

## 2022-02-28 DIAGNOSIS — I10 PRIMARY HYPERTENSION: Primary | ICD-10-CM

## 2022-02-28 DIAGNOSIS — L40.9 PSORIASIS: ICD-10-CM

## 2022-02-28 DIAGNOSIS — E78.2 MIXED HYPERLIPIDEMIA: ICD-10-CM

## 2022-02-28 PROCEDURE — 3017F COLORECTAL CA SCREEN DOC REV: CPT | Performed by: INTERNAL MEDICINE

## 2022-02-28 PROCEDURE — 99214 OFFICE O/P EST MOD 30 MIN: CPT | Performed by: INTERNAL MEDICINE

## 2022-02-28 PROCEDURE — G8482 FLU IMMUNIZE ORDER/ADMIN: HCPCS | Performed by: INTERNAL MEDICINE

## 2022-02-28 PROCEDURE — G8417 CALC BMI ABV UP PARAM F/U: HCPCS | Performed by: INTERNAL MEDICINE

## 2022-02-28 PROCEDURE — 1036F TOBACCO NON-USER: CPT | Performed by: INTERNAL MEDICINE

## 2022-02-28 PROCEDURE — 93010 ELECTROCARDIOGRAM REPORT: CPT | Performed by: INTERNAL MEDICINE

## 2022-02-28 PROCEDURE — 93005 ELECTROCARDIOGRAM TRACING: CPT | Performed by: INTERNAL MEDICINE

## 2022-02-28 PROCEDURE — G8427 DOCREV CUR MEDS BY ELIG CLIN: HCPCS | Performed by: INTERNAL MEDICINE

## 2022-02-28 RX ORDER — AMLODIPINE BESYLATE 5 MG/1
5 TABLET ORAL DAILY
Qty: 90 TABLET | Refills: 1 | Status: SHIPPED | OUTPATIENT
Start: 2022-02-28

## 2022-02-28 RX ORDER — METOPROLOL TARTRATE 100 MG/1
100 TABLET ORAL 2 TIMES DAILY
Qty: 180 TABLET | Refills: 1 | Status: SHIPPED | OUTPATIENT
Start: 2022-02-28

## 2022-02-28 RX ORDER — FOLIC ACID 1 MG/1
1 TABLET ORAL DAILY
COMMUNITY

## 2022-02-28 NOTE — PROGRESS NOTES
Today's Date: 2/28/2022  Patient Name: Gery Osler  Patient's age: 63 y.o., 1960    CC: follow up for HTN    HPI:  The patient is a 63 y.o.  male is in the office for f/u. Still dealing with rashes, itching and psoriasis- following at [facility]- now on Methotrexate. HR higher in evenings. Takes his lisinopril and coreg and brings heart rate down. Feels his ears ringing. States his BP and HR was doing better on lopressor and norvasc (rather than coreg). He would like to go back. Denies any cp, sob, orthopnea, pnd, le edema. Past Medical History:   has a past medical history of Allergic reaction caused by a drug, Arthritis, Chronic kidney disease, COPD (chronic obstructive pulmonary disease) ([address] CVA (cerebral vascular accident) ([address] Depression, Diabetes mellitus ([address] History of blood transfusion, History of migraine headaches, Hyperlipidemia, Hypertension, Pituitary microadenoma ([address] Pneumothorax, Psoriatic arthritis ([address] Stroke ([address] and Thrombocytopenia ([address] Past Surgical History:   has a past surgical history that includes other surgical history (Right); shoulder surgery (Left); Colonoscopy; Upper gastrointestinal endoscopy; Pilonidal cyst drainage; chest tube insertion (Left); Thoracoscopy (5/27/15); thoracotomy (5/27/15); other surgical history (5/27/15); Upper gastrointestinal endoscopy (N/A, 7/8/2020); Upper gastrointestinal endoscopy (01/20/2021); Upper gastrointestinal endoscopy (N/A, 1/20/2021); Endoscopy, colon, diagnostic (04/08/2021); and Upper gastrointestinal endoscopy (N/A, 4/8/2021). Home Medications:    Prior to Admission medications    Medication Sig Start Date End Date Taking?  Authorizing Provider   methotrexate (RHEUMATREX) 2.5 MG chemo tablet Take 2.5 mg by mouth once a week Take 2 tabs every Friday   Yes Historical Provider, MD   folic acid (FOLVITE) 1 MG tablet Take 1 mg by mouth daily   Yes Historical Provider, MD   triamcinolone (KENALOG) 0.1 % ointment  12/24/21  Yes Historical Provider, MD   Continuous Blood Gluc  (Lilian Newton) SILVANA Use to continuously check blood glucose. 2/11/22  Yes Cadence Chen DO   Continuous Blood Gluc Transmit (DEXCOM G6 TRANSMITTER) MISC Use to check blood glucose continuously. 2/11/22  Yes Cadence Chen, DO   Continuous Blood Gluc Sensor (DEXCOM G6 SENSOR) MISC Use to check blood glucose continuously. 2/11/22  Yes Cadence Chen DO   carvedilol (COREG) 25 MG tablet TAKE ONE TABLET BY MOUTH TWICE A DAY 1/31/22  Yes Phu Sanz, DO   Insulin Syringe-Needle U-100 30G X 5/16\" 0.3 ML MISC USE UP TO FIVE TIMES DAILY AS DIRECTED WITH INSULIN 1/22/22  Yes Mikhail Chen DO   pantoprazole (PROTONIX) 40 MG tablet TAKE ONE TABLET BY MOUTH EVERY MORNING BEFORE BREAKFAST 1/10/22  Yes Leroy Packer MD   insulin aspart (NOVOLOG) 100 UNIT/ML injection vial USE AS DIRECTED THREE TIMES DAILY PER SLIDING SCALE: 171-220=3 UNITS; 221-270=5 UNITS; 271-320=7 UNITS; GREATER THAN 320=9 UNITS 1/4/22  Yes Cadence Chen DO   insulin NPH (HUMULIN N;NOVOLIN N) 100 UNIT/ML injection vial Inject 8 Units into the skin 2 times daily 1/4/22  Yes Vero Chen DO   lisinopril (PRINIVIL;ZESTRIL) 10 MG tablet TAKE ONE TABLET BY MOUTH DAILY WHEN BLOOD PRESSURE IS ELEVATED AFTER SKYRIZI. 1/3/22  Yes Phu Sanz,    Clobetasol Propionate 0.05 % SHAM APPLY TO SCALP, LEAVE ON FOR 15 MINUTES THEN RINSE THOROUGHLY. MAY USE 3-4 TIMES A WEEK FOR MAINTENANCE 11/23/21  Yes Cadence Chen DO   pantoprazole (PROTONIX) 20 MG tablet TAKE ONE TABLET BY MOUTH ONCE NIGHTLY 11/8/21  Yes Leroy Packer MD   blood glucose monitor strips Test 3 times a day & as needed for symptoms of irregular blood glucose.  10/14/21  Yes Cadence Chen DO   sildenafil (VIAGRA) 100 MG tablet TAKE 1/2 - 1 TABLET BY MOUTH AS NEEDED 7/9/21  Yes Cadence Chen, DO   betamethasone dipropionate (DIPROLENE) 0.05 % cream  5/12/20  Yes Historical Provider, MD MARQUEZ, 150 MG DOSE, 75 MG/0.83ML PSKT injection Inject 150 mg into the skin Every 2 months  11/25/19  Yes Historical Provider, MD       Allergies:  Cosentyx [secukinumab], Lantus [insulin glargine], Infliximab, Methotrexate derivatives, Metoprolol, Seasonal, Adhesive tape, Keflex [cephalexin], Otezla [apremilast], Stellaria, Taltz [ixekizumab], and Ustekinumab    Social History:   reports that he quit smoking about 6 years ago. His smoking use included cigarettes. He has a 20.00 pack-year smoking history. He has never used smokeless tobacco. He reports that he does not drink alcohol and does not use drugs. REVIEW OF SYSTEMS:  · Constitutional: there has been no unanticipated weight loss. There's been No change in energy level, No change in activity level. · Eyes: No visual changes or diplopia. No scleral icterus. · ENT: No Headaches, hearing loss or vertigo. No mouth sores or sore throat. · Cardiovascular: AS HPI  · Respiratory: AS HPI  · Gastrointestinal: No abdominal pain, appetite loss, blood in stools. No change in bowel or bladder habits. · Genitourinary: No dysuria, trouble voiding, or hematuria. · Musculoskeletal:  No gait disturbance, No weakness or joint complaints. · Integumentary: No rash or pruritis. · Neurological: No headache, diplopia, change in muscle strength, numbness or tingling. No change in gait, balance, coordination, mood, affect, memory, mentation, behavior. · Psychiatric: No new anxiety or depression. · Endocrine: No temperature intolerance. No excessive thirst, fluid intake, or urination. No tremor. · Hematologic/Lymphatic: No abnormal bruising or bleeding, blood clots or swollen lymph nodes. · Allergic/Immunologic: No nasal congestion or hives. PHYSICAL EXAM:      /76   Pulse 77   Ht 5' 8.5\" (1.74 m)   Wt 184 lb (83.5 kg)   BMI 27.57 kg/m²    HEENT: PERRL, no cervical lymphadenopathy. No masses palpable.   Cardiovascular:  · The apical impulse is not displaced  · Heart  Sounds:RRR, S4  · Jugular venous pulsation Normal  · The carotid upstroke is normal  · Peripheral pulses are symmetrical and full  Respiratory: Good respiratory effort. On auscultation: clear to auscultation bilaterally  Abdomen:  · No masses or tenderness  · Bowel sounds present  Extremities:  ·  No Cyanosis or Clubbing  ·  Lower extremity edema: No  Skin: Warm and dry    Cardiac data:      ECG :  Sinus  Rhythm   WITHIN NORMAL LIMITS    Labs:   Lab Results   Component Value Date    CHOL 177 10/11/2021    TRIG 272 (H) 10/11/2021    HDL 39 (L) 10/11/2021    LDLCHOLESTEROL 84 10/11/2021    VLDL NOT REPORTED (H) 10/11/2021    CHOLHDLRATIO 4.5 10/11/2021     Lab Results   Component Value Date     (L) 10/11/2021    K 5.1 10/11/2021    CL 89 (L) 10/11/2021    CO2 24 10/11/2021    BUN 38 (H) 10/11/2021    CREATININE 2.08 (H) 10/11/2021    GLUCOSE 685 (HH) 10/11/2021    CALCIUM 8.9 10/11/2021    PROT 6.8 10/11/2021    LABALBU 4.0 10/11/2021    BILITOT 0.79 10/11/2021    ALKPHOS 188 (H) 10/11/2021    AST 9 10/11/2021    ALT 12 10/11/2021    LABGLOM 33 (L) 10/11/2021    GFRAA 40 (L) 10/11/2021    GLOB 3.3 10/06/2019     Assessment:    · HTN- not tolerating coreg (due to persistent higher HR - per patient). · DM  · HLP  · Previous history of smoking. · CKD with solitary kidney. · COPD  · History of CVA. · Preserved LV systolic function  · NEGATIVE STRESS TEST 10/2017  · Psoriasis- follows at [facility]    Recommendations:  - patient wants to go back to lopressor and norvasc   - stop coreg  - start lopressor 100 bid (previous dose)  - start norvasc 5 mg po qd  - he is using lisinopril as needed- will see how often he requires it with above medication changes. - continue nephro follow up  - following with [facility] for his psoriasis   - again recommended aspirin daily    The patient is to continue heart healthy diet, weight loss and exercise as tolerated.  Patient's medications and side effects were discussed. Medication refills were provided if needed. Follow up appointment timing was discussed. All questions and concerns were addressed to patient's satisfaction. The patient is to follow up in 6 months or sooner if necessary. Thank you for allowing me to participate in the care of this patient, please do not hesitate to call if you have any questions. Zaira Sanabria DO, [facility], [address], [address], [place] [address] [facility]  [facility]. [facility]  [phone]

## 2022-03-15 ENCOUNTER — HOSPITAL ENCOUNTER (EMERGENCY)
Age: 62
Discharge: ANOTHER HOSPITAL | End: 2022-03-16
Attending: SPECIALIST
Payer: MEDICARE

## 2022-03-15 ENCOUNTER — APPOINTMENT (OUTPATIENT)
Dept: CT IMAGING | Age: 62
End: 2022-03-15
Payer: MEDICARE

## 2022-03-15 DIAGNOSIS — R79.89 ELEVATED LFTS: ICD-10-CM

## 2022-03-15 DIAGNOSIS — K81.0 ACUTE CHOLECYSTITIS: Primary | ICD-10-CM

## 2022-03-15 DIAGNOSIS — N18.9 CHRONIC RENAL IMPAIRMENT, UNSPECIFIED CKD STAGE: ICD-10-CM

## 2022-03-15 DIAGNOSIS — E87.1 HYPONATREMIA: ICD-10-CM

## 2022-03-15 LAB
ABSOLUTE EOS #: 0.4 K/UL (ref 0–0.44)
ABSOLUTE IMMATURE GRANULOCYTE: 0.06 K/UL (ref 0–0.3)
ABSOLUTE LYMPH #: 0.79 K/UL (ref 1.1–3.7)
ABSOLUTE MONO #: 0.85 K/UL (ref 0.1–1.2)
ALBUMIN SERPL-MCNC: 3.6 G/DL (ref 3.5–5.2)
ALBUMIN/GLOBULIN RATIO: 1.2 (ref 1–2.5)
ALP BLD-CCNC: 148 U/L (ref 40–129)
ALT SERPL-CCNC: 143 U/L (ref 5–41)
ANION GAP SERPL CALCULATED.3IONS-SCNC: 10 MMOL/L (ref 9–17)
AST SERPL-CCNC: 89 U/L
BASOPHILS # BLD: 0 % (ref 0–2)
BASOPHILS ABSOLUTE: 0.04 K/UL (ref 0–0.2)
BILIRUB SERPL-MCNC: 9.42 MG/DL (ref 0.3–1.2)
BILIRUBIN DIRECT: 8.23 MG/DL
BILIRUBIN, INDIRECT: 1.19 MG/DL (ref 0–1)
BUN BLDV-MCNC: 19 MG/DL (ref 6–20)
BUN/CREAT BLD: 8 (ref 9–20)
CALCIUM SERPL-MCNC: 9.5 MG/DL (ref 8.6–10.4)
CHLORIDE BLD-SCNC: 92 MMOL/L (ref 98–107)
CO2: 27 MMOL/L (ref 20–31)
CREAT SERPL-MCNC: 2.47 MG/DL (ref 0.7–1.2)
EOSINOPHILS RELATIVE PERCENT: 4 % (ref 1–4)
GFR AFRICAN AMERICAN: 33 ML/MIN
GFR NON-AFRICAN AMERICAN: 27 ML/MIN
GFR SERPL CREATININE-BSD FRML MDRD: ABNORMAL ML/MIN/{1.73_M2}
GLOBULIN: 3.1 G/DL (ref 1.5–3.8)
GLUCOSE BLD-MCNC: 139 MG/DL (ref 70–99)
HCT VFR BLD CALC: 46.3 % (ref 40.7–50.3)
HEMOGLOBIN: 16.1 G/DL (ref 13–17)
IMMATURE GRANULOCYTES: 1 %
LACTIC ACID: 1.1 MMOL/L (ref 0.5–2.2)
LIPASE: 24 U/L (ref 13–60)
LYMPHOCYTES # BLD: 7 % (ref 24–43)
MCH RBC QN AUTO: 32.3 PG (ref 25.2–33.5)
MCHC RBC AUTO-ENTMCNC: 34.8 G/DL (ref 25.2–33.5)
MCV RBC AUTO: 93 FL (ref 82.6–102.9)
MONOCYTES # BLD: 8 % (ref 3–12)
NRBC AUTOMATED: 0 PER 100 WBC
PDW BLD-RTO: 15.5 % (ref 11.8–14.4)
PLATELET # BLD: 231 K/UL (ref 138–453)
PMV BLD AUTO: 9.1 FL (ref 8.1–13.5)
POTASSIUM SERPL-SCNC: 4.3 MMOL/L (ref 3.7–5.3)
RBC # BLD: 4.98 M/UL (ref 4.21–5.77)
RBC # BLD: ABNORMAL 10*6/UL
SEG NEUTROPHILS: 80 % (ref 36–65)
SEGMENTED NEUTROPHILS ABSOLUTE COUNT: 9.06 K/UL (ref 1.5–8.1)
SODIUM BLD-SCNC: 129 MMOL/L (ref 135–144)
TOTAL PROTEIN: 6.7 G/DL (ref 6.4–8.3)
WBC # BLD: 11.2 K/UL (ref 3.5–11.3)

## 2022-03-15 PROCEDURE — 2580000003 HC RX 258: Performed by: SPECIALIST

## 2022-03-15 PROCEDURE — 36415 COLL VENOUS BLD VENIPUNCTURE: CPT

## 2022-03-15 PROCEDURE — 96365 THER/PROPH/DIAG IV INF INIT: CPT

## 2022-03-15 PROCEDURE — 81001 URINALYSIS AUTO W/SCOPE: CPT

## 2022-03-15 PROCEDURE — 99285 EMERGENCY DEPT VISIT HI MDM: CPT

## 2022-03-15 PROCEDURE — 80048 BASIC METABOLIC PNL TOTAL CA: CPT

## 2022-03-15 PROCEDURE — 74176 CT ABD & PELVIS W/O CONTRAST: CPT

## 2022-03-15 PROCEDURE — 83605 ASSAY OF LACTIC ACID: CPT

## 2022-03-15 PROCEDURE — 83690 ASSAY OF LIPASE: CPT

## 2022-03-15 PROCEDURE — 80076 HEPATIC FUNCTION PANEL: CPT

## 2022-03-15 PROCEDURE — 85025 COMPLETE CBC W/AUTO DIFF WBC: CPT

## 2022-03-15 RX ORDER — SODIUM CHLORIDE 9 MG/ML
1000 INJECTION, SOLUTION INTRAVENOUS CONTINUOUS
Status: DISCONTINUED | OUTPATIENT
Start: 2022-03-15 | End: 2022-03-16 | Stop reason: HOSPADM

## 2022-03-15 RX ORDER — 0.9 % SODIUM CHLORIDE 0.9 %
500 INTRAVENOUS SOLUTION INTRAVENOUS ONCE
Status: COMPLETED | OUTPATIENT
Start: 2022-03-15 | End: 2022-03-15

## 2022-03-15 RX ADMIN — SODIUM CHLORIDE 500 ML: 9 INJECTION, SOLUTION INTRAVENOUS at 22:03

## 2022-03-15 RX ADMIN — SODIUM CHLORIDE 500 ML: 9 INJECTION, SOLUTION INTRAVENOUS at 23:00

## 2022-03-15 ASSESSMENT — PAIN - FUNCTIONAL ASSESSMENT: PAIN_FUNCTIONAL_ASSESSMENT: 0-10

## 2022-03-15 ASSESSMENT — PAIN SCALES - GENERAL: PAINLEVEL_OUTOF10: 9

## 2022-03-16 ENCOUNTER — APPOINTMENT (OUTPATIENT)
Dept: ULTRASOUND IMAGING | Age: 62
DRG: 445 | End: 2022-03-16
Payer: MEDICARE

## 2022-03-16 ENCOUNTER — APPOINTMENT (OUTPATIENT)
Dept: MRI IMAGING | Age: 62
DRG: 445 | End: 2022-03-16
Payer: MEDICARE

## 2022-03-16 ENCOUNTER — HOSPITAL ENCOUNTER (INPATIENT)
Age: 62
LOS: 2 days | Discharge: LEFT BEFORE DISCHARGE | DRG: 445 | End: 2022-03-18
Attending: EMERGENCY MEDICINE | Admitting: INTERNAL MEDICINE
Payer: MEDICARE

## 2022-03-16 ENCOUNTER — APPOINTMENT (OUTPATIENT)
Dept: GENERAL RADIOLOGY | Age: 62
DRG: 445 | End: 2022-03-16
Payer: MEDICARE

## 2022-03-16 VITALS
RESPIRATION RATE: 16 BRPM | OXYGEN SATURATION: 97 % | WEIGHT: 180 LBS | BODY MASS INDEX: 27.28 KG/M2 | HEIGHT: 68 IN | HEART RATE: 95 BPM | TEMPERATURE: 98.6 F | SYSTOLIC BLOOD PRESSURE: 101 MMHG | DIASTOLIC BLOOD PRESSURE: 84 MMHG

## 2022-03-16 DIAGNOSIS — K81.9 CHOLECYSTITIS: ICD-10-CM

## 2022-03-16 DIAGNOSIS — R17 ELEVATED BILIRUBIN: ICD-10-CM

## 2022-03-16 DIAGNOSIS — R17 JAUNDICE: Primary | ICD-10-CM

## 2022-03-16 PROBLEM — K80.20 CHOLELITHIASIS WITHOUT CHOLECYSTITIS: Status: ACTIVE | Noted: 2022-03-16

## 2022-03-16 PROBLEM — K80.20 CHOLELITHIASIS: Status: ACTIVE | Noted: 2022-03-16

## 2022-03-16 PROBLEM — K81.0 ACUTE CHOLECYSTITIS: Status: ACTIVE | Noted: 2022-03-16

## 2022-03-16 LAB
-: NORMAL
BACTERIA: NORMAL
BILIRUBIN URINE: ABNORMAL
EPITHELIAL CELLS UA: NORMAL /HPF (ref 0–5)
GLUCOSE BLD-MCNC: 105 MG/DL (ref 75–110)
GLUCOSE BLD-MCNC: 136 MG/DL (ref 75–110)
GLUCOSE BLD-MCNC: 202 MG/DL (ref 75–110)
GLUCOSE URINE: ABNORMAL
KETONES, URINE: ABNORMAL
LEUKOCYTE ESTERASE, URINE: NEGATIVE
NITRITE, URINE: NEGATIVE
PH UA: 6.5 (ref 5–6)
PROTEIN UA: ABNORMAL
RBC UA: NORMAL /HPF (ref 0–4)
SARS-COV-2, RAPID: NOT DETECTED
SPECIFIC GRAVITY UA: 1.01 (ref 1.01–1.02)
SPECIMEN DESCRIPTION: NORMAL
URINE HGB: ABNORMAL
UROBILINOGEN, URINE: ABNORMAL
WBC UA: NORMAL /HPF (ref 0–4)

## 2022-03-16 PROCEDURE — 6360000002 HC RX W HCPCS: Performed by: SPECIALIST

## 2022-03-16 PROCEDURE — 99223 1ST HOSP IP/OBS HIGH 75: CPT | Performed by: INTERNAL MEDICINE

## 2022-03-16 PROCEDURE — 2060000000 HC ICU INTERMEDIATE R&B

## 2022-03-16 PROCEDURE — 6360000002 HC RX W HCPCS

## 2022-03-16 PROCEDURE — 82947 ASSAY GLUCOSE BLOOD QUANT: CPT

## 2022-03-16 PROCEDURE — 6370000000 HC RX 637 (ALT 250 FOR IP): Performed by: SPECIALIST

## 2022-03-16 PROCEDURE — 76705 ECHO EXAM OF ABDOMEN: CPT

## 2022-03-16 PROCEDURE — 71045 X-RAY EXAM CHEST 1 VIEW: CPT

## 2022-03-16 PROCEDURE — 2580000003 HC RX 258

## 2022-03-16 PROCEDURE — 74181 MRI ABDOMEN W/O CONTRAST: CPT

## 2022-03-16 PROCEDURE — 99285 EMERGENCY DEPT VISIT HI MDM: CPT

## 2022-03-16 PROCEDURE — 99222 1ST HOSP IP/OBS MODERATE 55: CPT | Performed by: NURSE PRACTITIONER

## 2022-03-16 PROCEDURE — 87635 SARS-COV-2 COVID-19 AMP PRB: CPT

## 2022-03-16 PROCEDURE — 6370000000 HC RX 637 (ALT 250 FOR IP)

## 2022-03-16 PROCEDURE — 2580000003 HC RX 258: Performed by: SPECIALIST

## 2022-03-16 RX ORDER — ACETAMINOPHEN 650 MG/1
650 SUPPOSITORY RECTAL EVERY 6 HOURS PRN
Status: DISCONTINUED | OUTPATIENT
Start: 2022-03-16 | End: 2022-03-18 | Stop reason: HOSPADM

## 2022-03-16 RX ORDER — DESOXIMETASONE 2.5 MG/G
OINTMENT TOPICAL
COMMUNITY
Start: 2022-02-25

## 2022-03-16 RX ORDER — SODIUM CHLORIDE 9 MG/ML
25 INJECTION, SOLUTION INTRAVENOUS PRN
Status: DISCONTINUED | OUTPATIENT
Start: 2022-03-16 | End: 2022-03-18 | Stop reason: HOSPADM

## 2022-03-16 RX ORDER — NICOTINE POLACRILEX 4 MG
15 LOZENGE BUCCAL PRN
Status: DISCONTINUED | OUTPATIENT
Start: 2022-03-16 | End: 2022-03-18 | Stop reason: HOSPADM

## 2022-03-16 RX ORDER — POLYETHYLENE GLYCOL 3350 17 G/17G
17 POWDER, FOR SOLUTION ORAL DAILY PRN
Status: DISCONTINUED | OUTPATIENT
Start: 2022-03-16 | End: 2022-03-18 | Stop reason: HOSPADM

## 2022-03-16 RX ORDER — PANTOPRAZOLE SODIUM 20 MG/1
20 TABLET, DELAYED RELEASE ORAL NIGHTLY
Status: DISCONTINUED | OUTPATIENT
Start: 2022-03-16 | End: 2022-03-18 | Stop reason: HOSPADM

## 2022-03-16 RX ORDER — METOPROLOL TARTRATE 50 MG/1
100 TABLET, FILM COATED ORAL 2 TIMES DAILY
Status: DISCONTINUED | OUTPATIENT
Start: 2022-03-16 | End: 2022-03-18 | Stop reason: HOSPADM

## 2022-03-16 RX ORDER — DEXTROSE MONOHYDRATE 25 G/50ML
12.5 INJECTION, SOLUTION INTRAVENOUS PRN
Status: DISCONTINUED | OUTPATIENT
Start: 2022-03-16 | End: 2022-03-18 | Stop reason: HOSPADM

## 2022-03-16 RX ORDER — SODIUM CHLORIDE 0.9 % (FLUSH) 0.9 %
5-40 SYRINGE (ML) INJECTION EVERY 12 HOURS SCHEDULED
Status: DISCONTINUED | OUTPATIENT
Start: 2022-03-16 | End: 2022-03-18 | Stop reason: HOSPADM

## 2022-03-16 RX ORDER — SODIUM CHLORIDE 9 MG/ML
INJECTION, SOLUTION INTRAVENOUS CONTINUOUS
Status: DISCONTINUED | OUTPATIENT
Start: 2022-03-16 | End: 2022-03-18 | Stop reason: HOSPADM

## 2022-03-16 RX ORDER — ONDANSETRON 2 MG/ML
4 INJECTION INTRAMUSCULAR; INTRAVENOUS EVERY 6 HOURS PRN
Status: DISCONTINUED | OUTPATIENT
Start: 2022-03-16 | End: 2022-03-18 | Stop reason: HOSPADM

## 2022-03-16 RX ORDER — ACETAMINOPHEN 500 MG
500 TABLET ORAL ONCE
Status: COMPLETED | OUTPATIENT
Start: 2022-03-16 | End: 2022-03-16

## 2022-03-16 RX ORDER — DEXTROSE MONOHYDRATE 50 MG/ML
100 INJECTION, SOLUTION INTRAVENOUS PRN
Status: DISCONTINUED | OUTPATIENT
Start: 2022-03-16 | End: 2022-03-18 | Stop reason: HOSPADM

## 2022-03-16 RX ORDER — FOLIC ACID 1 MG/1
1 TABLET ORAL DAILY
Status: DISCONTINUED | OUTPATIENT
Start: 2022-03-16 | End: 2022-03-18 | Stop reason: HOSPADM

## 2022-03-16 RX ORDER — ACETAMINOPHEN 325 MG/1
650 TABLET ORAL EVERY 6 HOURS PRN
Status: DISCONTINUED | OUTPATIENT
Start: 2022-03-16 | End: 2022-03-18 | Stop reason: HOSPADM

## 2022-03-16 RX ORDER — LISINOPRIL 10 MG/1
10 TABLET ORAL DAILY
Status: CANCELLED | OUTPATIENT
Start: 2022-03-16

## 2022-03-16 RX ORDER — ONDANSETRON 4 MG/1
4 TABLET, ORALLY DISINTEGRATING ORAL EVERY 8 HOURS PRN
Status: DISCONTINUED | OUTPATIENT
Start: 2022-03-16 | End: 2022-03-18 | Stop reason: HOSPADM

## 2022-03-16 RX ORDER — PANTOPRAZOLE SODIUM 40 MG/1
40 TABLET, DELAYED RELEASE ORAL
Status: DISCONTINUED | OUTPATIENT
Start: 2022-03-17 | End: 2022-03-18 | Stop reason: HOSPADM

## 2022-03-16 RX ORDER — SODIUM CHLORIDE 0.9 % (FLUSH) 0.9 %
5-40 SYRINGE (ML) INJECTION PRN
Status: DISCONTINUED | OUTPATIENT
Start: 2022-03-16 | End: 2022-03-18 | Stop reason: HOSPADM

## 2022-03-16 RX ADMIN — PIPERACILLIN SODIUM AND TAZOBACTAM SODIUM 3375 MG: 3; .375 INJECTION, POWDER, LYOPHILIZED, FOR SOLUTION INTRAVENOUS at 06:44

## 2022-03-16 RX ADMIN — ENOXAPARIN SODIUM 40 MG: 100 INJECTION SUBCUTANEOUS at 19:46

## 2022-03-16 RX ADMIN — METOPROLOL TARTRATE 100 MG: 50 TABLET, FILM COATED ORAL at 20:58

## 2022-03-16 RX ADMIN — SODIUM CHLORIDE: 9 INJECTION, SOLUTION INTRAVENOUS at 23:30

## 2022-03-16 RX ADMIN — INSULIN LISPRO 1 UNITS: 100 INJECTION, SOLUTION INTRAVENOUS; SUBCUTANEOUS at 20:59

## 2022-03-16 RX ADMIN — PIPERACILLIN AND TAZOBACTAM 3375 MG: 3; .375 INJECTION, POWDER, FOR SOLUTION INTRAVENOUS at 19:45

## 2022-03-16 RX ADMIN — ACETAMINOPHEN 500 MG: 500 TABLET, FILM COATED ORAL at 02:55

## 2022-03-16 RX ADMIN — PIPERACILLIN AND TAZOBACTAM 3375 MG: 3; .375 INJECTION, POWDER, LYOPHILIZED, FOR SOLUTION INTRAVENOUS at 00:42

## 2022-03-16 RX ADMIN — SODIUM CHLORIDE, PRESERVATIVE FREE 10 ML: 5 INJECTION INTRAVENOUS at 20:59

## 2022-03-16 RX ADMIN — FOLIC ACID 1 MG: 1 TABLET ORAL at 19:46

## 2022-03-16 RX ADMIN — SODIUM CHLORIDE: 9 INJECTION, SOLUTION INTRAVENOUS at 19:43

## 2022-03-16 RX ADMIN — SODIUM CHLORIDE 500 ML: 9 INJECTION, SOLUTION INTRAVENOUS at 02:57

## 2022-03-16 RX ADMIN — PANTOPRAZOLE SODIUM 20 MG: 40 TABLET, DELAYED RELEASE ORAL at 20:58

## 2022-03-16 ASSESSMENT — ENCOUNTER SYMPTOMS
VOMITING: 1
EYES NEGATIVE: 1
DIARRHEA: 0
ABDOMINAL PAIN: 0
PHOTOPHOBIA: 0
NAUSEA: 1
VOICE CHANGE: 0
WHEEZING: 0
BACK PAIN: 0
RESPIRATORY NEGATIVE: 1
TROUBLE SWALLOWING: 0
SHORTNESS OF BREATH: 0
VOMITING: 1
SHORTNESS OF BREATH: 0
BLOOD IN STOOL: 0
COUGH: 0
ABDOMINAL PAIN: 0
DIARRHEA: 1
CONSTIPATION: 0
COUGH: 0

## 2022-03-16 ASSESSMENT — PAIN DESCRIPTION - LOCATION: LOCATION: HEAD

## 2022-03-16 ASSESSMENT — PAIN SCALES - GENERAL: PAINLEVEL_OUTOF10: 4

## 2022-03-16 NOTE — ED PROVIDER NOTES
[address]      Pt Name: Olya Wise  MRN: [id number]  [place] 1960  Date of evaluation: 3/15/2022      CHIEF COMPLAINT       Chief Complaint   Patient presents with    Hematuria         HISTORY OF PRESENT ILLNESS    Olya Wise is a 63 y.o. male who presents to the emergency department for evaluation of possible hematuria as patient has noticed reddish discoloration of the urine without any blood clots since this morning. Patient denies any urinary frequency, urgency, dysuria and denies any abdominal pain or flank pain. He has had nausea and 5-6 episodes of vomiting on March 14 and had one episode of diarrhea on the same day. He denies any hematemesis, melena or hematochezia. He has had fever with temperature 101°F on March 14 but denies any fever on the day of evaluation. His appetite has been decreased. Patient states his blood pressure was 95/70 prior to arrival with heart rate of 94. He denies any chest pain, shortness of breath, cough, lightheadedness, dizziness, diaphoresis. Patient has multiple medical problems including chronic kidney disease, COPD, diabetes mellitus, hypertension, psoriatic arthritis, cerebrovascular accident and thrombocytopenia. Patient has known severe left renal atrophy and has stent in the right renal artery. REVIEW OF SYSTEMS       Review of Systems   Constitutional: Positive for fever. Respiratory: Negative for cough and shortness of breath. Gastrointestinal: Positive for diarrhea and vomiting. Negative for abdominal pain. Genitourinary: Positive for hematuria. Negative for dysuria, flank pain and frequency. Neurological: Negative for dizziness and light-headedness. All other systems reviewed and are negative.        PAST MEDICAL HISTORY    has a past medical history of Allergic reaction caused by a drug, Arthritis, Chronic kidney disease, COPD (chronic obstructive pulmonary INJECTION VIAL    Inject 8 Units into the skin 2 times daily    INSULIN SYRINGE-NEEDLE U-100 30G X \" 0.3 ML MISC    USE UP TO FIVE TIMES DAILY AS DIRECTED WITH INSULIN    LISINOPRIL (PRINIVIL;ZESTRIL) 10 MG TABLET    TAKE ONE TABLET BY MOUTH DAILY WHEN BLOOD PRESSURE IS ELEVATED AFTER SKYRIZI. METHOTREXATE (RHEUMATREX) 2.5 MG CHEMO TABLET    Take 2.5 mg by mouth once a week Take 2 tabs every Friday    METOPROLOL TARTRATE (LOPRESSOR) 100 MG TABLET    Take 1 tablet by mouth 2 times daily    PANTOPRAZOLE (PROTONIX) 20 MG TABLET    TAKE ONE TABLET BY MOUTH ONCE NIGHTLY    PANTOPRAZOLE (PROTONIX) 40 MG TABLET    TAKE ONE TABLET BY MOUTH EVERY MORNING BEFORE BREAKFAST    SILDENAFIL (VIAGRA) 100 MG TABLET    TAKE 1/2 - 1 TABLET BY MOUTH AS NEEDED    SKYRIZI, 150 MG DOSE, 75 MG/0.83ML PSKT INJECTION    Inject 150 mg into the skin Every 2 months     TRIAMCINOLONE (KENALOG) 0.1 % OINTMENT           ALLERGIES     is allergic to cosentyx [secukinumab], lantus [insulin glargine], infliximab, methotrexate derivatives, metoprolol, seasonal, adhesive tape, keflex [cephalexin], otezla [apremilast], stellaria, taltz [ixekizumab], and ustekinumab. FAMILY HISTORY     He indicated that his mother is alive. He indicated that his father is alive. He indicated that his sister is alive. He indicated that his maternal grandmother is . He indicated that his maternal grandfather is . He indicated that his paternal grandmother is . He indicated that his paternal grandfather is . family history includes Alzheimer's Disease in his maternal grandmother; Cancer in his maternal grandfather; Diabetes in his sister; High Blood Pressure in his maternal grandfather and mother; Other in his maternal grandmother, mother, and paternal grandmother. SOCIAL HISTORY      reports that he quit smoking about 6 years ago. His smoking use included cigarettes. He has a 20.00 pack-year smoking history.  He has never used smokeless tobacco. He reports that he does not drink alcohol and does not use drugs. PHYSICAL EXAM     INITIAL VITALS:  height is 5' 8\" (1.727 m) and weight is 180 lb (81.6 kg). His tympanic temperature is 99.1 °F (37.3 °C). His blood pressure is 126/93 (abnormal) and his pulse is 89. His respiration is 21 and oxygen saturation is 97%. Physical Exam  Vitals and nursing note reviewed. Constitutional:       Appearance: He is well-developed. HENT:      Head: Normocephalic and atraumatic. Nose: Nose normal.   Eyes:      General: Scleral icterus present. Extraocular Movements: Extraocular movements intact. Pupils: Pupils are equal, round, and reactive to light. Cardiovascular:      Rate and Rhythm: Normal rate and regular rhythm. Heart sounds: Normal heart sounds. No murmur heard. Pulmonary:      Effort: Pulmonary effort is normal. No respiratory distress. Breath sounds: Normal breath sounds. Abdominal:      General: Bowel sounds are normal. There is no distension. Palpations: Abdomen is soft. Tenderness: There is no abdominal tenderness. Musculoskeletal:      Cervical back: Normal range of motion and neck supple. Skin:     General: Skin is warm and dry. Neurological:      General: No focal deficit present. Mental Status: He is alert and oriented to person, place, and time. DIFFERENTIAL DIAGNOSIS/ MDM:     Patient presents with yellowish-reddish discoloration of the urine, nausea, vomiting and fever at home. Patient has low-grade fever upon arrival and sclerae is icteric. Will obtain CBC, chemistries, liver function tests, urinalysis and CAT scan of the abdomen and pelvis.     DIAGNOSTIC RESULTS     EKG: All EKG's are interpreted by the Emergency Department Physician who either signs or Co-signs this chart in the absence of a cardiologist.    None obtained      RADIOLOGY:   Interpretation per the Radiologist below, if available at the time of this note:    CT ABDOMEN PELVIS WO CONTRAST Additional Contrast? None    Result Date: 3/16/2022  EXAMINATION: CT OF THE ABDOMEN AND PELVIS WITHOUT CONTRAST 3/15/2022 11:48 pm TECHNIQUE: CT of the abdomen and pelvis was performed without the administration of intravenous contrast. Multiplanar reformatted images are provided for review. Dose modulation, iterative reconstruction, and/or weight based adjustment of the mA/kV was utilized to reduce the radiation dose to as low as reasonably achievable. COMPARISON: 10/06/2019 HISTORY: ORDERING SYSTEM PROVIDED HISTORY: Elevated LFTs, renal insufficiency TECHNOLOGIST PROVIDED HISTORY: Elevated LFTs, renal insufficiency Decision Support Exception - unselect if not a suspected or confirmed emergency medical condition->Emergency Medical Condition (MA) Reason for Exam: Elevated LFTs, renal insufficiency, pt had some nausea and vomiting yesterday but none today FINDINGS: Lower Chest: Lung bases are clear and the heart size is normal.  There is a small hiatal hernia. There are few mildly prominent lymph nodes adjacent to the hiatal hernia. Organs: There are calcified gallstones. There is diffuse gallbladder wall thickening with pericholecystic fat stranding. The liver, spleen, pancreas, adrenal glands and right kidney are grossly normal with the limitations of a noncontrast study. There are few vascular calcifications. There is a right renal artery stent. Unchanged severe left renal atrophy. Few tiny calcifications in the lower pole of the atrophic left kidney. GI/Bowel: The appendix is normal.  No bowel obstruction. Unopacified bowel loops are unremarkable. Pelvis: Mild prostatomegaly. Urinary bladder is grossly normal. Peritoneum/Retroperitoneum: There is no adenopathy, free air or free fluid. Bones/Soft Tissues: No acute bone finding. Cholelithiasis. Gallbladder wall thickening with pericholecystic fat stranding consistent with acute cholecystitis.  Severe left renal atrophy. Right renal artery stent. RECOMMENDATIONS: Unavailable              ED BEDSIDE ULTRASOUND:       LABS:  Labs Reviewed   CBC WITH AUTO DIFFERENTIAL - Abnormal; Notable for the following components:       Result Value    MCHC 34.8 (*)     RDW 15.5 (*)     Seg Neutrophils 80 (*)     Lymphocytes 7 (*)     Immature Granulocytes 1 (*)     Segs Absolute 9.06 (*)     Absolute Lymph # 0.79 (*)     All other components within normal limits   BASIC METABOLIC PANEL W/ REFLEX TO MG FOR LOW K - Abnormal; Notable for the following components:    Glucose 139 (*)     CREATININE 2.47 (*)     Bun/Cre Ratio 8 (*)     Sodium 129 (*)     Chloride 92 (*)     GFR Non- 27 (*)     GFR  33 (*)     All other components within normal limits   HEPATIC FUNCTION PANEL - Abnormal; Notable for the following components:    Alkaline Phosphatase 148 (*)      (*)     AST 89 (*)     Total Bilirubin 9.42 (*)     Bilirubin, Direct 8.23 (*)     Bilirubin, Indirect 1.19 (*)     All other components within normal limits   URINALYSIS WITH REFLEX TO CULTURE - Abnormal; Notable for the following components:    Glucose, Ur TRACE (*)     Bilirubin Urine 3+ (*)     Ketones, Urine TRACE (*)     Urine Hgb TRACE (*)     pH, UA 6.5 (*)     Protein, UA TRACE (*)     Urobilinogen, Urine 4 mg/dL (*)     All other components within normal limits   COVID-19, RAPID   LIPASE   LACTIC ACID   MICROSCOPIC URINALYSIS       Patient is elevated liver function tests, hyponatremia, renal insufficiency but no evidence of hematuria or UTI.     EMERGENCY DEPARTMENT COURSE:   Vitals:    Vitals:    03/16/22 0100 03/16/22 0115 03/16/22 0130 03/16/22 0200   BP: 102/82 123/72 121/86 (!) 126/93   Pulse: 77 81 90 89   Resp: 19 24 23 21   Temp:       TempSrc:       SpO2: 95% 94% 95% 97%   Weight:       Height:         -------------------------  BP: (!) 126/93, Temp: 99.1 °F (37.3 °C), Pulse: 89, Resp: 21    Orders Placed This Encounter Medications    0.9 % sodium chloride bolus    0.9 % sodium chloride infusion    piperacillin-tazobactam (ZOSYN) 3,375 mg in dextrose 5 % 50 mL IVPB (mini-bag)     Order Specific Question:   Antimicrobial Indications     Answer:   Intra-Abdominal Infection       During the emergency department course, patient was given normal saline bolus followed by infusion and Zosyn 3.375 g IV piggyback as he has cholecystitis on the CAT scan. Patient will benefit from hospitalization. I discussed the case with general surgeon Dr. Kaur Thapa who recommended to transfer the patient as he will need ERCP and see gastroenterologist and these facilities are not available in our institution. I discussed the case with Adolfo Paz CNP covering for hospitalist Dr. Jennifer Macedo at [facility] as well as general surgeon Dr. Celina Washburn and they kindly accepted the patient as a direct admit on medical floor. We are awaiting bed assignment and then transfer arrangements will be made. I have reviewed the disposition diagnosis with the patient and or their family/guardian. I have answered their questions and given discharge instructions. They voiced understanding of these instructions and did not have any further questions or complaints. Re-evaluation Notes    Patient is resting comfortably and does not appear to be in any pain or distress. CRITICAL CARE:   None        CONSULTS: Adolfo Paz CNP and general surgeon Dr. Martinez Pod:  None    FINAL IMPRESSION      1. Acute cholecystitis    2. Elevated LFTs    3. Hyponatremia    4. Chronic renal impairment, unspecified CKD stage          DISPOSITION/PLAN   DISPOSITION Decision To Transfer    Condition on Disposition    Stable    PATIENT REFERRED TO:  No follow-up provider specified.     DISCHARGE MEDICATIONS:  New Prescriptions    No medications on file       (Please note that portions of this note were completed with a voice recognition program.  Efforts were made to edit the dictations but occasionally words are mis-transcribed.)    Sasha Downing MD,, MD, F.A.C.E.P.   Attending Emergency Physician                         Sasha Downing MD  03/16/22 [address] [place] [address] Graciela Kiser MD  03/16/22 0528

## 2022-03-16 NOTE — CONSULTS
General Surgery  Consult    PATIENT NAME: Jericho Hernandez  AGE: 63 y.o. MEDICAL RECORD NO. [id number]  DATE: 3/16/2022  SURGEON: Dr. Shawn Jenkins: Shazia Sylvester DO    Patient evaluated at the request of  Dr. Suzi Garcia  Reason for evaluation: jaundice/acute cholecystitis concerns    Patient information was obtained from patient. History/Exam limitations: none. Patient presented to the Emergency Department as transfer from [address] [place] [address] Dr:     Patient Active Problem List   Diagnosis    Hypertension    [facility]iatic arthritis [facility]    Chronic kidney disease (CKD), stage III (moderate) [facility]    CVA (cerebral vascular accident) ([facility]    History of migraine headaches    Hyperlipidemia    Pituitary microadenoma ([facility]    Chronic bullous emphysema (HCC)    Sinus tachycardia    Acute kidney injury ([facility]    Hyponatremia    Uncontrolled diabetes mellitus type 2 without complications    Psoriasis    Drug-induced skin rash    Leukopenia    Type 2 diabetes mellitus with hyperglycemia (HCC)    Esophageal dysphagia    Hiatal hernia    Esophagitis    Acute cholecystitis       PLAN:   1. Follow up GI recommendations  1. Likely will need ERCP prior to surgical intervention  2. MMPT  3. IVF, CLD today [email or web address]  4. Admit to medicine, optimize medically prior to any surgical intervention    Will d/w Dr. Serrano Code    HISTORY:   History of Chief Complaint:    Jericho Hernandez is a 63 y.o. male who presented as a transfer from [place] ED with concerns of acute cholecystitis. Patient had presented to [place] ED for hematuria and blood clots in urine. He had nausea, 5-6 episodes of vomiting since 3/14, fever, anorexia, hypotension, and appeared jaundice in that ER. CT scan showed cholelithiasis, gallbladder wall thickening with pericholecystic fat stranding.  Patient was accepted for transfer and direct admission to [facility] by their medicine team. Surgery at [facility] declined patient and so patient was transferred here instead. Patient denies abdominal pain at this time, notes headache. Patient's wife in room reports that he has yellow eyes and have been this way since Friday. Past Medical History   has a past medical history of Allergic reaction caused by a drug, Arthritis, Chronic kidney disease, COPD (chronic obstructive pulmonary disease) ([address] CVA (cerebral vascular accident) ([address] Depression, Diabetes mellitus ([address] History of blood transfusion, History of migraine headaches, Hyperlipidemia, Hypertension, Pituitary microadenoma ([address] Pneumothorax, Psoriatic arthritis ([address] Stroke ([address] and Thrombocytopenia ([address] Past Surgical History   has a past surgical history that includes other surgical history (Right); shoulder surgery (Left); Colonoscopy; Upper gastrointestinal endoscopy; Pilonidal cyst drainage; chest tube insertion (Left); Thoracoscopy (05/27/2015); thoracotomy (05/27/2015); other surgical history (05/27/2015); Upper gastrointestinal endoscopy (N/A, 07/08/2020); Upper gastrointestinal endoscopy (01/20/2021); Upper gastrointestinal endoscopy (N/A, 01/20/2021); Endoscopy, colon, diagnostic (04/08/2021); and Upper gastrointestinal endoscopy (N/A, 04/08/2021). Medications  Prior to Admission medications    Medication Sig Start Date End Date Taking?  Authorizing Provider   desoximetasone (TOPICORT) 0.25 % OINT  2/25/22   Historical Provider, MD   methotrexate (RHEUMATREX) 2.5 MG chemo tablet Take 2.5 mg by mouth once a week Take 2 tabs every Friday    Historical Provider, MD   folic acid (FOLVITE) 1 MG tablet Take 1 mg by mouth daily    Historical Provider, MD   metoprolol tartrate (LOPRESSOR) 100 MG tablet Take 1 tablet by mouth 2 times daily 2/28/22   Phu Sanz DO   amLODIPine (NORVASC) 5 MG tablet Take 1 tablet by mouth daily  Patient not taking: Reported on 3/16/2022 2/28/22   Phu Sanz DO   triamcinolone (KENALOG) 0.1 % ointment  12/24/21   Historical Provider, MD Continuous Blood Gluc  ([address]) SILVANA Use to continuously check blood glucose. Patient not taking: Reported on 3/16/2022 2/11/22   Cleatis Blush Black, DO   Continuous Blood Gluc Transmit (DEXCOM G6 TRANSMITTER) MISC Use to check blood glucose continuously. Patient not taking: Reported on 3/16/2022 2/11/22   Cleatis Blush Black, DO   Continuous Blood Gluc Sensor (DEXCOM G6 SENSOR) MISC Use to check blood glucose continuously. Patient not taking: Reported on 3/16/2022 2/11/22   Cleatis Blush Black, DO   Insulin Syringe-Needle U-100 30G X 5/16\" 0.3 ML MISC USE UP TO FIVE TIMES DAILY AS DIRECTED WITH INSULIN 1/22/22   Cleatis Blush Black, DO   pantoprazole (PROTONIX) 40 MG tablet TAKE ONE TABLET BY MOUTH EVERY MORNING BEFORE BREAKFAST 1/10/22   Louise Salgado MD   insulin aspart (NOVOLOG) 100 UNIT/ML injection vial USE AS DIRECTED THREE TIMES DAILY PER SLIDING SCALE: 171-220=3 UNITS; 221-270=5 UNITS; 271-320=7 UNITS; GREATER THAN 320=9 UNITS 1/4/22   Cleatis Blush Black, DO   insulin NPH (HUMULIN N;NOVOLIN N) 100 UNIT/ML injection vial Inject 8 Units into the skin 2 times daily 1/4/22   Cleatis Blush Black, DO   lisinopril (PRINIVIL;ZESTRIL) 10 MG tablet TAKE ONE TABLET BY MOUTH DAILY WHEN BLOOD PRESSURE IS ELEVATED AFTER SKYRIZI. 1/3/22   hPu Sanz, DO   Clobetasol Propionate 0.05 % SHAM APPLY TO SCALP, LEAVE ON FOR 15 MINUTES THEN RINSE THOROUGHLY. MAY USE 3-4 TIMES A WEEK FOR MAINTENANCE 11/23/21   Cleatis Blush Black, DO   pantoprazole (PROTONIX) 20 MG tablet TAKE ONE TABLET BY MOUTH ONCE NIGHTLY 11/8/21   Louise Salgado MD   blood glucose monitor strips Test 3 times a day & as needed for symptoms of irregular blood glucose.  10/14/21   Cleatis Blush Black, DO   sildenafil (VIAGRA) 100 MG tablet TAKE 1/2 - 1 TABLET BY MOUTH AS NEEDED 7/9/21   Cleatis Blush Black, DO   betamethasone dipropionate (DIPROLENE) 0.05 % cream  5/12/20   Historical Provider, MD   SKYRIZI, 150 MG DOSE, 75 MG/0.83ML PSKT injection Inject 150 mg into the skin Every 2 months 11/25/19   Historical Provider, MD    Scheduled Meds:  Continuous Infusions:  PRN Meds:. Allergies  is allergic to cosentyx [secukinumab], lantus [insulin glargine], infliximab, methotrexate derivatives, metoprolol, seasonal, adhesive tape, keflex [cephalexin], otezla [apremilast], stellaria, taltz [ixekizumab], and ustekinumab. Family History  family history includes Alzheimer's Disease in his maternal grandmother; Cancer in his maternal grandfather; Diabetes in his sister; High Blood Pressure in his maternal grandfather and mother; Other in his maternal grandmother, mother, and paternal grandmother. Social History   reports that he quit smoking about 6 years ago. His smoking use included cigarettes. He has a 20.00 pack-year smoking history. He has never used smokeless tobacco.   reports no history of alcohol use. reports no history of drug use. Review of Systems  General Denies any fever or chills  HEENT  Denies any diplopia, tinnitus or vertigo  Resp Denies any shortness of breath, cough or wheezing  Cardiac Denies any chest pain, palpitations, claudication or edema  GI Denies any melena, hematochezia, hematemesis or pyrosis   Denies any frequency, urgency, hesitancy or incontinence  Heme Denies bruising or bleeding easily  Endocrine Denies any history of diabetes or thyroid disease  Neuro Denies any focal motor or sensory deficits    PHYSICAL:   VITALS:  oral temperature is 98.2 °F (36.8 °C). His blood pressure is 126/82 and his pulse is 97. His respiration is 17 and oxygen saturation is 95%. GENERAL: No acute distress, alert, resting comfortably on ER stretcher  HEENT: normocephalic, atraumatic.  Icteric sclerae   NECK: supple  CV: Regular rate, no murmurs, rubs, or gallops  Pulm: CTAB, no wheezes, rhonchi, rales  ABD: Soft, ruq ttp, nondistended, no rebound or guarding  Neuro: No focal deficits  MSK:  no deformities or injuries, moves all extremities equally  Skin:Jaundice, mild  Psych: Normal Few tiny calcifications in the lower pole of the atrophic left kidney. GI/Bowel: The appendix is normal.  No bowel obstruction. Unopacified bowel loops are unremarkable. Pelvis: Mild prostatomegaly. Urinary bladder is grossly normal. Peritoneum/Retroperitoneum: There is no adenopathy, free air or free fluid. Bones/Soft Tissues: No acute bone finding. Cholelithiasis. Gallbladder wall thickening with pericholecystic fat stranding consistent with acute cholecystitis. Severe left renal atrophy. Right renal artery stent. RECOMMENDATIONS: Unavailable     US LIVER    Result Date: 3/16/2022  EXAMINATION: RIGHT UPPER QUADRANT ULTRASOUND 3/16/2022 1:21 pm COMPARISON: CT 03/15/2022 HISTORY: ORDERING SYSTEM PROVIDED HISTORY: evaluate,  obstructive jaundice TECHNOLOGIST PROVIDED HISTORY: Evaluate,  obstructive jaundice FINDINGS: LIVER:  The liver demonstrates normal echogenicity without evidence of intrahepatic biliary ductal dilatation. Liver length is 17.5 cm. Flow in the main portal vein is hepatopetal. BILIARY SYSTEM:  The gallbladder contains several shadowing gallstones without convincing sonographic findings of cholecystitis. Wall thickness is at the upper limits of normal.  There may be minimal adjacent fluid. Patient did not have a positive sonographic Tineo's sign. Common bile duct is within normal limits measuring 5.4 mm. RIGHT KIDNEY: The right kidney is grossly unremarkable without evidence of hydronephrosis. PANCREAS:  Visualized portions of the pancreas are unremarkable. Pancreas is partially obscured by overlying bowel gas. OTHER: No evidence of right upper quadrant ascites. Cholelithiasis without convincing sonographic findings of cholecystitis. HIDA scan could assess for acute cholecystitis if clinically warranted.      MRI ABDOMEN WO CONTRAST MRCP    Result Date: 3/16/2022  EXAMINATION: MRI OF THE ABDOMEN WITHOUT CONTRAST AND MRCP 3/16/2022 2:02 pm TECHNIQUE: Multiplanar multisequence MRI of the abdomen was performed without the administration of intravenous contrast.  After initial T2 axial and coronal images, thick slab, thin slab and 3D coronal MRCP sequences were obtained without the administration of intravenous contrast.  MIP images are provided for review. COMPARISON: None HISTORY: ORDERING SYSTEM PROVIDED HISTORY: Obstructive jaundice TECHNOLOGIST PROVIDED HISTORY: Obstructive jaundice Decision Support Exception - unselect if not a suspected or confirmed emergency medical condition->Emergency Medical Condition (MA) Reason for Exam: Obstructive jaundice FINDINGS: Respiratory motion artifact limits evaluation There is mild signal loss seen in the liver on out of phase images, compatible with fatty infiltration. Gallbladder: Gallbladder is contracted. Gallstones are seen. .  There is mild gallbladder wall thickening. There is trace pericholecystic fluid No pancreatic ductal dilatation. No peripancreatic fluid Bile Ducts: Extrahepatic common duct measures 6 mm. No significant intra or extrahepatic biliary ductal dilatation seen. No focal filling defects seen in extrahepatic common duct. Pancreatic Duct: No pancreatic ductal dilatation. No peripancreatic fluid Adrenal glands are unremarkable. Cortical thinning is seen on the left. No hydronephrosis. No hydronephrosis on the right No retroperitoneal adenopathy. No aortic aneurysm. .  Susceptibility artifact is seen from renal artery stent Spurring is seen in the spine     No biliary ductal dilatation is seen to explain the patient's obstructive jaundice. No focal filling defects seen in the extrahepatic common duct. Cholelithiasis.   There is associated gallbladder wall thickening and trace pericholecystic fluid raising the question of superimposed cholecystitis Mild signal loss throughout the liver on out of phase images, compatible with fatty infiltration RECOMMENDATIONS: Myla Rodríguez DO  3/16/2022, 2:08 PM

## 2022-03-16 NOTE — CARE COORDINATION
Case Management Initial Discharge Plan  Vicente Matta,             Met with:patient to discuss discharge plans. Information verified: address, contacts, phone number, , insurance Yes  Insurance Provider: [facility] [address]    Emergency Contact/Next of Kin name & number:   Kyung Nelson    Spouse    [phone]       Who are involved in patient's support system? wife    PCP: Vikki Drake DO  Date of last visit: 1 month      Discharge Planning    Living Arrangements:        Home has 1 stories  3 stairs to climb to get into front door, 0stairs to climb to reach second floor  Location of bedroom/bathroom in home main    Patient able to perform ADL's:Independent    Current Services (outpatient & in home) none  DME equipment: none  DME provider: none    Is patient receiving oral anticoagulation therapy? No    If indicated:   Physician managing anticoagulation treatment: n/a  Where does patient obtain lab work for ATC treatment? n/a      Potential Assistance Needed:       Patient agreeable to home care: No  [place] of choice provided:  n/a    Prior SNF/Rehab Placement and Facility: none  Agreeable to SNF/Rehab: No  [place] of choice provided: n/a     Evaluation: no    Expected Discharge date:       Patient expects to be discharged to: If home: is the family and/or caregiver wiling & able to provide support at home? yes  Who will be providing this support? Wife Liam Smith    Follow Up Appointment: Best Day/ Time:      Transportation provider: wife, self  Transportation arrangements needed for discharge: No    Readmission Risk              Risk of Unplanned Readmission:  12             Does patient have a readmission risk score greater than 14?: Yes  If yes, follow-up appointment must be made within 7 days of discharge.      Goals of Care: get better      Educated pt on transitional options, provided freedom of choice and are agreeable with plan      Discharge Plan: Home independently pcp established has transportation          Electronically signed by Tho Carlton RN on 3/16/22 at 4:37 PM EDT

## 2022-03-16 NOTE — H&P
[address]     Department of Internal Medicine - Staff Internal Medicine Teaching Service          ADMISSION NOTE/HISTORY AND PHYSICAL EXAMINATION   Date: 3/16/2022  Patient Name: Verónica Long  Date of admission: 3/16/2022 11:46 AM  YOB: 1960  PCP: Violeta Barnett DO  History Obtained From:  patient, electronic medical record    CHIEF COMPLAINT     Chief complaint: Dark urine    HISTORY OF PRESENTING ILLNESS     The patient is a pleasant 63 y.o. male presents with a chief complaint of dark urine. Patient is a transfer from [facility] but he presented with complaints of reddish discoloration of urine. The day before yesterday patient had episodes of abdominal pain and vomiting. Denies any dysuria or urinary frequency or urgency. Abdominal pain has resolved. Yesterday patient had episodes of headache. Denies or hematochezia or melena. On arrival to the [facility] ED patient's blood pressure was 95/70. He denied any symptoms. Examination showed jaundice, work-up there showed elevated LFTs , elevated total and direct bilirubin and imaging showed acute cholecystitis, was given a dose of Zosyn and transferred to [facility]. No similar complaints in the past  Patient has history of diabetes mellitus, hypertension, CKD, COPD, psoriasis and psoriatic arthritis, CVA, renal atrophy. Home medications include - Norvasc, insulin NovoLog, sildenafil, Protonix, methotrexate, SKYRIZI, folic acid, Lopressor    On arrival to the ED  Patient was hemodynamically stable  Afebrile on room air  Lab work showed -   sodium 129  Creatinine 2.47, history of CKD  Bilirubin 9.42, direct bilirubin 8.23  Alk phos 148, AST 89,   WBC 11.2. Hemoglobin 16    On my evaluation patient seen comfortable denies any symptoms of abdominal pain or nausea or vomiting.  Has Jaundice from the face down to the chest.  GI was consulted in the ED, was recommended ultrasound and MRCP without contrast.  Ultrasound showed cholelithiasis, MRCP showed cholelithiasis with possible superimposed cholecystitis. Recommended clear liquid diet today, n.p.o. from midnight for possible surgery or ERCP tomorrow. ED Course:  Vitals:    03/16/22 1653   BP: (!) 132/93   Pulse: 88   Resp: 19   Temp: 98.4 °F (36.9 °C)   SpO2: 98%       Review of Systems:  Review of Systems   Constitutional: Positive for appetite change. Negative for activity change, chills, fatigue and fever. HENT: Negative. Negative for trouble swallowing and voice change. Eyes: Negative. Negative for photophobia and visual disturbance. Respiratory: Negative. Negative for cough, shortness of breath and wheezing. Cardiovascular: Negative. Negative for chest pain, palpitations and leg swelling. Gastrointestinal: Positive for nausea and vomiting. Negative for abdominal pain, blood in stool, constipation and diarrhea. Endocrine: Negative. Genitourinary: Positive for hematuria. Negative for dysuria, flank pain and frequency. Musculoskeletal: Negative. Negative for arthralgias and back pain. Skin: Negative. Negative for rash and wound. Neurological: Negative. Negative for dizziness, light-headedness, numbness and headaches. Hematological: Negative. Psychiatric/Behavioral: Negative. Negative for confusion.          PAST MEDICAL HISTORY     Past Medical History:   Diagnosis Date    Allergic reaction caused by a drug 8/13/2016    Arthritis     psoritic arthritis    Chronic kidney disease     Follows with Nephro    COPD (chronic obstructive pulmonary disease) ([address]     pt  denies    CVA (cerebral vascular accident) ([address]     Neuro eval with Dr Ovalle here    Depression     Diabetes mellitus ([address]     History of blood transfusion     History of migraine headaches     Hyperlipidemia     Hypertension     Pituitary microadenoma ([address]     Eval by NS and Endo 2011 but no recent FU    Pneumothorax 05/27/2015 left    P[facility] arthritis [facility]     Stroke ([facility]     x3    Thrombocytopenia ([facility]        PAST SURGICAL HISTORY     Past Surgical History:   Procedure Laterality Date    CHEST TUBE INSERTION Left     COLONOSCOPY      11/13 SIS 10 y    ENDOSCOPY, COLON, DIAGNOSTIC  04/08/2021    EGD BIOPSY GASTRIC AND GE JUNCTION     OTHER SURGICAL HISTORY Right     stent r kidney    OTHER SURGICAL HISTORY  05/27/2015    resection of bleb    PILONIDAL CYST DRAINAGE      SHOULDER SURGERY Left     Rotator cuff    THORACOSCOPY  05/27/2015    THORACOTOMY  05/27/2015    UPPER GASTROINTESTINAL ENDOSCOPY      UPPER GASTROINTESTINAL ENDOSCOPY N/A 07/08/2020    EGD with biopsies performed by Grace Nickerson DO at [facility].  01/20/2021    UPPER GASTROINTESTINAL ENDOSCOPY N/A 01/20/2021    EGD BIOPSY performed by Manisha Nicolas MD at [address] [facility] N/A 04/08/2021    EGD BIOPSY GASTRIC AND GE JUNCTION  performed by Manisha Nicolas MD at [address] [facility]     Cosentyx [secukinumab], Lantus [insulin glargine], Stellaria, Infliximab, Methotrexate derivatives, Seasonal, Adhesive tape, Keflex [cephalexin], Otezla [apremilast], Taltz [ixekizumab], and Ustekinumab    MEDICATIONS PRIOR TO ADMISSION     Prior to Admission medications    Medication Sig Start Date End Date Taking?  Authorizing Provider   desoximetasone (TOPICORT) 0.25 % OINT  2/25/22   Historical Provider, MD   methotrexate (RHEUMATREX) 2.5 MG chemo tablet Take 2.5 mg by mouth once a week Take 2 tabs every Friday    Historical Provider, MD   folic acid (FOLVITE) 1 MG tablet Take 1 mg by mouth daily    Historical Provider, MD   metoprolol tartrate (LOPRESSOR) 100 MG tablet Take 1 tablet by mouth 2 times daily 2/28/22   Phu Sanz DO   amLODIPine (NORVASC) 5 MG tablet Take 1 tablet by mouth daily  Patient not taking: Reported on 3/16/2022 2/28/22   Phu Sanz DO   triamcinolone (KENALOG) 0.1 % ointment  12/24/21   Historical Provider, MD   Continuous Blood Gluc  ([place]le [employer]s) SILVANA Use to continuously check blood glucose. Patient not taking: Reported on 3/16/2022 2/11/22   Edgar Chen, DO   Continuous Blood Gluc Transmit (DEXCOM G6 TRANSMITTER) MISC Use to check blood glucose continuously. Patient not taking: Reported on 3/16/2022 2/11/22   Edgar Chen, DO   Continuous Blood Gluc Sensor (DEXCOM G6 SENSOR) MISC Use to check blood glucose continuously. Patient not taking: Reported on 3/16/2022 2/11/22   Edgar Chen, DO   Insulin Syringe-Needle U-100 30G X 5/16\" 0.3 ML MISC USE UP TO FIVE TIMES DAILY AS DIRECTED WITH INSULIN 1/22/22   Edgar Chen, DO   pantoprazole (PROTONIX) 40 MG tablet TAKE ONE TABLET BY MOUTH EVERY MORNING BEFORE BREAKFAST 1/10/22   Oracio Marques MD   insulin aspart (NOVOLOG) 100 UNIT/ML injection vial USE AS DIRECTED THREE TIMES DAILY PER SLIDING SCALE: 171-220=3 UNITS; 221-270=5 UNITS; 271-320=7 UNITS; GREATER THAN 320=9 UNITS 1/4/22   Edgar Chen, DO   insulin NPH (HUMULIN N;NOVOLIN N) 100 UNIT/ML injection vial Inject 8 Units into the skin 2 times daily  Patient not taking: Reported on 3/16/2022 1/4/22   Edgar Chen DO   lisinopril (PRINIVIL;ZESTRIL) 10 MG tablet TAKE ONE TABLET BY MOUTH DAILY WHEN BLOOD PRESSURE IS ELEVATED AFTER SKYRIZI. 1/3/22   Phu Sanz, DO   Clobetasol Propionate 0.05 % SHAM APPLY TO SCALP, LEAVE ON FOR 15 MINUTES THEN RINSE THOROUGHLY. MAY USE 3-4 TIMES A WEEK FOR MAINTENANCE 11/23/21   Edgar Chen DO   pantoprazole (PROTONIX) 20 MG tablet TAKE ONE TABLET BY MOUTH ONCE NIGHTLY 11/8/21   Oracio Marques MD   blood glucose monitor strips Test 3 times a day & as needed for symptoms of irregular blood glucose.  10/14/21   Edgar Chen DO   sildenafil (VIAGRA) 100 MG tablet TAKE 1/2 - 1 TABLET BY MOUTH AS NEEDED  Patient not taking: Reported on 3/16/2022 7/9/21   Edgar Dryer DO Gustavo   betamethasone dipropionate (DIPROLENE) 0.05 % cream  20   Historical Provider, MD   SKYRIZI, 150 MG DOSE, 75 MG/0.83ML PSKT injection Inject 150 mg into the skin Every 2 months  19   Historical Provider, MD       SOCIAL HISTORY     Tobacco: former smoker  Alcohol: denies  Illicits: denies    FAMILY HISTORY     Family History   Problem Relation Age of Onset    Diabetes Sister     Alzheimer's Disease Maternal Grandmother     Other Maternal Grandmother         dementia    High Blood Pressure Maternal Grandfather     Cancer Maternal Grandfather         Unknown    High Blood Pressure Mother     Other Mother         blood clots    Other Paternal Grandmother         dementia       PHYSICAL EXAM     Vitals: BP (!) 132/93   Pulse 88   Temp 98.4 °F (36.9 °C) (Oral)   Resp 19   Ht 5' 8\" (1.727 m)   Wt 175 lb 11.3 oz (79.7 kg)   SpO2 98%   BMI 26.72 kg/m²   Tmax: Temp (24hrs), Av.6 °F (37 °C), Min:98.2 °F (36.8 °C), Max:99.1 °F (37.3 °C)    Last Body weight:   Wt Readings from Last 3 Encounters:   22 175 lb 11.3 oz (79.7 kg)   03/15/22 180 lb (81.6 kg)   22 184 lb (83.5 kg)     Body Mass Index : Body mass index is 26.72 kg/m². PHYSICAL EXAMINATION:  Physical Exam  Constitutional:       Appearance: Normal appearance. HENT:      Mouth/Throat:      Mouth: Mucous membranes are dry. Cardiovascular:      Rate and Rhythm: Normal rate and regular rhythm. Pulses: Normal pulses. Heart sounds: Normal heart sounds. No murmur heard. Pulmonary:      Effort: No respiratory distress. Breath sounds: Normal breath sounds. No wheezing or rales. Abdominal:      General: There is no distension. Palpations: Abdomen is soft. Tenderness: There is no abdominal tenderness. Musculoskeletal:         General: No swelling. Cervical back: Normal range of motion. No rigidity. Right lower leg: No edema. Left lower leg: No edema. Skin:     General: Skin is warm. Coloration: Skin is jaundiced. Findings: No bruising or rash. Neurological:      General: No focal deficit present. Mental Status: He is alert and oriented to person, place, and time.    Psychiatric:         Mood and Affect: Mood normal.         Behavior: Behavior normal.           INVESTIGATIONS     Laboratory Testing:     Recent Results (from the past 24 hour(s))   CBC with Auto Differential    Collection Time: 03/15/22  9:59 PM   Result Value Ref Range    WBC 11.2 3.5 - 11.3 k/uL    RBC 4.98 4.21 - 5.77 m/uL    Hemoglobin 16.1 13.0 - 17.0 g/dL    Hematocrit 46.3 40.7 - 50.3 %    MCV 93.0 82.6 - 102.9 fL    MCH 32.3 25.2 - 33.5 pg    MCHC 34.8 (H) 25.2 - 33.5 g/dL    RDW 15.5 (H) 11.8 - 14.4 %    Platelets 944 072 - 750 k/uL    MPV 9.1 8.1 - 13.5 fL    NRBC Automated 0.0 0.0 per 100 WBC    Seg Neutrophils 80 (H) 36 - 65 %    Lymphocytes 7 (L) 24 - 43 %    Monocytes 8 3 - 12 %    Eosinophils % 4 1 - 4 %    Basophils 0 0 - 2 %    Immature Granulocytes 1 (H) 0 %    Segs Absolute 9.06 (H) 1.50 - 8.10 k/uL    Absolute Lymph # 0.79 (L) 1.10 - 3.70 k/uL    Absolute Mono # 0.85 0.10 - 1.20 k/uL    Absolute Eos # 0.40 0.00 - 0.44 k/uL    Basophils Absolute 0.04 0.00 - 0.20 k/uL    Absolute Immature Granulocyte 0.06 0.00 - 0.30 k/uL    RBC Morphology ANISOCYTOSIS PRESENT    Basic Metabolic Panel w/ Reflex to MG    Collection Time: 03/15/22  9:59 PM   Result Value Ref Range    Glucose 139 (H) 70 - 99 mg/dL    BUN 19 6 - 20 mg/dL    CREATININE 2.47 (H) 0.70 - 1.20 mg/dL    Bun/Cre Ratio 8 (L) 9 - 20    Calcium 9.5 8.6 - 10.4 mg/dL    Sodium 129 (L) 135 - 144 mmol/L    Potassium 4.3 3.7 - 5.3 mmol/L    Chloride 92 (L) 98 - 107 mmol/L    CO2 27 20 - 31 mmol/L    Anion Gap 10 9 - 17 mmol/L    GFR Non-African American 27 (L) >60 mL/min    GFR  33 (L) >60 mL/min    GFR Comment         Hepatic Function Panel    Collection Time: 03/15/22  9:59 PM   Result Value Ref Range    Albumin 3.6 3.5 - 5.2 g/dL    Alkaline Phosphatase 148 (H) 40 - 129 U/L     (H) 5 - 41 U/L    AST 89 (H) <40 U/L    Total Bilirubin 9.42 (H) 0.3 - 1.2 mg/dL    Bilirubin, Direct 8.23 (H) <0.31 mg/dL    Bilirubin, Indirect 1.19 (H) 0.00 - 1.00 mg/dL    Total Protein 6.7 6.4 - 8.3 g/dL    Globulin 3.1 1.5 - 3.8 g/dL    Albumin/Globulin Ratio 1.2 1.0 - 2.5   Lipase    Collection Time: 03/15/22  9:59 PM   Result Value Ref Range    Lipase 24 13 - 60 U/L   Lactic Acid    Collection Time: 03/15/22  9:59 PM   Result Value Ref Range    Lactic Acid 1.1 0.5 - 2.2 mmol/L   Urinalysis with Reflex to Culture    Collection Time: 03/15/22 11:15 PM    Specimen: Urine   Result Value Ref Range    Glucose, Ur TRACE (A) NEGATIVE    Bilirubin Urine 3+ (A) NEGATIVE    Ketones, Urine TRACE (A) NEGATIVE    Specific [place], UA 1.010 1.010 - 1.025    Urine Hgb TRACE (A) NEGATIVE    pH, UA 6.5 (H) 5.0 - 6.0    Protein, UA TRACE (A) NEGATIVE    Urobilinogen, Urine 4 mg/dL (A) Normal    Nitrite, Urine NEGATIVE NEGATIVE    Leukocyte Esterase, Urine NEGATIVE NEGATIVE   Microscopic Urinalysis    Collection Time: 03/15/22 11:15 PM   Result Value Ref Range    -          WBC, UA 0 TO 4 0 - 4 /HPF    RBC, UA 0 TO 4 0 - 4 /HPF    Epithelial Cells UA 0 TO 4 0 - 5 /HPF    Bacteria, UA None None   COVID-19, Rapid    Collection Time: 03/16/22 12:35 AM    Specimen: Nasopharyngeal Swab   Result Value Ref Range    Specimen Description . NASOPHARYNGEAL SWAB     SARS-CoV-2, Rapid Not Detected Not Detected   POC Glucose Fingerstick    Collection Time: 03/16/22  4:19 AM   Result Value Ref Range    POC Glucose 136 (H) 75 - 110 mg/dL   POC Glucose Fingerstick    Collection Time: 03/16/22  5:37 PM   Result Value Ref Range    POC Glucose 105 75 - 110 mg/dL       Imaging:   CT ABDOMEN PELVIS WO CONTRAST Additional Contrast? None    Result Date: 3/16/2022  Cholelithiasis. Gallbladder wall thickening with pericholecystic fat stranding consistent with acute cholecystitis. Severe left renal atrophy. Right renal artery stent. RECOMMENDATIONS: Unavailable     US LIVER    Result Date: 3/16/2022  Cholelithiasis without convincing sonographic findings of cholecystitis. HIDA scan could assess for acute cholecystitis if clinically warranted. MRI ABDOMEN WO CONTRAST MRCP    Result Date: 3/16/2022  No biliary ductal dilatation is seen to explain the patient's obstructive jaundice. No focal filling defects seen in the extrahepatic common duct. Cholelithiasis. There is associated gallbladder wall thickening and trace pericholecystic fluid raising the question of superimposed cholecystitis Mild signal loss throughout the liver on out of phase images, compatible with fatty infiltration RECOMMENDATIONS: Unavailable       ASSESSMENT & PLAN     ASSESSMENT:     Primary Problem  Cholelithiasis    Active Hospital Problems    Diagnosis Date Noted    Cholecystitis [K81.9] 03/16/2022    Cholelithiasis [K80.20] 03/16/2022    Acute cholecystitis [K81.0] 03/16/2022    Hiatal hernia [K44.9]     Esophagitis [K20.90]     Type 2 diabetes mellitus with hyperglycemia ([address] [E11.65] 12/21/2020    Chronic kidney disease (CKD), stage III (moderate) (HCC) [N18.30]     Hypertension [I10]     Psoriatic arthritis ([address] [L40.50]     CVA (cerebral vascular accident) ([address] [I63.9]        PLAN:     IMPRESSION  This is a 63 y.o. male who presented with above mentioned complaints and was admitted to inpatient service for further management as follows:     Principal Problem:    Cholelithiasis  Active Problems:    Hypertension    Psoriatic arthritis ([address]    Chronic kidney disease (CKD), stage III (moderate) (HCC)    CVA (cerebral vascular accident) ([address]    Type 2 diabetes mellitus with hyperglycemia ([address]    Hiatal hernia    Esophagitis    Acute cholecystitis    Cholecystitis  Resolved Problems:    * No resolved hospital problems. *      1.  Cholelithiasis with acute cholecystitis -  - clear liquids  - IV fluids  - continue zosyn  - daily LFT s  - GI onboard, cholecystectomy vs ERCP tomorrow    2. Diabetes milletus -   - Last Hb A1c 11.5  - low dose sliding scale  - POCT glucose checks  - hypoglycemia protocol    3. Hypertension -   - continue home med lopressor 100 mg BID  - will resume Norvasc 5 mg daily if needed    4. CKD stage 4 -   - single functioning kidney  - creatinine 2.47  - 5 years ago baseline around 2.5  - will monitor    5. Psoriasis and psoriatic arthritis  - methotrexate 5 mg weekly  - folic acid  - SKYRIZI every 4 months    6.  Esophagitis -   - Protonix 40 mg daily and 20 mg nightly  - h/o hiatal hernia, endoscopy in July 2020      Diet: liquid diet, NPO form midnight  DVT ppx: lovenox  GI ppx: Protonix    PT/OT/SW - consulted  Discharge Planning - consulted       Lore Aparicio  PGY-1  Internal Medicine  [facility]   6:16 [place] 3/16/2022

## 2022-03-16 NOTE — ED TRIAGE NOTES
Pt. Michael Fluke in by EMS from [place] to [facility] room 24. Pt. Ambulated from stretcher to the ED bed. Pt. States he was in [place] hospital since 9 last night. Pt. States he is in 0/10 pain. Pt. Reports that the jaundice came on suddenly two days ago where he went in for testing for it at [place]. Pt. transferred to [facility] for specialist treatment with a GI doctor that can perform surgery.

## 2022-03-16 NOTE — PROGRESS NOTES
Pharmacy Note     Renal Dose Adjustment    Rahat Olson is a 63 y.o. male. Pharmacist assessment of renally cleared medications. Recent Labs     03/15/22  2159   BUN 19       Recent Labs     03/15/22  2159   CREATININE 2.47*       Estimated Creatinine Clearance: 31 mL/min (A) (based on SCr of 2.47 mg/dL (H)).     Height:   Ht Readings from Last 1 Encounters:   03/15/22 5' 8\" (1.727 m)     Weight:  Wt Readings from Last 1 Encounters:   03/15/22 180 lb (81.6 kg)       The following medication dose has been adjusted based upon renal function per P&T Guidelines:             Zosyn 3.375g Q6 changed to 3.375g Q8  Will monitor for dose adjustment if renal function drops < 20 mL/min    Harper Cardona, PharmD, [facility]  3/16/2022  4:34 PM

## 2022-03-16 NOTE — CONSULTS
[facility]  Consultation Note     . REASON FOR CONSULTATION:    Needs ERCP    HISTORY OF PRESENT ILLNESS:     This is a 63 y.o. male with PMH including DM type II, CVA, HTN, CKD, COPD, HLD who presented to OSH with complaints of nausea, vomiting and RUQ pain that became diffuse abdominal pain. Pt states that a few days ago he ate a greasy supper prior to development of abdominal pain. He denies any fever or chills. No recent sick contacts, travels, or new medications  Pt admits to social drinking, no drug use, no smoking or Nsaid use  Pt is on Methotrexate for psoriatic arthriritis     No hematemesis, melena or hematochezia    LFT's were found to be significantly elevated-, , AST 89, T.Bili 9.42, direct Bili 8.23  CBC unremarkable but WBC's were 11.2-pt was started on Zosyn  Bilirubin was also noted in the UA  BUN was 19, Cr 2.47    CT scan was concerning for Cholelithiasis so pt was transferred to [facility] for higher level of care and the need for ERCP    Summary of imaging completed at this time:  3/16/2022 CT A/P indicated Cholelithiasis. Liver US and MRI/MRCP are pending    Previous GI history:   4/8/2021 EGD per Dr. Vijay Sanchez:     1) Mild LA grade A esophagitis, significant healing observed compared to prior EGD. 2) Cold biopsy taken of the GEJ to evaluate for Broderick's  3) 3 cm hiatal hernia  4) Mild antritis, no ulcerations, no erosions. Cold biopsy taken  5) Normal appearing duodenal mucosa         RECOMMENDATIONS:   1) Continue with PPI once daily at 40mg with dietary modifications for additional 4 weeks. Re-evaluate for tapering in GI clinic.  Follow up path in GI clinic         Arti Jose MD  [facility]   04/08/21  Past Medical/Social/Family History:  Past Medical History:   Diagnosis Date    Allergic reaction caused by a drug 8/13/2016    Arthritis     psoritic arthritis    Chronic kidney disease     Follows with Nephro    COPD (chronic obstructive pulmonary disease) (HCC)     pt  denies    CVA (cerebral vascular accident) ([address]     Neuro eval with Dr Ovalle here    Depression     Diabetes mellitus ([address]     History of blood transfusion     History of migraine headaches     Hyperlipidemia     Hypertension     Pituitary microadenoma ([address]     Eval by NS and Endo 2011 but no recent FU    Pneumothorax 05/27/2015    left    Psoriatic arthritis ([address]     Stroke ([address]     x3    Thrombocytopenia ([address]      Past Surgical History:   Procedure Laterality Date    CHEST TUBE INSERTION Left     COLONOSCOPY      11/13 SIS 10 y    ENDOSCOPY, COLON, DIAGNOSTIC  04/08/2021    EGD BIOPSY GASTRIC AND GE JUNCTION     OTHER SURGICAL HISTORY Right     stent r kidney    OTHER SURGICAL HISTORY  05/27/2015    resection of bleb    PILONIDAL CYST DRAINAGE      SHOULDER SURGERY Left     Rotator cuff    THORACOSCOPY  05/27/2015    THORACOTOMY  05/27/2015    UPPER GASTROINTESTINAL ENDOSCOPY      UPPER GASTROINTESTINAL ENDOSCOPY N/A 07/08/2020    EGD with biopsies performed by Iasdora Borjas DO at [facility]  01/20/2021    UPPER GASTROINTESTINAL ENDOSCOPY N/A 01/20/2021    EGD BIOPSY performed by Bindu Reyes MD at [facility] N/A 04/08/2021    EGD BIOPSY GASTRIC AND GE JUNCTION  performed by Bindu Reyes MD at [address] [facility].     Family History   Problem Relation Age of Onset    Diabetes Sister     Alzheimer's Disease Maternal Grandmother     Other Maternal Grandmother         dementia    High Blood Pressure Maternal Grandfather     Cancer Maternal Grandfather         Unknown    High Blood Pressure Mother     Other Mother         blood clots    Other Paternal Grandmother         dementia     Previous records/history/ and notes were reviewed    Allergies:   Allergies   Allergen Reactions    Cosentyx [Secukinumab] Nausea And Vomiting and Rash fever    Lantus [Insulin Glargine] Itching, Swelling and Rash     Patient started Lantus about a month ago, only new medication. His skin reaction started on his abdomen where he injects the Lantus and moved up to the face. This is similar to his response to Cosentyx.  Infliximab Other (See Comments)     Nerve damage    Methotrexate Derivatives Other (See Comments)     Bone marrow toxicity    Metoprolol      Ringing in ears    Seasonal     Adhesive Tape Rash    Keflex [Cephalexin] Rash    Otezla [Apremilast] Rash     Other reaction(s): Unknown    Stellaria Rash    Taltz [Ixekizumab] Rash     Other reaction(s): Unknown    Ustekinumab Rash     Other reaction(s): rash-allergic and acute renal failure  Other reaction(s): Unknown       Home medications:  Prior to Admission medications    Medication Sig Start Date End Date Taking? Authorizing Provider   desoximetasone (TOPICORT) 0.25 % OINT  2/25/22   Historical Provider, MD   methotrexate (RHEUMATREX) 2.5 MG chemo tablet Take 2.5 mg by mouth once a week Take 2 tabs every Friday    Historical Provider, MD   folic acid (FOLVITE) 1 MG tablet Take 1 mg by mouth daily    Historical Provider, MD   metoprolol tartrate (LOPRESSOR) 100 MG tablet Take 1 tablet by mouth 2 times daily 2/28/22   Phu Sanz DO   amLODIPine (NORVASC) 5 MG tablet Take 1 tablet by mouth daily  Patient not taking: Reported on 3/16/2022 2/28/22   Phu Sanz DO   triamcinolone (KENALOG) 0.1 % ointment  12/24/21   Historical Provider, MD   Continuous Blood Gluc  (Natalie Silva) SILVANA Use to continuously check blood glucose. Patient not taking: Reported on 3/16/2022 2/11/22   Fernanda Chen, DO   Continuous Blood Gluc Transmit (DEXCOM G6 TRANSMITTER) MISC Use to check blood glucose continuously. Patient not taking: Reported on 3/16/2022 2/11/22   Fernanda Chen, DO   Continuous Blood Gluc Sensor (DEXCOM G6 SENSOR) MISC Use to check blood glucose continuously.   Patient not taking: Reported on 3/16/2022 2/11/22   Saroj Chen DO   Insulin Syringe-Needle U-100 30G X 5/16\" 0.3 ML MISC USE UP TO FIVE TIMES DAILY AS DIRECTED WITH INSULIN 1/22/22   Saroj Chen DO   pantoprazole (PROTONIX) 40 MG tablet TAKE ONE TABLET BY MOUTH EVERY MORNING BEFORE BREAKFAST 1/10/22   Iron Dos Santos MD   insulin aspart (NOVOLOG) 100 UNIT/ML injection vial USE AS DIRECTED THREE TIMES DAILY PER SLIDING SCALE: 171-220=3 UNITS; 221-270=5 UNITS; 271-320=7 UNITS; GREATER THAN 320=9 UNITS 1/4/22   Saroj Chen DO   insulin NPH (HUMULIN N;NOVOLIN N) 100 UNIT/ML injection vial Inject 8 Units into the skin 2 times daily 1/4/22   Saroj Chen DO   lisinopril (PRINIVIL;ZESTRIL) 10 MG tablet TAKE ONE TABLET BY MOUTH DAILY WHEN BLOOD PRESSURE IS ELEVATED AFTER SKYRIZI. 1/3/22   Phu Sanz, DO   Clobetasol Propionate 0.05 % SHAM APPLY TO SCALP, LEAVE ON FOR 15 MINUTES THEN RINSE THOROUGHLY. MAY USE 3-4 TIMES A WEEK FOR MAINTENANCE 11/23/21   Saroj Chen DO   pantoprazole (PROTONIX) 20 MG tablet TAKE ONE TABLET BY MOUTH ONCE NIGHTLY 11/8/21   Iron Dos Santos MD   blood glucose monitor strips Test 3 times a day & as needed for symptoms of irregular blood glucose. 10/14/21   Saroj Chen DO   sildenafil (VIAGRA) 100 MG tablet TAKE 1/2 - 1 TABLET BY MOUTH AS NEEDED 7/9/21   Saroj Chen DO   betamethasone dipropionate (DIPROLENE) 0.05 % cream  5/12/20   Historical Provider, MD RUIZYRIZI, 150 MG DOSE, 75 MG/0.83ML PSKT injection Inject 150 mg into the skin Every 2 months  11/25/19   Historical Provider, MD     .  Current Medications:  Scheduled Meds:  .  Continuous Infusions:  . PRN Meds:    REVIEW OF SYSTEMS:     Constitutional: No fever, no chills, no lethargy, no weakness, no weight loss  HEENT:  No headache, otalgia, itchy eyes, nasal discharge or sore throat. Cardiac:  No chest pain, dyspnea, orthopnea or PND. Chest:   No cough, phlegm or wheezing.   Abdomen:  RUQ pain, nausea, vomiting  Neuro:  No focal weakness, abnormal movements or seizure like activity. Skin:   No rashes, no itching. :   No hematuria, no pyuria, no dysuria, no flank pain. Extremities:  No swelling or joint pains. ROS was otherwise negative except as mentioned in the [address] PHYSICAL EXAM:    /82   Pulse 97   Temp 98.2 °F (36.8 °C) (Oral)   Resp 17   SpO2 95%   . TMAX[24]    General: Well developed, Well nourished, No apparent distress  Head:  Normocephalic, Atraumatic  EENT: EOMI, Sclera not icteric, Oropharynx moist  Neck:  Supple, Trachea midline  Lungs:CTA Bilaterally  Heart: RRR, No murmur, No rub, No gallop, PMI nondisplaced. Abdomen:Soft,  tender, Not distended, BS WNL,  No masses. No hepatomegalia   Ext:No clubbing. No cyanosis. No edema. Skin: No rashes. No jaundice. No stigmata of liver disease. Neuro:  A&O x Three, No focal neurological deficits    Imaging:   3/16/2022 CT A/P with IV access  FINDINGS:   Lower Chest: Lung bases are clear and the heart size is normal.  There is a   small hiatal hernia.  There are few mildly prominent lymph nodes adjacent to   the hiatal hernia.       Organs: There are calcified gallstones. Lani Hudson is diffuse gallbladder wall   thickening with pericholecystic fat stranding.  The liver, spleen, pancreas,   adrenal glands and right kidney are grossly normal with the limitations of a   noncontrast study.  There are few vascular calcifications. Lani Hudson is a right   renal artery stent.  Unchanged severe left renal atrophy.  Few tiny   calcifications in the lower pole of the atrophic left kidney.       GI/Bowel:  The appendix is normal.  No bowel obstruction.  Unopacified bowel   loops are unremarkable.       Pelvis: Mild prostatomegaly.  Urinary bladder is grossly normal.       Peritoneum/Retroperitoneum: There is no adenopathy, free air or free fluid.       Bones/Soft Tissues: No acute bone finding.           Impression   Cholelithiasis.       Gallbladder wall thickening with pericholecystic fat stranding consistent   with acute cholecystitis.       Severe left renal atrophy.  Right renal artery stent. Hemotological labs: Anemia studies:  No results for input(s): LABIRON, TIBC, FERRITIN, VGOAALZI88, FOLATE, OCCULTBLD in the last 72 hours. CBC:  Recent Labs     03/15/22  2159   WBC 11.2   HGB 16.1   MCV 93.0   RDW 15.5*          PT/INR:  No results for input(s): PROTIME, INR in the last 72 hours. BMP:  Recent Labs     03/15/22  2159   *   K 4.3   CL 92*   CO2 27   BUN 19   CREATININE 2.47*   GLUCOSE 139*   CALCIUM 9.5       Liver work up:  Hepatitis Functional Panel:  Recent Labs     03/15/22  2159   ALKPHOS 148*   *   AST 89*   PROT 6.7   BILITOT 9.42*   BILIDIR 8.23*   LABALBU 3.6       Amylase/Lipase/Ammonia:  Recent Labs     03/15/22  2159   LIPASE 24       Acute Hepatitis Panel:  Lab Results   Component Value Date    HEPBSAG NONREACTIVE 08/14/2016    HEPCAB NONREACTIVE 08/14/2016    HEPBIGM NONREACTIVE 08/14/2016    HEPAIGM NONREACTIVE 08/14/2016            Active Problems:    * No active hospital problems. *  Resolved Problems:    * No resolved hospital problems. *       GI Impression and recommendations and plan:    63 yr old male with RUQ pain, elevated LFT's, CT imaging concerning for cholelithiasis. Pt was started on IVF and antibiotics. Will need to rule out choledocholithiasis    1. Recommend liver ultrasound and MRI/MRCP without contrast to rule out choledocholithiasis  2. Pt may have clears after imaging  3. Rec daily CBC, BMP, LFT  4. Agree with supportive care, antibiotics etc  5.  Complete POC to be determined based on imaging and labs    This plan was formulated in collaboration with Dr. Bertin Soriano MD      Electronically signed by:  Clayton Fang  Gastroenterology  [phone]  3/16/2022    2:23 PM     This note was created with the assistance of a speech-recognition program.  Although the intention is to generate a document that actually reflects the content of the visit, no guarantees can be provided that every mistake has been identified and corrected by editing.

## 2022-03-16 NOTE — PROGRESS NOTES
Received call from Dr. Belle Vincent, ER, to evaluate patient for possible admission. Per Vania Cárdenas, general surgery had not accepted consultation prior to ER requesting hospital admission. Melia asked Dr. Belle Vincent to consult general surgery. Per report, Dr. Etienne Bell, general surgery, declined patient. Patient to be transferred to tertiary care for surgical care.

## 2022-03-16 NOTE — ED PROVIDER NOTES
ADDENDUM:      Care of this patient was assumed from Dr Kee Shaw. The patient was seen for Hematuria  . The patient's initial evaluation and plan have been discussed with the prior provider who initially evaluated the patient. Nursing Notes, Past Medical Hx, Past Surgical Hx, Social Hx, Allergies, and Family Hx were all reviewed. Diagnostic Results     EKG: All EKG's are interpreted by the Emergency Department Physician who either signs or Co-signs this chart in the absence of a cardiologist.        RADIOLOGY:All plain film, CT, MRI, and formal ultrasound images (except ED bedside ultrasound) are read by the radiologist and the images and interpretations are reviewed by the emergency physician. Studies:      CT ABDOMEN PELVIS WO CONTRAST Additional Contrast? None   Final Result   Cholelithiasis. Gallbladder wall thickening with pericholecystic fat stranding consistent   with acute cholecystitis. Severe left renal atrophy. Right renal artery stent.       RECOMMENDATIONS:   Unavailable             LABS:   Labs Reviewed   CBC WITH AUTO DIFFERENTIAL - Abnormal; Notable for the following components:       Result Value    MCHC 34.8 (*)     RDW 15.5 (*)     Seg Neutrophils 80 (*)     Lymphocytes 7 (*)     Immature Granulocytes 1 (*)     Segs Absolute 9.06 (*)     Absolute Lymph # 0.79 (*)     All other components within normal limits   BASIC METABOLIC PANEL W/ REFLEX TO MG FOR LOW K - Abnormal; Notable for the following components:    Glucose 139 (*)     CREATININE 2.47 (*)     Bun/Cre Ratio 8 (*)     Sodium 129 (*)     Chloride 92 (*)     GFR Non- 27 (*)     GFR  33 (*)     All other components within normal limits   HEPATIC FUNCTION PANEL - Abnormal; Notable for the following components:    Alkaline Phosphatase 148 (*)      (*)     AST 89 (*)     Total Bilirubin 9.42 (*)     Bilirubin, Direct 8.23 (*)     Bilirubin, Indirect 1.19 (*)     All other components within normal limits   URINALYSIS WITH REFLEX TO CULTURE - Abnormal; Notable for the following components:    Glucose, Ur TRACE (*)     Bilirubin Urine 3+ (*)     Ketones, Urine TRACE (*)     Urine Hgb TRACE (*)     pH, UA 6.5 (*)     Protein, UA TRACE (*)     Urobilinogen, Urine 4 mg/dL (*)     All other components within normal limits   POC GLUCOSE FINGERSTICK - Abnormal; Notable for the following components:    POC Glucose 136 (*)     All other components within normal limits   COVID-19, RAPID   LIPASE   LACTIC ACID   MICROSCOPIC URINALYSIS       RECENT VITALS:  BP: 101/84, Temp: 98.6 °F (37 °C), Pulse: 95, Resp: 16     ED Course     The patient was given the following medications:  Orders Placed This Encounter   Medications    0.9 % sodium chloride bolus    DISCONTD: 0.9 % sodium chloride infusion    piperacillin-tazobactam (ZOSYN) 3,375 mg in dextrose 5 % 50 mL IVPB (mini-bag)     Order Specific Question:   Antimicrobial Indications     Answer:   Intra-Abdominal Infection    acetaminophen (TYLENOL) tablet 500 mg    piperacillin-tazobactam (ZOSYN) 3,375 mg in dextrose 5 % 50 mL IVPB (mini-bag)     Order Specific Question:   Antimicrobial Indications     Answer:   Intra-Abdominal Infection         Medical Decision Making      Initially accepted at [facility] however apparently due to some air was not placed on the bed board we were informed that there would be no bed today at [facility] for the patient axis also told of there are no beds at [facility] or [facility] Lázaro's at this point given the possibility of obstructive jaundice and may be ascending cholangitis with decision was to transfer him to the ER to ER I discussed the case with Dr. Alberto Whatley attending at [address] [place] [address] he accepts the patient for transfer    Disposition     CLINICAL IMPRESSION:  1. Acute cholecystitis    2. Elevated LFTs    3. Hyponatremia    4.  Chronic renal impairment, unspecified CKD stage PATIENT REFERRED TO:  No follow-up provider specified.     DISCHARGE MEDICATIONS:  Discharge Medication List as of 3/16/2022 11:27 AM          DISPOSITION: Patient transferred to [address] [place] [address] emergency department stabilized condition  Zahra Alvarado MD  (Please note that portions of this note were completed with a voice recognition program.  Efforts were made to edit the dictations but occasionally words are mis-transcribed.)     Zahra Alvarado MD  03/21/22 2097

## 2022-03-16 NOTE — ED PROVIDER NOTES
[facility]  Emergency Department        Pt Name: Fortino Morin  MRN: [id number]  [place] 1960  Date of evaluation: 3/16/22    CHIEF COMPLAINT       Chief Complaint   Patient presents with    Jaundice       HISTORY OF PRESENT ILLNESS  (Location/Symptom, Timing/Onset, Context/Setting, Quality, Duration, ModifyingFactors, Severity.)      Fortino Morin is a 63 y.o. male who presents with nominal pain and an episode of vomiting. Presented to [facility] emergency room where he had a CT of his abdomen that was done after patient was noted to be jaundiced. That showed cholecystitis but also elevated bilirubin and concerns for obstructive pattern, patient here for further GI evaluation and possible ERCP. Patient is not in any discomfort at this time. He is accompanied by his son. PAST MEDICAL / SURGICAL / SOCIAL / FAMILY HISTORY      has a past medical history of Allergic reaction caused by a drug, Arthritis, Chronic kidney disease, COPD (chronic obstructive pulmonary disease) ([address] CVA (cerebral vascular accident) ([address] Depression, Diabetes mellitus ([address] History of blood transfusion, History of migraine headaches, Hyperlipidemia, Hypertension, Pituitary microadenoma ([address] Pneumothorax, Psoriatic arthritis ([address] Stroke ([address] and Thrombocytopenia ([address] has a past surgical history that includes other surgical history (Right); shoulder surgery (Left); Colonoscopy; Upper gastrointestinal endoscopy; Pilonidal cyst drainage; chest tube insertion (Left); Thoracoscopy (05/27/2015); thoracotomy (05/27/2015); other surgical history (05/27/2015); Upper gastrointestinal endoscopy (N/A, 07/08/2020); Upper gastrointestinal endoscopy (01/20/2021); Upper gastrointestinal endoscopy (N/A, 01/20/2021); Endoscopy, colon, diagnostic (04/08/2021); and Upper gastrointestinal endoscopy (N/A, 04/08/2021).        Social History     Socioeconomic History    Marital status:      Spouse name: Not on file    Number of children: Not on file    Years of education: Not on file    Highest education level: Not on file   Occupational History    Not on file   Tobacco Use    Smoking status: Former Smoker     Packs/day: 1.00     Years: 20.00     Pack years: 20.00     Types: Cigarettes     Quit date: 2015     Years since quittin.8    Smokeless tobacco: Never Used    Tobacco comment: Quit smoking 2016, CARMEN Mack P, 10/20/2016. Vaping Use    Vaping Use: Never used   Substance and Sexual Activity    Alcohol use: No     Alcohol/week: 0.0 standard drinks    Drug use: No    Sexual activity: Yes     Partners: Female   Other Topics Concern    Not on file   Social History Narrative    Not on file     Social Determinants of Health     Financial Resource Strain:     Difficulty of Paying Living Expenses: Not on file   Food Insecurity:     Worried About Running Out of Food in the Last Year: Not on file    Tripp of Food in the Last Year: Not on file   Transportation Needs:     Lack of Transportation (Medical): Not on file    Lack of Transportation (Non-Medical):  Not on file   Physical Activity:     Days of Exercise per Week: Not on file    Minutes of Exercise per Session: Not on file   Stress:     Feeling of Stress : Not on file   Social Connections:     Frequency of Communication with Friends and Family: Not on file    Frequency of Social Gatherings with Friends and Family: Not on file    Attends Sikh Services: Not on file    Active Member of Clubs or Organizations: Not on file    Attends Club or Organization Meetings: Not on file    Marital Status: Not on file   Intimate Partner Violence:     Fear of Current or Ex-Partner: Not on file    Emotionally Abused: Not on file    Physically Abused: Not on file    Sexually Abused: Not on file   Housing Stability:     Unable to Pay for Housing in the Last Year: Not on file    Number of [place] in the Last Year: Not on file    Unstable Housing in the Last Year: Not on file       Family History   Problem Relation Age of Onset    Diabetes Sister     Alzheimer's Disease Maternal Grandmother     Other Maternal Grandmother         dementia    High Blood Pressure Maternal Grandfather     Cancer Maternal Grandfather         Unknown    High Blood Pressure Mother     Other Mother         blood clots    Other Paternal Grandmother         dementia       Allergies:  Cosentyx [secukinumab], Lantus [insulin glargine], Stellaria, Infliximab, Methotrexate derivatives, Seasonal, Adhesive tape, Keflex [cephalexin], Otezla [apremilast], Taltz [ixekizumab], and Ustekinumab    Home Medications:  Prior to Admission medications    Medication Sig Start Date End Date Taking? Authorizing Provider   desoximetasone (TOPICORT) 0.25 % OINT  2/25/22   Historical Provider, MD   methotrexate (RHEUMATREX) 2.5 MG chemo tablet Take 2.5 mg by mouth once a week Take 2 tabs every Friday    Historical Provider, MD   folic acid (FOLVITE) 1 MG tablet Take 1 mg by mouth daily    Historical Provider, MD   metoprolol tartrate (LOPRESSOR) 100 MG tablet Take 1 tablet by mouth 2 times daily 2/28/22   Phu Sanz DO   amLODIPine (NORVASC) 5 MG tablet Take 1 tablet by mouth daily  Patient not taking: Reported on 3/16/2022 2/28/22   Phu Sanz DO   triamcinolone (KENALOG) 0.1 % ointment  12/24/21   Historical Provider, MD   Continuous Blood Gluc  (Svetlana Brooks) SILVANA Use to continuously check blood glucose. Patient not taking: Reported on 3/16/2022 2/11/22   Sanna Chen, DO   Continuous Blood Gluc Transmit (DEXCOM G6 TRANSMITTER) MISC Use to check blood glucose continuously. Patient not taking: Reported on 3/16/2022 2/11/22   Sanna Chen, DO   Continuous Blood Gluc Sensor (DEXCOM G6 SENSOR) MISC Use to check blood glucose continuously.   Patient not taking: Reported on 3/16/2022 2/11/22   Sanna Chen, DO   Insulin Syringe-Needle U-100 30G X 5/16\" 0.3 ML MISC USE UP TO FIVE TIMES DAILY AS DIRECTED WITH INSULIN 1/22/22   Yaya Chen DO   pantoprazole (PROTONIX) 40 MG tablet TAKE ONE TABLET BY MOUTH EVERY MORNING BEFORE BREAKFAST 1/10/22   Kristin Kussmaul, MD   insulin aspart (NOVOLOG) 100 UNIT/ML injection vial USE AS DIRECTED THREE TIMES DAILY PER SLIDING SCALE: 171-220=3 UNITS; 221-270=5 UNITS; 271-320=7 UNITS; GREATER THAN 320=9 UNITS 1/4/22   Yaya Chen DO   insulin NPH (HUMULIN N;NOVOLIN N) 100 UNIT/ML injection vial Inject 8 Units into the skin 2 times daily  Patient not taking: Reported on 3/16/2022 1/4/22   Yaya Chen DO   lisinopril (PRINIVIL;ZESTRIL) 10 MG tablet TAKE ONE TABLET BY MOUTH DAILY WHEN BLOOD PRESSURE IS ELEVATED AFTER SKYRIZI. 1/3/22   Phu Sanz, DO   Clobetasol Propionate 0.05 % SHAM APPLY TO SCALP, LEAVE ON FOR 15 MINUTES THEN RINSE THOROUGHLY. MAY USE 3-4 TIMES A WEEK FOR MAINTENANCE 11/23/21   Yaya Chen DO   pantoprazole (PROTONIX) 20 MG tablet TAKE ONE TABLET BY MOUTH ONCE NIGHTLY 11/8/21   Kristin Kussmaul, MD   blood glucose monitor strips Test 3 times a day & as needed for symptoms of irregular blood glucose. 10/14/21   Yaya Chen DO   sildenafil (VIAGRA) 100 MG tablet TAKE 1/2 - 1 TABLET BY MOUTH AS NEEDED  Patient not taking: Reported on 3/16/2022 7/9/21   Yaya Chen DO   betamethasone dipropionate (DIPROLENE) 0.05 % cream  5/12/20   Historical Provider, MD   SKYRIZI, 150 MG DOSE, 75 MG/0.83ML PSKT injection Inject 150 mg into the skin Every 2 months  11/25/19   Historical Provider, MD       REVIEW OF SYSTEMS    (2-9 systems for level 4, 10 or more for level 5)      Review of Systems   Constitutional: Negative for activity change, appetite change, fatigue and fever. HENT: Negative for congestion, rhinorrhea and sore throat. Respiratory: Negative for cough, shortness of breath and wheezing. Cardiovascular: Negative for chest pain, palpitations and leg swelling.    Gastrointestinal: Positive for abdominal pain, nausea and vomiting. Negative for abdominal distention, constipation and diarrhea. Genitourinary: Negative for decreased urine volume and dysuria. Skin: Positive for color change. Negative for rash. Neurological: Negative for dizziness, weakness, light-headedness, numbness and headaches. PHYSICAL EXAM   (up to 7 for level 4, 8 or more for level 5)     INITIAL VITALS:   BP (!) 137/99   Pulse 79   Temp 97.9 °F (36.6 °C) (Oral)   Resp 22   Ht 5' 8\" (1.727 m)   Wt 175 lb 11.3 oz (79.7 kg)   SpO2 95%   BMI 26.72 kg/m²     Physical Exam  Constitutional:       General: He is not in acute distress. Appearance: Normal appearance. He is not ill-appearing. HENT:      Head: Normocephalic and atraumatic. Eyes:      General: Scleral icterus present. Right eye: No discharge. Left eye: No discharge. Extraocular Movements: Extraocular movements intact. Pupils: Pupils are equal, round, and reactive to light. Cardiovascular:      Rate and Rhythm: Normal rate. Pulmonary:      Effort: Pulmonary effort is normal. No respiratory distress. Abdominal:      Comments: Protuberant abdomen,   Musculoskeletal:      Right lower leg: No edema. Left lower leg: No edema. Neurological:      General: No focal deficit present. Mental Status: He is alert and oriented to person, place, and time. DIFFERENTIAL  DIAGNOSIS     With concern for obstructive jaundice, labs done at outlying facility [facility] already received. We will plan for GI consultation and general surgery consultation and admission. PLAN (LABS / IMAGING / EKG):  Orders Placed This Encounter   Procedures    Culture, Blood 1    Culture, Blood 1    US LIVER    MRI ABDOMEN WO CONTRAST MRCP    XR CHEST PORTABLE    Comprehensive Metabolic Panel w/ Reflex to MG    CBC with Auto Differential    Urinalysis with Reflex to Culture    Urinalysis with Microscopic    Urinalysis with Microscopic    ADULT DIET;  Regular; 4 carb choices (60 gm/meal)    Misc nursing order (specify)    Vital signs per unit routine    Notify physician    Up as tolerated    Up with assistance    Intake and output    HYPOGLYCEMIA TREATMENT: blood glucose less than 50 mg/dL and patient  ALERT and TOLERATING PO    HYPOGLYCEMIA TREATMENT: blood glucose less than 70 mg/dL and patient ALERT and TOLERATING PO    HYPOGLYCEMIA TREATMENT: blood glucose less than 70 mg/dL and patient NOT ALERT or NPO    Full Code    Inpatient consult to GI    Inpatient consult to Internal Medicine    Inpatient consult to General Surgery    Inpatient consult to Case Management    OT eval and treat    PT evaluation and treat    Initiate Oxygen Therapy Protocol    POCT glucose    POCT Glucose    POC Glucose Fingerstick    POC Glucose Fingerstick    POC Glucose Fingerstick    POC Glucose Fingerstick    POC Glucose Fingerstick    ADMIT TO INPATIENT       MEDICATIONS ORDERED:  Orders Placed This Encounter   Medications    sodium chloride flush 0.9 % injection 5-40 mL    sodium chloride flush 0.9 % injection 5-40 mL    0.9 % sodium chloride infusion    enoxaparin (LOVENOX) injection 40 mg    OR Linked Order Group     ondansetron (ZOFRAN-ODT) disintegrating tablet 4 mg     ondansetron (ZOFRAN) injection 4 mg    polyethylene glycol (GLYCOLAX) packet 17 g    OR Linked Order Group     acetaminophen (TYLENOL) tablet 650 mg     acetaminophen (TYLENOL) suppository 650 mg    insulin lispro (HUMALOG) injection vial 0-6 Units    insulin lispro (HUMALOG) injection vial 0-3 Units    glucose (GLUTOSE) 40 % oral gel 15 g    dextrose 50 % IV solution    glucagon (rDNA) injection 1 mg    dextrose 5 % solution    DISCONTD: piperacillin-tazobactam (ZOSYN) 3,375 mg in dextrose 5 % 50 mL IVPB extended infusion (mini-bag)     Order Specific Question:   Antimicrobial Indications     Answer:    Other     Order Specific Question:   Other Abx Indication     Answer: cholecystitis    0.9 % sodium chloride infusion    piperacillin-tazobactam (ZOSYN) 3,375 mg in sodium chloride 0.9 % 50 mL IVPB (mini-bag)     Order Specific Question:   Antimicrobial Indications     Answer:    Other     Order Specific Question:   Other Abx Indication     Answer:   cholecystitis    folic acid (FOLVITE) tablet 1 mg    pantoprazole (PROTONIX) tablet 40 mg    metoprolol tartrate (LOPRESSOR) tablet 100 mg    methotrexate (RHEUMATREX) chemo tablet 5 mg    pantoprazole (PROTONIX) tablet 20 mg       DIAGNOSTIC RESULTS / EMERGENCY DEPARTMENT COURSE / MDM     LABS:  Results for orders placed or performed during the hospital encounter of 03/16/22   Comprehensive Metabolic Panel w/ Reflex to MG   Result Value Ref Range    Glucose 100 (H) 70 - 99 mg/dL    BUN 17 6 - 20 mg/dL    CREATININE 2.11 (H) 0.70 - 1.20 mg/dL    Calcium 8.6 8.6 - 10.4 mg/dL    Sodium 135 135 - 144 mmol/L    Potassium 4.3 3.7 - 5.3 mmol/L    Chloride 99 98 - 107 mmol/L    CO2 23 20 - 31 mmol/L    Anion Gap 13 9 - 17 mmol/L    Alkaline Phosphatase 157 (H) 40 - 129 U/L    ALT 82 (H) 5 - 41 U/L    AST 36 <40 U/L    Total Bilirubin 4.00 (H) 0.3 - 1.2 mg/dL    Total Protein 5.6 (L) 6.4 - 8.3 g/dL    Albumin 3.4 (L) 3.5 - 5.2 g/dL    Albumin/Globulin Ratio 1.5 1.0 - 2.5    GFR Non- 32 (L) >60 mL/min    GFR  39 (L) >60 mL/min    GFR Comment         CBC with Auto Differential   Result Value Ref Range    WBC 6.4 3.5 - 11.3 k/uL    RBC 4.69 4.21 - 5.77 m/uL    Hemoglobin 15.0 13.0 - 17.0 g/dL    Hematocrit 44.5 40.7 - 50.3 %    MCV 94.9 82.6 - 102.9 fL    MCH 32.0 25.2 - 33.5 pg    MCHC 33.7 28.4 - 34.8 g/dL    RDW 15.7 (H) 11.8 - 14.4 %    Platelets 880 209 - 859 k/uL    MPV 9.4 8.1 - 13.5 fL    NRBC Automated 0.0 0.0 per 100 WBC    Immature Granulocytes 0 0 %    Seg Neutrophils 74 (H) 36 - 66 %    Lymphocytes 13 (L) 24 - 44 %    Monocytes 5 1 - 7 %    Eosinophils % 7 (H) 1 - 4 %    Basophils 1 0 - 2 % Absolute Immature Granulocyte 0.00 0.00 - 0.30 k/uL    Segs Absolute 4.74 1.8 - 7.7 k/uL    Absolute Lymph # 0.83 (L) 1.0 - 4.8 k/uL    Absolute Mono # 0.32 0.1 - 0.8 k/uL    Absolute Eos # 0.45 (H) 0.0 - 0.4 k/uL    Basophils Absolute 0.06 0.0 - 0.2 k/uL    Morphology ANISOCYTOSIS PRESENT    Urinalysis with Microscopic   Result Value Ref Range    Color, UA Dark Yellow (A) Yellow    Turbidity UA Clear Clear    Glucose, Ur TRACE (A) NEGATIVE    Bilirubin Urine SMALL (A) NEGATIVE    Ketones, Urine TRACE (A) NEGATIVE    Specific Gravity, UA 1.011 1.005 - 1.030    Urine Hgb NEGATIVE NEGATIVE    pH, UA 5.5 5.0 - 8.0    Protein, UA NEGATIVE NEGATIVE    Urobilinogen, Urine Normal Normal    Nitrite, Urine NEGATIVE NEGATIVE    Leukocyte Esterase, Urine NEGATIVE NEGATIVE    -          WBC, UA None 0 - 5 /HPF    RBC, UA None 0 - 4 /HPF    Epithelial Cells UA None 0 - 5 /HPF   POC Glucose Fingerstick   Result Value Ref Range    POC Glucose 105 75 - 110 mg/dL   POC Glucose Fingerstick   Result Value Ref Range    POC Glucose 202 (H) 75 - 110 mg/dL   POC Glucose Fingerstick   Result Value Ref Range    POC Glucose 110 75 - 110 mg/dL   POC Glucose Fingerstick   Result Value Ref Range    POC Glucose 97 75 - 110 mg/dL   POC Glucose Fingerstick   Result Value Ref Range    POC Glucose 113 (H) 75 - 110 mg/dL       IMPRESSION: Jaundice, obstructive    RADIOLOGY:  CT ABDOMEN PELVIS WO CONTRAST Additional Contrast? None    Result Date: 3/16/2022  EXAMINATION: CT OF THE ABDOMEN AND PELVIS WITHOUT CONTRAST 3/15/2022 11:48 pm TECHNIQUE: CT of the abdomen and pelvis was performed without the administration of intravenous contrast. Multiplanar reformatted images are provided for review. Dose modulation, iterative reconstruction, and/or weight based adjustment of the mA/kV was utilized to reduce the radiation dose to as low as reasonably achievable.  COMPARISON: 10/06/2019 HISTORY: ORDERING SYSTEM PROVIDED HISTORY: Elevated LFTs, renal insufficiency TECHNOLOGIST PROVIDED HISTORY: Elevated LFTs, renal insufficiency Decision Support Exception - unselect if not a suspected or confirmed emergency medical condition->Emergency Medical Condition (MA) Reason for Exam: Elevated LFTs, renal insufficiency, pt had some nausea and vomiting yesterday but none today FINDINGS: Lower Chest: Lung bases are clear and the heart size is normal.  There is a small hiatal hernia. There are few mildly prominent lymph nodes adjacent to the hiatal hernia. Organs: There are calcified gallstones. There is diffuse gallbladder wall thickening with pericholecystic fat stranding. The liver, spleen, pancreas, adrenal glands and right kidney are grossly normal with the limitations of a noncontrast study. There are few vascular calcifications. There is a right renal artery stent. Unchanged severe left renal atrophy. Few tiny calcifications in the lower pole of the atrophic left kidney. GI/Bowel: The appendix is normal.  No bowel obstruction. Unopacified bowel loops are unremarkable. Pelvis: Mild prostatomegaly. Urinary bladder is grossly normal. Peritoneum/Retroperitoneum: There is no adenopathy, free air or free fluid. Bones/Soft Tissues: No acute bone finding. Cholelithiasis. Gallbladder wall thickening with pericholecystic fat stranding consistent with acute cholecystitis. Severe left renal atrophy. Right renal artery stent. RECOMMENDATIONS: Unavailable           EMERGENCY DEPARTMENT COURSE:  GI down to eval patient and wants US. Which has been ordered  Spoke with medicine for admission of patient  Chart reviewed that show patient received. Zosyn x 2 doses. Will also plan on gen surg consultation     Spoke with gen surg resident. CONSULTS:  IP CONSULT TO GI  IP CONSULT TO INTERNAL MEDICINE  IP CONSULT TO GENERAL SURGERY  IP CONSULT TO CASE MANAGEMENT    CRITICAL CARE:      FINAL IMPRESSION      1. Jaundice    2. Elevated bilirubin    3.  Cholecystitis DISPOSITION / PLAN     DISPOSITION Admitted 03/16/2022 03:02:42 PM      PATIENT REFERRED TO:  No follow-up provider specified.     DISCHARGE MEDICATIONS:  Current Discharge Medication List          Quinton Subramanian DO  6:39 PM    Attending Emergency Physician  CHELI SAPP NEURO    (Please note that portions of this note were completed with a voice recognition program.  Melody Henry made to edit the dictations but occasionally words are mis-transcribed.)              Monique Coronado,   03/17/22 [id number]

## 2022-03-17 LAB
-: ABNORMAL
ABSOLUTE EOS #: 0.45 K/UL (ref 0–0.4)
ABSOLUTE IMMATURE GRANULOCYTE: 0 K/UL (ref 0–0.3)
ABSOLUTE LYMPH #: 0.83 K/UL (ref 1–4.8)
ABSOLUTE MONO #: 0.32 K/UL (ref 0.1–0.8)
ALBUMIN SERPL-MCNC: 3.4 G/DL (ref 3.5–5.2)
ALBUMIN/GLOBULIN RATIO: 1.5 (ref 1–2.5)
ALP BLD-CCNC: 157 U/L (ref 40–129)
ALT SERPL-CCNC: 82 U/L (ref 5–41)
ANION GAP SERPL CALCULATED.3IONS-SCNC: 13 MMOL/L (ref 9–17)
AST SERPL-CCNC: 36 U/L
BASOPHILS # BLD: 1 % (ref 0–2)
BASOPHILS ABSOLUTE: 0.06 K/UL (ref 0–0.2)
BILIRUB SERPL-MCNC: 4 MG/DL (ref 0.3–1.2)
BILIRUBIN URINE: ABNORMAL
BUN BLDV-MCNC: 17 MG/DL (ref 6–20)
CALCIUM SERPL-MCNC: 8.6 MG/DL (ref 8.6–10.4)
CHLORIDE BLD-SCNC: 99 MMOL/L (ref 98–107)
CO2: 23 MMOL/L (ref 20–31)
COLOR: ABNORMAL
CREAT SERPL-MCNC: 2.11 MG/DL (ref 0.7–1.2)
EOSINOPHILS RELATIVE PERCENT: 7 % (ref 1–4)
EPITHELIAL CELLS UA: ABNORMAL /HPF (ref 0–5)
GFR AFRICAN AMERICAN: 39 ML/MIN
GFR NON-AFRICAN AMERICAN: 32 ML/MIN
GFR SERPL CREATININE-BSD FRML MDRD: ABNORMAL ML/MIN/{1.73_M2}
GLUCOSE BLD-MCNC: 100 MG/DL (ref 70–99)
GLUCOSE BLD-MCNC: 104 MG/DL (ref 75–110)
GLUCOSE BLD-MCNC: 110 MG/DL (ref 75–110)
GLUCOSE BLD-MCNC: 113 MG/DL (ref 75–110)
GLUCOSE BLD-MCNC: 97 MG/DL (ref 75–110)
GLUCOSE URINE: ABNORMAL
HCT VFR BLD CALC: 44.5 % (ref 40.7–50.3)
HEMOGLOBIN: 15 G/DL (ref 13–17)
IMMATURE GRANULOCYTES: 0 %
KETONES, URINE: ABNORMAL
LEUKOCYTE ESTERASE, URINE: NEGATIVE
LYMPHOCYTES # BLD: 13 % (ref 24–44)
MCH RBC QN AUTO: 32 PG (ref 25.2–33.5)
MCHC RBC AUTO-ENTMCNC: 33.7 G/DL (ref 28.4–34.8)
MCV RBC AUTO: 94.9 FL (ref 82.6–102.9)
MONOCYTES # BLD: 5 % (ref 1–7)
MORPHOLOGY: ABNORMAL
NITRITE, URINE: NEGATIVE
NRBC AUTOMATED: 0 PER 100 WBC
PDW BLD-RTO: 15.7 % (ref 11.8–14.4)
PH UA: 5.5 (ref 5–8)
PLATELET # BLD: 206 K/UL (ref 138–453)
PMV BLD AUTO: 9.4 FL (ref 8.1–13.5)
POTASSIUM SERPL-SCNC: 4.3 MMOL/L (ref 3.7–5.3)
PROTEIN UA: NEGATIVE
RBC # BLD: 4.69 M/UL (ref 4.21–5.77)
RBC UA: ABNORMAL /HPF (ref 0–4)
SEG NEUTROPHILS: 74 % (ref 36–66)
SEGMENTED NEUTROPHILS ABSOLUTE COUNT: 4.74 K/UL (ref 1.8–7.7)
SODIUM BLD-SCNC: 135 MMOL/L (ref 135–144)
SPECIFIC GRAVITY UA: 1.01 (ref 1–1.03)
TOTAL PROTEIN: 5.6 G/DL (ref 6.4–8.3)
TURBIDITY: CLEAR
URINE HGB: NEGATIVE
UROBILINOGEN, URINE: NORMAL
WBC # BLD: 6.4 K/UL (ref 3.5–11.3)
WBC UA: ABNORMAL /HPF (ref 0–5)

## 2022-03-17 PROCEDURE — 97162 PT EVAL MOD COMPLEX 30 MIN: CPT

## 2022-03-17 PROCEDURE — 85025 COMPLETE CBC W/AUTO DIFF WBC: CPT

## 2022-03-17 PROCEDURE — 6370000000 HC RX 637 (ALT 250 FOR IP)

## 2022-03-17 PROCEDURE — 82947 ASSAY GLUCOSE BLOOD QUANT: CPT

## 2022-03-17 PROCEDURE — 97116 GAIT TRAINING THERAPY: CPT

## 2022-03-17 PROCEDURE — 6360000002 HC RX W HCPCS

## 2022-03-17 PROCEDURE — 2060000000 HC ICU INTERMEDIATE R&B

## 2022-03-17 PROCEDURE — 36415 COLL VENOUS BLD VENIPUNCTURE: CPT

## 2022-03-17 PROCEDURE — 99233 SBSQ HOSP IP/OBS HIGH 50: CPT | Performed by: INTERNAL MEDICINE

## 2022-03-17 PROCEDURE — 80053 COMPREHEN METABOLIC PANEL: CPT

## 2022-03-17 PROCEDURE — 81001 URINALYSIS AUTO W/SCOPE: CPT

## 2022-03-17 PROCEDURE — 2580000003 HC RX 258

## 2022-03-17 PROCEDURE — 97530 THERAPEUTIC ACTIVITIES: CPT

## 2022-03-17 RX ORDER — AMLODIPINE BESYLATE 5 MG/1
5 TABLET ORAL DAILY
Status: DISCONTINUED | OUTPATIENT
Start: 2022-03-18 | End: 2022-03-18 | Stop reason: HOSPADM

## 2022-03-17 RX ORDER — AMLODIPINE BESYLATE 5 MG/1
5 TABLET ORAL DAILY
Status: DISCONTINUED | OUTPATIENT
Start: 2022-03-18 | End: 2022-03-17

## 2022-03-17 RX ADMIN — FOLIC ACID 1 MG: 1 TABLET ORAL at 09:03

## 2022-03-17 RX ADMIN — PIPERACILLIN AND TAZOBACTAM 3375 MG: 3; .375 INJECTION, POWDER, FOR SOLUTION INTRAVENOUS at 21:30

## 2022-03-17 RX ADMIN — PIPERACILLIN AND TAZOBACTAM 3375 MG: 3; .375 INJECTION, POWDER, FOR SOLUTION INTRAVENOUS at 12:18

## 2022-03-17 RX ADMIN — PANTOPRAZOLE SODIUM 40 MG: 20 TABLET, DELAYED RELEASE ORAL at 09:03

## 2022-03-17 RX ADMIN — PANTOPRAZOLE SODIUM 20 MG: 40 TABLET, DELAYED RELEASE ORAL at 21:18

## 2022-03-17 RX ADMIN — SODIUM CHLORIDE 25 ML: 9 INJECTION, SOLUTION INTRAVENOUS at 04:16

## 2022-03-17 RX ADMIN — SODIUM CHLORIDE: 9 INJECTION, SOLUTION INTRAVENOUS at 09:15

## 2022-03-17 RX ADMIN — ENOXAPARIN SODIUM 40 MG: 100 INJECTION SUBCUTANEOUS at 09:04

## 2022-03-17 RX ADMIN — METOPROLOL TARTRATE 100 MG: 50 TABLET, FILM COATED ORAL at 09:03

## 2022-03-17 RX ADMIN — PIPERACILLIN AND TAZOBACTAM 3375 MG: 3; .375 INJECTION, POWDER, FOR SOLUTION INTRAVENOUS at 04:16

## 2022-03-17 RX ADMIN — METOPROLOL TARTRATE 100 MG: 50 TABLET, FILM COATED ORAL at 21:19

## 2022-03-17 RX ADMIN — SODIUM CHLORIDE: 9 INJECTION, SOLUTION INTRAVENOUS at 20:28

## 2022-03-17 ASSESSMENT — ENCOUNTER SYMPTOMS
RHINORRHEA: 0
SHORTNESS OF BREATH: 0
ABDOMINAL DISTENTION: 0
SORE THROAT: 0
COLOR CHANGE: 1
DIARRHEA: 0
WHEEZING: 0
ABDOMINAL PAIN: 1
CONSTIPATION: 0
VOMITING: 1
NAUSEA: 1
COUGH: 0

## 2022-03-17 NOTE — PROGRESS NOTES
[facility]  Internal Medicine Teaching Residency Program  Inpatient Daily Progress Note  ______________________________________________________________________________    Patient: Reginaldo Holcomb  YOB: 1960   WNX:[id number]    Acct: [de-identified]     Room: 5/12-56  Admit date: 3/16/2022  Today's date: 03/17/22  Number of days in the hospital: 1    SUBJECTIVE   Admitting Diagnosis: Cholelithiasis  CC: Abdominal pain and nausea    No acute issues overnight  Pt examined at bedside. Comfortably resting on the bed  Chart & results reviewed. N.p.o. for possible surgery  Afebrile  Heart rate stable  Blood pressure mildly elevated  On room air  Creatinine improving 2.11 today  Bilirubin levels can coming down,  total bilirubin 4 today  Patient is sleeping ok, normal bowel/ bladder movements. Patient is able to ambulate. General surgery on board, recommends ERCP, no surgical intervention at this time  GI on board, pending recommendations    ROS:  Constitutional:  negative for chills, fevers, sweats  Respiratory:  negative for cough, dyspnea on exertion, hemoptysis, shortness of breath, wheezing  Cardiovascular:  negative for chest pain, chest pressure/discomfort, lower extremity edema, palpitations  Gastrointestinal:  negative for abdominal pain, constipation, diarrhea, nausea, vomiting  Neurological:  negative for dizziness, headache    BRIEF HISTORY     The patient is a pleasant 63 y.o. male presents with a chief complaint of dark urine.     Patient is a transfer from [facility] but he presented with complaints of reddish discoloration of urine. The day before yesterday patient had episodes of abdominal pain and vomiting. Denies any dysuria or urinary frequency or urgency. Abdominal pain has resolved. Yesterday patient had episodes of headache. Denies or hematochezia or melena.   On arrival to the [facility] ED patient's blood pressure was 95/70. He denied any symptoms. Examination showed jaundice, work-up there showed elevated LFTs , elevated total and direct bilirubin and imaging showed acute cholecystitis, was given a dose of Zosyn and transferred to [facility]. [facility].     No similar complaints in the past  Patient has history of diabetes mellitus, hypertension, CKD, COPD, psoriasis and psoriatic arthritis, CVA, renal atrophy.     Home medications include - Norvasc, insulin NovoLog, sildenafil, Protonix, methotrexate, SKYRIZI, folic acid, Lopressor     On arrival to the ED  Patient was hemodynamically stable  Afebrile on room air  Lab work showed -   sodium 129  Creatinine 2.47, history of CKD  Bilirubin 9.42, direct bilirubin 8.23  Alk phos 148, AST 89,   WBC 11.2. Hemoglobin 16     On my evaluation patient seen comfortable denies any symptoms of abdominal pain or nausea or vomiting. Has Jaundice from the face down to the chest.  GI was consulted in the ED, was recommended ultrasound and MRCP without contrast.  Ultrasound showed cholelithiasis, MRCP showed cholelithiasis with possible superimposed cholecystitis. Recommended clear liquid diet today, n.p.o. from midnight for possible surgery or ERCP tomorrow. OBJECTIVE     Vital Signs:  BP (!) 144/91   Pulse 71   Temp 98.1 °F (36.7 °C) (Oral)   Resp 16   Ht 5' 8\" (1.727 m)   Wt 175 lb 11.3 oz (79.7 kg)   SpO2 97%   BMI 26.72 kg/m²     Temp (24hrs), Av.2 °F (36.8 °C), Min:97.7 °F (36.5 °C), Max:98.6 °F (37 °C)    In: -   Out: 1600 [Urine:1600]    Physical Exam:  Physical Exam  Constitutional:       Appearance: Normal appearance. HENT:      Mouth/Throat:      Mouth: Mucous membranes are dry. Cardiovascular:      Rate and Rhythm: Normal rate and regular rhythm. Pulses: Normal pulses. Heart sounds: Normal heart sounds. No murmur heard. Pulmonary:      Effort: No respiratory distress. Breath sounds: Normal breath sounds. No wheezing or rales.    Abdominal: General: There is no distension. Palpations: Abdomen is soft. Tenderness: There is no abdominal tenderness. Musculoskeletal:         General: No swelling. Cervical back: Normal range of motion. Right lower leg: No edema. Left lower leg: No edema. Skin:     Coloration: Skin is jaundiced. Neurological:      Mental Status: He is alert and oriented to person, place, and time. Psychiatric:         Mood and Affect: Mood normal.         Behavior: Behavior normal.           Medications:  Scheduled Medications:    sodium chloride flush  5-40 mL IntraVENous 2 times per day    enoxaparin  40 mg SubCUTAneous Daily    insulin lispro  0-6 Units SubCUTAneous TID WC    insulin lispro  0-3 Units SubCUTAneous Nightly    piperacillin-tazobactam  3,375 mg IntraVENous I9B    folic acid  1 mg Oral Daily    pantoprazole  40 mg Oral QAM AC    metoprolol  100 mg Oral BID    [START ON 3/18/2022] methotrexate  5 mg Oral Weekly    pantoprazole  20 mg Oral Nightly     Continuous Infusions:    sodium chloride 25 mL (03/17/22 0416)    dextrose      sodium chloride 100 mL/hr at 03/17/22 0915     PRN Medicationssodium chloride flush, 5-40 mL, PRN  sodium chloride, 25 mL, PRN  ondansetron, 4 mg, Q8H PRN   Or  ondansetron, 4 mg, Q6H PRN  polyethylene glycol, 17 g, Daily PRN  acetaminophen, 650 mg, Q6H PRN   Or  acetaminophen, 650 mg, Q6H PRN  glucose, 15 g, PRN  dextrose, 12.5 g, PRN  glucagon (rDNA), 1 mg, PRN  dextrose, 100 mL/hr, PRN        Diagnostic Labs:  CBC:   Recent Labs     03/15/22  2159 03/17/22  0503   WBC 11.2 6.4   RBC 4.98 4.69   HGB 16.1 15.0   HCT 46.3 44.5   MCV 93.0 94.9   RDW 15.5* 15.7*    206     BMP:   Recent Labs     03/15/22  2159 03/17/22  0503   * 135   K 4.3 4.3   CL 92* 99   CO2 27 23   BUN 19 17   CREATININE 2.47* 2.11*     BNP: No results for input(s): BNP in the last 72 hours. PT/INR: No results for input(s): PROTIME, INR in the last 72 hours.   APTT: No results for input(s): APTT in the last 72 hours. CARDIAC ENZYMES: No results for input(s): CKMB, CKMBINDEX, TROPONINI in the last 72 hours. Invalid input(s): CKTOTAL;3  FASTING LIPID PANEL:  Lab Results   Component Value Date    CHOL 177 10/11/2021    HDL 39 (L) 10/11/2021    TRIG 272 (H) 10/11/2021     LIVER PROFILE:   Recent Labs     03/15/22  2159 03/17/22  0503   AST 89* 36   * 82*   BILIDIR 8.23*  --    BILITOT 9.42* 4.00*   ALKPHOS 148* 157*      MICROBIOLOGY:   Lab Results   Component Value Date/Time    CULTURE NO GROWTH 6 DAYS 10/07/2019 10:44 AM       Imaging:    CT ABDOMEN PELVIS WO CONTRAST Additional Contrast? None    Result Date: 3/16/2022  Cholelithiasis. Gallbladder wall thickening with pericholecystic fat stranding consistent with acute cholecystitis. Severe left renal atrophy. Right renal artery stent. RECOMMENDATIONS: Unavailable     US LIVER    Result Date: 3/16/2022  Cholelithiasis without convincing sonographic findings of cholecystitis. HIDA scan could assess for acute cholecystitis if clinically warranted. XR CHEST PORTABLE    Result Date: 3/16/2022  No acute cardiopulmonary findings. MRI ABDOMEN WO CONTRAST MRCP    Result Date: 3/16/2022  No biliary ductal dilatation is seen to explain the patient's obstructive jaundice. No focal filling defects seen in the extrahepatic common duct. Cholelithiasis.   There is associated gallbladder wall thickening and trace pericholecystic fluid raising the question of superimposed cholecystitis Mild signal loss throughout the liver on out of phase images, compatible with fatty infiltration RECOMMENDATIONS: Unavailable       ASSESSMENT & PLAN     Assessment and Plan:    Principal Problem:    Cholelithiasis  Active Problems:    Hypertension    Psoriatic arthritis ([address]    Chronic kidney disease (CKD), stage III (moderate) (HCC)    CVA (cerebral vascular accident) ([address]    Type 2 diabetes mellitus with hyperglycemia ([address]    Hiatal hernia Esophagitis    Acute cholecystitis    Cholecystitis  Resolved Problems:    * No resolved hospital problems. *      1. Cholelithiasis with acute cholecystitis -  - clear liquids  - IV fluids  - continue zosyn  - daily LFT s, total bilirubin levels coming down  - GI onboard, pending recommendations     2. Diabetes milletus -   - Last Hb A1c 11.5  - low dose sliding scale  - POCT glucose checks  - hypoglycemia protocol     3. Hypertension -   - continue home med lopressor 100 mg BID  - will resume Norvasc 5 mg daily if needed     4. CKD stage 4 -   - single functioning kidney  - creatinine 2.47  - 5 years ago baseline around 2.5  - will monitor     5. Psoriasis and psoriatic arthritis  - methotrexate 5 mg weekly  - folic acid  - SKYRIZI every 4 months     6. Esophagitis -   - Protonix 40 mg daily and 20 mg nightly  - h/o hiatal hernia, endoscopy in July 2020    7.   COPD        Diet: NPO for possible surgery  DVT ppx: lovenox  GI ppx: Protonix     PT/OT/SW - consulted  Discharge Planning -   on board, patient going home independently       Lore Aparicio  PGY-1  Internal Medicine  [address]    62:55 AM 3/17/2022

## 2022-03-17 NOTE — PLAN OF CARE
Problem: Falls - Risk of:  Goal: Will remain free from falls  Description: Will remain free from falls  Outcome: Ongoing  Goal: Absence of physical injury  Description: Absence of physical injury  Outcome: Ongoing     Problem: Activity:  Goal: Risk for activity intolerance will decrease  Description: Risk for activity intolerance will decrease  Outcome: Ongoing     Problem:  Bowel/Gastric:  Goal: Bowel function will improve  Description: Bowel function will improve  Outcome: Ongoing     Problem: Infection:  Goal: Will remain free from infection  Description: Will remain free from infection  Outcome: Ongoing     Problem: Safety:  Goal: Free from accidental physical injury  Description: Free from accidental physical injury  Outcome: Ongoing  Goal: Free from intentional harm  Description: Free from intentional harm  Outcome: Ongoing     Problem: Daily Care:  Goal: Daily care needs are met  Description: Daily care needs are met  Outcome: Ongoing     Problem: Pain:  Goal: Patient's pain/discomfort is manageable  Description: Patient's pain/discomfort is manageable  Outcome: Ongoing     Problem: Skin Integrity:  Goal: Skin integrity will stabilize  Description: Skin integrity will stabilize  Outcome: Ongoing     Problem: Discharge Planning:  Goal: Patients continuum of care needs are met  Description: Patients continuum of care needs are met  Outcome: Ongoing

## 2022-03-17 NOTE — PROGRESS NOTES
Physical Therapy  Initial Assessment  Date: 3/17/2022  Patient Name: Marylu Valdez  MRN: [id number]  : 1960         Chief Complaint   Patient presents with    Jaundice       Restrictions  Restrictions/Precautions  Restrictions/Precautions: General Precautions,Up as Tolerated  Required Braces or Orthoses?: No    Subjective   General  Chart Reviewed: Yes  Patient assessed for rehabilitation services?: Yes  Additional Pertinent Hx: complaint of dark-colored urine. Patient was evaluated at [facility] and found to be jaundiced. Diagnosed with cholelithiasis and possible cholecystitis and transferred after starting antibiotics and intravenous fluids. Response To Previous Treatment: Not applicable  Family / Caregiver Present: No  Follows Commands: Within Functional Limits  Other (Comment): Pt awake, alert resting in bed upon PT arrival, Pt in agreement to PT intervention at this time  General Comment  Comments: Okay per RN to see for PT evaluation, ambulate off monitor. Subjective  Subjective: Pt report no pain, no weakness or functional decline, eager to get up and walk  Pain Screening  Patient Currently in Pain: No  Vital Signs  Patient Currently in Pain: No    Vision/Hearing     Pt wears glasses at all times, for distance  Orientation   A&O x 4    Social/Functional History  Social/Functional History  Lives With: Spouse  Type of Home: House  Home Layout: Two level; Laundry in basement  Home Access: Stairs to enter with rails  Entrance Stairs - Number of Steps: 3STE  Entrance Stairs - Rails: Left  Bathroom Shower/Tub: Tub/Shower unit; Shower chair without back  [employer]: Handicap height  Bathroom Accessibility: Accessible  Receives Help From: Family  ADL Assistance: [address]: Independent  Homemaking Responsibilities: Yes  Active : Yes (wife provides [de-identified] of driving)  Mode of Transportation: Car  Occupation: On disability  Leisure & Hobbies: enjoy cooking    Objective     Observation/Palpation  Posture: Good  Observation: normal posture and motor planning with mobiltiy    AROM RLE (degrees)  RLE AROM: WNL  AROM LLE (degrees)  LLE AROM : WNL  AROM RUE (degrees)  RUE AROM : WNL  AROM LUE (degrees)  LUE AROM : WNL    Strength RLE  Strength RLE: WFL  Strength LLE  Strength LLE: WFL  Strength RUE  Strength RUE: WFL  Strength LUE  Strength LUE: WFL  Tone RLE  RLE Tone: Normotonic  Tone LLE  LLE Tone: Normotonic  Motor Control  Gross Motor?: WNL  Coordination  Rapid Alternating Movements: Normal  Finger to Nose: Normal  Heel to Shin: Normal     Sensation  Overall Sensation Status: WNL  Bed mobility  Bridging: Independent  Rolling to Left: Independent  Rolling to Right: Independent  Supine to Sit: Independent  Sit to Supine: Independent  Scooting: Independent  Comment: demonstrate all mobiltiy safely with no use of external support needed  Transfers  Sit to Stand: Independent  Stand to sit: Independent  Bed to Chair: Independent  Comment: Pt demonstrates       Ambulation  Ambulation?: Yes  More Ambulation?: No  Ambulation 1  Surface: level tile  Device: No Device  Assistance: Stand by assistance  Quality of Gait: Pt demonstrates normal  step length, width and charan without AD  Gait Deviations: None  Distance: 100' x 2  Comments: Pt ambulate with decreased charan due to increase HR with activity, demonstrating good understanding of pacing  Stairs/Curb  Stairs?: No (Pt has stairs to enter home denies need to trial or practice negotiating steps)  Balance  Posture: Good  Sitting - Static: Good  Sitting - Dynamic: Good  Standing - Static: Good  Standing - Dynamic: Good  Functional Reach Test  Fall Risk (Score of <10 inches is fall risk): No                         Assessment   Conditions Requiring Skilled Therapeutic Intervention  Assessment: Pt demonstrating functional independence with all bed mobilty, functional transfers and ambulates at baseline abilty without AD. No PT goals identified, pt has no DME needs with good family support. Pt denies need for stair traininig and in agreement with PT to sign off care.   Prognosis: Good  Decision Making: Medium Complexity  Clinical Presentation: evolving  REQUIRES PT FOLLOW UP: No  No Skilled PT: No PT goals identified  Activity Tolerance  Activity Tolerance: Patient Tolerated treatment well         Plan   Plan  Times per week: no further PT indicated Pt up ad uziel, ambulating without AD  Safety Devices  Type of devices: Call light within reach,Gait belt,Nurse notified  Restraints  Initially in place: No                                                      AM-PAC Score     AM-PAC Inpatient Mobility without Stair Climbing Raw Score : 20 (03/17/22 1038)  AM-PAC Inpatient without Stair Climbing T-Scale Score : 60.57 (03/17/22 1038)  Mobility Inpatient CMS 0-100% Score: 0 (03/17/22 1038)  Mobility Inpatient without Stair CMS G-Code Modifier : [address] (03/17/22 1038)       Goals  Short term goals  Time Frame for Short term goals: no furhter PT goals identified, pt in agreement  Patient Goals   Patient goals : to return home once the Docs figure out medical management       Therapy Time   Individual Concurrent Group Co-treatment   Time In 0923         Time Out 1002         Minutes 39         Timed Code Treatment Minutes: 23 Minutes       Kristina Woodward, PT

## 2022-03-17 NOTE — PLAN OF CARE
Problem: Falls - Risk of:  Goal: Will remain free from falls  Description: Will remain free from falls  3/17/2022 1145 by Dmitri Gomez RN  Outcome: Ongoing  3/17/2022 1024 by Dmitri Gomez RN  Outcome: Ongoing  3/17/2022 0611 by Rachel Mcintosh RN  Outcome: Ongoing  Goal: Absence of physical injury  Description: Absence of physical injury  3/17/2022 1145 by Dmitri Gomez RN  Outcome: Ongoing  3/17/2022 1024 by Dmitri Gomez RN  Outcome: Ongoing  3/17/2022 0611 by Rachel Mcintosh RN  Outcome: Ongoing     Problem: Activity:  Goal: Risk for activity intolerance will decrease  Description: Risk for activity intolerance will decrease  3/17/2022 1145 by Dmitri Gomez RN  Outcome: Ongoing  3/17/2022 1024 by Dmitri Gomez RN  Outcome: Ongoing  3/17/2022 0611 by Rachel Mcintosh RN  Outcome: Ongoing     Problem:  Bowel/Gastric:  Goal: Bowel function will improve  Description: Bowel function will improve  3/17/2022 1145 by Dmitri Gomez RN  Outcome: Ongoing  3/17/2022 1024 by Dmitri Gomez RN  Outcome: Ongoing  3/17/2022 0611 by Rachel Mcintosh RN  Outcome: Ongoing     Problem: Infection:  Goal: Will remain free from infection  Description: Will remain free from infection  3/17/2022 1145 by Dmitri Gomez RN  Outcome: Ongoing  3/17/2022 1024 by Dmitri Gomez RN  Outcome: Ongoing  3/17/2022 0611 by Rachel Mcintosh RN  Outcome: Ongoing     Problem: Safety:  Goal: Free from accidental physical injury  Description: Free from accidental physical injury  3/17/2022 1145 by Dmitri Gomez RN  Outcome: Ongoing  3/17/2022 1024 by Dmitri Gomez RN  Outcome: Ongoing  3/17/2022 0611 by Rachel Mcintosh RN  Outcome: Ongoing  Goal: Free from intentional harm  Description: Free from intentional harm  3/17/2022 1145 by Dmitri Gomez RN  Outcome: Ongoing  3/17/2022 1024 by Dmitri Gomez RN  Outcome: Ongoing  3/17/2022 0611 by Rachel Mcintosh RN  Outcome: Ongoing     Problem: Daily Care:  Goal: Daily care needs are met  Description: Daily care needs are met  3/17/2022 1145 by Luiza Rojas RN  Outcome: Ongoing  3/17/2022 1024 by Luiza Rojas RN  Outcome: Ongoing  3/17/2022 0611 by Miguel Holt RN  Outcome: Ongoing     Problem: Pain:  Goal: Patient's pain/discomfort is manageable  Description: Patient's pain/discomfort is manageable  3/17/2022 1145 by Luiza Rojas RN  Outcome: Ongoing  3/17/2022 1024 by Luiza Rojas RN  Outcome: Ongoing  3/17/2022 0611 by Miguel Holt RN  Outcome: Ongoing     Problem: Skin Integrity:  Goal: Skin integrity will stabilize  Description: Skin integrity will stabilize  3/17/2022 1145 by Luiza Rojas RN  Outcome: Ongoing  3/17/2022 1024 by uLiza Rojas RN  Outcome: Ongoing  3/17/2022 0611 by Miguel Holt RN  Outcome: Ongoing     Problem: Discharge Planning:  Goal: Patients continuum of care needs are met  Description: Patients continuum of care needs are met  3/17/2022 1145 by Luiza Rojas RN  Outcome: Ongoing  3/17/2022 1024 by Luiza Rojas RN  Outcome: Ongoing  3/17/2022 0611 by Miguel Holt RN  Outcome: Ongoing

## 2022-03-17 NOTE — PROGRESS NOTES
PROGRESS NOTE          [id number]  DATE: 3/17/2022  SURGEON: Dr. Pratima Enrique: Aric Castro,     HD: # 1    ASSESSMENT    Patient Active Problem List   Diagnosis    Hypertension    [facility]    Chronic kidney disease (CKD), stage III (moderate) ([facility]    CVA (cerebral vascular accident) ([facility]    History of migraine headaches    Hyperlipidemia    Pituitary microadenoma (HCC)    Chronic bullous emphysema (HCC)    Sinus tachycardia    Acute kidney injury ([facility]    Hyponatremia    Uncontrolled diabetes mellitus type 2 without complications    Psoriasis    Drug-induced skin rash    Leukopenia    Type 2 diabetes mellitus with hyperglycemia (HCC)    Esophageal dysphagia    Hiatal hernia    Esophagitis    Acute cholecystitis    Cholecystitis    Cholelithiasis       MEDICAL DECISION MAKING AND PLAN    Medical management per primary   Trending transaminitis, hyperbilirubinemia improving   MRCP -no biliary dilation, cholelithiasis, with trace pericholecystic fluid  Would recommend ERCP  With brushing to evaluate for cancer   Recommend hepatitis panel and liver pathology evaluation. No acute surgical intervention indicated at this time. Patient is not having pain. Clinical picture of painless jaundice, hyperbilirubinemia and transaminitis does not support the diagnosis of acute cholecystitis. Chief Complaint: Ramses Rosales is happening\"    SUBJECTIVE    Susie Dyer was seen and examined at bedside. No acute events overnight. Patient is frustrated and does not know what is going on. Patient informed of currently clinical status and plans to await GI recommendations. No surgery at this time. Denies RUQ pain, nausea, vomiting or shortness of breath.      OBJECTIVE  VITALS: Temp: Temp: 98.2 °F (36.8 °C)Temp  Av.2 °F (36.8 °C)  Min: 97.7 °F (36.5 °C)  Max: 98.6 °F (37 °C) BP Systolic (89CXI), ABF:917 , Min:101 , CJA:737   Diastolic (46RGJ), HWN:97, Min:73, Max:96   Pulse Pulse  Av.8  Min: 76  Max: 103 Resp Resp  Av  Min: 14  Max: 25 Pulse ox SpO2  Av.1 %  Min: 95 %  Max: 98 %  GENERAL: alert, no distress  NEURO: no acute neurological deficits  HEENT: normocephalic, jaundiced, scleral icterus, yellow discoloration under tongue  : deferred  LUNGS: symmetrical rise and fall, no distress  HEART: normal rate and regular rhythm  ABDOMEN: soft, non-tender and non-distended, Negative Tineo's sign, psoriatic and and jaundice appearing skin   EXTREMITY: no cyanosis, clubbing or edema    I/O last 3 completed shifts:  In: -   Out: 1600 [Urine:1600]    Drain/tube output: In: -   Out: 1600 [Urine:1600]    LAB:  CBC:   Recent Labs     03/15/22  2159 03/17/22  0503   WBC 11.2 6.4   HGB 16.1 15.0   HCT 46.3 44.5   MCV 93.0 94.9    206     BMP:   Recent Labs     03/15/22  2159 03/17/22  0503   * 135   K 4.3 4.3   CL 92* 99   CO2 27 23   BUN 19 17   CREATININE 2.47* 2.11*   GLUCOSE 139* 100*     COAGS:   Recent Labs     03/15/22  2159 03/17/22  0503   PROT 6.7 5.6*       RADIOLOGY:  XR CHEST PORTABLE   Final Result   No acute cardiopulmonary findings. MRI ABDOMEN WO CONTRAST MRCP   Final Result   No biliary ductal dilatation is seen to explain the patient's obstructive   jaundice. No focal filling defects seen in the extrahepatic common duct. Cholelithiasis. There is associated gallbladder wall thickening and trace   pericholecystic fluid raising the question of superimposed cholecystitis      Mild signal loss throughout the liver on out of phase images, compatible with   fatty infiltration      RECOMMENDATIONS:   Unavailable         US LIVER   Final Result   Cholelithiasis without convincing sonographic findings of cholecystitis. HIDA scan could assess for acute cholecystitis if clinically warranted.                Dmitri Xavier MD  22, 7:53 AM

## 2022-03-17 NOTE — PROGRESS NOTES
No acute surgical intervention. Patient denies abdominal pain. Symptoms do not suggest cholecystitis. Recommend further work up of hyperbilirubinemia and elevated transaminitis. General surgery will sign off.      Erendira Gaona MD  PGY-2 General Surgery  1:27 PM 03/17/22

## 2022-03-17 NOTE — PLAN OF CARE
Problem: Falls - Risk of:  Goal: Will remain free from falls  Description: Will remain free from falls  3/17/2022 1024 by Sea Cohen RN  Outcome: Ongoing  3/17/2022 0611 by Yon Hi RN  Outcome: Ongoing  Goal: Absence of physical injury  Description: Absence of physical injury  3/17/2022 1024 by Sea Cohen RN  Outcome: Ongoing  3/17/2022 0611 by Yon Hi RN  Outcome: Ongoing     Problem: Activity:  Goal: Risk for activity intolerance will decrease  Description: Risk for activity intolerance will decrease  3/17/2022 1024 by Sea Cohen RN  Outcome: Ongoing  3/17/2022 0611 by Yon Hi RN  Outcome: Ongoing     Problem:  Bowel/Gastric:  Goal: Bowel function will improve  Description: Bowel function will improve  3/17/2022 1024 by Sea Cohen RN  Outcome: Ongoing  3/17/2022 0611 by Yon Hi RN  Outcome: Ongoing     Problem: Infection:  Goal: Will remain free from infection  Description: Will remain free from infection  3/17/2022 1024 by Sea Cohen RN  Outcome: Ongoing  3/17/2022 0611 by Yon Hi RN  Outcome: Ongoing     Problem: Safety:  Goal: Free from accidental physical injury  Description: Free from accidental physical injury  3/17/2022 1024 by Sea Cohen RN  Outcome: Ongoing  3/17/2022 0611 by Yon Hi RN  Outcome: Ongoing  Goal: Free from intentional harm  Description: Free from intentional harm  3/17/2022 1024 by Sea Cohen RN  Outcome: Ongoing  3/17/2022 0611 by Yon Hi RN  Outcome: Ongoing     Problem: Daily Care:  Goal: Daily care needs are met  Description: Daily care needs are met  3/17/2022 1024 by Sea Cohen RN  Outcome: Ongoing  3/17/2022 0611 by Yon Hi RN  Outcome: Ongoing     Problem: Pain:  Goal: Patient's pain/discomfort is manageable  Description: Patient's pain/discomfort is manageable  3/17/2022 1024 by Sea Cohen RN  Outcome: Ongoing  3/17/2022 0611 by Yon Hi RN  Outcome: Ongoing     Problem: Skin Integrity:  Goal: Skin integrity will stabilize  Description: Skin integrity will stabilize  3/17/2022 1024 by Gisel Mcneal RN  Outcome: Ongoing  3/17/2022 0611 by Arnol Beatty RN  Outcome: Ongoing     Problem: Discharge Planning:  Goal: Patients continuum of care needs are met  Description: Patients continuum of care needs are met  3/17/2022 1024 by Gisel Mcneal RN  Outcome: Ongoing  3/17/2022 0611 by Arnol Beatty RN  Outcome: Ongoing

## 2022-03-17 NOTE — PROGRESS NOTES
THE [facility]   Progress Note    Gery Osler is a 63 y.o. male patient. Hospitalization Day:1      Chief consult reason:     Needs ERCP    Subjective:  Patient seen and examined. Patient states that he feels much better today, right upper quadrant tenderness has resolved, nausea has improved. No biliary obstruction noted on ultrasound, MRI/MRCP or CAT scan  LFTs have improved significantly today:  -157  -82  AST 89-36  T bili 9.4-4  No leukocytosis noted, afebrile  VITALS:  BP (!) 118/92   Pulse 73   Temp 98.1 °F (36.7 °C) (Oral)   Resp 18   Ht 5' 8\" (1.727 m)   Wt 175 lb 11.3 oz (79.7 kg)   SpO2 95%   BMI 26.72 kg/m²   TEMPERATURE:  Current - Temp: 98.1 °F (36.7 °C); Max - Temp  Av.1 °F (36.7 °C)  Min: 97.7 °F (36.5 °C)  Max: 98.4 °F (36.9 °C)    Physical Assessment:  General appearance:  alert, cooperative and no distress  Mental Status:  oriented to person, place and time and normal affect  Lungs:  clear to auscultation bilaterally, normal effort  Heart:  regular rate and rhythm, no murmur  Abdomen:  soft, nontender, nondistended, normal bowel sounds, no masses, hepatomegaly, splenomegaly  Extremities:  no edema, redness, tenderness in the calves  Skin:  no gross lesions, rashes, induration    Data Review:    Labs and Imaging:     CBC:  Recent Labs     03/15/22  2159 03/17/22  0503   WBC 11.2 6.4   HGB 16.1 15.0   MCV 93.0 94.9   RDW 15.5* 15.7*    206       ANEMIA STUDIES:  No results for input(s): LABIRON, TIBC, FERRITIN, [id number], FOLATE, OCCULTBLD in the last 72 hours.     BMP:  Recent Labs     03/15/22  2159 03/17/22  0503   * 135   K 4.3 4.3   CL 92* 99   CO2 27 23   BUN 19 17   CREATININE 2.47* 2.11*   GLUCOSE 139* 100*   CALCIUM 9.5 8.6       LFTS:  Recent Labs     03/15/22  2159 03/17/22  0503   ALKPHOS 148* 157*   * 82*   AST 89* 36   BILITOT 9.42* 4.00*   BILIDIR 8.23*  --    LABALBU 3.6 3.4*       Amylase/Lipase and Ammonia:  Recent Labs     03/15/22  2159   LIPASE 24       Acute Hepatitis Panel:  Lab Results   Component Value Date    HEPBSAG NONREACTIVE 08/14/2016    HEPCAB NONREACTIVE 08/14/2016    HEPBIGM NONREACTIVE 08/14/2016    HEPAIGM NONREACTIVE 08/14/2016       HCV Genotype:  No results found for: HEPATITISCGENOTYPE    HCV Quantitative:  No results found for: HCVQNT    LIVER WORK UP:    AFP  No results found for: AFP    Alpha 1 antitrypsin   Lab Results   Component Value Date    A1A 216 09/28/2011       WENDY  Lab Results   Component Value Date    WENDY NEGATIVE 08/14/2016       AMA  No results found for: MITOAB    ASMA  No results found for: SMOOTHMUSCAB    PT/INR  No results for input(s): PROTIME, INR in the last 72 hours. Cancer Markers:  CEA:  No results for input(s): CEA in the last 72 hours. Ca 125:  No results for input(s):  in the last 72 hours. Ca 19-9:   Invalid input(s):   AFP: No results for input(s): AFP in the last 72 hours. Lactic acid:Invalid input(s): LACTIC ACID    Radiology Review:    US LIVER    Result Date: 3/16/2022  EXAMINATION: RIGHT UPPER QUADRANT ULTRASOUND 3/16/2022 1:21 pm COMPARISON: CT 03/15/2022 HISTORY: ORDERING SYSTEM PROVIDED HISTORY: evaluate,  obstructive jaundice TECHNOLOGIST PROVIDED HISTORY: Evaluate,  obstructive jaundice FINDINGS: LIVER:  The liver demonstrates normal echogenicity without evidence of intrahepatic biliary ductal dilatation. Liver length is 17.5 cm. Flow in the main portal vein is hepatopetal. BILIARY SYSTEM:  The gallbladder contains several shadowing gallstones without convincing sonographic findings of cholecystitis. There may be minimal fluid/edema in the wall although the wall itself is not significantly thickened measuring up to 2.76 mm. No pericholecystic fluid is seen. The patient did not have a positive sonographic Tineo's sign. Common bile duct is within normal limits measuring 5.4 mm.  RIGHT KIDNEY: The right kidney is grossly unremarkable without evidence of hydronephrosis. PANCREAS:  Visualized portions of the pancreas are unremarkable. Pancreas is partially obscured by overlying bowel gas. OTHER: No evidence of right upper quadrant ascites. Cholelithiasis without convincing sonographic findings of cholecystitis. HIDA scan could assess for acute cholecystitis if clinically warranted. 3/16/2022 MRI MRCP without contrast     FINDINGS:   Respiratory motion artifact limits evaluation       There is mild signal loss seen in the liver on out of phase images,   compatible with fatty infiltration.       Gallbladder: Gallbladder is contracted.  Gallstones are seen. Katrina Maria Luz is mild   gallbladder wall thickening.  There is trace pericholecystic fluid       No pancreatic ductal dilatation.  No peripancreatic fluid       Bile Ducts: Extrahepatic common duct measures 6 mm.  No significant intra or   extrahepatic biliary ductal dilatation seen.  No focal filling defects seen   in extrahepatic common duct.       Pancreatic Duct: No pancreatic ductal dilatation.  No peripancreatic fluid       Adrenal glands are unremarkable.  Cortical thinning is seen on the left.  No   hydronephrosis. No hydronephrosis on the right       No retroperitoneal adenopathy.  No aortic aneurysm. .  Susceptibility artifact   is seen from renal artery stent       Spurring is seen in the spine           Impression   No biliary ductal dilatation is seen to explain the patient's obstructive   jaundice.  No focal filling defects seen in the extrahepatic common duct.       Cholelithiasis. Maria De Jesus Pilon is associated gallbladder wall thickening and trace   pericholecystic fluid raising the question of superimposed cholecystitis       Mild signal loss throughout the liver on out of phase images, compatible with   fatty infiltration       Principal Problem:    Cholelithiasis  Active Problems:    Hypertension    Psoriatic arthritis ([address]    Chronic kidney disease (CKD), stage III (moderate) (HCC)    CVA (cerebral vascular accident) ([address]    Type 2 diabetes mellitus with hyperglycemia ([address]    Hiatal hernia    Esophagitis    Acute cholecystitis    Cholecystitis  Resolved Problems:    * No resolved hospital problems. *       GI Impression:    1. Right upper quadrant pain-resolved  2. Cholelithiasis-suspect patient had stone in CBD that is now passed given negative imaging and acute drop in bilirubin with improvement in symptoms in last 24 hours  3. Suspected acute cholecystitis-management per general surgery    Plan and Recommendations:    1. No indication at this time for ERCP given resolution of symptoms, no choledocholithiasis noted on imaging, and acute improvement in LFTs/T bili. 2. No objections to diet from GI perspective-please check with other services  3. No further recommendations at this time-anticipate resolution of elevated LFTs once gallbladder is removed  4. GI will sign off      This plan was formulated in collaboration with Dr. Guillermo Mohr MD    Thank you for allowing me to participate in the care of your patient. Please feel free to contact me with any questions or concerns. 2 [facility]    This note was created with the assistance of a speech-recognition program.  Although the intention is to generate a document that actually reflects the content of the visit, no guarantees can be provided that every mistake has been identified and corrected by editing.

## 2022-03-18 ENCOUNTER — TELEPHONE (OUTPATIENT)
Dept: FAMILY MEDICINE CLINIC | Age: 62
End: 2022-03-18

## 2022-03-18 VITALS
HEIGHT: 68 IN | TEMPERATURE: 98.3 F | BODY MASS INDEX: 26.52 KG/M2 | DIASTOLIC BLOOD PRESSURE: 104 MMHG | SYSTOLIC BLOOD PRESSURE: 137 MMHG | RESPIRATION RATE: 18 BRPM | OXYGEN SATURATION: 99 % | WEIGHT: 175 LBS | HEART RATE: 77 BPM

## 2022-03-18 NOTE — TELEPHONE ENCOUNTER
[facility] Initial Follow Up Call    Outreach made within 2 business days of discharge: Yes    Patient: Gery Osler Patient : 1960   MRN: <[id number]>  Reason for Admission: There are no discharge diagnoses documented for the most recent discharge. Discharge Date: 3/18/22       Spoke with: Noa Mcghee    Discharge department/facility:  [facility]    [facility] Interactive Patient Contact:  Was patient able to fill all prescriptions: Yes  Was patient instructed to bring all medications to the follow-up visit: Yes  Is patient taking all medications as directed in the discharge summary? Yes  Does patient understand their discharge instructions: Yes  Does patient have questions or concerns that need addressed prior to 7-14 day follow up office visit: no    Scheduled appointment with PCP within 7-14 days; Dr. Arthur Michel 3/29 at [address] [place], [place] [address]Frantz Offered sooner visit with another provider next week as Dr. Arthur Michel is [facility], pt declined and states he can wait and will see Dr. Arthur Michel when she gets back.     Follow Up  Future Appointments   Date Time Provider Akshat Vann   2022  8:20 AM DO DANIEL Schuler DPP   2022  9:00 AM DO [facility]       Lyric Sterling RN

## 2022-03-18 NOTE — PROGRESS NOTES
[phone] From: [facility] [employer] [address] RE: Armando Mendoza BP now 162/100, saw the order for BP med in AM do you want to order something for the current HTN, pt says he takes lisnopril at home when his BP is over 140  Read 11:52 PM   3/17/22 11:55 PM   There other medications as well to control bp, i would be more comfortable resuming norvas before lisinopril because of his creatinine. 3/17/22 11:57 PM  Okay, I will let the patient know, thanks for putting in the order . ... Read 11:58 PM           [phone] From: [place] [employer] [address] [employer] [address] RE: Armando Mendoza BP now 162/100, saw the order for BP med in AM do you want to order something for the current HTN, pt says he takes lisnopril at home when his BP is over 140  Read 11:52 PM   3/17/22 11:55 PM   There other medications as well to control bp, i would be more comfortable resuming norvas before lisinopril because of his creatinine. 3/17/22 11:57 PM  Okay, I will let the patient know, thanks for putting in the order . ...   Read 11:58 PM

## 2022-03-18 NOTE — PROGRESS NOTES
April 20, 2017      Greg Lyons MD  [address]  [address]  [facility] [address]           [facility] - Urology 4th Floor  [address]  [place] [address]  Phone: [phone]          Patient: Vivienne Jose   MR Number: [id number]   YOB: 1949   Date of Visit: 4/20/2017       Dear Dr. Greg Lyons:    Thank you for referring Vivienne Jose to me for evaluation. Attached you will find relevant portions of my assessment and plan of care.    If you have questions, please do not hesitate to call me. I look forward to following Vivienne Jose along with you.    Sincerely,    Patricia Cee MD    Enclosure  CC:  No Recipients    If you would like to receive this communication electronically, please contact [email or web address] or [phone] to request more information on [facility] access.    For providers and/or their staff who would like to refer a patient to [facility], please contact us through our one-stop-shop provider referral line, [facility] [place], at [phone].    If you feel you have received this communication in error or would no longer like to receive these types of communications, please e-mail [email or web address]          Writer assumed care for patient, pt sitting in bed, states he will be leaving as he told previous nurse since surgery would not take him seriously. IV was previously removed, AMA form signed and in chart. This RN re-notified primary team of the above.

## 2022-03-18 NOTE — PROGRESS NOTES
Patient requested to leave AMA, on call provider notified, AMA paper work given and signed by patient. IV out. Patient  Is waiting for his wife to come to pick him up.

## 2022-03-18 NOTE — PROGRESS NOTES
[facility] [address] [employer]  Occupational Therapy Not Seen Note    DATE: 3/18/2022    NAME: Joaquin Smith  MRN: [id number]   : 1960      Patient not seen this date for Occupational Therapy due to:    Patient is not appropriate for active participation in OT evaluation/treatment at this time d/t RN reporting pt is leaving AMA. Next Scheduled Treatment: Check back if POC changes.      Electronically signed by Aris Foster on 3/18/2022 at 8:15 AM

## 2022-03-18 NOTE — PLAN OF CARE
Problem: Falls - Risk of:  Goal: Will remain free from falls  Description: Will remain free from falls  3/17/2022 2254 by Melonie Marcelo RN  Outcome: Ongoing  3/17/2022 1145 by Renuka Hassan RN  Outcome: Ongoing  3/17/2022 1024 by Renuka Hassan RN  Outcome: Ongoing  Goal: Absence of physical injury  Description: Absence of physical injury  3/17/2022 2254 by Melonie Marcelo RN  Outcome: Ongoing  3/17/2022 1145 by Renuka Hassan RN  Outcome: Ongoing  3/17/2022 1024 by Renuka Hassan RN  Outcome: Ongoing     Problem: Activity:  Goal: Risk for activity intolerance will decrease  Description: Risk for activity intolerance will decrease  3/17/2022 2254 by Melonie Marcelo RN  Outcome: Ongoing  3/17/2022 1145 by Renuka Hassan RN  Outcome: Ongoing  3/17/2022 1024 by Renuka Hassan RN  Outcome: Ongoing     Problem:  Bowel/Gastric:  Goal: Bowel function will improve  Description: Bowel function will improve  3/17/2022 2254 by Melonie Marcelo RN  Outcome: Ongoing  3/17/2022 1145 by Renuka Hassan RN  Outcome: Ongoing  3/17/2022 1024 by Renuka Hassan RN  Outcome: Ongoing     Problem: Infection:  Goal: Will remain free from infection  Description: Will remain free from infection  3/17/2022 2254 by Melonie Marcelo RN  Outcome: Ongoing  3/17/2022 1145 by Renuka Hassan RN  Outcome: Ongoing  3/17/2022 1024 by Renuka Hassan RN  Outcome: Ongoing     Problem: Safety:  Goal: Free from accidental physical injury  Description: Free from accidental physical injury  3/17/2022 2254 by Melonie Marcelo RN  Outcome: Ongoing  3/17/2022 1145 by Renuka Hassan RN  Outcome: Ongoing  3/17/2022 1024 by Renuka Hassan RN  Outcome: Ongoing  Goal: Free from intentional harm  Description: Free from intentional harm  3/17/2022 2254 by Melonie Marcelo RN  Outcome: Ongoing  3/17/2022 1145 by Renuka Hassan RN  Outcome: Ongoing  3/17/2022 1024 by Renuka Hassan RN  Outcome: Ongoing     Problem: Daily Care:  Goal: Daily care needs are met  Description: Daily care needs are met  3/17/2022 2254 by Tristen Tirado RN  Outcome: Ongoing  3/17/2022 1145 by Wolfgang Solis RN  Outcome: Ongoing  3/17/2022 1024 by Wolfgang Solis RN  Outcome: Ongoing     Problem: Pain:  Goal: Patient's pain/discomfort is manageable  Description: Patient's pain/discomfort is manageable  3/17/2022 2254 by Tristen Tirado RN  Outcome: Ongoing  3/17/2022 1145 by Wolfgang Solis RN  Outcome: Ongoing  3/17/2022 1024 by Wolfgang Soils RN  Outcome: Ongoing     Problem: Skin Integrity:  Goal: Skin integrity will stabilize  Description: Skin integrity will stabilize  3/17/2022 2254 by Tristen Tirado RN  Outcome: Ongoing  3/17/2022 1145 by Wolfgang Solis RN  Outcome: Ongoing  3/17/2022 1024 by Wolfgang Solis RN  Outcome: Ongoing     Problem: Discharge Planning:  Goal: Patients continuum of care needs are met  Description: Patients continuum of care needs are met  3/17/2022 2254 by Tristen Tirado RN  Outcome: Ongoing  3/17/2022 1145 by Wolfgang Solis RN  Outcome: Ongoing  3/17/2022 1024 by Wolfgang Solis RN  Outcome: Ongoing

## 2022-03-18 NOTE — PROGRESS NOTES
[facility]  Internal Medicine Teaching Residency Program  Inpatient Daily Progress Note  ______________________________________________________________________________    Patient: Vicente Matta  YOB: 1960   HID:[id number]    Acct: [de-identified]     Room: 5/12-56  Admit date: 3/16/2022  Today's date: 03/18/22  Number of days in the hospital: 2    SUBJECTIVE   Admitting Diagnosis: Cholelithiasis  CC: abdominal pain     No acute issues overnight  Pt examined at bedside. Patient frustrated that he was not updated about the plan from surgery   Chart & results reviewed. Afebrile  HR and blood pressure stable  On room air  Patient is eating ok, sleeping ok, normal bowel/ bladder movements. Patient is able to ambulate. ROS:  Constitutional:  negative for chills, fevers, sweats  Respiratory:  negative for cough, dyspnea on exertion, hemoptysis, shortness of breath, wheezing  Cardiovascular:  negative for chest pain, chest pressure/discomfort, lower extremity edema, palpitations  Gastrointestinal:  negative for abdominal pain, constipation, diarrhea, nausea, vomiting  Neurological:  negative for dizziness, headache    BRIEF HISTORY     The patient is a pleasant 61 y. o. male presents with a chief complaint of dark urine.     Patient is a transfer from [facility] but he presented with complaints of reddish discoloration of urine.  The day before yesterday patient had episodes of abdominal pain and vomiting.  Denies any dysuria or urinary frequency or urgency.  Abdominal pain has resolved.   Yesterday patient had episodes of headache.  Denies or hematochezia or melena. On arrival to the [facility] ED patient's blood pressure was 95/70.   He denied any symptoms.  Examination showed jaundice, work-up there showed elevated LFTs , elevated total and direct bilirubin and imaging showed acute cholecystitis, was given a dose of Zosyn and transferred to [facility].     No similar complaints in the past  Patient has history of diabetes mellitus, hypertension, CKD, COPD, psoriasis and psoriatic arthritis, CVA, renal atrophy.     Home medications include - Norvasc, insulin NovoLog, sildenafil, Protonix, methotrexate, SKYRIZI, folic acid, Lopressor     On arrival to the ED  Patient was hemodynamically stable  Afebrile on room air  Lab work showed -   sodium 129  Creatinine 2.47, history of CKD  Bilirubin 9.42, direct bilirubin 8.23  Alk phos 148, AST 89,   WBC 11.2.  Hemoglobin 16     On my evaluation patient seen comfortable denies any symptoms of abdominal pain or nausea or vomiting. Has Jaundice from the face down to the chest.  GI was consulted in the ED, was recommended ultrasound and MRCP without contrast.  Ultrasound showed cholelithiasis, MRCP showed cholelithiasis with possible superimposed cholecystitis. Recommended clear liquid diet today, n.p.o. from midnight for possible surgery or ERCP tomorrow. OBJECTIVE     Vital Signs:  BP (!) 137/104   Pulse 77   Temp 98.3 °F (36.8 °C) (Oral)   Resp 18   Ht 5' 8\" (1.727 m)   Wt 175 lb (79.4 kg)   SpO2 99%   BMI 26.61 kg/m²     Temp (24hrs), Av.9 °F (36.6 °C), Min:97.7 °F (36.5 °C), Max:98.3 °F (36.8 °C)    In: 2795.5   Out: 1550 [Urine:1550]    Physical Exam:  Physical Exam  Constitutional:       Appearance: Normal appearance. HENT:      Mouth/Throat:      Mouth: Mucous membranes are dry. Cardiovascular:      Rate and Rhythm: Normal rate and regular rhythm. Pulses: Normal pulses. Heart sounds: Normal heart sounds. No murmur heard. Pulmonary:      Effort: No respiratory distress. Breath sounds: Normal breath sounds. No wheezing or rales. Abdominal:      General: There is no distension. Palpations: Abdomen is soft. Tenderness: There is no abdominal tenderness. Musculoskeletal:         General: No swelling. Normal range of motion.       Cervical back: Normal range of motion. No rigidity. Right lower leg: No edema. Left lower leg: No edema. Skin:     Coloration: Skin is jaundiced. Neurological:      Mental Status: He is alert and oriented to person, place, and time. Psychiatric:         Behavior: Behavior normal.           Medications:  Scheduled Medications:   Continuous Infusions:   PRN Medications    Diagnostic Labs:  CBC:   Recent Labs     03/15/22  2159 03/17/22  0503   WBC 11.2 6.4   RBC 4.98 4.69   HGB 16.1 15.0   HCT 46.3 44.5   MCV 93.0 94.9   RDW 15.5* 15.7*    206     BMP:   Recent Labs     03/15/22  2159 03/17/22  0503   * 135   K 4.3 4.3   CL 92* 99   CO2 27 23   BUN 19 17   CREATININE 2.47* 2.11*     BNP: No results for input(s): BNP in the last 72 hours. PT/INR: No results for input(s): PROTIME, INR in the last 72 hours. APTT: No results for input(s): APTT in the last 72 hours. CARDIAC ENZYMES: No results for input(s): CKMB, CKMBINDEX, TROPONINI in the last 72 hours. Invalid input(s): CKTOTAL;3  FASTING LIPID PANEL:  Lab Results   Component Value Date    CHOL 177 10/11/2021    HDL 39 (L) 10/11/2021    TRIG 272 (H) 10/11/2021     LIVER PROFILE:   Recent Labs     03/15/22  2159 03/17/22  0503   AST 89* 36   * 82*   BILIDIR 8.23*  --    BILITOT 9.42* 4.00*   ALKPHOS 148* 157*      MICROBIOLOGY:   Lab Results   Component Value Date/Time    CULTURE NO GROWTH 6 DAYS 10/07/2019 10:44 AM       Imaging:    CT ABDOMEN PELVIS WO CONTRAST Additional Contrast? None    Result Date: 3/16/2022  Cholelithiasis. Gallbladder wall thickening with pericholecystic fat stranding consistent with acute cholecystitis. Severe left renal atrophy. Right renal artery stent. RECOMMENDATIONS: Unavailable     US LIVER    Result Date: 3/16/2022  Cholelithiasis without convincing sonographic findings of cholecystitis. HIDA scan could assess for acute cholecystitis if clinically warranted.      XR CHEST PORTABLE    Result Date: 3/16/2022  No acute cardiopulmonary findings. MRI ABDOMEN WO CONTRAST MRCP    Result Date: 3/16/2022  No biliary ductal dilatation is seen to explain the patient's obstructive jaundice. No focal filling defects seen in the extrahepatic common duct. Cholelithiasis. There is associated gallbladder wall thickening and trace pericholecystic fluid raising the question of superimposed cholecystitis Mild signal loss throughout the liver on out of phase images, compatible with fatty infiltration RECOMMENDATIONS: Unavailable       ASSESSMENT & PLAN     Assessment and Plan:    Principal Problem:    Cholelithiasis  Active Problems:    Hypertension    Psoriatic arthritis ([address]    Chronic kidney disease (CKD), stage III (moderate) (HCC)    CVA (cerebral vascular accident) ([address]    Type 2 diabetes mellitus with hyperglycemia ([address]    Hiatal hernia    Esophagitis    Acute cholecystitis    Cholecystitis  Resolved Problems:    * No resolved hospital problems. *      1. Cholelithiasis with acute cholecystitis -  - clear liquids  - IV fluids  - continue zosyn  - daily LFT s, total bilirubin levels coming down  - GI onboard, LFTs trending down, no indication for ERCP  - general surgery, no surgical intervention     2. Diabetes milletus -   - Last Hb A1c 11.5  - low dose sliding scale  - POCT glucose checks  - hypoglycemia protocol     3. Hypertension -   - continue home med lopressor 100 mg BID  - will resume Norvasc 5 mg daily if needed     4. CKD stage 4 -   - single functioning kidney  - creatinine 2.47  - 5 years ago baseline around 2.5  - will monitor     5. Psoriasis and psoriatic arthritis  - methotrexate 5 mg weekly  - folic acid  - SKYRIZI every 4 months     6. Esophagitis -   - Protonix 40 mg daily and 20 mg nightly  - h/o hiatal hernia, endoscopy in July 2020     7.   COPD        Diet: regular adult diet  DVT ppx: lovenox  GI ppx: Protonix     PT/OT/SW - functionally independent  Discharge Planning - patient going home independently       Lore Aparicio  PGY-1  Internal Medicine  [address] [facility], [address] [place] [address]    2:48 [place] 3/18/2022

## 2022-03-21 ENCOUNTER — CARE COORDINATION (OUTPATIENT)
Dept: CASE MANAGEMENT | Age: 62
End: 2022-03-21

## 2022-03-21 DIAGNOSIS — R17 JAUNDICE: Primary | ICD-10-CM

## 2022-03-21 PROCEDURE — 1111F DSCHRG MED/CURRENT MED MERGE: CPT | Performed by: FAMILY MEDICINE

## 2022-03-21 NOTE — CARE COORDINATION
[address] Transitions Initial Follow Up Call    Call within 2 business days of discharge: Yes    Patient: Lary Miles Patient : 1960   MRN: <[id number]>  Reason for Admission:  Cholelithiasis  Discharge Date: 3/18/22 RARS: Readmission Risk Score: 16.2 ( )      Last Discharge [facility]       Complaint Diagnosis Description Type Department Provider    3/16/22 Jaundice Jaundice . .. ED to Hosp-Admission (Discharged) (ADMITTED) Neha Mac MD; Uzma. .. Spoke with: Transitions of Care Initial Call: Spoke to [facility] the role of Care Transition Nurse and the Transition program, patient is agreeable to follow up calls Post discharge from the [address] states he has no ongoing symptoms of abdominal pain or Jaundice. No N/V/D. He also states he signed himself out AMA because he was unable to have surgery scheduled. Appetite is fair. . Bowel and bladder WNL. Pt denies need for DME or HHC. 1111F medication reconciliation completed. Discussed discharge f/u appt with PCP in 3/29/22. Pt verbalized knowledge of date and time. He will transport himself to appts. will continue to follow. Patient is aware of when to contact MD with any new or worsening symptoms. Advised to contact PCP  with any health concerns for early outpatient intervention in an effort to avoid hospitalization. Report any worsening symptoms to PCP and/or Call 911 and/or GO TO EMERGENCY ROOM if symptoms are severe. Expresses understanding. Transitions of Care Initial Call    Was this an external facility discharge? No Discharge Facility: n/a    Challenges to be reviewed by the provider   Additional needs identified to be addressed with provider: No  none             Method of communication with provider : none      Advance Care Planning:   Does patient have an Advance Directive: reviewed and current. Was this a readmission?  No  Patient stated reason for admission: jaundice dark urine    Patients top risk factors for readmission: medical condition-jaundice    Care Transition Nurse (CTN) contacted the patient by telephone to perform post hospital discharge assessment. Verified name and  with patient as identifiers. Provided introduction to self, and explanation of the CTN role. CTN reviewed discharge instructions, medical action plan and red flags with patient who verbalized understanding. Patient given an opportunity to ask questions and does not have any further questions or concerns at this time. Were discharge instructions available to patient? No left AMA   Reviewed appropriate site of care based on symptoms and resources available to patient including: PCP, When to call 911 and [address]. The patient agrees to contact the PCP office for questions related to their healthcare. Medication reconciliation was performed with patient, who verbalizes understanding of administration of home medications. Advised obtaining a 90-day supply of all daily and as-needed medications. Covid Risk Education     Educated patient about risk for severe COVID-19 due to risk factors according to CDC guidelines. LPN CC reviewed discharge instructions, medical action plan and red flag symptoms with the patient who verbalized understanding. Discussed COVID vaccination status: No. Education provided on COVID-19 vaccination as appropriate. Discussed exposure protocols and quarantine with CDC Guidelines. Patient was given an opportunity to verbalize any questions and concerns and agrees to contact LPN CC or health care provider for questions related to their healthcare. Reviewed and educated patient on any new and changed medications related to discharge diagnosis. Was patient discharged with a pulse oximeter? No Discussed and confirmed pulse oximeter discharge instructions and when to notify provider or seek emergency care. LPN CC provided contact information.  Plan for follow-up call in 5-7 days based on severity of symptoms and risk factors. Plan for next call: symptom management-jaundice  abdominal pain  dark urine  self management-monitor urine for color   monitor skin and eye sclera for jaundice.  Monitor glucose levels  follow up appointment-with PCP      Facility: [address] [place] [address]    Non-face-to-face services provided:  Obtained and reviewed discharge summary and/or continuity of care documents    Care Transitions 24 Hour Call    Patient DME: Other  Do you have support at home?: Partner/Spouse/SO  Are you an active caregiver in your home?: No  Care Transitions Interventions         Follow Up  Future Appointments   Date Time Provider Akshat Vann   3/29/2022  9:40 AM DO DANIEL Pugh DPP   6/13/2022  8:20 AM DO DANIEL Pugh DPP   9/5/2022  9:00 AM DO ANGELICA Dunlap DPP       Marcello Schulz, [address] [facility] Care Transitions Nurse   [phone]

## 2022-03-25 ENCOUNTER — HOSPITAL ENCOUNTER (OUTPATIENT)
Dept: LAB | Age: 62
Discharge: HOME | End: 2022-03-25
Payer: MEDICARE

## 2022-03-25 DIAGNOSIS — I10 ESSENTIAL HYPERTENSION: ICD-10-CM

## 2022-03-25 DIAGNOSIS — E78.5 HYPERLIPIDEMIA, UNSPECIFIED HYPERLIPIDEMIA TYPE: ICD-10-CM

## 2022-03-25 DIAGNOSIS — R74.8 ELEVATED ALKALINE PHOSPHATASE LEVEL: ICD-10-CM

## 2022-03-25 DIAGNOSIS — E11.65 TYPE 2 DIABETES MELLITUS WITH HYPERGLYCEMIA, UNSPECIFIED WHETHER LONG TERM INSULIN USE (HCC): ICD-10-CM

## 2022-03-25 LAB
ABSOLUTE EOS #: 0.24 K/UL (ref 0–0.44)
ABSOLUTE EOS #: 0.24 K/UL (ref 0–0.44)
ABSOLUTE IMMATURE GRANULOCYTE: 0.04 K/UL (ref 0–0.3)
ABSOLUTE IMMATURE GRANULOCYTE: 0.04 K/UL (ref 0–0.3)
ABSOLUTE LYMPH #: 1.42 K/UL (ref 1.1–3.7)
ABSOLUTE LYMPH #: 1.42 K/UL (ref 1.1–3.7)
ABSOLUTE MONO #: 0.74 K/UL (ref 0.1–1.2)
ABSOLUTE MONO #: 0.74 K/UL (ref 0.1–1.2)
ALBUMIN SERPL-MCNC: 4.2 G/DL (ref 3.5–5.2)
ALBUMIN/GLOBULIN RATIO: 1.4 (ref 1–2.5)
ALP BLD-CCNC: 127 U/L (ref 40–129)
ALT SERPL-CCNC: 54 U/L (ref 5–41)
ANION GAP SERPL CALCULATED.3IONS-SCNC: 9 MMOL/L (ref 9–17)
ANION GAP SERPL CALCULATED.3IONS-SCNC: 9 MMOL/L (ref 9–17)
AST SERPL-CCNC: 26 U/L
BASOPHILS # BLD: 2 % (ref 0–2)
BASOPHILS # BLD: 2 % (ref 0–2)
BASOPHILS ABSOLUTE: 0.13 K/UL (ref 0–0.2)
BASOPHILS ABSOLUTE: 0.13 K/UL (ref 0–0.2)
BILIRUB SERPL-MCNC: 1.26 MG/DL (ref 0.3–1.2)
BUN BLDV-MCNC: 13 MG/DL (ref 6–20)
BUN BLDV-MCNC: 13 MG/DL (ref 6–20)
BUN/CREAT BLD: 6 (ref 9–20)
BUN/CREAT BLD: 6 (ref 9–20)
CALCIUM SERPL-MCNC: 9.6 MG/DL (ref 8.6–10.4)
CALCIUM SERPL-MCNC: 9.6 MG/DL (ref 8.6–10.4)
CHLORIDE BLD-SCNC: 97 MMOL/L (ref 98–107)
CHLORIDE BLD-SCNC: 97 MMOL/L (ref 98–107)
CHOLESTEROL/HDL RATIO: 6.8
CHOLESTEROL: 189 MG/DL
CO2: 29 MMOL/L (ref 20–31)
CO2: 29 MMOL/L (ref 20–31)
CREAT SERPL-MCNC: 2.28 MG/DL (ref 0.7–1.2)
CREAT SERPL-MCNC: 2.28 MG/DL (ref 0.7–1.2)
EOSINOPHILS RELATIVE PERCENT: 4 % (ref 1–4)
EOSINOPHILS RELATIVE PERCENT: 4 % (ref 1–4)
ESTIMATED AVERAGE GLUCOSE: 189 MG/DL
GFR AFRICAN AMERICAN: 36 ML/MIN
GFR AFRICAN AMERICAN: 36 ML/MIN
GFR NON-AFRICAN AMERICAN: 30 ML/MIN
GFR NON-AFRICAN AMERICAN: 30 ML/MIN
GFR SERPL CREATININE-BSD FRML MDRD: ABNORMAL ML/MIN/{1.73_M2}
GFR SERPL CREATININE-BSD FRML MDRD: ABNORMAL ML/MIN/{1.73_M2}
GGT: 140 U/L (ref 8–61)
GLUCOSE BLD-MCNC: 179 MG/DL (ref 70–99)
GLUCOSE BLD-MCNC: 179 MG/DL (ref 70–99)
HBA1C MFR BLD: 8.2 % (ref 4–6)
HCT VFR BLD CALC: 47.5 % (ref 40.7–50.3)
HCT VFR BLD CALC: 47.5 % (ref 40.7–50.3)
HDLC SERPL-MCNC: 28 MG/DL
HEMOGLOBIN: 16.1 G/DL (ref 13–17)
HEMOGLOBIN: 16.1 G/DL (ref 13–17)
IMMATURE GRANULOCYTES: 1 %
IMMATURE GRANULOCYTES: 1 %
LDL CHOLESTEROL: 129 MG/DL (ref 0–130)
LYMPHOCYTES # BLD: 22 % (ref 24–43)
LYMPHOCYTES # BLD: 22 % (ref 24–43)
MCH RBC QN AUTO: 32.4 PG (ref 25.2–33.5)
MCH RBC QN AUTO: 32.4 PG (ref 25.2–33.5)
MCHC RBC AUTO-ENTMCNC: 33.9 G/DL (ref 25.2–33.5)
MCHC RBC AUTO-ENTMCNC: 33.9 G/DL (ref 25.2–33.5)
MCV RBC AUTO: 95.6 FL (ref 82.6–102.9)
MCV RBC AUTO: 95.6 FL (ref 82.6–102.9)
MONOCYTES # BLD: 12 % (ref 3–12)
MONOCYTES # BLD: 12 % (ref 3–12)
NRBC AUTOMATED: 0 PER 100 WBC
NRBC AUTOMATED: 0 PER 100 WBC
PDW BLD-RTO: 15.1 % (ref 11.8–14.4)
PDW BLD-RTO: 15.1 % (ref 11.8–14.4)
PLATELET # BLD: 380 K/UL (ref 138–453)
PLATELET # BLD: 380 K/UL (ref 138–453)
PMV BLD AUTO: 8.9 FL (ref 8.1–13.5)
PMV BLD AUTO: 8.9 FL (ref 8.1–13.5)
POTASSIUM SERPL-SCNC: 4.5 MMOL/L (ref 3.7–5.3)
POTASSIUM SERPL-SCNC: 4.5 MMOL/L (ref 3.7–5.3)
RBC # BLD: 4.97 M/UL (ref 4.21–5.77)
RBC # BLD: 4.97 M/UL (ref 4.21–5.77)
RBC # BLD: ABNORMAL 10*6/UL
RBC # BLD: ABNORMAL 10*6/UL
SEG NEUTROPHILS: 60 % (ref 36–65)
SEG NEUTROPHILS: 60 % (ref 36–65)
SEGMENTED NEUTROPHILS ABSOLUTE COUNT: 3.82 K/UL (ref 1.5–8.1)
SEGMENTED NEUTROPHILS ABSOLUTE COUNT: 3.82 K/UL (ref 1.5–8.1)
SODIUM BLD-SCNC: 135 MMOL/L (ref 135–144)
SODIUM BLD-SCNC: 135 MMOL/L (ref 135–144)
TOTAL PROTEIN: 7.3 G/DL (ref 6.4–8.3)
TRIGL SERPL-MCNC: 158 MG/DL
WBC # BLD: 6.4 K/UL (ref 3.5–11.3)
WBC # BLD: 6.4 K/UL (ref 3.5–11.3)

## 2022-03-25 PROCEDURE — 84080 ASSAY ALKALINE PHOSPHATASES: CPT

## 2022-03-25 PROCEDURE — 84075 ASSAY ALKALINE PHOSPHATASE: CPT

## 2022-03-25 PROCEDURE — 36415 COLL VENOUS BLD VENIPUNCTURE: CPT

## 2022-03-25 PROCEDURE — 80061 LIPID PANEL: CPT

## 2022-03-25 PROCEDURE — 83036 HEMOGLOBIN GLYCOSYLATED A1C: CPT

## 2022-03-25 PROCEDURE — 80048 BASIC METABOLIC PNL TOTAL CA: CPT

## 2022-03-25 PROCEDURE — 85025 COMPLETE CBC W/AUTO DIFF WBC: CPT

## 2022-03-25 PROCEDURE — 82977 ASSAY OF GGT: CPT

## 2022-03-25 PROCEDURE — 80053 COMPREHEN METABOLIC PANEL: CPT

## 2022-03-25 NOTE — PROGRESS NOTES
Physician Progress Note      PATIENT:               Ellyn Rivera  CSN #:                  [id number]  :                       1960  ADMIT DATE:       3/16/2022 11:46 AM  DISCH DATE:        3/18/2022 8:20 AM  RESPONDING  PROVIDER #:        Guera Flaherty [address]          QUERY TEXT:    Patient admitted with acute cholecystitis, noted to have CKD 4 documented with   GFR range 27-32. If possible, please document in progress notes clinical   support for CKD stage 4 or if other stage determined: The medical record reflects the following:  Risk Factors: 63 yr old w/ HTN, DM2  Clinical Indicators: last baseline 2.5 5 years ago. Admitting Cr 2.47 w/ GFR   27> Cr 2.11 w/ GFR 32  Treatment:  IVF, lab monitoring    Thank you, please contact me for any questions!  Ruth Lala RN CDI Supervisor   [phone]  Options provided:  -- CKD Stage 3b GFR 30-44, CKD stage 4 ruled out  -- CKD Stage 4 GFR 15-29, please provide clinical support, please provide   clinical support  -- Other - I will add my own diagnosis  -- Disagree - Not applicable / Not valid  -- Disagree - Clinically unable to determine / Unknown  -- Refer to Clinical Documentation Reviewer    PROVIDER RESPONSE TEXT:    This patient has CKD Stage 4. creatinine and creatinine clearance    Query created by: Junaid Argueta on 3/24/2022 12:03 PM      Electronically signed by:  Marissa Novak 3/25/2022 6:30 AM

## 2022-03-27 DIAGNOSIS — T38.0X5D STEROID-INDUCED DIABETES MELLITUS, SUBSEQUENT ENCOUNTER (HCC): ICD-10-CM

## 2022-03-27 DIAGNOSIS — E09.9 STEROID-INDUCED DIABETES MELLITUS, SUBSEQUENT ENCOUNTER (HCC): ICD-10-CM

## 2022-03-29 ENCOUNTER — OFFICE VISIT (OUTPATIENT)
Dept: FAMILY MEDICINE CLINIC | Age: 62
End: 2022-03-29
Payer: MEDICARE

## 2022-03-29 VITALS
WEIGHT: 185.8 LBS | DIASTOLIC BLOOD PRESSURE: 84 MMHG | TEMPERATURE: 97.2 F | OXYGEN SATURATION: 97 % | BODY MASS INDEX: 28.16 KG/M2 | SYSTOLIC BLOOD PRESSURE: 130 MMHG | HEIGHT: 68 IN | HEART RATE: 75 BPM

## 2022-03-29 DIAGNOSIS — S51.802A OPEN WOUND OF LEFT FOREARM, INITIAL ENCOUNTER: ICD-10-CM

## 2022-03-29 DIAGNOSIS — K81.9 CHOLECYSTITIS: Primary | ICD-10-CM

## 2022-03-29 DIAGNOSIS — K80.50 CHOLEDOCHOLITHIASIS: ICD-10-CM

## 2022-03-29 LAB
ALK PHOS BONE SPECIFIC: 38 U/L (ref 0–55)
ALK PHOS OTHER CALC: 0 U/L
ALK PHOSPHATASE: 127 U/L (ref 40–120)
ALKALINE PHOSPHATASE LIVER FRACTION: 89 U/L (ref 0–94)

## 2022-03-29 PROCEDURE — 3017F COLORECTAL CA SCREEN DOC REV: CPT | Performed by: FAMILY MEDICINE

## 2022-03-29 PROCEDURE — 99213 OFFICE O/P EST LOW 20 MIN: CPT

## 2022-03-29 PROCEDURE — G8482 FLU IMMUNIZE ORDER/ADMIN: HCPCS | Performed by: FAMILY MEDICINE

## 2022-03-29 PROCEDURE — 99214 OFFICE O/P EST MOD 30 MIN: CPT | Performed by: FAMILY MEDICINE

## 2022-03-29 PROCEDURE — G8417 CALC BMI ABV UP PARAM F/U: HCPCS | Performed by: FAMILY MEDICINE

## 2022-03-29 PROCEDURE — 1111F DSCHRG MED/CURRENT MED MERGE: CPT | Performed by: FAMILY MEDICINE

## 2022-03-29 PROCEDURE — 1036F TOBACCO NON-USER: CPT | Performed by: FAMILY MEDICINE

## 2022-03-29 PROCEDURE — G8427 DOCREV CUR MEDS BY ELIG CLIN: HCPCS | Performed by: FAMILY MEDICINE

## 2022-03-29 NOTE — PROGRESS NOTES
[address]  [address], [address]  [phone]      Jacobo Miller is a 63 y.o. male who presents today for his medical conditions/complaints as noted below.   Jacobo Miller is c/o of Follow-Up from Hospital (wants referral for gallbladder) and Wound Check (left arm)      HPI:     Pt here today     Signed out AMA on 3/18  No pain, N/V    Fasting AM glucose the day of his labs was 123  Has only had to take insulin twice last week; the rest of his glucose levels have been <200    Plans to re-start riding his bike    Has wound on his L forearm from tape from his hospitalization  Has been using Neosporin and wrapping it  Cleaning peroxide TID        Past Medical History:   Diagnosis Date    Allergic reaction caused by a drug 8/13/2016    Arthritis     psoritic arthritis    Chronic kidney disease     Follows with Nephro    COPD (chronic obstructive pulmonary disease) ([address]     pt  denies    CVA (cerebral vascular accident) ([address]     Neuro eval with Dr Ovalle here    Depression     Diabetes mellitus ([address]     History of blood transfusion     History of migraine headaches     Hyperlipidemia     Hypertension     Pituitary microadenoma ([address]     Eval by NS and Endo 2011 but no recent FU    Pneumothorax 05/27/2015    left    Psoriatic arthritis ([address]     Stroke ([address]     x3    Thrombocytopenia ([address]       Past Surgical History:   Procedure Laterality Date    CHEST TUBE INSERTION Left     COLONOSCOPY      11/13 SIS 10 y    ENDOSCOPY, COLON, DIAGNOSTIC  04/08/2021    EGD BIOPSY GASTRIC AND GE JUNCTION     OTHER SURGICAL HISTORY Right     stent r kidney    OTHER SURGICAL HISTORY  05/27/2015    resection of bleb    PILONIDAL CYST DRAINAGE      SHOULDER SURGERY Left     Rotator cuff    THORACOSCOPY  05/27/2015    THORACOTOMY  05/27/2015    UPPER GASTROINTESTINAL ENDOSCOPY      UPPER GASTROINTESTINAL ENDOSCOPY N/A 07/08/2020    [facility]  2021    UPPER GASTROINTESTINAL ENDOSCOPY N/A 2021    EGD BIOPSY performed by Zarina Alva MD at [facility] N/A 2021    EGD BIOPSY GASTRIC AND GE JUNCTION  performed by Zarina Alva MD at [address] [facility].     Family History   Problem Relation Age of Onset    Diabetes Sister     Alzheimer's Disease Maternal Grandmother     Other Maternal Grandmother         dementia    High Blood Pressure Maternal Grandfather     Cancer Maternal Grandfather         Unknown    High Blood Pressure Mother     Other Mother         blood clots    Other Paternal Grandmother         dementia     Social History     Tobacco Use    Smoking status: Former Smoker     Packs/day: 1.00     Years: 20.00     Pack years: 20.00     Types: Cigarettes     Quit date: 2015     Years since quittin.8    Smokeless tobacco: Never Used    Tobacco comment: Quit smoking 2016, CARMEN Mack MALI, 10/20/2016.    Substance Use Topics    Alcohol use: No     Alcohol/week: 0.0 standard drinks      Current Outpatient Medications   Medication Sig Dispense Refill    desoximetasone (TOPICORT) 0.25 % OINT       methotrexate (RHEUMATREX) 2.5 MG chemo tablet Take 2.5 mg by mouth once a week Take 2 tabs every Friday      folic acid (FOLVITE) 1 MG tablet Take 1 mg by mouth daily      metoprolol tartrate (LOPRESSOR) 100 MG tablet Take 1 tablet by mouth 2 times daily 180 tablet 1    amLODIPine (NORVASC) 5 MG tablet Take 1 tablet by mouth daily 90 tablet 1    triamcinolone (KENALOG) 0.1 % ointment       Insulin Syringe-Needle U-100 30G X 516\" 0.3 ML MISC USE UP TO FIVE TIMES DAILY AS DIRECTED WITH INSULIN 300 each 3    pantoprazole (PROTONIX) 40 MG tablet TAKE ONE TABLET BY MOUTH EVERY MORNING BEFORE BREAKFAST 90 tablet 2    insulin aspart (NOVOLOG) 100 UNIT/ML injection vial USE AS DIRECTED THREE TIMES DAILY PER SLIDING SCALE: 171-220=3 UNITS; 221-270=5 UNITS; 271-320=7 UNITS; GREATER THAN 320=9 UNITS 10 mL 3    lisinopril (PRINIVIL;ZESTRIL) 10 MG tablet TAKE ONE TABLET BY MOUTH DAILY WHEN BLOOD PRESSURE IS ELEVATED AFTER SKYRIZI. 90 tablet 3    Clobetasol Propionate 0.05 % SHAM APPLY TO SCALP, LEAVE ON FOR 15 MINUTES THEN RINSE THOROUGHLY. MAY USE 3-4 TIMES A WEEK FOR MAINTENANCE 118 mL 5    pantoprazole (PROTONIX) 20 MG tablet TAKE ONE TABLET BY MOUTH ONCE NIGHTLY 30 tablet 3    SKYRIZI, 150 MG DOSE, 75 MG/0.83ML PSKT injection Inject 150 mg into the skin Every 2 months       blood glucose test strips (FREESTYLE LITE) strip TEST THREE TIMES A DAY AND AS NEEDED FOR SYMPTOMS OF IRREGULAR BLOOD GLUCOSE. 300 strip 3    Continuous Blood Gluc  (DEXCOM G6 ) SILVANA Use to continuously check blood glucose. 1 each 0    Continuous Blood Gluc Transmit (DEXCOM G6 TRANSMITTER) MISC Use to check blood glucose continuously. 1 each 3    Continuous Blood Gluc Sensor (DEXCOM G6 SENSOR) MISC Use to check blood glucose continuously. 9 each 3    insulin NPH (HUMULIN N;NOVOLIN N) 100 UNIT/ML injection vial Inject 8 Units into the skin 2 times daily (Patient not taking: Reported on 3/21/2022) 10 mL 2    sildenafil (VIAGRA) 100 MG tablet TAKE 1/2 - 1 TABLET BY MOUTH AS NEEDED 30 tablet 5     No current facility-administered medications for this visit. Allergies   Allergen Reactions    Cosentyx [Secukinumab] Nausea And Vomiting and Rash      fever    Lantus [Insulin Glargine] Itching, Swelling and Rash     Patient started Lantus about a month ago, only new medication. His skin reaction started on his abdomen where he injects the Lantus and moved up to the face. This is similar to his response to Cosentyx.      Stellaria Rash    Infliximab Other (See Comments)     Nerve damage    Methotrexate Derivatives Other (See Comments)     Bone marrow toxicity    Seasonal     Adhesive Tape Rash    Keflex [Cephalexin] Rash   Elner Conception [Apremilast] Rash     Other reaction(s): Unknown    Taltz [Ixekizumab] Rash     Other reaction(s): Unknown    Ustekinumab Rash     Other reaction(s): rash-allergic and acute renal failure  Other reaction(s): Unknown       Health Maintenance   Topic Date Due    Pneumococcal 0-64 years Vaccine (1 of 2 - PPSV23) Never done    Hepatitis B vaccine (1 of 3 - Risk 3-dose series) Never done    Shingles Vaccine (1 of 2) Never done    Diabetic retinal exam  10/30/2018    Diabetic foot exam  06/08/2022    Depression Screen  06/08/2022    Annual Wellness Visit (AWV)  06/09/2022    Low dose CT lung screening  10/18/2022    A1C test (Diabetic or Prediabetic)  03/25/2023    Lipid screen  03/25/2023    Potassium monitoring  04/05/2023    Creatinine monitoring  04/05/2023    Colorectal Cancer Screen  08/08/2023    DTaP/Tdap/Td vaccine (2 - Td or Tdap) 09/03/2028    COVID-19 Vaccine  Completed    Hepatitis C screen  Completed    HIV screen  Addressed    Hepatitis A vaccine  Aged Out    Hib vaccine  Aged Out    Meningococcal (ACWY) vaccine  Aged Out       Subjective:      Review of Systems   Constitutional: Negative for unexpected weight change. Gastrointestinal: Negative for abdominal pain. Objective:     Vitals:    03/29/22 1054   BP: 130/84   Site: Right Upper Arm   Position: Sitting   Cuff Size: Large Adult   Pulse: 75   Temp: 97.2 °F (36.2 °C)   TempSrc: Temporal   SpO2: 97%   Weight: 185 lb 12.8 oz (84.3 kg)   Height: 5' 8\" (1.727 m)     Physical Exam  Constitutional:       General: He is not in acute distress. Appearance: Normal appearance. HENT:      Head: Normocephalic and atraumatic. Eyes:      Conjunctiva/sclera: Conjunctivae normal.   Cardiovascular:      Rate and Rhythm: Normal rate and regular rhythm. Heart sounds: Normal heart sounds. Pulmonary:      Effort: Pulmonary effort is normal. No respiratory distress.       Breath sounds: Normal breath sounds. Abdominal:      General: Bowel sounds are normal. There is no distension. Palpations: Abdomen is soft. Tenderness: There is no abdominal tenderness. Musculoskeletal:      Right lower leg: No edema. Left lower leg: No edema. Skin:     General: Skin is warm and dry. Comments: 4 cm length total  1.5 x 2.25 biggest area   Neurological:      General: No focal deficit present. Mental Status: He is alert and oriented to person, place, and time. Psychiatric:         Mood and Affect: Mood normal.         Assessment:      1. Cholecystitis  -     Johana To DO, General Surgery, [place]  2. Choledocholithiasis  -     Johana To DO, General Surgery, [place]  3. Open wound of left forearm, initial encounter  -     mupirocin (BACTROBAN) 2 % ointment; Apply topically to affected area 3 times daily. , Disp-30 g, R-0, Normal         Plan:      Return if symptoms worsen or fail to improve x 5-7 days. Orders Placed This Encounter   Procedures   [address], , General Surgery, [place]     Referral Priority:   Routine     Referral Type:   Eval and Treat     Referral Reason:   Specialty Services Required     Referred to Provider:   George Marcelo DO     Requested Specialty:   General Surgery     Number of Visits Requested:   1     Orders Placed This Encounter   Medications    mupirocin (BACTROBAN) 2 % ointment     Sig: Apply topically to affected area 3 times daily. Dispense:  30 g     Refill:  0       Patient given educational materials - see patient instructions. Discussed use, benefit, and side effects of prescribed medications. All patient questions answered. Pt voiced understanding. Reviewed health maintenance.             Electronically signed by Georgette Godfrey DO, DO on 4/10/2022 at 11:43 PM

## 2022-03-30 ENCOUNTER — CARE COORDINATION (OUTPATIENT)
Dept: CASE MANAGEMENT | Age: 62
End: 2022-03-30

## 2022-03-30 NOTE — CARE COORDINATION
[facility] Transitions Follow Up Call    3/30/2022    Patient: Jemima Martin  Patient : 1960   MRN: <[id number]>  Reason for Admission: Cholelithiasis  Discharge Date: 3/18/22 RARS: Readmission Risk Score: 16.2 ( )    Spoke with: Gabriela English 33 Transitions Subsequent and Final Call    Subsequent and Final Calls  Do you have any ongoing symptoms?: No  Do you have any questions related to your medications?: No  Do you currently have any active services?: No  Do you have any needs or concerns that I can assist you with?: No  Identified Barriers: None  Care Transitions Interventions  No Identified Needs  Other Interventions:       Spoke briefly with Lenard Fahad for follow up care transition call. He reports feeling \"fine\". He denies any abdominal pain, nausea, or vomiting. He denies any needs, questions, or concerns at this time.      Follow Up  Future Appointments   Date Time Provider Akshat Vann   3/31/2022  3:40 PM Tara Kidd, DO [facility]   2022  8:20 AM Ayla Arrieta DO [facility]   2022  9:00 AM DO ANGELICA Booth [facility]       Jeannie Harris RN

## 2022-03-31 ENCOUNTER — TELEPHONE (OUTPATIENT)
Dept: SURGERY | Age: 62
End: 2022-03-31

## 2022-03-31 RX ORDER — BLOOD-GLUCOSE METER
KIT MISCELLANEOUS
Qty: 300 STRIP | Refills: 3 | Status: SHIPPED | OUTPATIENT
Start: 2022-03-31

## 2022-03-31 NOTE — TELEPHONE ENCOUNTER
[address]           PNR:[id number]/[id number]           Surgical/Procedure Planned: tessa glass    Date & Location: [facility]       Outpatient   Planned Length of OR: 120 minutes    Sedation: general    Estimated Cardiac Risk for Non-Cardiac Surgery/Procedure     Low______ Moderate______ High______    Medication Instructions - Clarification needed by this date:   -Insulin: Novolog sliding scale and novolin 8 units BID    Resume medications:     Lovenox indicated: _____Yes _____NO    Provider: Dr. Demetrice Rob of Provider Giving Orders for Medication holds:    _____________________________________________

## 2022-04-05 ENCOUNTER — TELEPHONE (OUTPATIENT)
Dept: SURGERY | Age: 62
End: 2022-04-05

## 2022-04-05 ENCOUNTER — INITIAL CONSULT (OUTPATIENT)
Dept: SURGERY | Age: 62
End: 2022-04-05
Payer: MEDICARE

## 2022-04-05 ENCOUNTER — HOSPITAL ENCOUNTER (OUTPATIENT)
Dept: LAB | Age: 62
Discharge: HOME | End: 2022-04-05
Payer: MEDICARE

## 2022-04-05 ENCOUNTER — CARE COORDINATION (OUTPATIENT)
Dept: CASE MANAGEMENT | Age: 62
End: 2022-04-05

## 2022-04-05 VITALS
BODY MASS INDEX: 28.55 KG/M2 | HEIGHT: 68 IN | HEART RATE: 68 BPM | TEMPERATURE: 96.7 F | SYSTOLIC BLOOD PRESSURE: 130 MMHG | DIASTOLIC BLOOD PRESSURE: 82 MMHG | WEIGHT: 188.4 LBS

## 2022-04-05 DIAGNOSIS — Z01.818 PRE-OP TESTING: ICD-10-CM

## 2022-04-05 DIAGNOSIS — K80.20 SYMPTOMATIC CHOLELITHIASIS: Primary | ICD-10-CM

## 2022-04-05 LAB
ALBUMIN SERPL-MCNC: 3.9 G/DL (ref 3.5–5.2)
ALBUMIN/GLOBULIN RATIO: 1.4 (ref 1–2.5)
ALP BLD-CCNC: 105 U/L (ref 40–129)
ALT SERPL-CCNC: 21 U/L (ref 5–41)
ANION GAP SERPL CALCULATED.3IONS-SCNC: 8 MMOL/L (ref 9–17)
AST SERPL-CCNC: 19 U/L
BILIRUB SERPL-MCNC: 1.22 MG/DL (ref 0.3–1.2)
BUN BLDV-MCNC: 11 MG/DL (ref 6–20)
BUN/CREAT BLD: 5 (ref 9–20)
CALCIUM SERPL-MCNC: 8.8 MG/DL (ref 8.6–10.4)
CHLORIDE BLD-SCNC: 90 MMOL/L (ref 98–107)
CO2: 29 MMOL/L (ref 20–31)
CREAT SERPL-MCNC: 2.06 MG/DL (ref 0.7–1.2)
GFR AFRICAN AMERICAN: 40 ML/MIN
GFR NON-AFRICAN AMERICAN: 33 ML/MIN
GFR SERPL CREATININE-BSD FRML MDRD: ABNORMAL ML/MIN/{1.73_M2}
GLUCOSE BLD-MCNC: 203 MG/DL (ref 70–99)
POTASSIUM SERPL-SCNC: 4.4 MMOL/L (ref 3.7–5.3)
SODIUM BLD-SCNC: 127 MMOL/L (ref 135–144)
TOTAL PROTEIN: 6.7 G/DL (ref 6.4–8.3)

## 2022-04-05 PROCEDURE — 3017F COLORECTAL CA SCREEN DOC REV: CPT | Performed by: SURGERY

## 2022-04-05 PROCEDURE — G8417 CALC BMI ABV UP PARAM F/U: HCPCS | Performed by: SURGERY

## 2022-04-05 PROCEDURE — 1111F DSCHRG MED/CURRENT MED MERGE: CPT | Performed by: SURGERY

## 2022-04-05 PROCEDURE — 99204 OFFICE O/P NEW MOD 45 MIN: CPT | Performed by: SURGERY

## 2022-04-05 PROCEDURE — 36415 COLL VENOUS BLD VENIPUNCTURE: CPT

## 2022-04-05 PROCEDURE — G8427 DOCREV CUR MEDS BY ELIG CLIN: HCPCS | Performed by: SURGERY

## 2022-04-05 PROCEDURE — 80053 COMPREHEN METABOLIC PANEL: CPT

## 2022-04-05 PROCEDURE — 1036F TOBACCO NON-USER: CPT | Performed by: SURGERY

## 2022-04-05 PROCEDURE — 99215 OFFICE O/P EST HI 40 MIN: CPT

## 2022-04-05 NOTE — ASSESSMENT & PLAN NOTE
After evaluation does seem to me that the patient likely had common bile duct obstruction from gallstone the past.  As his lab work showed significant decrease in his bilirubin and his symptoms improved did not think that he is got any ongoing obstruction and his MRCP did not show any evidence of any sort of obstruction. We have discussed the definitive intervention for removal of the source of the gallstones to prevent any further symptoms. Patient would like to proceed with surgery. We have discussed robotic assisted laparoscopic cholecystectomy. Risks of the procedure including bleeding, infection, scarring, pain, damage surrounding structures including bile ducts, need for additional surgery, retained stone, bile leak, conversion to open and anesthesia risk were discussed and consent is obtained. We also discussed potential for healing complications because of the medications he is on for psoriatic arthritis. He has had recent EKG, chest x-ray and labs. I am going to repeat a CMP just to make sure that his bilirubin level has remained within normal limits after his episode of the choledocholithiasis.

## 2022-04-05 NOTE — TELEPHONE ENCOUNTER
Patient is not currently taking novolin 8 units, only novolog sliding scale. No med hold order needed.

## 2022-04-05 NOTE — TELEPHONE ENCOUNTER
Spoke with pt and he states yes he can climb 2 flights of stairs or walk 2 blocks without Chest Pain or Shortness of Breath.

## 2022-04-05 NOTE — TELEPHONE ENCOUNTER
[facility]    Pre-Operative Evaluation/Consultation    Name:  Blayne Tejeda                                         Age:  63 y.o. MRN:  [id number]       :  1960   Date:  2022         Sex: male    There were no encounter diagnoses. Surgeon:  Dr. Venice Langley  Procedure (Planned): Lap Deidre  Date Scheduled surgery: 4/15/22    Attending : No att. providers found    Primary Physician: Sandra Beasley  Cardiologist: None    Type of Anesthesia Requested: General    Patient Medical history: Allergies   Allergen Reactions    Cosentyx [Secukinumab] Nausea And Vomiting and Rash      fever    Lantus [Insulin Glargine] Itching, Swelling and Rash     Patient started Lantus about a month ago, only new medication. His skin reaction started on his abdomen where he injects the Lantus and moved up to the face. This is similar to his response to Cosentyx.      Stellaria Rash    Infliximab Other (See Comments)     Nerve damage    Methotrexate Derivatives Other (See Comments)     Bone marrow toxicity    Seasonal     Adhesive Tape Rash    Keflex [Cephalexin] Rash    Otezla [Apremilast] Rash     Other reaction(s): Unknown    Taltz [Ixekizumab] Rash     Other reaction(s): Unknown    Ustekinumab Rash     Other reaction(s): rash-allergic and acute renal failure  Other reaction(s): Unknown     Patient Active Problem List   Diagnosis    Hypertension    Psoriatic arthritis ([address]    Chronic kidney disease (CKD), stage III (moderate) (HCC)    CVA (cerebral vascular accident) ([address]    History of migraine headaches    Hyperlipidemia    Pituitary microadenoma (HCC)    Chronic bullous emphysema (HCC)    Sinus tachycardia    Acute kidney injury ([address]    Hyponatremia    Uncontrolled diabetes mellitus type 2 without complications    Psoriasis    Drug-induced skin rash    Leukopenia    Type 2 diabetes mellitus with hyperglycemia (HCC)    Esophageal dysphagia    Hiatal hernia    Esophagitis    Acute cholecystitis    Cholecystitis    Cholelithiasis     Past Medical History:   Diagnosis Date    Allergic reaction caused by a drug 2016    Arthritis     psoritic arthritis    Chronic kidney disease     Follows with Nephro    COPD (chronic obstructive pulmonary disease) ([address]     pt  denies    CVA (cerebral vascular accident) ([address]     Neuro eval with Dr Vasquez yost    Depression     Diabetes mellitus ([address]     History of blood transfusion     History of migraine headaches     Hyperlipidemia     Hypertension     Pituitary microadenoma ([address]     Eval by NS and Endo  but no recent FU    Pneumothorax 2015    left    Psoriatic arthritis ([address]     Stroke ([address]     x3    Thrombocytopenia ([address]      Past Surgical History:   Procedure Laterality Date    CHEST TUBE INSERTION Left     COLONOSCOPY       SIS 10 y    ENDOSCOPY, COLON, DIAGNOSTIC  2021    EGD BIOPSY GASTRIC AND GE JUNCTION     OTHER SURGICAL HISTORY Right     stent r kidney    OTHER SURGICAL HISTORY  2015    resection of bleb    PILONIDAL CYST DRAINAGE      SHOULDER SURGERY Left     Rotator cuff    THORACOSCOPY  2015    THORACOTOMY  2015    UPPER GASTROINTESTINAL ENDOSCOPY      UPPER GASTROINTESTINAL ENDOSCOPY N/A 2020    EGD with biopsies performed by Eulalia Holder DO at [facility]  2021    UPPER GASTROINTESTINAL ENDOSCOPY N/A 2021    EGD BIOPSY performed by Lei Bejarano MD at [facility] N/A 2021    EGD BIOPSY GASTRIC AND GE JUNCTION  performed by Lei Bejarano MD at [address] [address] History     Tobacco Use    Smoking status: Former Smoker     Packs/day: 1.00     Years: 20.00     Pack years: 20.00     Types: Cigarettes     Quit date: 2015     Years since quittin.8    Smokeless tobacco: Never Used    Tobacco comment: Quit smoking 2016, CARMEN Mack RCP, 10/20/2016. Vaping Use    Vaping Use: Never used   Substance Use Topics    Alcohol use: No     Alcohol/week: 0.0 standard drinks    Drug use: No     Medications:  Current Outpatient Medications   Medication Sig Dispense Refill    blood glucose test strips (FREESTYLE LITE) strip TEST THREE TIMES A DAY AND AS NEEDED FOR SYMPTOMS OF IRREGULAR BLOOD GLUCOSE. 300 strip 3    mupirocin (BACTROBAN) 2 % ointment Apply topically to affected area 3 times daily. 30 g 0    desoximetasone (TOPICORT) 0.25 % OINT       methotrexate (RHEUMATREX) 2.5 MG chemo tablet Take 2.5 mg by mouth once a week Take 2 tabs every Friday      folic acid (FOLVITE) 1 MG tablet Take 1 mg by mouth daily      metoprolol tartrate (LOPRESSOR) 100 MG tablet Take 1 tablet by mouth 2 times daily 180 tablet 1    amLODIPine (NORVASC) 5 MG tablet Take 1 tablet by mouth daily 90 tablet 1    triamcinolone (KENALOG) 0.1 % ointment       Continuous Blood Gluc  (DEXCOM G6 ) SILVANA Use to continuously check blood glucose. 1 each 0    Continuous Blood Gluc Transmit (DEXCOM G6 TRANSMITTER) MISC Use to check blood glucose continuously. 1 each 3    Continuous Blood Gluc Sensor (DEXCOM G6 SENSOR) MISC Use to check blood glucose continuously.  9 each 3    Insulin Syringe-Needle U-100 30G X 5/16\" 0.3 ML MISC USE UP TO FIVE TIMES DAILY AS DIRECTED WITH INSULIN 300 each 3    pantoprazole (PROTONIX) 40 MG tablet TAKE ONE TABLET BY MOUTH EVERY MORNING BEFORE BREAKFAST 90 tablet 2    insulin aspart (NOVOLOG) 100 UNIT/ML injection vial USE AS DIRECTED THREE TIMES DAILY PER SLIDING SCALE: 171-220=3 UNITS; 221-270=5 UNITS; 271-320=7 UNITS; GREATER THAN 320=9 UNITS 10 mL 3    insulin NPH (HUMULIN N;NOVOLIN N) 100 UNIT/ML injection vial Inject 8 Units into the skin 2 times daily (Patient not taking: Reported on 3/21/2022) 10 mL 2    lisinopril (PRINIVIL;ZESTRIL) 10 MG tablet TAKE ONE TABLET BY MOUTH DAILY WHEN BLOOD PRESSURE IS ELEVATED AFTER SKYRIZI. 90 tablet 3    Clobetasol Propionate 0.05 % SHAM APPLY TO SCALP, LEAVE ON FOR 15 MINUTES THEN RINSE THOROUGHLY. MAY USE 3-4 TIMES A WEEK FOR MAINTENANCE 118 mL 5    pantoprazole (PROTONIX) 20 MG tablet TAKE ONE TABLET BY MOUTH ONCE NIGHTLY 30 tablet 3    sildenafil (VIAGRA) 100 MG tablet TAKE 1/2 - 1 TABLET BY MOUTH AS NEEDED 30 tablet 5    SKYRIZI, 150 MG DOSE, 75 MG/0.83ML PSKT injection Inject 150 mg into the skin Every 2 months        No current facility-administered medications for this visit. Scheduled Meds:  Continuous Infusions:  PRN Meds:. Prior to Admission medications    Medication Sig Start Date End Date Taking? Authorizing Provider   blood glucose test strips (FREESTYLE LITE) strip TEST THREE TIMES A DAY AND AS NEEDED FOR SYMPTOMS OF IRREGULAR BLOOD GLUCOSE. 3/31/22   Jed Chen, DO   mupirocin (BACTROBAN) 2 % ointment Apply topically to affected area 3 times daily. 3/29/22 4/5/22  Jed Chen DO   desoximetasone (TOPICORT) 0.25 % OINT  2/25/22   Historical Provider, MD   methotrexate (RHEUMATREX) 2.5 MG chemo tablet Take 2.5 mg by mouth once a week Take 2 tabs every Friday    Historical Provider, MD   folic acid (FOLVITE) 1 MG tablet Take 1 mg by mouth daily    Historical Provider, MD   metoprolol tartrate (LOPRESSOR) 100 MG tablet Take 1 tablet by mouth 2 times daily 2/28/22   Phu Sanz DO   amLODIPine (NORVASC) 5 MG tablet Take 1 tablet by mouth daily 2/28/22   Phu Sanz, DO   triamcinolone (KENALOG) 0.1 % ointment  12/24/21   Historical Provider, MD   Continuous Blood Gluc  ([address]) SILVANA Use to continuously check blood glucose. 2/11/22   Jed Chen, DO   Continuous Blood Gluc Transmit (DEXCOM G6 TRANSMITTER) MISC Use to check blood glucose continuously. 2/11/22   Jed Chen, DO   Continuous Blood Gluc Sensor (DEXCOM G6 SENSOR) MISC Use to check blood glucose continuously.  2/11/22   Jed Chen, DO   Insulin Syringe-Needle U-100 30G X 5/16\" 0.3 ML MISC USE UP TO FIVE TIMES DAILY AS DIRECTED WITH INSULIN 1/22/22   Sweta Chen DO   pantoprazole (PROTONIX) 40 MG tablet TAKE ONE TABLET BY MOUTH EVERY MORNING BEFORE BREAKFAST 1/10/22   Sweta Hays MD   insulin aspart (NOVOLOG) 100 UNIT/ML injection vial USE AS DIRECTED THREE TIMES DAILY PER SLIDING SCALE: 171-220=3 UNITS; 221-270=5 UNITS; 271-320=7 UNITS; GREATER THAN 320=9 UNITS 1/4/22   Sweta Chen DO   insulin NPH (HUMULIN N;NOVOLIN N) 100 UNIT/ML injection vial Inject 8 Units into the skin 2 times daily  Patient not taking: Reported on 3/21/2022 1/4/22   Sweta Chen DO   lisinopril (PRINIVIL;ZESTRIL) 10 MG tablet TAKE ONE TABLET BY MOUTH DAILY WHEN BLOOD PRESSURE IS ELEVATED AFTER SKYRIZI. 1/3/22   Phu Sanz DO   Clobetasol Propionate 0.05 % SHAM APPLY TO SCALP, LEAVE ON FOR 15 MINUTES THEN RINSE THOROUGHLY. MAY USE 3-4 TIMES A WEEK FOR MAINTENANCE 11/23/21   Sweta Chen DO   pantoprazole (PROTONIX) 20 MG tablet TAKE ONE TABLET BY MOUTH ONCE NIGHTLY 11/8/21   Sweta Hays MD   sildenafil (VIAGRA) 100 MG tablet TAKE 1/2 - 1 TABLET BY MOUTH AS NEEDED 7/9/21   Sweta Chen DO   SKYRIZI, 150 MG DOSE, 75 MG/0.83ML PSKT injection Inject 150 mg into the skin Every 2 months  11/25/19   Historical Provider, MD     Vital Signs (Current) [unfilled]    Weight:   Wt Readings from Last 1 Encounters:   03/29/22 185 lb 12.8 oz (84.3 kg)     Height:   Ht Readings from Last 1 Encounters:   03/29/22 5' 8\" (1.727 m)      BMI:  There is no height or weight on file to calculate BMI. Estimated body mass index is 28.65 kg/m² as calculated from the following:    Height as of an earlier encounter on 4/5/22: 5' 8\" (1.727 m). Weight as of an earlier encounter on 4/5/22: 188 lb 6.4 oz (85.5 kg). body mass index is unknown because there is no height or weight on file. Cardiac Clearance: None   Medical Clearance:None   Appointment for surgery Clearance scheduled for:None     Preoperative Testing:   These are the current and completed labs:  CBC:   Lab Results   Component Value Date    WBC 6.4 03/25/2022    RBC 4.97 03/25/2022    HGB 16.1 03/25/2022    HCT 47.5 03/25/2022    MCV 95.6 03/25/2022    RDW 15.1 03/25/2022     03/25/2022     CMP:   Lab Results   Component Value Date     03/25/2022    K 4.5 03/25/2022    CL 97 03/25/2022    CO2 29 03/25/2022    BUN 13 03/25/2022    CREATININE 2.28 03/25/2022    CREATININE 1.9 06/13/2018    GFRAA 36 03/25/2022    LABGLOM 30 03/25/2022    GLUCOSE 179 03/25/2022    PROT 7.3 03/25/2022    CALCIUM 9.6 03/25/2022    BILITOT 1.26 03/25/2022    ALKPHOS 127 03/25/2022    ALKPHOS 127 03/25/2022    AST 26 03/25/2022    ALT 54 03/25/2022     POC Tests: No results for input(s): POCGLU, POCNA, POCK, POCCL, POCBUN, POCHEMO, POCHCT in the last 72 hours. Coags    Lab Results   Component Value Date    PROTIME 9.9 09/22/2017    INR 0.9 09/22/2017    APTT 28.5 62/29/5588     HCG (If Applicable) No results found for: PREGTESTUR, PREGSERUM, HCG, HCGQUANT   ABGs No results found for: PHART, PO2ART, JDQ0FSY, LXQ3DPP, BEART, B2WUZZRR   Type & Screen (If Applicable)  No results found for: Edwin Ohm    Additional ordered pre-operative testing:  []CBC    []ABG      [] BMP   []URINALYSIS   []CMP    []HCG   []COAGS PT/INR  []T&C  []LFTs   []TYPE AND SCREEN    [] EKG  [] Chest X-Ray  [] Other Radiology    [] Sent to Hospitalist None  [] Sent to Anesthesia for your review: None   [] Additional Orders: None     Comments:EKG, labs and chest xray already in chart   Requests: No Special requests    Signed:  Birdie Hopkins LPN 3/3/7991 2:03 AM

## 2022-04-05 NOTE — TELEPHONE ENCOUNTER
[address]           RBU:27/17/3930           Surgical/Procedure Planned: Stephani Jiang     Date & Location: 4/15/22       Outpatient   Planned Length of OR: 120 minutes    Sedation: general    Estimated Cardiac Risk for Non-Cardiac Surgery/Procedure     Low______ Moderate_X_____ High______    Provider: Dr. Dylon Saucedant of Provider Giving Orders for Medication holds:    _____________________________________________  Randi Bartholomew DO, [facility], [address], [address], [facility] AT [facility]  [email or web address]  [phone]

## 2022-04-05 NOTE — CARE COORDINATION
[facility] Transitions Follow Up Call    2022    Patient: Parviz Santos  Patient : 1960   MRN: <[id number]>  Reason for Admission:   Cholelithiasis    Discharge Date: 3/18/22 RARS: Readmission Risk Score: 16.2 ( )         Spoke with: subsequent call. Spoke to Becky Kate. Becky Lavinia states he is doing ok. No abdominal pain, nausea or vomiting. No jaundice. Pt. Was seen by PCP on 3/29/21. He was seen by Gen surgery today and has surgery scheduled for cholecystectomy on 4/15/22. Final call. Patient is aware of when to contact MD with any new or worsening symptoms. Advised to contact PCP  with any health concerns for early outpatient intervention in an effort to avoid hospitalization. Report any worsening symptoms to PCP and/or Call 911 and/or GO TO EMERGENCY ROOM if symptoms are severe. Expresses understanding. Care Transitions Follow Up Call    Needs to be reviewed by the provider   Additional needs identified to be addressed with provider: No  none             Method of communication with provider : none      Care Transition Nurse (CTN) contacted the patient by telephone to follow up after admission on 3/16/22 . Verified name and  with patient as identifiers. Addressed changes since last contact: none  Discussed follow-up appointments. If no appointment was previously scheduled, appointment scheduling offered: No.   Is follow up appointment scheduled within 7 days of discharge? Yes. Advance Care Planning:   Does patient have an Advance Directive: reviewed and current. CTN reviewed discharge instructions, medical action plan and red flags with patient and discussed any barriers to care and/or understanding of plan of care after discharge. Discussed appropriate site of care based on symptoms and resources available to patient including: PCP  Specialist  When to call [address] The patient agrees to contact the PCP office for questions related to their healthcare.      Patients top risk factors for readmission: medical condition-cholelithiasis  Interventions to address risk factors: Obtained and reviewed discharge summary and/or continuity of care documents      Non-[facility] follow up appointment(s): n/a    CTN provided contact information for future needs. No further follow-up call indicated based on severity of symptoms and risk factors. Plan for next call: n/a          Care Transitions Subsequent and Final Call    Subsequent and Final Calls  Care Transitions Interventions  Other Interventions:            Follow Up  Future Appointments   Date Time Provider Akshat Vann   6/13/2022  8:20 AM DO DANIEL Caballero DPP   9/5/2022  9:00 AM DO ANGELICA Andrade DPP       [facility] Trans[facility] Nurse   [phone]

## 2022-04-05 NOTE — PROGRESS NOTES
Marcello Phipps is a 63 y.o. male who presents today for further evaluation of cholecystitis and recent likely choledocholithiasis. Patient presented to ER here mid March with complaints of approximately 1 day for nausea vomiting and diarrhea. When he was seen in the ER he was noted to be jaundiced on exam and a work-up revealed significant elevation of bilirubin 10/9 with most indirect bilirubin. He also had imaging that did show gallstones as well as signs consistent with possible cholecystitis. Patient denies any abdominal pain at the time but because of his increased bilirubin and the concern that he may have some sort of ductal obstruction he was transferred to [facility]. At [facility] he had evaluation by GI as well as general surgery up there and also underwent an MRCP. Fortunately it seems the patient likely passed the stone that was obstructing the duct and his bilirubin came down. The MRCP did not show any evidence of ongoing obstruction. No ERCP was performed. He was seen by general surgery up there and per the patient was told that they were planning for surgery but he states after a few days weight continued to be delayed he decided to leave hospital and follow-up with a surgeon here. Since the discharge from the hospital he denies any further similar symptoms and has not had any abdominal pain. Tolerating diet. Because of his findings at initial presentation and his work-up in [facility] he was referred to general surgery for definitive surgical plans.     Past Medical History:   Diagnosis Date    Allergic reaction caused by a drug 8/13/2016    Arthritis     psoritic arthritis    Chronic kidney disease     Follows with Nephro    COPD (chronic obstructive pulmonary disease) ([address]     pt  denies    CVA (cerebral vascular accident) ([address]     Neuro eval with Dr Sophy Walsh here    Depression     Diabetes mellitus ([address]     History of blood transfusion     History of migraine headaches     Hyperlipidemia     Hypertension     Pituitary microadenoma ([facility])     Eval by NS and Endo 2011 but no recent FU    Pneumothorax 05/27/2015    left    Psoriatic arthritis ([facility])     Stroke ([facility])     x3    Thrombocytopenia ([facility])        Past Surgical History:   Procedure Laterality Date    CHEST TUBE INSERTION Left     COLONOSCOPY      11/13 SIS 10 y    ENDOSCOPY, COLON, DIAGNOSTIC  04/08/2021    EGD BIOPSY GASTRIC AND GE JUNCTION     OTHER SURGICAL HISTORY Right     stent r kidney    OTHER SURGICAL HISTORY  05/27/2015    resection of bleb    PILONIDAL CYST DRAINAGE      SHOULDER SURGERY Left     Rotator cuff    THORACOSCOPY  05/27/2015    THORACOTOMY  05/27/2015    UPPER GASTROINTESTINAL ENDOSCOPY      UPPER GASTROINTESTINAL ENDOSCOPY N/A 07/08/2020    EGD with biopsies performed by Álvaro Palma DO at [address]  01/20/2021    UPPER GASTROINTESTINAL ENDOSCOPY N/A 01/20/2021    EGD BIOPSY performed by Gauri Rosa MD at [address] 04/08/2021    EGD BIOPSY GASTRIC AND GE JUNCTION  performed by Gauri Rosa MD at [address] [facility] ([place]).       Current Outpatient Medications   Medication Sig Dispense Refill    blood glucose test strips (FREESTYLE LITE) strip TEST THREE TIMES A DAY AND AS NEEDED FOR SYMPTOMS OF IRREGULAR BLOOD GLUCOSE. 300 strip 3    mupirocin (BACTROBAN) 2 % ointment Apply topically to affected area 3 times daily.  30 g 0    desoximetasone (TOPICORT) 0.25 % OINT       methotrexate (RHEUMATREX) 2.5 MG chemo tablet Take 2.5 mg by mouth once a week Take 2 tabs every Friday      folic acid (FOLVITE) 1 MG tablet Take 1 mg by mouth daily      metoprolol tartrate (LOPRESSOR) 100 MG tablet Take 1 tablet by mouth 2 times daily 180 tablet 1    amLODIPine (NORVASC) 5 MG tablet Take 1 tablet by mouth daily 90 tablet 1    triamcinolone (KENALOG) 0.1 % ointment       Continuous Blood Gluc  (DEXCOM G6 ) SILVANA Use to continuously check blood glucose. 1 each 0    Continuous Blood Gluc Transmit (DEXCOM G6 TRANSMITTER) MISC Use to check blood glucose continuously. 1 each 3    Continuous Blood Gluc Sensor (DEXCOM G6 SENSOR) MISC Use to check blood glucose continuously. 9 each 3    Insulin Syringe-Needle U-100 30G X 5/16\" 0.3 ML MISC USE UP TO FIVE TIMES DAILY AS DIRECTED WITH INSULIN 300 each 3    pantoprazole (PROTONIX) 40 MG tablet TAKE ONE TABLET BY MOUTH EVERY MORNING BEFORE BREAKFAST 90 tablet 2    insulin aspart (NOVOLOG) 100 UNIT/ML injection vial USE AS DIRECTED THREE TIMES DAILY PER SLIDING SCALE: 171-220=3 UNITS; 221-270=5 UNITS; 271-320=7 UNITS; GREATER THAN 320=9 UNITS 10 mL 3    lisinopril (PRINIVIL;ZESTRIL) 10 MG tablet TAKE ONE TABLET BY MOUTH DAILY WHEN BLOOD PRESSURE IS ELEVATED AFTER SKYRIZI. 90 tablet 3    Clobetasol Propionate 0.05 % SHAM APPLY TO SCALP, LEAVE ON FOR 15 MINUTES THEN RINSE THOROUGHLY. MAY USE 3-4 TIMES A WEEK FOR MAINTENANCE 118 mL 5    pantoprazole (PROTONIX) 20 MG tablet TAKE ONE TABLET BY MOUTH ONCE NIGHTLY 30 tablet 3    sildenafil (VIAGRA) 100 MG tablet TAKE 1/2 - 1 TABLET BY MOUTH AS NEEDED 30 tablet 5    SKYRIZI, 150 MG DOSE, 75 MG/0.83ML PSKT injection Inject 150 mg into the skin Every 2 months       insulin NPH (HUMULIN N;NOVOLIN N) 100 UNIT/ML injection vial Inject 8 Units into the skin 2 times daily (Patient not taking: Reported on 3/21/2022) 10 mL 2     No current facility-administered medications for this visit. Allergies   Allergen Reactions    Cosentyx [Secukinumab] Nausea And Vomiting and Rash      fever    Lantus [Insulin Glargine] Itching, Swelling and Rash     Patient started Lantus about a month ago, only new medication. His skin reaction started on his abdomen where he injects the Lantus and moved up to the face. This is similar to his response to Cosentyx.      Stellaria Rash    Infliximab Other (See Comments)     Nerve damage    Methotrexate Derivatives Other (See Comments)     Bone marrow toxicity    Seasonal     Adhesive Tape Rash    Keflex [Cephalexin] Rash    Otezla [Apremilast] Rash     Other reaction(s): Unknown    Taltz [Ixekizumab] Rash     Other reaction(s): Unknown    Ustekinumab Rash     Other reaction(s): rash-allergic and acute renal failure  Other reaction(s): Unknown       Family History   Problem Relation Age of Onset    Diabetes Sister     Alzheimer's Disease Maternal Grandmother     Other Maternal Grandmother         dementia    High Blood Pressure Maternal Grandfather     Cancer Maternal Grandfather         Unknown    High Blood Pressure Mother     Other Mother         blood clots    Other Paternal Grandmother         dementia       Social History     Socioeconomic History    Marital status:      Spouse name: Not on file    Number of children: Not on file    Years of education: Not on file    Highest education level: Not on file   Occupational History    Not on file   Tobacco Use    Smoking status: Former Smoker     Packs/day: 1.00     Years: 20.00     Pack years: 20.00     Types: Cigarettes     Quit date: 2015     Years since quittin.8    Smokeless tobacco: Never Used    Tobacco comment: Quit smoking 2016, [facility], 10/20/2016. Vaping Use    Vaping Use: Never used   Substance and Sexual Activity    Alcohol use: No     Alcohol/week: 0.0 standard drinks    Drug use: No    Sexual activity: Yes     Partners: Female   Other Topics Concern    Not on file   Social History Narrative    Not on file     Social Determinants of Health     Financial Resource Strain:     Difficulty of Paying Living Expenses: Not on file   Food Insecurity:     Worried About Running Out of Food in the Last Year: Not on file    Tripp of Food in the Last Year: Not on file   Transportation Needs:     Lack of Transportation (Medical):  Not on file    Lack of Transportation (Non-Medical): Not on file   Physical Activity:     Days of Exercise per Week: Not on file    Minutes of Exercise per Session: Not on file   Stress:     Feeling of Stress : Not on file   Social Connections:     Frequency of Communication with Friends and Family: Not on file    Frequency of Social Gatherings with Friends and Family: Not on file    Attends Sabianism Services: Not on file    Active Member of [address] [place] [address] or Organizations: Not on file    Attends Club or Organization Meetings: Not on file    Marital Status: Not on file   Intimate Partner Violence:     Fear of Current or Ex-Partner: Not on file    Emotionally Abused: Not on file    Physically Abused: Not on file    Sexually Abused: Not on file   Housing Stability:     Unable to Pay for Housing in the Last Year: Not on file    Number of [place] in the Last Year: Not on file    Unstable Housing in the Last Year: Not on file       ROS:   Review of Systems - General ROS: negative  Psychological ROS: negative  Ophthalmic ROS: negative  ENT ROS: negative  Respiratory ROS: no cough, shortness of breath, or wheezing  Cardiovascular ROS: no chest pain or dyspnea on exertion  Gastrointestinal ROS: per HPI  Genito-Urinary ROS: no dysuria, trouble voiding, or hematuria  Musculoskeletal ROS: negative  Dermatological ROS: negative      Objective   Vitals:    04/05/22 0805   BP: 130/82   Pulse: 68   Temp: 96.7 °F (35.9 °C)     General:in no apparent distress and well developed and well nourished  Eyes: No gross abnormalities. Ears, Nose, Throat: hearing grossly normal bilaterally  Neck: neck supple and non tender without mass  Lungs: clear to auscultation without wheezes or rales   Heart: S1S2, no mumurs, RRR  Abdomen: Soft, nondistended, nontender to palpation, bowel sounds present  Extremity: negative  Neuro: CN II-XII grossly intact      1.  Symptomatic cholelithiasis  Assessment & Plan:  After evaluation does seem to me that the patient likely had common bile duct obstruction from gallstone the past.  As his lab work showed significant decrease in his bilirubin and his symptoms improved did not think that he is got any ongoing obstruction and his MRCP did not show any evidence of any sort of obstruction. We have discussed the definitive intervention for removal of the source of the gallstones to prevent any further symptoms. Patient would like to proceed with surgery. We have discussed robotic assisted laparoscopic cholecystectomy. Risks of the procedure including bleeding, infection, scarring, pain, damage surrounding structures including bile ducts, need for additional surgery, retained stone, bile leak, conversion to open and anesthesia risk were discussed and consent is obtained. We also discussed potential for healing complications because of the medications he is on for psoriatic arthritis. He has had recent EKG, chest x-ray and labs. I am going to repeat a CMP just to make sure that his bilirubin level has remained within normal limits after his episode of the choledocholithiasis. 2. Pre-op testing  -     Comprehensive Metabolic Panel;  Future         (Please note that portions of this note were completed with a voice recognition program.  Efforts were made to edit the dictations but occasionally words are mis-transcribed.)

## 2022-04-08 NOTE — TELEPHONE ENCOUNTER
Group Topic:  Group Psychotherapy    Date: 4/8/2022  Start Time: 10:15 AM  End Time: 11:15 AM  Facilitators: Grant Macedo LCSW    Focus: 60 min group provided via [facility] telehealth.  Grief/loss: Pt to share their concerns with others and learn how to address problems pertinent to their individual experience of loss.  Allow those who have lost a loved one to share their struggles with others who have experienced a similar loss as evidenced by sharing story and how that loss may exacerbate difficulties around addiction.    Number in attendance: 10  The pt was an active participant in group.  Pt shared experiences around her personal loss ad grief.  Pt was observed today nodding head and following the group discussion.  The pt appeared receptive to the group discussion as evidenced by nodding head and demonstrating good eye contact.  Pt's sober date is 12/28.  Method: Group  Attendance: Present  Participation: Moderate  Patient Response: Appropriate feedback, Attentive and Good eye contact  Mood: Anxious and Depressed  Affect: Type: Anxious and Depressed   Range: Full (normal)   Congruency: Congruent   Stability: Stable  Behavior/Socialization: Appropriate to group, Cooperative and Engaged  Thought Process: Focused  Task Performance: Follows directions  Additional Information:  Psychosocial Stressors: Grief/Loss  Patient Evaluation: Independent - full participation       Last appt 6-8-20. Next appt 9-11-20.

## 2022-04-10 ASSESSMENT — ENCOUNTER SYMPTOMS: ABDOMINAL PAIN: 0

## 2022-04-15 ENCOUNTER — ANESTHESIA EVENT (OUTPATIENT)
Dept: OPERATING ROOM | Age: 62
End: 2022-04-15
Payer: MEDICARE

## 2022-04-15 ENCOUNTER — HOSPITAL ENCOUNTER (OUTPATIENT)
Age: 62
Setting detail: OUTPATIENT SURGERY
Discharge: HOME | End: 2022-04-15
Attending: SURGERY | Admitting: SURGERY
Payer: MEDICARE

## 2022-04-15 ENCOUNTER — ANESTHESIA (OUTPATIENT)
Dept: OPERATING ROOM | Age: 62
End: 2022-04-15
Payer: MEDICARE

## 2022-04-15 VITALS
DIASTOLIC BLOOD PRESSURE: 104 MMHG | SYSTOLIC BLOOD PRESSURE: 162 MMHG | TEMPERATURE: 96.5 F | RESPIRATION RATE: 11 BRPM | OXYGEN SATURATION: 100 %

## 2022-04-15 VITALS
SYSTOLIC BLOOD PRESSURE: 131 MMHG | BODY MASS INDEX: 27.13 KG/M2 | HEIGHT: 69 IN | RESPIRATION RATE: 16 BRPM | TEMPERATURE: 97.1 F | WEIGHT: 183.2 LBS | OXYGEN SATURATION: 96 % | DIASTOLIC BLOOD PRESSURE: 85 MMHG | HEART RATE: 64 BPM

## 2022-04-15 DIAGNOSIS — G89.18 POST-OP PAIN: Primary | ICD-10-CM

## 2022-04-15 LAB — GLUCOSE BLD-MCNC: 144 MG/DL (ref 75–110)

## 2022-04-15 PROCEDURE — 3600000019 HC SURGERY ROBOT ADDTL 15MIN: Performed by: SURGERY

## 2022-04-15 PROCEDURE — 2500000003 HC RX 250 WO HCPCS: Performed by: SURGERY

## 2022-04-15 PROCEDURE — 6360000002 HC RX W HCPCS: Performed by: NURSE ANESTHETIST, CERTIFIED REGISTERED

## 2022-04-15 PROCEDURE — 7100000000 HC PACU RECOVERY - FIRST 15 MIN: Performed by: SURGERY

## 2022-04-15 PROCEDURE — 3700000001 HC ADD 15 MINUTES (ANESTHESIA): Performed by: SURGERY

## 2022-04-15 PROCEDURE — S2900 ROBOTIC SURGICAL SYSTEM: HCPCS | Performed by: SURGERY

## 2022-04-15 PROCEDURE — 2580000003 HC RX 258: Performed by: SURGERY

## 2022-04-15 PROCEDURE — 2500000003 HC RX 250 WO HCPCS: Performed by: NURSE ANESTHETIST, CERTIFIED REGISTERED

## 2022-04-15 PROCEDURE — 7100000011 HC PHASE II RECOVERY - ADDTL 15 MIN: Performed by: SURGERY

## 2022-04-15 PROCEDURE — 3700000000 HC ANESTHESIA ATTENDED CARE: Performed by: SURGERY

## 2022-04-15 PROCEDURE — 82947 ASSAY GLUCOSE BLOOD QUANT: CPT

## 2022-04-15 PROCEDURE — 2709999900 HC NON-CHARGEABLE SUPPLY: Performed by: SURGERY

## 2022-04-15 PROCEDURE — 6370000000 HC RX 637 (ALT 250 FOR IP): Performed by: SURGERY

## 2022-04-15 PROCEDURE — 7100000010 HC PHASE II RECOVERY - FIRST 15 MIN: Performed by: SURGERY

## 2022-04-15 PROCEDURE — 7100000001 HC PACU RECOVERY - ADDTL 15 MIN: Performed by: SURGERY

## 2022-04-15 PROCEDURE — 88304 TISSUE EXAM BY PATHOLOGIST: CPT

## 2022-04-15 PROCEDURE — 47562 LAPAROSCOPIC CHOLECYSTECTOMY: CPT | Performed by: SURGERY

## 2022-04-15 PROCEDURE — 3600000009 HC SURGERY ROBOT BASE: Performed by: SURGERY

## 2022-04-15 RX ORDER — ONDANSETRON 2 MG/ML
4 INJECTION INTRAMUSCULAR; INTRAVENOUS
Status: DISCONTINUED | OUTPATIENT
Start: 2022-04-15 | End: 2022-04-15 | Stop reason: HOSPADM

## 2022-04-15 RX ORDER — MORPHINE SULFATE 2 MG/ML
1 INJECTION, SOLUTION INTRAMUSCULAR; INTRAVENOUS EVERY 5 MIN PRN
Status: DISCONTINUED | OUTPATIENT
Start: 2022-04-15 | End: 2022-04-15 | Stop reason: HOSPADM

## 2022-04-15 RX ORDER — ROCURONIUM BROMIDE 10 MG/ML
INJECTION, SOLUTION INTRAVENOUS PRN
Status: DISCONTINUED | OUTPATIENT
Start: 2022-04-15 | End: 2022-04-15 | Stop reason: SDUPTHER

## 2022-04-15 RX ORDER — INDOCYANINE GREEN AND WATER 25 MG
2.5 KIT INJECTION ONCE
Status: COMPLETED | OUTPATIENT
Start: 2022-04-15 | End: 2022-04-15

## 2022-04-15 RX ORDER — MIDAZOLAM HYDROCHLORIDE 1 MG/ML
INJECTION INTRAMUSCULAR; INTRAVENOUS PRN
Status: DISCONTINUED | OUTPATIENT
Start: 2022-04-15 | End: 2022-04-15 | Stop reason: SDUPTHER

## 2022-04-15 RX ORDER — CLINDAMYCIN PHOSPHATE 900 MG/50ML
900 INJECTION INTRAVENOUS ONCE
Status: COMPLETED | OUTPATIENT
Start: 2022-04-15 | End: 2022-04-15

## 2022-04-15 RX ORDER — PROPOFOL 10 MG/ML
INJECTION, EMULSION INTRAVENOUS PRN
Status: DISCONTINUED | OUTPATIENT
Start: 2022-04-15 | End: 2022-04-15 | Stop reason: SDUPTHER

## 2022-04-15 RX ORDER — SODIUM CHLORIDE 0.9 % (FLUSH) 0.9 %
5-40 SYRINGE (ML) INJECTION PRN
Status: DISCONTINUED | OUTPATIENT
Start: 2022-04-15 | End: 2022-04-15 | Stop reason: HOSPADM

## 2022-04-15 RX ORDER — HYDROCODONE BITARTRATE AND ACETAMINOPHEN 5; 325 MG/1; MG/1
1 TABLET ORAL EVERY 6 HOURS PRN
Qty: 20 TABLET | Refills: 0 | Status: SHIPPED | OUTPATIENT
Start: 2022-04-15 | End: 2022-04-20

## 2022-04-15 RX ORDER — OXYCODONE HYDROCHLORIDE 5 MG/1
10 TABLET ORAL PRN
Status: COMPLETED | OUTPATIENT
Start: 2022-04-15 | End: 2022-04-15

## 2022-04-15 RX ORDER — OXYCODONE HYDROCHLORIDE 5 MG/1
5 TABLET ORAL PRN
Status: COMPLETED | OUTPATIENT
Start: 2022-04-15 | End: 2022-04-15

## 2022-04-15 RX ORDER — PHENYLEPHRINE HYDROCHLORIDE 10 MG/ML
INJECTION INTRAVENOUS PRN
Status: DISCONTINUED | OUTPATIENT
Start: 2022-04-15 | End: 2022-04-15 | Stop reason: SDUPTHER

## 2022-04-15 RX ORDER — DEXAMETHASONE SODIUM PHOSPHATE 4 MG/ML
INJECTION, SOLUTION INTRA-ARTICULAR; INTRALESIONAL; INTRAMUSCULAR; INTRAVENOUS; SOFT TISSUE PRN
Status: DISCONTINUED | OUTPATIENT
Start: 2022-04-15 | End: 2022-04-15 | Stop reason: SDUPTHER

## 2022-04-15 RX ORDER — FENTANYL CITRATE 50 UG/ML
INJECTION, SOLUTION INTRAMUSCULAR; INTRAVENOUS PRN
Status: DISCONTINUED | OUTPATIENT
Start: 2022-04-15 | End: 2022-04-15 | Stop reason: SDUPTHER

## 2022-04-15 RX ORDER — MORPHINE SULFATE 2 MG/ML
2 INJECTION, SOLUTION INTRAMUSCULAR; INTRAVENOUS EVERY 5 MIN PRN
Status: DISCONTINUED | OUTPATIENT
Start: 2022-04-15 | End: 2022-04-15 | Stop reason: HOSPADM

## 2022-04-15 RX ORDER — ONDANSETRON 2 MG/ML
INJECTION INTRAMUSCULAR; INTRAVENOUS PRN
Status: DISCONTINUED | OUTPATIENT
Start: 2022-04-15 | End: 2022-04-15 | Stop reason: SDUPTHER

## 2022-04-15 RX ORDER — ONDANSETRON 4 MG/1
4 TABLET, FILM COATED ORAL 3 TIMES DAILY PRN
Qty: 15 TABLET | Refills: 0 | Status: SHIPPED | OUTPATIENT
Start: 2022-04-15

## 2022-04-15 RX ORDER — SODIUM CHLORIDE, SODIUM LACTATE, POTASSIUM CHLORIDE, CALCIUM CHLORIDE 600; 310; 30; 20 MG/100ML; MG/100ML; MG/100ML; MG/100ML
INJECTION, SOLUTION INTRAVENOUS CONTINUOUS
Status: DISCONTINUED | OUTPATIENT
Start: 2022-04-15 | End: 2022-04-15 | Stop reason: HOSPADM

## 2022-04-15 RX ORDER — SODIUM CHLORIDE 9 MG/ML
25 INJECTION, SOLUTION INTRAVENOUS PRN
Status: DISCONTINUED | OUTPATIENT
Start: 2022-04-15 | End: 2022-04-15 | Stop reason: HOSPADM

## 2022-04-15 RX ORDER — SODIUM CHLORIDE 0.9 % (FLUSH) 0.9 %
5-40 SYRINGE (ML) INJECTION EVERY 12 HOURS SCHEDULED
Status: DISCONTINUED | OUTPATIENT
Start: 2022-04-15 | End: 2022-04-15 | Stop reason: HOSPADM

## 2022-04-15 RX ORDER — SODIUM CHLORIDE 0.9 % (FLUSH) 0.9 %
10 SYRINGE (ML) INJECTION PRN
Status: DISCONTINUED | OUTPATIENT
Start: 2022-04-15 | End: 2022-04-15 | Stop reason: HOSPADM

## 2022-04-15 RX ORDER — LIDOCAINE HYDROCHLORIDE 20 MG/ML
INJECTION, SOLUTION EPIDURAL; INFILTRATION; INTRACAUDAL; PERINEURAL PRN
Status: DISCONTINUED | OUTPATIENT
Start: 2022-04-15 | End: 2022-04-15 | Stop reason: SDUPTHER

## 2022-04-15 RX ORDER — NEOSTIGMINE METHYLSULFATE 1 MG/ML
INJECTION, SOLUTION INTRAVENOUS PRN
Status: DISCONTINUED | OUTPATIENT
Start: 2022-04-15 | End: 2022-04-15 | Stop reason: SDUPTHER

## 2022-04-15 RX ORDER — SODIUM CHLORIDE 0.9 % (FLUSH) 0.9 %
10 SYRINGE (ML) INJECTION EVERY 12 HOURS SCHEDULED
Status: DISCONTINUED | OUTPATIENT
Start: 2022-04-15 | End: 2022-04-15 | Stop reason: HOSPADM

## 2022-04-15 RX ORDER — SODIUM CHLORIDE 9 MG/ML
INJECTION, SOLUTION INTRAVENOUS PRN
Status: DISCONTINUED | OUTPATIENT
Start: 2022-04-15 | End: 2022-04-15 | Stop reason: HOSPADM

## 2022-04-15 RX ORDER — GLYCOPYRROLATE 1 MG/5 ML
SYRINGE (ML) INTRAVENOUS PRN
Status: DISCONTINUED | OUTPATIENT
Start: 2022-04-15 | End: 2022-04-15 | Stop reason: SDUPTHER

## 2022-04-15 RX ADMIN — ROCURONIUM BROMIDE 40 MG: 10 INJECTION, SOLUTION INTRAVENOUS at 08:55

## 2022-04-15 RX ADMIN — Medication 0.6 MG: at 09:58

## 2022-04-15 RX ADMIN — OXYCODONE 10 MG: 5 TABLET ORAL at 11:01

## 2022-04-15 RX ADMIN — PROPOFOL 200 MG: 10 INJECTION, EMULSION INTRAVENOUS at 08:54

## 2022-04-15 RX ADMIN — SODIUM CHLORIDE, POTASSIUM CHLORIDE, SODIUM LACTATE AND CALCIUM CHLORIDE: 600; 310; 30; 20 INJECTION, SOLUTION INTRAVENOUS at 09:25

## 2022-04-15 RX ADMIN — PHENYLEPHRINE HYDROCHLORIDE 100 MCG: 10 INJECTION INTRAVENOUS at 09:03

## 2022-04-15 RX ADMIN — INDOCYANINE GREEN AND WATER 2.5 MG: KIT at 08:36

## 2022-04-15 RX ADMIN — ONDANSETRON 4 MG: 2 INJECTION INTRAMUSCULAR; INTRAVENOUS at 09:13

## 2022-04-15 RX ADMIN — DEXAMETHASONE SODIUM PHOSPHATE 4 MG: 4 INJECTION, SOLUTION INTRAMUSCULAR; INTRAVENOUS at 09:13

## 2022-04-15 RX ADMIN — Medication 3 MG: at 09:58

## 2022-04-15 RX ADMIN — PHENYLEPHRINE HYDROCHLORIDE 200 MCG: 10 INJECTION INTRAVENOUS at 09:06

## 2022-04-15 RX ADMIN — CLINDAMYCIN PHOSPHATE 900 MG: 18 INJECTION, SOLUTION INTRAVENOUS at 09:00

## 2022-04-15 RX ADMIN — MIDAZOLAM HYDROCHLORIDE 2 MG: 1 INJECTION, SOLUTION INTRAMUSCULAR; INTRAVENOUS at 08:48

## 2022-04-15 RX ADMIN — FENTANYL CITRATE 100 MCG: 50 INJECTION, SOLUTION INTRAMUSCULAR; INTRAVENOUS at 08:48

## 2022-04-15 RX ADMIN — SODIUM CHLORIDE, POTASSIUM CHLORIDE, SODIUM LACTATE AND CALCIUM CHLORIDE: 600; 310; 30; 20 INJECTION, SOLUTION INTRAVENOUS at 08:50

## 2022-04-15 RX ADMIN — LIDOCAINE HYDROCHLORIDE 100 MG: 20 INJECTION, SOLUTION EPIDURAL; INFILTRATION; INTRACAUDAL; PERINEURAL at 08:54

## 2022-04-15 ASSESSMENT — PULMONARY FUNCTION TESTS
PIF_VALUE: 17
PIF_VALUE: 19
PIF_VALUE: 18
PIF_VALUE: 1
PIF_VALUE: 14
PIF_VALUE: 12
PIF_VALUE: 19
PIF_VALUE: 20
PIF_VALUE: 19
PIF_VALUE: 21
PIF_VALUE: 19
PIF_VALUE: 18
PIF_VALUE: 19
PIF_VALUE: 8
PIF_VALUE: 21
PIF_VALUE: 19
PIF_VALUE: 1
PIF_VALUE: 19
PIF_VALUE: 1
PIF_VALUE: 19
PIF_VALUE: 23
PIF_VALUE: 17
PIF_VALUE: 19
PIF_VALUE: 21
PIF_VALUE: 16
PIF_VALUE: 12
PIF_VALUE: 19
PIF_VALUE: 19
PIF_VALUE: 22
PIF_VALUE: 23
PIF_VALUE: 21
PIF_VALUE: 17
PIF_VALUE: 22
PIF_VALUE: 16
PIF_VALUE: 4
PIF_VALUE: 21
PIF_VALUE: 17
PIF_VALUE: 15
PIF_VALUE: 19
PIF_VALUE: 19
PIF_VALUE: 18
PIF_VALUE: 20
PIF_VALUE: 19
PIF_VALUE: 22
PIF_VALUE: 22
PIF_VALUE: 17
PIF_VALUE: 19
PIF_VALUE: 1
PIF_VALUE: 19
PIF_VALUE: 15
PIF_VALUE: 22
PIF_VALUE: 21
PIF_VALUE: 15
PIF_VALUE: 19
PIF_VALUE: 17
PIF_VALUE: 19
PIF_VALUE: 1
PIF_VALUE: 17
PIF_VALUE: 5
PIF_VALUE: 21
PIF_VALUE: 19
PIF_VALUE: 22
PIF_VALUE: 15
PIF_VALUE: 19
PIF_VALUE: 16
PIF_VALUE: 2
PIF_VALUE: 19
PIF_VALUE: 19
PIF_VALUE: 21
PIF_VALUE: 4
PIF_VALUE: 18
PIF_VALUE: 19

## 2022-04-15 ASSESSMENT — PAIN SCALES - GENERAL
PAINLEVEL_OUTOF10: 0
PAINLEVEL_OUTOF10: 7
PAINLEVEL_OUTOF10: 0
PAINLEVEL_OUTOF10: 7
PAINLEVEL_OUTOF10: 4

## 2022-04-15 ASSESSMENT — PAIN DESCRIPTION - ORIENTATION
ORIENTATION: LOWER
ORIENTATION: LOWER

## 2022-04-15 ASSESSMENT — PAIN DESCRIPTION - LOCATION
LOCATION: ABDOMEN
LOCATION: ABDOMEN

## 2022-04-15 ASSESSMENT — PAIN DESCRIPTION - ONSET
ONSET: ON-GOING
ONSET: PROGRESSIVE

## 2022-04-15 ASSESSMENT — PAIN - FUNCTIONAL ASSESSMENT: PAIN_FUNCTIONAL_ASSESSMENT: 0-10

## 2022-04-15 ASSESSMENT — PAIN DESCRIPTION - DESCRIPTORS
DESCRIPTORS: PRESSURE
DESCRIPTORS: CRAMPING;PRESSURE

## 2022-04-15 ASSESSMENT — PAIN DESCRIPTION - FREQUENCY
FREQUENCY: CONTINUOUS
FREQUENCY: CONTINUOUS

## 2022-04-15 ASSESSMENT — COPD QUESTIONNAIRES: CAT_SEVERITY: NO INTERVAL CHANGE

## 2022-04-15 ASSESSMENT — PAIN DESCRIPTION - PAIN TYPE
TYPE: SURGICAL PAIN
TYPE: SURGICAL PAIN

## 2022-04-15 NOTE — ANESTHESIA PRE PROCEDURE
Department of Anesthesiology  Preprocedure Note       Name:  Lala Honeycutt   Age:  63 y.o.  :  1960                                          MRN:  [id number]         Date:  4/15/2022      Surgeon: Taryn Meadows):  Hellen Leventhal, DO    Procedure: Procedure(s):  v-Laparoscopic Robotic Assisted CHOLECYSTECTOMY    Medications prior to admission:   Prior to Admission medications    Medication Sig Start Date End Date Taking? Authorizing Provider   blood glucose test strips (FREESTYLE LITE) strip TEST THREE TIMES A DAY AND AS NEEDED FOR SYMPTOMS OF IRREGULAR BLOOD GLUCOSE. 3/31/22   Killian Chen DO   desoximetasone (TOPICORT) 0.25 % OINT  22   Historical Provider, MD   methotrexate (RHEUMATREX) 2.5 MG chemo tablet Take 2.5 mg by mouth once a week Take 2 tabs every Friday    Historical Provider, MD   folic acid (FOLVITE) 1 MG tablet Take 1 mg by mouth daily    Historical Provider, MD   metoprolol tartrate (LOPRESSOR) 100 MG tablet Take 1 tablet by mouth 2 times daily 22   Phu Sanz DO   amLODIPine (NORVASC) 5 MG tablet Take 1 tablet by mouth daily 22   Phu Sanz DO   triamcinolone (KENALOG) 0.1 % ointment  21   Historical Provider, MD   Continuous Blood Gluc  (Colby Dumont) SILVANA Use to continuously check blood glucose. 22   Killian Chen DO   Continuous Blood Gluc Transmit (DEXCOM G6 TRANSMITTER) MISC Use to check blood glucose continuously. 22   Killian Chen DO   Continuous Blood Gluc Sensor (DEXCOM G6 SENSOR) MISC Use to check blood glucose continuously.  22   Killian Chen DO   Insulin Syringe-Needle U-100 30G X 5/16\" 0.3 ML MISC USE UP TO FIVE TIMES DAILY AS DIRECTED WITH INSULIN 22   Killian Chen DO   pantoprazole (PROTONIX) 40 MG tablet TAKE ONE TABLET BY MOUTH EVERY MORNING BEFORE BREAKFAST 1/10/22   Iftikhar Cruz MD   insulin aspart (NOVOLOG) 100 UNIT/ML injection vial USE AS DIRECTED THREE TIMES DAILY PER SLIDING SCALE: 171-220=3 UNITS; 221-270=5 UNITS; 271-320=7 UNITS; GREATER THAN 320=9 UNITS 1/4/22   Kelly Chen DO   insulin NPH (HUMULIN N;NOVOLIN N) 100 UNIT/ML injection vial Inject 8 Units into the skin 2 times daily  Patient not taking: Reported on 3/21/2022 1/4/22   Kelly Chen DO   lisinopril (PRINIVIL;ZESTRIL) 10 MG tablet TAKE ONE TABLET BY MOUTH DAILY WHEN BLOOD PRESSURE IS ELEVATED AFTER SKYRIZI. 1/3/22   Phu Sanz DO   Clobetasol Propionate 0.05 % SHAM APPLY TO SCALP, LEAVE ON FOR 15 MINUTES THEN RINSE THOROUGHLY. MAY USE 3-4 TIMES A WEEK FOR MAINTENANCE 11/23/21   Kelly Chen DO   pantoprazole (PROTONIX) 20 MG tablet TAKE ONE TABLET BY MOUTH ONCE NIGHTLY 11/8/21   Tameka Jonas MD   sildenafil (VIAGRA) 100 MG tablet TAKE 1/2 - 1 TABLET BY MOUTH AS NEEDED 7/9/21   Kelly Chen DO   SKYRIZI, 150 MG DOSE, 75 MG/0.83ML PSKT injection Inject 150 mg into the skin Every 2 months  11/25/19   Historical Provider, MD       Current medications:    No current outpatient medications on file. No current facility-administered medications for this visit. Allergies: Allergies   Allergen Reactions    Cosentyx [Secukinumab] Nausea And Vomiting and Rash      fever    Lantus [Insulin Glargine] Itching, Swelling and Rash     Patient started Lantus about a month ago, only new medication. His skin reaction started on his abdomen where he injects the Lantus and moved up to the face. This is similar to his response to Cosentyx.      Stellaria Rash    Infliximab Other (See Comments)     Nerve damage    Methotrexate Derivatives Other (See Comments)     Bone marrow toxicity    Seasonal     Adhesive Tape Rash    Keflex [Cephalexin] Rash    Otezla [Apremilast] Rash     Other reaction(s): Unknown    Taltz [Ixekizumab] Rash     Other reaction(s): Unknown    Ustekinumab Rash     Other reaction(s): rash-allergic and acute renal failure  Other reaction(s): Unknown       Problem List:    Patient Active Problem List   Diagnosis Code    Hypertension I10    Psoriatic arthritis ([address] L40.50    Chronic kidney disease (CKD), stage III (moderate) (HCC) N18.30    CVA (cerebral vascular accident) ([address] I63.9    History of migraine headaches Z86.69    Hyperlipidemia E78.5    Pituitary microadenoma (HCC) D35.2    Chronic bullous emphysema (HCC) J43.9    Sinus tachycardia R00.0    Acute kidney injury ([address] N17.9    Hyponatremia E87.1    Uncontrolled diabetes mellitus type 2 without complications [id number]    Psoriasis L40.9    Drug-induced skin rash L27.0    Leukopenia D72.819    Type 2 diabetes mellitus with hyperglycemia (HCC) E11.65    Esophageal dysphagia R13.19    Hiatal hernia K44.9    Esophagitis K20.90    Acute cholecystitis K81.0    Cholecystitis K81.9    Cholelithiasis K80.20    Symptomatic cholelithiasis K80.20       Past Medical History:        Diagnosis Date    Allergic reaction caused by a drug 8/13/2016    Arthritis     psoritic arthritis    Chronic kidney disease     Follows with Nephro    COPD (chronic obstructive pulmonary disease) (HCC)     pt  denies    CVA (cerebral vascular accident) ([address]     Neuro eval with Dr Ovalle here    Depression     Diabetes mellitus ([address]     History of blood transfusion     History of migraine headaches     Hyperlipidemia     Hypertension     Pituitary microadenoma ([address]     Eval by NS and Endo 2011 but no recent FU    Pneumothorax 05/27/2015    left    Psoriatic arthritis ([address]     Stroke ([address]     x3    Thrombocytopenia (HCC)        Past Surgical History:        Procedure Laterality Date    CHEST TUBE INSERTION Left     COLONOSCOPY      11/13 SIS 10 y    ENDOSCOPY, COLON, DIAGNOSTIC  04/08/2021    EGD BIOPSY GASTRIC AND GE JUNCTION     OTHER SURGICAL HISTORY Right     stent r kidney    OTHER SURGICAL HISTORY  05/27/2015    resection of bleb    PILONIDAL CYST DRAINAGE      SHOULDER SURGERY Left     Rotator cuff    THORACOSCOPY  05/27/2015    THORACOTOMY  05/27/2015    LABGLOM 33 04/05/2022    GLUCOSE 203 04/05/2022    PROT 6.7 04/05/2022    CALCIUM 8.8 04/05/2022    BILITOT 1.22 04/05/2022    ALKPHOS 105 04/05/2022    AST 19 04/05/2022    ALT 21 04/05/2022       POC Tests:   No results for input(s): POCGLU, POCNA, POCK, POCCL, POCBUN, POCHEMO, POCHCT in the last 72 hours. Coags:   Lab Results   Component Value Date    PROTIME 9.9 09/22/2017    INR 0.9 09/22/2017    APTT 28.5 08/13/2016       HCG (If Applicable): No results found for: PREGTESTUR, PREGSERUM, HCG, HCGQUANT     ABGs: No results found for: PHART, PO2ART, LRA4JMW, NEU0XLK, BEART, U2WMPOWH     Type & Screen (If Applicable):  No results found for: LABABO, LABRH    Drug/Infectious Status (If Applicable):  Lab Results   Component Value Date    HEPCAB NONREACTIVE 08/14/2016       COVID-19 Screening (If Applicable):   Lab Results   Component Value Date    COVID19 Not Detected 03/16/2022    COVID19 Not Detected 04/02/2021    COVID19 Not Detected 01/15/2021    COVID19 Not Detected 07/02/2020           Anesthesia Evaluation  Patient summary reviewed and Nursing notes reviewed no history of anesthetic complications:   Airway: Mallampati: II  TM distance: >3 FB   Neck ROM: full  Mouth opening: > = 3 FB Dental: normal exam         Pulmonary:normal exam    (+) COPD: no interval change,                             Cardiovascular:    (+) hypertension: no interval change, angina: no interval change,       ECG reviewed                        Neuro/Psych:   (+) CVA: no interval change, psychiatric history:depression/anxiety             GI/Hepatic/Renal:   (+) renal disease: CRI and no interval change,           Endo/Other:    (+) DiabetesType II DM, no interval change, , .                 Abdominal:             Vascular: negative vascular ROS. Other Findings:               Anesthesia Plan      general     ASA 3           MIPS: Postoperative opioids intended and Prophylactic antiemetics administered.   Anesthetic plan and risks discussed with patient. Use of blood products discussed with patient whom.    Plan discussed with surgical team.                  XOCHILT Forde - CRNA   4/15/2022

## 2022-04-15 NOTE — ANESTHESIA POSTPROCEDURE EVALUATION
Department of Anesthesiology  Postprocedure Note    Patient: Shelly Hall  MRN: [id number]  [place]: 1960  Date of evaluation: 4/15/2022  Time:  10:16 AM     Procedure Summary     Date: 04/15/22 Room / Location: [address] [place] [address]    Anesthesia Start: 9931 Anesthesia Stop: 6045    Procedure: Laparoscopic Robotic Assisted CHOLECYSTECTOMY (N/A ) Diagnosis: (chcolelithiasis)    Surgeons: Evans Chaidez DO Responsible Provider: XOCHILT Lei CRNA    Anesthesia Type: general ASA Status: 3          Anesthesia Type: general    Vashti Phase I: Vashti Score: 10    Vashti Phase II:      Last vitals: Reviewed and per EMR flowsheets.        Anesthesia Post Evaluation    Patient location during evaluation: PACU  Patient participation: complete - patient participated  Level of consciousness: awake and alert  Pain score: 3  Airway patency: patent  Nausea & Vomiting: no nausea  Complications: no  Cardiovascular status: blood pressure returned to baseline and hemodynamically stable  Respiratory status: acceptable  Hydration status: euvolemic

## 2022-04-15 NOTE — OP NOTE
[address]                 [address] [place] [address]                                OPERATIVE REPORT    PATIENT NAME: Zach Martinez                :        1960  MED REC NO:   [id number]                             ROOM:  ACCOUNT NO:   [de-identified]                           ADMIT DATE: 04/15/2022  PROVIDER:     Jozef Bagley    DATE OF PROCEDURE:  04/15/2022    SURGEON:  Dr. Jozef Bagley. ASSISTANT:  None. PREOPERATIVE DIAGNOSES:  1. Symptomatic cholelithiasis. 2.  Recent history of choledocholithiasis. POSTOPERATIVE DIAGNOSES:  1. Symptomatic cholelithiasis. 2.  Recent history of choledocholithiasis. PROCEDURE:  Robotic-assisted laparoscopic cholecystectomy. ANESTHESIA:  General:    ESTIMATED BLOOD LOSS:  10 mL. FLUIDS:  2 liters of crystalloid. COMPLICATIONS:  None. SPECIMEN:  Gallbladder and contents. INDICATIONS FOR PROCEDURE:  The patient is a 51-year-old gentleman who  has previously been seen in the office and has recently come into the  ER, where he was noted to have a bilirubin above 9 with signs of  possible cholecystitis. Because of concern for common duct obstruction,  he was actually transferred up to [facility], where he was evaluated and did  have an MRCP that showed no obstruction and during his admission up in  [facility], his bilirubin was noted to trend down to normal or close to  normal.  It was felt that he probably spontaneously passed a gallstone  after he was evaluated by GI up there and he was being planned for  surgery, but apparently that did not happen on that admission and he was  discharged to home. Because of that, he followed up with me for  definitive surgical intervention due to his history of  choledocholithiasis. After he was seen in the office, we rechecked his  labs and his bilirubin continued to be within normal limits or close to  normal and he was no longer having any symptoms. We planned for  cholecystectomy for definitive treatment due to history of gallstones  and the stones that were seen on his imaging. Prior to the day of the  procedure, risks, benefits and alternatives were explained to the  patient and consent was obtained. DESCRIPTION OF PROCEDURE:  The patient was brought to the operative  suite, placed on the operative table in supine position. Monitoring  devices and SCD cuffs were placed. General endotracheal anesthesia was  induced. After induction of anesthesia, the time-out was performed and  correct patient and procedure were verified. The patient's abdomen was  prepped and draped in sterile fashion. Prior to the time of the  incision, the patient received preoperative antibiotics in accordance  with SCIP protocol and also received a dose of indocyanine green. The  patient's abdomen was anesthetized at the left upper quadrant just below  the costal margin at midclavicular line. A transverse incision was made  through this area and a Veress needle was advanced into the abdomen. Saline drop test showed no aspiration of blood or enteric fluid and free  flow of saline. Insufflation tubing was connected and the patient's  abdomen was insufflated to 12 mmHg. Once done, the Veress needle was  removed and an 8 mm robotic trocar with laparoscope in place was  advanced to the abdomen in an OptiView fashion. Once in, inspection of  the underlying structures showed no damage during the entrance.  _____  exam in the belly showed no obvious abnormalities. There were some  signs of adhesive changes likely due to inflammation in the right upper  quadrant. Under visualization with laparoscope, three additional  robotic trocars were placed in the lower abdomen to the left and right  of midline and then right lower quadrant just below the level of the  umbilicus. The patient was positioned with head up and rolled to left  side.   Robot was docked and targeted to the right upper quadrant. Working instruments of a Force bipolar hook cautery and a Cadiere  grasper were placed. Once in, I used the Cadiere to grasp the fundus of  the gallbladder which was gently retracted cephalad. At this point,  there was adhesion of omentum to the gallbladder and it appeared to be a  fairly fatty rind around the gallbladder. I began to take down the  adhesions using electrocautery until I could completely remove them and  the gallbladder was further retracted cephalad. At this point, I  activated the Firefly and then I could see what I believed to be the  cystic duct, but was having a little bit of trouble seeing common duct  due to the amount of fatty tissue at the hilum. I began to slowly  dissect removing some of fatty tissue around what I believe to be the  cystic duct and eventually did get down to the structure, which I  circumferentially dissected. The cystic duct appeared fairly large  though I could clearly see it going into the gallbladder, but because of  the size, I decided to go ahead and do some additional dissection  further down and as I was doing this, also identified the cystic artery  which was circumferentially dissected. Firefly was activated several  times during this dissection to ensure that we were inadvertently going  to injure any of the biliary structures. Eventually, I was able to get  a fairly good dissection, but because of the size of the cystic duct, I  went ahead and just clipped the cystic artery and divided it after I  partially cleared the hepatic plate. I did have good critical view of  safety, but was still having some difficulty seeing the confluence of  the cystic and common duct under the Firefly, so I continued my  dissection until I could clearly identify the biliary anatomy just to  make sure that we were inadvertently pulling up the common duct.   During  the dissection, unfortunately I did make a small injury to the cystic  duct, but it was fairly easily controlled and then we had minimal  spillage of bile. Once I had clearly identified all the anatomy and  made sure that we did in fact have the cystic ducts dissected and we  were not going to cause any injury to the common duct, I clipped the  cystic duct making sure that the clip was below the level of the  ductotomy that was created during the dissection. A second clip was  placed above the level of that and I divided the cystic duct between the  two clips using scissors. Once this was done, I dissected the  gallbladder free of the liver using electrocautery. During this time,  good hemostasis was maintained. Once we had the gallbladder free, final  inspection of the liver bed showed good hemostasis and the clips were  still in good placement. The Cadiere grasper at this point was removed  and an EndoCatch bag was brought into the left upper quadrant port site. Gallbladder was placed into the bag and it was closed. Final inspection  in the abdomen showed no obvious injuries to other structures. Robotic  instruments were removed and the robot was undocked. At this point, I  scrubbed back into the case. Under visualization with the scope, I  removed the specimen in the bag through the left upper quadrant port  site. I did have to enlarge this slightly due to the size of the  gallbladder. Once out, I used a Ridge-Eli needle to close the  defect at the left upper quadrant at the fascia level using an  interrupted suture of 0 Vicryl. Scope was removed and additional  robotic trocars were uncapped to allow evacuation of CO2 and then these  were also removed. All port sites were anesthetized with additional  local anesthetic and closed at skin level with 4-0 Monocryl in a  subcuticular fashion. The patient's abdomen was cleansed and dried and  skin glue was placed across all incisions. At the conclusion of the  procedure, all sponge, instrument and needle counts were correct. The  patient was awoken from anesthesia and taken to the PACU in stable  condition.         Kapil Cruz    D: 04/15/2022 10:17:04       T: 04/15/2022 10:22:50     [id number]  Job#: [id number]     Doc#: [id number]    CC:  Primary Care

## 2022-04-15 NOTE — H&P
Subjective   Zahraa Rossi is a 63 y.o. male who presents today for further evaluation of cholecystitis and recent likely choledocholithiasis. Patient presented to ER here mid March with complaints of approximately 1 day for nausea vomiting and diarrhea. When he was seen in the ER he was noted to be jaundiced on exam and a work-up revealed significant elevation of bilirubin 10/9 with most indirect bilirubin. He also had imaging that did show gallstones as well as signs consistent with possible cholecystitis. Patient denies any abdominal pain at the time but because of his increased bilirubin and the concern that he may have some sort of ductal obstruction he was transferred to [place]. At [facility] he had evaluation by GI as well as general surgery up there and also underwent an MRCP. Fortunately it seems the patient likely passed the stone that was obstructing the duct and his bilirubin came down. The MRCP did not show any evidence of ongoing obstruction. No ERCP was performed. He was seen by general surgery up there and per the patient was told that they were planning for surgery but he states after a few days weight continued to be delayed he decided to leave hospital and follow-up with a surgeon here. Since the discharge from the hospital he denies any further similar symptoms and has not had any abdominal pain. Tolerating diet.   Because of his findings at initial presentation and his work-up in [place] he was referred to general surgery for definitive surgical plans.     Past Medical History        Past Medical History:   Diagnosis Date    Allergic reaction caused by a drug 8/13/2016    Arthritis       psoritic arthritis    Chronic kidney disease       Follows with Nephro    COPD (chronic obstructive pulmonary disease) ([address]       pt  denies    CVA (cerebral vascular accident) ([address]       Neuro eval with Dr Ovalle here    Depression      Diabetes mellitus [facility]      History of blood transfusion      History of migraine headaches      Hyperlipidemia      Hypertension      Pituitary microadenoma [facility]       Eval by NS and Endo 2011 but no recent FU    Pneumothorax 05/27/2015     left    Psoriatic arthritis ([facility]      Stroke ([facility]       x3    Thrombocytopenia ([facility])              Past Surgical History         Past Surgical History:   Procedure Laterality Date    CHEST TUBE INSERTION Left      COLONOSCOPY         11/13 SIS 10 y    ENDOSCOPY, COLON, DIAGNOSTIC   04/08/2021     EGD BIOPSY GASTRIC AND GE JUNCTION     OTHER SURGICAL HISTORY Right       stent r kidney    OTHER SURGICAL HISTORY   05/27/2015     resection of bleb    PILONIDAL CYST DRAINAGE        SHOULDER SURGERY Left       Rotator cuff    THORACOSCOPY   05/27/2015    THORACOTOMY   05/27/2015    UPPER GASTROINTESTINAL ENDOSCOPY        UPPER GASTROINTESTINAL ENDOSCOPY N/A 07/08/2020     EGD with biopsies performed by Arthur Johnson DO at [facility]   01/20/2021    UPPER GASTROINTESTINAL ENDOSCOPY N/A 01/20/2021     EGD BIOPSY performed by Nadiya Singleton MD at [address] 04/08/2021     EGD BIOPSY GASTRIC AND GE JUNCTION  performed by Nadiya Singleton MD at [id number] [place] [address].            Current Facility-Administered Medications          Current Outpatient Medications   Medication Sig Dispense Refill    blood glucose test strips (FREESTYLE LITE) strip TEST THREE TIMES A DAY AND AS NEEDED FOR SYMPTOMS OF IRREGULAR BLOOD GLUCOSE. 300 strip 3    mupirocin (BACTROBAN) 2 % ointment Apply topically to affected area 3 times daily.  30 g 0    desoximetasone (TOPICORT) 0.25 % OINT          methotrexate (RHEUMATREX) 2.5 MG chemo tablet Take 2.5 mg by mouth once a week Take 2 tabs every Friday        folic acid (FOLVITE) 1 MG tablet Take 1 mg by mouth daily        metoprolol tartrate (LOPRESSOR) 100 MG tablet Take 1 tablet by mouth 2 times daily 180 tablet 1    amLODIPine (NORVASC) 5 MG tablet Take 1 tablet by mouth daily 90 tablet 1    triamcinolone (KENALOG) 0.1 % ointment          Continuous Blood Gluc  (DEXCOM G6 ) SILVANA Use to continuously check blood glucose. 1 each 0    Continuous Blood Gluc Transmit (DEXCOM G6 TRANSMITTER) MISC Use to check blood glucose continuously. 1 each 3    Continuous Blood Gluc Sensor (DEXCOM G6 SENSOR) MISC Use to check blood glucose continuously. 9 each 3    Insulin Syringe-Needle U-100 30G X 5/16\" 0.3 ML MISC USE UP TO FIVE TIMES DAILY AS DIRECTED WITH INSULIN 300 each 3    pantoprazole (PROTONIX) 40 MG tablet TAKE ONE TABLET BY MOUTH EVERY MORNING BEFORE BREAKFAST 90 tablet 2    insulin aspart (NOVOLOG) 100 UNIT/ML injection vial USE AS DIRECTED THREE TIMES DAILY PER SLIDING SCALE: 171-220=3 UNITS; 221-270=5 UNITS; 271-320=7 UNITS; GREATER THAN 320=9 UNITS 10 mL 3    lisinopril (PRINIVIL;ZESTRIL) 10 MG tablet TAKE ONE TABLET BY MOUTH DAILY WHEN BLOOD PRESSURE IS ELEVATED AFTER SKYRIZI. 90 tablet 3    Clobetasol Propionate 0.05 % SHAM APPLY TO SCALP, LEAVE ON FOR 15 MINUTES THEN RINSE THOROUGHLY. MAY USE 3-4 TIMES A WEEK FOR MAINTENANCE 118 mL 5    pantoprazole (PROTONIX) 20 MG tablet TAKE ONE TABLET BY MOUTH ONCE NIGHTLY 30 tablet 3    sildenafil (VIAGRA) 100 MG tablet TAKE 1/2 - 1 TABLET BY MOUTH AS NEEDED 30 tablet 5    SKYRIZI, 150 MG DOSE, 75 MG/0.83ML PSKT injection Inject 150 mg into the skin Every 2 months         insulin NPH (HUMULIN N;NOVOLIN N) 100 UNIT/ML injection vial Inject 8 Units into the skin 2 times daily (Patient not taking: Reported on 3/21/2022) 10 mL 2      No current facility-administered medications for this visit.                  Allergies   Allergen Reactions    Cosentyx [Secukinumab] Nausea And Vomiting and Rash        fever    Lantus [Insulin Glargine] Itching, Swelling and Rash       Patient started Lantus about a month ago, only new medication.  His skin reaction started on his abdomen where he injects the Lantus and moved up to the face. This is similar to his response to Cosentyx.  Stellaria Rash    Infliximab Other (See Comments)       Nerve damage    Methotrexate Derivatives Other (See Comments)       Bone marrow toxicity    Seasonal      Adhesive Tape Rash    Keflex [Cephalexin] Rash    Otezla [Apremilast] Rash       Other reaction(s): Unknown    Taltz [Ixekizumab] Rash       Other reaction(s): Unknown    Ustekinumab Rash       Other reaction(s): rash-allergic and acute renal failure  Other reaction(s): Unknown         Family History         Family History   Problem Relation Age of Onset    Diabetes Sister      Alzheimer's Disease Maternal Grandmother      Other Maternal Grandmother           dementia    High Blood Pressure Maternal Grandfather      Cancer Maternal Grandfather           Unknown    High Blood Pressure Mother      Other Mother           blood clots    Other Paternal Grandmother           dementia            Social History               Socioeconomic History    Marital status:        Spouse name: Not on file    Number of children: Not on file    Years of education: Not on file    Highest education level: Not on file   Occupational History    Not on file   Tobacco Use    Smoking status: Former Smoker       Packs/day: 1.00       Years: 20.00       Pack years: 20.00       Types: Cigarettes       Quit date: 2015       Years since quittin.8    Smokeless tobacco: Never Used    Tobacco comment: Quit smoking 2016, [facility], 10/20/2016. Vaping Use    Vaping Use: Never used   Substance and Sexual Activity    Alcohol use:  No       Alcohol/week: 0.0 standard drinks    Drug use: No    Sexual activity: Yes       Partners: Female   Other Topics Concern    Not on file   Social History Narrative    Not on file      Social Determinants of Health          Financial Resource Strain:     Difficulty of Paying Living Expenses: Not on file   Food Insecurity:     Worried About [address] in the Last Year: Not on file    Tripp of Food in the Last Year: Not on file   Transportation Needs:     Lack of Transportation (Medical): Not on file    Lack of Transportation (Non-Medical): Not on file   Physical Activity:     Days of Exercise per Week: Not on file    Minutes of Exercise per Session: Not on file   Stress:     Feeling of Stress : Not on file   Social Connections:     Frequency of Communication with Friends and Family: Not on file    Frequency of Social Gatherings with Friends and Family: Not on file    Attends [employer] Services: Not on file    Active Member of [address] [place] [address] or Organizations: Not on file    Attends Club or Organization Meetings: Not on file    Marital Status: Not on file   Intimate Partner Violence:     Fear of Current or Ex-Partner: Not on file    Emotionally Abused: Not on file    Physically Abused: Not on file    Sexually Abused: Not on file   Housing Stability:     Unable to Pay for Housing in the Last Year: Not on file    Number of [place] in the Last Year: Not on file    Unstable Housing in the Last Year: Not on file            ROS:   Review of Systems - General ROS: negative  Psychological ROS: negative  Ophthalmic ROS: negative  ENT ROS: negative  Respiratory ROS: no cough, shortness of breath, or wheezing  Cardiovascular ROS: no chest pain or dyspnea on exertion  Gastrointestinal ROS: per HPI  Genito-Urinary ROS: no dysuria, trouble voiding, or hematuria  Musculoskeletal ROS: negative  Dermatological ROS: negative        Objective       Vitals:     04/05/22 0805   BP: 130/82   Pulse: 68   Temp: 96.7 °F (35.9 °C)      General:in no apparent distress and well developed and well nourished  Eyes: No gross abnormalities.   Ears, Nose, Throat: hearing grossly normal bilaterally  Neck: neck supple and non tender without mass  Lungs: clear to auscultation without wheezes or rales   Heart: S1S2, no mumurs, RRR  Abdomen: Soft, nondistended, nontender to palpation, bowel sounds present  Extremity: negative  Neuro: CN II-XII grossly intact        1. Symptomatic cholelithiasis  Assessment & Plan:  After evaluation does seem to me that the patient likely had common bile duct obstruction from gallstone the past.  As his lab work showed significant decrease in his bilirubin and his symptoms improved did not think that he is got any ongoing obstruction and his MRCP did not show any evidence of any sort of obstruction.     We have discussed the definitive intervention for removal of the source of the gallstones to prevent any further symptoms. Patient would like to proceed with surgery. We have discussed robotic assisted laparoscopic cholecystectomy. Risks of the procedure including bleeding, infection, scarring, pain, damage surrounding structures including bile ducts, need for additional surgery, retained stone, bile leak, conversion to open and anesthesia risk were discussed and consent is obtained. We also discussed potential for healing complications because of the medications he is on for psoriatic arthritis.     He has had recent EKG, chest x-ray and labs. I am going to repeat a CMP just to make sure that his bilirubin level has remained within normal limits after his episode of the choledocholithiasis. 2. Pre-op testing  -     Comprehensive Metabolic Panel;  Future           (Please note that portions of this note were completed with a voice recognition program.  Efforts were made to edit the dictations but occasionally words are mis-transcribed.)    The patient was interviewed and examined and there have been no changes since the above History and Physical.    Electronically signed by Blank Reyes DO on 4/14/2022 at 8:21 PM

## 2022-04-18 LAB — SURGICAL PATHOLOGY REPORT: NORMAL

## 2022-04-18 NOTE — DISCHARGE SUMMARY
[address]     Department of Internal Medicine - Staff Internal Medicine Teaching Service    INPATIENT DISCHARGE SUMMARY      Patient Identification:  Joaquin Smith is a 63 y.o. male. :  1960  MRN: [id number]     Acct: [de-identified]   PCP: Trinidad Han DO  Admit Date:  3/16/2022  Discharge date and time: 3/18/2022, left against medical advice  Attending Provider: Dr Vikki Bay Problem Lists:  Principal Problem:    Cholelithiasis  Active Problems:    Hypertension    Psoriatic arthritis ([address]    Chronic kidney disease (CKD), stage III (moderate) (HCC)    CVA (cerebral vascular accident) ([address]    Type 2 diabetes mellitus with hyperglycemia ([address]    Hiatal hernia    Esophagitis    Acute cholecystitis    Cholecystitis  Resolved Problems:    * No resolved hospital problems.  *      HOSPITAL STAY     Brief Inpatient course:   Joaquin Smith is a 63 y.o. male who was admitted for the management of Cholelithiasis, presented to the emergency department with chief complaints of dark urine  Patient was seen in outside facility's ED, work-up there showed elevated LFTs elevated total and direct bilirubin imaging showed acute cholecystitis was given a dose of Zosyn and transferred to [place]. [facility] was consulted, recommended ultrasound and MRCP without contrast, ultrasound showed cholelithiasis, MRCP showed cholelithiasis with superimposed cholecystitis  Started on IV fluids, continued on Zosyn    Placed on low-dose sliding scale for diabetes mellitus  Started on home medication Lopressor hypertension  Has history of psoriasis and psoriatic arthritis on methotrexate weekly  On Protonix for esophagitis    General surgery was consulted for concerning of cholecystitis no acute surgical intervention because symptoms does not suggest cholecystitis  MRCP was negative for choledocholithiasis LFTs were trending down no indication for ERCP or GI recommended LFTs as outpatient 1 week after discharge       Procedures/ Significant Interventions:      No results found    Consults:     Consults:     Final Specialist Recommendations/Findings:   IP CONSULT TO GI  IP CONSULT TO INTERNAL MEDICINE  IP CONSULT TO GENERAL SURGERY  IP CONSULT TO CASE MANAGEMENT  follow-up outpatient     Any Hospital Acquired Infections: none    Discharge Functional Status:  stable    DISCHARGE PLAN     Disposition: Home    Patient Instructions:   Discharge Medication List as of 3/18/2022  8:20 AM      CONTINUE these medications which have NOT CHANGED    Details   desoximetasone (TOPICORT) 0.25 % OINT Historical Med      methotrexate (RHEUMATREX) 2.5 MG chemo tablet Take 2.5 mg by mouth once a week Take 2 tabs every FridayHistorical Med      folic acid (FOLVITE) 1 MG tablet Take 1 mg by mouth dailyHistorical Med      metoprolol tartrate (LOPRESSOR) 100 MG tablet Take 1 tablet by mouth 2 times daily, Disp-180 tablet, R-1Normal      amLODIPine (NORVASC) 5 MG tablet Take 1 tablet by mouth daily, Disp-90 tablet, R-1Normal      triamcinolone (KENALOG) 0.1 % ointment Historical Med      Continuous Blood Gluc  (DEXCOM G6 ) SILVANA Use to continuously check blood glucose., Disp-1 each, R-0Normal      Continuous Blood Gluc Transmit (DEXCOM G6 TRANSMITTER) MISC Use to check blood glucose continuously. , Disp-1 each, R-3Normal      Continuous Blood Gluc Sensor (DEXCOM G6 SENSOR) MISC Use to check blood glucose continuously. , Disp-9 each, R-3Normal      Insulin Syringe-Needle U-100 30G X 5/16\" 0.3 ML MISC Disp-300 each, R-3, NormalUSE UP TO FIVE TIMES DAILY AS DIRECTED WITH INSULIN      !!  pantoprazole (PROTONIX) 40 MG tablet TAKE ONE TABLET BY MOUTH EVERY MORNING BEFORE BREAKFAST, Disp-90 tablet, R-2Normal      insulin aspart (NOVOLOG) 100 UNIT/ML injection vial USE AS DIRECTED THREE TIMES DAILY PER SLIDING SCALE: 171-220=3 UNITS; 221-270=5 UNITS; 271-320=7 UNITS; GREATER THAN 320=9 UNITS, Disp-10 mL, R-3Normal      insulin NPH (HUMULIN N;NOVOLIN N) 100 UNIT/ML injection vial Inject 8 Units into the skin 2 times daily, Disp-10 mL, R-2Normal      lisinopril (PRINIVIL;ZESTRIL) 10 MG tablet TAKE ONE TABLET BY MOUTH DAILY WHEN BLOOD PRESSURE IS ELEVATED AFTER SKYRIZI., Disp-90 tablet, R-3Normal      Clobetasol Propionate 0.05 % SHAM APPLY TO SCALP, LEAVE ON FOR 15 MINUTES THEN RINSE THOROUGHLY. MAY USE 3-4 TIMES A WEEK FOR MAINTENANCE, Disp-118 mL, R-5Normal      !! pantoprazole (PROTONIX) 20 MG tablet TAKE ONE TABLET BY MOUTH ONCE NIGHTLY, Disp-30 tablet, R-3Normal      blood glucose monitor strips Test 3 times a day & as needed for symptoms of irregular blood glucose., Disp-300 strip, R-1, Normal      sildenafil (VIAGRA) 100 MG tablet TAKE 1/2 - 1 TABLET BY MOUTH AS NEEDED, Disp-30 tablet, R-5Normal      betamethasone dipropionate (DIPROLENE) 0.05 % cream Historical Med      SKYRIZI, 150 MG DOSE, 75 MG/0.83ML PSKT injection Inject 150 mg into the skin Every 2 months , DAWHistorical Med       !! - Potential duplicate medications found. Please discuss with provider. Activity: Activity as tolerated    Diet: Regular adult diet    Follow-up:    No follow-up provider specified. Patient Instructions:   1.  Follow-up outpatient with GI and PCP    Note that over 30 minutes was spent in preparing discharge papers, discussing discharge with patient, medication review, etc.      Lore Aparicio  PGY-1  Internal Medicine  [facility], [facility]

## 2022-04-19 RX ORDER — PANTOPRAZOLE SODIUM 20 MG/1
TABLET, DELAYED RELEASE ORAL
Qty: 30 TABLET | Refills: 3 | Status: SHIPPED | OUTPATIENT
Start: 2022-04-19 | End: 2022-08-26

## 2022-05-03 ENCOUNTER — OFFICE VISIT (OUTPATIENT)
Dept: SURGERY | Age: 62
End: 2022-05-03
Payer: MEDICARE

## 2022-05-03 VITALS
TEMPERATURE: 97.3 F | BODY MASS INDEX: 27.37 KG/M2 | OXYGEN SATURATION: 96 % | HEIGHT: 69 IN | WEIGHT: 184.8 LBS | DIASTOLIC BLOOD PRESSURE: 78 MMHG | HEART RATE: 111 BPM | SYSTOLIC BLOOD PRESSURE: 128 MMHG

## 2022-05-03 DIAGNOSIS — Z09 POSTOP CHECK: Primary | ICD-10-CM

## 2022-05-03 PROCEDURE — 99214 OFFICE O/P EST MOD 30 MIN: CPT | Performed by: SURGERY

## 2022-05-03 PROCEDURE — 99024 POSTOP FOLLOW-UP VISIT: CPT | Performed by: SURGERY

## 2022-05-03 NOTE — PROGRESS NOTES
Chasity Bermudez is a 63 y.o. male who presents today for follow-up after recent robotic assisted laparoscopic cholecystectomy due to history of choledocholithiasis. The patient underwent the procedure on 15 April. Since then he states that he has been doing well for the most part. States the pain has been well controlled and is no longer requiring any pain medication. No incisional problems. He does report that he has been tolerating diet and has not had any nausea or emesis. However he states that when he eats temperature hot foods that he will get some burning sensation in the epigastrium that causes him not to want to eat as much. He states that as soon as he stops eating this seems to go away on its own. He does have a history of reflux disease and is on Protonix twice daily. This is not related to any specific types of foods but anything that he is hot. Denies any issues with drinking liquids. Cold foods do not seem to cause problems. No changes in bowel movements. No other complaints.     Past Medical History:   Diagnosis Date    Allergic reaction caused by a drug 8/13/2016    Arthritis     psoritic arthritis    Chronic kidney disease     Follows with Nephro    COPD (chronic obstructive pulmonary disease) ([address]     pt  denies    CVA (cerebral vascular accident) ([address]     Neuro eval with Dr Ovalle here    Depression     Diabetes mellitus ([address]     History of blood transfusion     History of migraine headaches     Hyperlipidemia     Hypertension     Pituitary microadenoma ([address]     Eval by NS and Endo 2011 but no recent FU    Pneumothorax 05/27/2015    left    Psoriatic arthritis ([address]     Stroke ([address]     x3    Thrombocytopenia ([address]        Past Surgical History:   Procedure Laterality Date    CHEST TUBE INSERTION Left     CHOLECYSTECTOMY, LAPAROSCOPIC N/A 4/15/2022    Laparoscopic Robotic Assisted CHOLECYSTECTOMY performed by Cait Neely DO at [address] [place] [address] COLONOSCOPY      11/13 SIS 10 y    ENDOSCOPY, COLON, DIAGNOSTIC  04/08/2021    EGD BIOPSY GASTRIC AND GE JUNCTION     OTHER SURGICAL HISTORY Right     stent r kidney    OTHER SURGICAL HISTORY  05/27/2015    resection of bleb    PILONIDAL CYST DRAINAGE      SHOULDER SURGERY Left     Rotator cuff    THORACOSCOPY  05/27/2015    THORACOTOMY  05/27/2015    UPPER GASTROINTESTINAL ENDOSCOPY      UPPER GASTROINTESTINAL ENDOSCOPY N/A 07/08/2020    EGD with biopsies performed by Álvaro Palma DO at [address] [facility]  01/20/2021    UPPER GASTROINTESTINAL ENDOSCOPY N/A 01/20/2021    EGD BIOPSY performed by Gauri Rosa MD at [address] [facility] 04/08/2021    EGD BIOPSY GASTRIC AND GE JUNCTION  performed by Gauri Rosa MD at [address] [facility].       Current Outpatient Medications   Medication Sig Dispense Refill    pantoprazole (PROTONIX) 20 MG tablet TAKE ONE TABLET BY MOUTH ONCE NIGHTLY 30 tablet 3    ondansetron (ZOFRAN) 4 MG tablet Take 1 tablet by mouth 3 times daily as needed for Nausea or Vomiting 15 tablet 0    blood glucose test strips (FREESTYLE LITE) strip TEST THREE TIMES A DAY AND AS NEEDED FOR SYMPTOMS OF IRREGULAR BLOOD GLUCOSE. 300 strip 3    methotrexate (RHEUMATREX) 2.5 MG chemo tablet Take 2.5 mg by mouth once a week Take 2 tabs every Friday      folic acid (FOLVITE) 1 MG tablet Take 1 mg by mouth daily      metoprolol tartrate (LOPRESSOR) 100 MG tablet Take 1 tablet by mouth 2 times daily 180 tablet 1    amLODIPine (NORVASC) 5 MG tablet Take 1 tablet by mouth daily 90 tablet 1    Insulin Syringe-Needle U-100 30G X 5/16\" 0.3 ML MISC USE UP TO FIVE TIMES DAILY AS DIRECTED WITH INSULIN 300 each 3    pantoprazole (PROTONIX) 40 MG tablet TAKE ONE TABLET BY MOUTH EVERY MORNING BEFORE BREAKFAST 90 tablet 2    insulin aspart (NOVOLOG) 100 UNIT/ML injection vial USE AS DIRECTED THREE TIMES DAILY PER SLIDING SCALE: 171-220=3 UNITS; 221-270=5 UNITS; 271-320=7 UNITS; GREATER THAN 320=9 UNITS 10 mL 3    lisinopril (PRINIVIL;ZESTRIL) 10 MG tablet TAKE ONE TABLET BY MOUTH DAILY WHEN BLOOD PRESSURE IS ELEVATED AFTER SKYRIZI. 90 tablet 3    Clobetasol Propionate 0.05 % SHAM APPLY TO SCALP, LEAVE ON FOR 15 MINUTES THEN RINSE THOROUGHLY. MAY USE 3-4 TIMES A WEEK FOR MAINTENANCE 118 mL 5    SKYRIZI, 150 MG DOSE, 75 MG/0.83ML PSKT injection Inject 150 mg into the skin Every 2 months Every 2 !/2 months      desoximetasone (TOPICORT) 0.25 % OINT  (Patient not taking: Reported on 5/3/2022)      triamcinolone (KENALOG) 0.1 % ointment  (Patient not taking: Reported on 5/3/2022)      Continuous Blood Gluc  (DEXCOM G6 ) SILVANA Use to continuously check blood glucose. (Patient not taking: Reported on 5/3/2022) 1 each 0    Continuous Blood Gluc Transmit (DEXCOM G6 TRANSMITTER) MISC Use to check blood glucose continuously. (Patient not taking: Reported on 5/3/2022) 1 each 3    Continuous Blood Gluc Sensor (DEXCOM G6 SENSOR) MISC Use to check blood glucose continuously. (Patient not taking: Reported on 5/3/2022) 9 each 3    insulin NPH (HUMULIN N;NOVOLIN N) 100 UNIT/ML injection vial Inject 8 Units into the skin 2 times daily (Patient not taking: Reported on 3/21/2022) 10 mL 2    sildenafil (VIAGRA) 100 MG tablet TAKE 1/2 - 1 TABLET BY MOUTH AS NEEDED (Patient not taking: Reported on 5/3/2022) 30 tablet 5     No current facility-administered medications for this visit. Allergies   Allergen Reactions    Cosentyx [Secukinumab] Nausea And Vomiting and Rash      fever    Lantus [Insulin Glargine] Itching, Swelling and Rash     Patient started Lantus about a month ago, only new medication. His skin reaction started on his abdomen where he injects the Lantus and moved up to the face. This is similar to his response to Cosentyx.      Stellaria Rash    Infliximab Other (See Comments)     Nerve damage    Methotrexate Derivatives Other (See Comments)     Bone marrow toxicity    Seasonal     Adhesive Tape Rash    Keflex [Cephalexin] Rash    Otezla [Apremilast] Rash     Other reaction(s): Unknown    Taltz [Ixekizumab] Rash     Other reaction(s): Unknown    Ustekinumab Rash     Other reaction(s): rash-allergic and acute renal failure  Other reaction(s): Unknown       Family History   Problem Relation Age of Onset    Diabetes Sister     Alzheimer's Disease Maternal Grandmother     Other Maternal Grandmother         dementia    High Blood Pressure Maternal Grandfather     Cancer Maternal Grandfather         Unknown    High Blood Pressure Mother     Other Mother         blood clots    Other Paternal Grandmother         dementia       Social History     Socioeconomic History    Marital status:      Spouse name: Not on file    Number of children: Not on file    Years of education: Not on file    Highest education level: Not on file   Occupational History    Not on file   Tobacco Use    Smoking status: Former Smoker     Packs/day: 1.00     Years: 20.00     Pack years: 20.00     Types: Cigarettes     Quit date: 2015     Years since quittin.9    Smokeless tobacco: Never Used    Tobacco comment: Quit smoking 2016, [facility], 10/20/2016. Vaping Use    Vaping Use: Never used   Substance and Sexual Activity    Alcohol use: No     Alcohol/week: 0.0 standard drinks    Drug use: No    Sexual activity: Yes     Partners: Female   Other Topics Concern    Not on file   Social History Narrative    Not on file     Social Determinants of Health     Financial Resource Strain:     Difficulty of Paying Living Expenses: Not on file   Food Insecurity:     Worried About Running Out of Food in the Last Year: Not on file    Tripp of Food in the Last Year: Not on file   Transportation Needs:     Lack of Transportation (Medical): Not on file    Lack of Transportation (Non-Medical):  Not on file   Physical Activity:     Days of Exercise per Week: Not on file    Minutes of Exercise per Session: Not on file   Stress:     Feeling of Stress : Not on file   Social Connections:     Frequency of Communication with Friends and Family: Not on file    Frequency of Social Gatherings with Friends and Family: Not on file    Attends Confucianist Services: Not on file    Active Member of [address] [place] [address] [employer] or Organizations: Not on file    Attends Club or Organization Meetings: Not on file    Marital Status: Not on file   Intimate Partner Violence:     Fear of Current or Ex-Partner: Not on file    Emotionally Abused: Not on file    Physically Abused: Not on file    Sexually Abused: Not on file   Housing Stability:     Unable to Pay for Housing in the Last Year: Not on file    Number of [place] in the Last Year: Not on file    Unstable Housing in the Last Year: Not on file       ROS:   Review of Systems - Negative except as noted in HPI      Objective   Vitals:    05/03/22 0855   BP: 128/78   Pulse: 111   Temp: 97.3 °F (36.3 °C)   SpO2: 96%     General:in no apparent distress and well developed and well nourished  Eyes: No gross abnormalities. Ears, Nose, Throat: hearing grossly normal bilaterally  Neck: neck supple and non tender without mass  Lungs: clear to auscultation without wheezes or rales   Heart: S1S2, no mumurs, RRR  Abdomen: Soft, nondistended, nontender to palpation, bowel sounds present. Incisions all intact and healed well. Extremity: negative  Neuro: CN II-XII grossly intact      1. Postop check  Assessment & Plan:  Patient is doing well after surgery. That it may be that he is having some change in his reflux because of the surgery or it may just be that he is having flareup of his reflux. Patient is tolerating diet and I do not have any concern that there is an acute problem. I have discussed with him the pathology and he voiced understanding.   Get up plan to see him back tentatively in about 3 weeks so that if he is still having the same issues with eating may plan for further work-up at that point. I am hopeful that the symptoms will resolve over the next few weeks as we get further out from surgery.          (Please note that portions of this note were completed with a voice recognition program.  Efforts were made to edit the dictations but occasionally words are mis-transcribed.)

## 2022-05-03 NOTE — ASSESSMENT & PLAN NOTE
Patient is doing well after surgery. That it may be that he is having some change in his reflux because of the surgery or it may just be that he is having flareup of his reflux. Patient is tolerating diet and I do not have any concern that there is an acute problem. I have discussed with him the pathology and he voiced understanding. Get up plan to see him back tentatively in about 3 weeks so that if he is still having the same issues with eating may plan for further work-up at that point. I am hopeful that the symptoms will resolve over the next few weeks as we get further out from surgery.

## 2022-05-20 DIAGNOSIS — E11.65 TYPE 2 DIABETES MELLITUS WITH HYPERGLYCEMIA, UNSPECIFIED WHETHER LONG TERM INSULIN USE (HCC): Primary | ICD-10-CM

## 2022-05-23 NOTE — TELEPHONE ENCOUNTER
Antoni Elise called requesting a refill of the below medication which has been pended for you:     Requested Prescriptions     Pending Prescriptions Disp Refills    NOVOLOG 100 UNIT/ML injection vial [Pharmacy Med Name: NovoLOG 100 UNIT/ML Subcutaneous Solution] 10 mL 0     Sig: USE AS DIRECTED THREE TIMES DAILY PER SLIDING SCALE: 171-220=3 UNITS; 221-270=5 UNITS; 270-320=7 UNITS; GREATER THAN 320=9 UNITS       Last Appointment Date: 3/29/2022  Next Appointment Date: 6/13/2022    Allergies   Allergen Reactions    Cosentyx [Secukinumab] Nausea And Vomiting and Rash      fever    Lantus [Insulin Glargine] Itching, Swelling and Rash     Patient started Lantus about a month ago, only new medication. His skin reaction started on his abdomen where he injects the Lantus and moved up to the face. This is similar to his response to Cosentyx.      Stellaria Rash    Infliximab Other (See Comments)     Nerve damage    Methotrexate Derivatives Other (See Comments)     Bone marrow toxicity    Seasonal     Adhesive Tape Rash    Keflex [Cephalexin] Rash    Otezla [Apremilast] Rash     Other reaction(s): Unknown    Taltz [Ixekizumab] Rash     Other reaction(s): Unknown    Ustekinumab Rash     Other reaction(s): rash-allergic and acute renal failure  Other reaction(s): Unknown

## 2022-05-25 RX ORDER — INSULIN ASPART 100 [IU]/ML
INJECTION, SOLUTION INTRAVENOUS; SUBCUTANEOUS
Qty: 10 ML | Refills: 5 | Status: SHIPPED | OUTPATIENT
Start: 2022-05-25 | End: 2022-05-27 | Stop reason: SDUPTHER

## 2022-05-27 RX ORDER — INSULIN ASPART 100 [IU]/ML
INJECTION, SOLUTION INTRAVENOUS; SUBCUTANEOUS
Qty: 10 ML | Refills: 5 | Status: SHIPPED | OUTPATIENT
Start: 2022-05-27

## 2022-05-27 NOTE — TELEPHONE ENCOUNTER
Per pharmacy fax, needs to resend medication stating max daily dose. Added to sliding scale instructions: \"Max daily dose 33 units\".

## 2022-06-02 PROBLEM — Z09 POSTOP CHECK: Status: RESOLVED | Noted: 2022-05-03 | Resolved: 2022-06-02

## 2022-06-13 ENCOUNTER — OFFICE VISIT (OUTPATIENT)
Dept: FAMILY MEDICINE CLINIC | Age: 62
End: 2022-06-13
Payer: MEDICARE

## 2022-06-13 VITALS
DIASTOLIC BLOOD PRESSURE: 90 MMHG | HEART RATE: 82 BPM | HEIGHT: 69 IN | WEIGHT: 185 LBS | TEMPERATURE: 98.1 F | BODY MASS INDEX: 27.4 KG/M2 | SYSTOLIC BLOOD PRESSURE: 146 MMHG | OXYGEN SATURATION: 96 %

## 2022-06-13 DIAGNOSIS — I10 ESSENTIAL HYPERTENSION: ICD-10-CM

## 2022-06-13 DIAGNOSIS — E78.5 HYPERLIPIDEMIA, UNSPECIFIED HYPERLIPIDEMIA TYPE: ICD-10-CM

## 2022-06-13 DIAGNOSIS — L40.50 PSORIATIC ARTHRITIS (HCC): ICD-10-CM

## 2022-06-13 DIAGNOSIS — E11.65 TYPE 2 DIABETES MELLITUS WITH HYPERGLYCEMIA, UNSPECIFIED WHETHER LONG TERM INSULIN USE (HCC): Primary | ICD-10-CM

## 2022-06-13 DIAGNOSIS — N18.32 STAGE 3B CHRONIC KIDNEY DISEASE (HCC): ICD-10-CM

## 2022-06-13 DIAGNOSIS — R74.8 ELEVATED SERUM GGT LEVEL: ICD-10-CM

## 2022-06-13 DIAGNOSIS — R74.8 ELEVATED ALKALINE PHOSPHATASE LEVEL: ICD-10-CM

## 2022-06-13 DIAGNOSIS — Z12.5 SCREENING FOR PROSTATE CANCER: ICD-10-CM

## 2022-06-13 PROCEDURE — 2022F DILAT RTA XM EVC RTNOPTHY: CPT | Performed by: FAMILY MEDICINE

## 2022-06-13 PROCEDURE — 3017F COLORECTAL CA SCREEN DOC REV: CPT | Performed by: FAMILY MEDICINE

## 2022-06-13 PROCEDURE — G8417 CALC BMI ABV UP PARAM F/U: HCPCS | Performed by: FAMILY MEDICINE

## 2022-06-13 PROCEDURE — 1036F TOBACCO NON-USER: CPT | Performed by: FAMILY MEDICINE

## 2022-06-13 PROCEDURE — G8427 DOCREV CUR MEDS BY ELIG CLIN: HCPCS | Performed by: FAMILY MEDICINE

## 2022-06-13 PROCEDURE — 99214 OFFICE O/P EST MOD 30 MIN: CPT | Performed by: FAMILY MEDICINE

## 2022-06-13 PROCEDURE — 3052F HG A1C>EQUAL 8.0%<EQUAL 9.0%: CPT | Performed by: FAMILY MEDICINE

## 2022-06-13 PROCEDURE — 99212 OFFICE O/P EST SF 10 MIN: CPT | Performed by: FAMILY MEDICINE

## 2022-06-13 ASSESSMENT — PATIENT HEALTH QUESTIONNAIRE - PHQ9
SUM OF ALL RESPONSES TO PHQ QUESTIONS 1-9: 1
1. LITTLE INTEREST OR PLEASURE IN DOING THINGS: 0
2. FEELING DOWN, DEPRESSED OR HOPELESS: 1
SUM OF ALL RESPONSES TO PHQ QUESTIONS 1-9: 1
SUM OF ALL RESPONSES TO PHQ9 QUESTIONS 1 & 2: 1

## 2022-06-13 NOTE — PROGRESS NOTES
[address]  [address], [address] [place]  [phone]      Christopher Vera is a 63 y.o. male who presents today for his medical conditions/complaints as noted below. Christopher Vera is c/o of Diabetes and Hypertension      HPI:     Pt here today for follow-up of DM and HTN. Pt had last treatment of Skyrizi 2-3 weeks ago; since then, his BP has been more variable. 2 days ago, he woke up at 164/110; this AM, he is elevated at 146/90. He still gets ear ringing and HA's when his BP is high. Doesn't seem like the Lisinopril or Norvasc are helping much anymore. Has not yet taken his doses today. Typically uses the Lisinopril only as needed; however, since the Skyrizi injection 2-3 weeks ago, he has taken 1 or 2 doses every day, and BP still stays elevated. Taking Norvasc 5 mg daily and Lopressor 100 mg BID. Does not want to continue the Braden Brambila - will [place] [facility] [place] and they will discuss possible new med. Also taking Methotrexate 5 mg (two of the 2.5 mg tabs) once weekly on Friday's - does seem to be helping; also taking folic acid daily, except on Friday's. Also using steroid ointment as needed on the few patches of psoriasis he has right now. Glucose this AM was 137. States he is rarely at 200; only had to take Novolog twice in the past week. Has not been taking Novolin N, as it causes him to feel off-balance and have blurred vision. Last A1c was 8.2% on 3/25/22.     Saw Dr. Americo Barakat earlier this year for DM eye exam.          Past Medical History:   Diagnosis Date    Allergic reaction caused by a drug 8/13/2016    Arthritis     psoritic arthritis    Chronic kidney disease     Follows with Nephro    COPD (chronic obstructive pulmonary disease) ([address]     pt  denies    CVA (cerebral vascular accident) ([address]     Neuro eval with Dr Ovalle here    Depression     Diabetes mellitus ([address]     Erectile dysfunction     History of blood transfusion     History of migraine headaches     Hyperlipidemia     Hypertension     Pituitary microadenoma ([facility]     Eval by NS and Endo 2011 but no recent FU    Pneumothorax 05/27/2015    left    Psoriatic arthritis ([facility]     Stroke ([facility]     x3    Thrombocytopenia ([facility]       Past Surgical History:   Procedure Laterality Date    CHEST TUBE INSERTION Left     CHOLECYSTECTOMY, LAPAROSCOPIC N/A 4/15/2022    Laparoscopic Robotic Assisted CHOLECYSTECTOMY performed by Christie Mac DO at [address] [facility]      11/13 SIS 10 y    ENDOSCOPY, COLON, DIAGNOSTIC  04/08/2021    EGD BIOPSY GASTRIC AND GE JUNCTION     OTHER SURGICAL HISTORY Right     stent r kidney    OTHER SURGICAL HISTORY  05/27/2015    resection of bleb    PILONIDAL CYST DRAINAGE      SHOULDER SURGERY Left     Rotator cuff    THORACOSCOPY  05/27/2015    THORACOTOMY  05/27/2015    UPPER GASTROINTESTINAL ENDOSCOPY      UPPER GASTROINTESTINAL ENDOSCOPY N/A 07/08/2020    EGD with biopsies performed by Álvaro Palma DO at [facility]  01/20/2021    UPPER GASTROINTESTINAL ENDOSCOPY N/A 01/20/2021    EGD BIOPSY performed by Gauri Rosa MD at [facility] N/A 04/08/2021    EGD BIOPSY GASTRIC AND GE JUNCTION  performed by Gauri Rosa MD at [address] [facility].     Family History   Problem Relation Age of Onset    Diabetes Sister     Alzheimer's Disease Maternal Grandmother     Other Maternal Grandmother         dementia    High Blood Pressure Maternal Grandfather     Cancer Maternal Grandfather         Unknown    High Blood Pressure Mother     Other Mother         blood clots    Other Paternal Grandmother         dementia     Social History     Tobacco Use    Smoking status: Former Smoker     Packs/day: 1.50     Years: 20.00     Pack years: 30.00     Types: Cigarettes     Start date: 6/1/1995     Quit date: 5/27/2015     Years since quittin.0    Smokeless tobacco: Never Used    Tobacco comment: Quit smoking 2016, [facility], 10/20/2016. Substance Use Topics    Alcohol use: Not Currently     Alcohol/week: 0.0 standard drinks     Comment: Not since this started. Before not much. Current Outpatient Medications   Medication Sig Dispense Refill    NOVOLOG 100 UNIT/ML injection vial Use as directed TID with meals as directed with sliding scale: 171-220 - 3 units; 221-270 - 5 units; 271-320 - 7 units; 321-370 - 9 units; >371 - 11 units. Max daily dose 33 units. 10 mL 5    pantoprazole (PROTONIX) 20 MG tablet TAKE ONE TABLET BY MOUTH ONCE NIGHTLY 30 tablet 3    ondansetron (ZOFRAN) 4 MG tablet Take 1 tablet by mouth 3 times daily as needed for Nausea or Vomiting 15 tablet 0    blood glucose test strips (FREESTYLE LITE) strip TEST THREE TIMES A DAY AND AS NEEDED FOR SYMPTOMS OF IRREGULAR BLOOD GLUCOSE. 300 strip 3    methotrexate (RHEUMATREX) 2.5 MG chemo tablet Take 5 mg by mouth once a week Take 5 mg (two of the 2.5 mg tabs) weekly on .  folic acid (FOLVITE) 1 MG tablet Take 1 mg by mouth daily      metoprolol tartrate (LOPRESSOR) 100 MG tablet Take 1 tablet by mouth 2 times daily 180 tablet 1    amLODIPine (NORVASC) 5 MG tablet Take 1 tablet by mouth daily 90 tablet 1    triamcinolone (KENALOG) 0.1 % ointment       Insulin Syringe-Needle U-100 30G X 5/16\" 0.3 ML MISC USE UP TO FIVE TIMES DAILY AS DIRECTED WITH INSULIN 300 each 3    pantoprazole (PROTONIX) 40 MG tablet TAKE ONE TABLET BY MOUTH EVERY MORNING BEFORE BREAKFAST 90 tablet 2    lisinopril (PRINIVIL;ZESTRIL) 10 MG tablet TAKE ONE TABLET BY MOUTH DAILY WHEN BLOOD PRESSURE IS ELEVATED AFTER SKYRIZI. 90 tablet 3    Clobetasol Propionate 0.05 % SHAM APPLY TO SCALP, LEAVE ON FOR 15 MINUTES THEN RINSE THOROUGHLY.  MAY USE 3-4 TIMES A WEEK FOR MAINTENANCE 118 mL 5    SKYRIZI, 150 MG DOSE, 75 MG/0.83ML PSKT injection Inject 150 mg into the skin Every 2 months Every 2 !/2 months      desoximetasone (TOPICORT) 0.25 % OINT  (Patient not taking: Reported on 5/3/2022)      Continuous Blood Gluc  (DEXCOM G6 ) SILVANA Use to continuously check blood glucose. (Patient not taking: Reported on 5/3/2022) 1 each 0    Continuous Blood Gluc Transmit (DEXCOM G6 TRANSMITTER) MISC Use to check blood glucose continuously. (Patient not taking: Reported on 5/3/2022) 1 each 3    Continuous Blood Gluc Sensor (DEXCOM G6 SENSOR) MISC Use to check blood glucose continuously. (Patient not taking: Reported on 5/3/2022) 9 each 3    sildenafil (VIAGRA) 100 MG tablet TAKE 1/2 - 1 TABLET BY MOUTH AS NEEDED (Patient not taking: Reported on 5/3/2022) 30 tablet 5     No current facility-administered medications for this visit. Allergies   Allergen Reactions    Cosentyx [Secukinumab] Nausea And Vomiting and Rash      fever    Lantus [Insulin Glargine] Itching, Swelling and Rash     Patient started Lantus about a month ago, only new medication. His skin reaction started on his abdomen where he injects the Lantus and moved up to the face. This is similar to his response to Cosentyx.      Stellaria Rash    Infliximab Other (See Comments)     Nerve damage    Methotrexate Derivatives Other (See Comments)     Bone marrow toxicity    Seasonal     Adhesive Tape Rash    Keflex [Cephalexin] Rash    Otezla [Apremilast] Rash     Other reaction(s): Unknown    Taltz [Ixekizumab] Rash     Other reaction(s): Unknown    Ustekinumab Rash     Other reaction(s): rash-allergic and acute renal failure  Other reaction(s): Unknown       Health Maintenance   Topic Date Due    Pneumococcal 0-64 years Vaccine (1 - PCV) Never done    Hepatitis B vaccine (1 of 3 - Risk 3-dose series) Never done    Shingles vaccine (1 of 2) Never done    Diabetic retinal exam  10/30/2018    Prostate Specific Antigen (PSA) Screening or Monitoring  09/24/2021    Diabetic foot exam  06/08/2022    Low dose CT lung screening  10/18/2022    A1C test (Diabetic or Prediabetic)  03/25/2023    Lipids  03/25/2023    Depression Monitoring  06/14/2023    Annual Wellness Visit (AWV)  06/15/2023    Colorectal Cancer Screen  08/08/2023    DTaP/Tdap/Td vaccine (2 - Td or Tdap) 09/03/2028    COVID-19 Vaccine  Completed    Hepatitis C screen  Completed    HIV screen  Addressed    Hepatitis A vaccine  Aged Out    Hib vaccine  Aged Out    Meningococcal (ACWY) vaccine  Aged Out       Subjective:      Review of Systems   Constitutional: Negative for unexpected weight change. Objective:     Vitals:    06/13/22 0830   BP: (!) 146/90   Site: Right Upper Arm   Position: Sitting   Cuff Size: Large Adult   Pulse: 82   Temp: 98.1 °F (36.7 °C)   TempSrc: Temporal   SpO2: 96%   Weight: 185 lb (83.9 kg)   Height: 5' 8.5\" (1.74 m)     Physical Exam  Constitutional:       General: He is not in acute distress. Appearance: Normal appearance. HENT:      Head: Normocephalic and atraumatic. Eyes:      Conjunctiva/sclera: Conjunctivae normal.   Cardiovascular:      Rate and Rhythm: Normal rate and regular rhythm. Heart sounds: Normal heart sounds. Pulmonary:      Effort: Pulmonary effort is normal. No respiratory distress. Breath sounds: Normal breath sounds. Abdominal:      General: Bowel sounds are normal. There is no distension. Palpations: Abdomen is soft. Tenderness: There is no abdominal tenderness. Musculoskeletal:      Right lower leg: No edema. Left lower leg: No edema. Skin:     General: Skin is warm and dry. Neurological:      General: No focal deficit present. Mental Status: He is alert and oriented to person, place, and time. Psychiatric:         Mood and Affect: Mood normal.         Assessment:      1. Type 2 diabetes mellitus with hyperglycemia, unspecified whether long term insulin use (HCC)  -     Hemoglobin A1C; Future  2.  Essential hypertension  -     Comprehensive Metabolic Panel; Future  3. Psoriatic arthritis ([facility]  4. Hyperlipidemia, unspecified hyperlipidemia type  -     Lipid Panel; Future  5. Stage 3b chronic kidney disease (HCC)  6. Elevated serum GGT level  -     Gamma GT; Future  7. Elevated alkaline phosphatase level  -     Comprehensive Metabolic Panel; Future  8. Screening for prostate cancer  -     PSA Screening; Future         Plan:      Return in about 4 months (around 10/13/2022) for f/u DM, HTN. Orders Placed This Encounter   Procedures    PSA Screening     Standing Status:   Future     Standing Expiration Date:   6/13/2023    Comprehensive Metabolic Panel     Standing Status:   Future     Standing Expiration Date:   6/13/2023    Lipid Panel     Standing Status:   Future     Standing Expiration Date:   6/13/2023     Order Specific Question:   Is Patient Fasting?/# of Hours     Answer:   12    Hemoglobin A1C     Standing Status:   Future     Standing Expiration Date:   6/13/2023    Gamma GT     Standing Status:   Future     Standing Expiration Date:   6/13/2023     No orders of the defined types were placed in this encounter. Patient given educational materials - see patient instructions. Discussed use, benefit, and side effects of prescribed medications. All patient questions answered. Pt voiced understanding. Reviewed health maintenance.             Electronically signed by Slade Marie DO, DO on 6/20/2022 at 12:15 AM

## 2022-06-14 ENCOUNTER — TELEMEDICINE (OUTPATIENT)
Dept: FAMILY MEDICINE CLINIC | Age: 62
End: 2022-06-14
Payer: MEDICARE

## 2022-06-14 DIAGNOSIS — Z00.00 MEDICARE ANNUAL WELLNESS VISIT, SUBSEQUENT: Primary | ICD-10-CM

## 2022-06-14 PROCEDURE — G0439 PPPS, SUBSEQ VISIT: HCPCS | Performed by: FAMILY MEDICINE

## 2022-06-14 PROCEDURE — 3017F COLORECTAL CA SCREEN DOC REV: CPT | Performed by: FAMILY MEDICINE

## 2022-06-14 SDOH — HEALTH STABILITY: PHYSICAL HEALTH: ON AVERAGE, HOW MANY DAYS PER WEEK DO YOU ENGAGE IN MODERATE TO STRENUOUS EXERCISE (LIKE A BRISK WALK)?: 0 DAYS

## 2022-06-14 SDOH — HEALTH STABILITY: PHYSICAL HEALTH: ON AVERAGE, HOW MANY MINUTES DO YOU ENGAGE IN EXERCISE AT THIS LEVEL?: 0 MIN

## 2022-06-14 ASSESSMENT — PATIENT HEALTH QUESTIONNAIRE - PHQ9
SUM OF ALL RESPONSES TO PHQ QUESTIONS 1-9: 10
6. FEELING BAD ABOUT YOURSELF - OR THAT YOU ARE A FAILURE OR HAVE LET YOURSELF OR YOUR FAMILY DOWN: 0
4. FEELING TIRED OR HAVING LITTLE ENERGY: 3
8. MOVING OR SPEAKING SO SLOWLY THAT OTHER PEOPLE COULD HAVE NOTICED. OR THE OPPOSITE, BEING SO FIGETY OR RESTLESS THAT YOU HAVE BEEN MOVING AROUND A LOT MORE THAN USUAL: 0
7. TROUBLE CONCENTRATING ON THINGS, SUCH AS READING THE NEWSPAPER OR WATCHING TELEVISION: 0
SUM OF ALL RESPONSES TO PHQ9 QUESTIONS 1 & 2: 4
9. THOUGHTS THAT YOU WOULD BE BETTER OFF DEAD, OR OF HURTING YOURSELF: 0
10. IF YOU CHECKED OFF ANY PROBLEMS, HOW DIFFICULT HAVE THESE PROBLEMS MADE IT FOR YOU TO DO YOUR WORK, TAKE CARE OF THINGS AT HOME, OR GET ALONG WITH OTHER PEOPLE: 1
3. TROUBLE FALLING OR STAYING ASLEEP: 3
SUM OF ALL RESPONSES TO PHQ QUESTIONS 1-9: 10
5. POOR APPETITE OR OVEREATING: 0
2. FEELING DOWN, DEPRESSED OR HOPELESS: 3
SUM OF ALL RESPONSES TO PHQ QUESTIONS 1-9: 10
1. LITTLE INTEREST OR PLEASURE IN DOING THINGS: 1
SUM OF ALL RESPONSES TO PHQ QUESTIONS 1-9: 10

## 2022-06-14 ASSESSMENT — LIFESTYLE VARIABLES
HOW OFTEN DO YOU HAVE A DRINK CONTAINING ALCOHOL: MONTHLY OR LESS
HOW OFTEN DO YOU HAVE SIX OR MORE DRINKS ON ONE OCCASION: 1
HOW MANY STANDARD DRINKS CONTAINING ALCOHOL DO YOU HAVE ON A TYPICAL DAY: 1 OR 2
HOW OFTEN DO YOU HAVE A DRINK CONTAINING ALCOHOL: 2
HOW MANY STANDARD DRINKS CONTAINING ALCOHOL DO YOU HAVE ON A TYPICAL DAY: 1

## 2022-06-14 NOTE — PROGRESS NOTES
Medicare Annual Wellness Visit    Michael Garland is here for Tc GUZMAN    Assessment & Plan   Medicare annual wellness visit, subsequent      Recommendations for Preventive Services Due: see orders and patient instructions/AVS.  Recommended screening schedule for the next 5-10 years is provided to the patient in written form: see Patient Instructions/AVS.     No follow-ups on file. Subjective     Patient's complete Health Risk Assessment and screening values have been reviewed and are found in Flowsheets. The following problems were reviewed today and where indicated follow up appointments were made and/or referrals ordered. Positive Risk Factor Screenings with Interventions:      Depression:  PHQ-2 Score: 4  PHQ-9 Total Score: 10    Severity:1-4 = minimal depression, 5-9 = mild depression, 10-14 = moderate depression, 15-19 = moderately severe depression, 20-27 = severe depression    Depression Interventions:  · Pt states has been on depression meds in the past and does not wish to restart.  Declines referral to therapy, states daily depression is normal for him and is related to the chronic pain and health problems he has  · Patient declines any further evaluation/treatment for this issue            General Health and ACP:  General  In general, how would you say your health is?: (!) Poor  In the past 7 days, have you experienced any of the following: New or Increased Pain, New or Increased Fatigue, Loneliness, Social Isolation, Stress or Anger?: No  Do you get the social and emotional support that you need?: Yes  Do you have a Living Will?: (!) No    Advance Directives     Power of [address] [place] [address] Will ACP-Advance Directive ACP-Power of     Not on File Not on File Not on File Not on File      General Health Risk Interventions:  · Poor self-assessment of health status: patient declines any further evaluation/treatment for this issue, States is related to chronic health conditions  · No Living Will: Will mail pt a living will packet per request, instructed to call the office with any questions    Health Habits/Nutrition:     Physical Activity: Inactive    Days of Exercise per Week: 0 days    Minutes of Exercise per Session: 0 min     Have you lost any weight without trying in the past 3 months?: No     Have you seen the dentist within the past year?: N/A - wear dentures    Health Habits/Nutrition Interventions:  · Inadequate physical activity:  patient is not ready to increase his/her physical activity level at this time, Has only gone for 1 bike ride this summer. Exercises as his BP and overall health that day allows             Objective      Patient-Reported Vitals  /80  HR 67          Allergies   Allergen Reactions    Cosentyx [Secukinumab] Nausea And Vomiting and Rash      fever    Lantus [Insulin Glargine] Itching, Swelling and Rash     Patient started Lantus about a month ago, only new medication. His skin reaction started on his abdomen where he injects the Lantus and moved up to the face. This is similar to his response to Cosentyx.  Stellaria Rash    Infliximab Other (See Comments)     Nerve damage    Methotrexate Derivatives Other (See Comments)     Bone marrow toxicity    Seasonal     Adhesive Tape Rash    Keflex [Cephalexin] Rash    Otezla [Apremilast] Rash     Other reaction(s): Unknown    Taltz [Ixekizumab] Rash     Other reaction(s): Unknown    Ustekinumab Rash     Other reaction(s): rash-allergic and acute renal failure  Other reaction(s): Unknown     Prior to Visit Medications    Medication Sig Taking? Authorizing Provider   NOVOLOG 100 UNIT/ML injection vial Use as directed TID with meals as directed with sliding scale: 171-220 - 3 units; 221-270 - 5 units; 271-320 - 7 units; 321-370 - 9 units; >371 - 11 units. Max daily dose 33 units.   Anamaria Chen,    pantoprazole (PROTONIX) 20 MG tablet TAKE ONE TABLET BY MOUTH ONCE NIGHTLY  Althea Wells MD   ondansetron (ZOFRAN) 4 MG tablet Take 1 tablet by mouth 3 times daily as needed for Nausea or Vomiting  Ayaz Ino, DO   blood glucose test strips (FREESTYLE LITE) strip TEST THREE TIMES A DAY AND AS NEEDED FOR SYMPTOMS OF IRREGULAR BLOOD GLUCOSE. Sean Rubinstein Black, DO   desoximetasone (TOPICORT) 0.25 % OINT   Historical Provider, MD   methotrexate (RHEUMATREX) 2.5 MG chemo tablet Take 5 mg by mouth once a week Take 5 mg (two of the 2.5 mg tabs) weekly on Friday's. Historical Provider, MD   folic acid (FOLVITE) 1 MG tablet Take 1 mg by mouth daily  Historical Provider, MD   metoprolol tartrate (LOPRESSOR) 100 MG tablet Take 1 tablet by mouth 2 times daily  Phu Sanz DO   amLODIPine (NORVASC) 5 MG tablet Take 1 tablet by mouth daily  Phu Sazn,    triamcinolone (KENALOG) 0.1 % ointment   Historical Provider, MD   Continuous Blood Gluc  ([employer]) SILVANA Use to continuously check blood glucose. Patient not taking: Reported on 5/3/2022  Sean Rubinstein Black, DO   Continuous Blood Gluc Transmit (DEXCOM G6 TRANSMITTER) MISC Use to check blood glucose continuously. Patient not taking: Reported on 5/3/2022  Sean Rubinstein Black, DO   Continuous Blood Gluc Sensor (DEXCOM G6 SENSOR) MISC Use to check blood glucose continuously. Patient not taking: Reported on 5/3/2022  Sean Rubinstein Black, DO   Insulin Syringe-Needle U-100 30G X 5/16\" 0.3 ML MISC USE UP TO FIVE TIMES DAILY AS DIRECTED WITH INSULIN  Gagan Chen DO   pantoprazole (PROTONIX) 40 MG tablet TAKE ONE TABLET BY MOUTH EVERY MORNING BEFORE BREAKFAST  Faith Maciel MD   lisinopril (PRINIVIL;ZESTRIL) 10 MG tablet TAKE ONE TABLET BY MOUTH DAILY WHEN BLOOD PRESSURE IS ELEVATED AFTER SKYRIZI. Phu Sanz DO   Clobetasol Propionate 0.05 % SHAM APPLY TO SCALP, LEAVE ON FOR 15 MINUTES THEN RINSE THOROUGHLY.  MAY USE 3-4 TIMES A WEEK FOR MAINTENANCE  Vero Chen DO   sildenafil (VIAGRA) 100 MG tablet TAKE 1/2 - 1 TABLET BY MOUTH AS NEEDED  Patient not taking: Reported on 5/3/2022  DO JOSEPH ManzanoYRIZI, 150 MG DOSE, 75 MG/0.83ML PSKT injection Inject 150 mg into the skin Every 2 months Every 2 !/2 months  Historical Provider, MD Hernandez (Including outside providers/suppliers regularly involved in providing care):   Patient Care Team:  Varghese Royal DO as PCP - General (Family Medicine)  Varghese Royal DO as PCP - [facility] Empaneled Provider  Tami Parker as Referring Physician (Internal Medicine)  Jackeline Quintero MD as Surgeon (Cardiothoracic Surgery)  Nilson Pierre MD (Nephrology)  Raulito Castro MD as Consulting Physician (Pediatric Allergy & Immunology)  Terrell Ma MD as Consulting Physician (Pulmonology)  Tomas Bautista (Dermatology)  Kirsten Macario MD as Consulting Physician (Cardiology)  Mae Clarke MD as Consulting Physician (Endocrinology)  Varsha Carlton MD (Dermatology)     Reviewed and updated this visit:  Tobacco  Allergies  Meds  Med Hx  Surg Hx  Soc Hx  Fam Hx           Melodye Paola, was evaluated through a synchronous (real-time) telephone encounter. The patient (or guardian if applicable) is aware that this is a billable service, which includes applicable co-pays. This Virtual Visit was conducted with patient's (and/or legal guardian's) consent. The visit was conducted pursuant to the emergency declaration under the [facility], [address] [place] [address] authority and the [employer] AVIS and OVIA General Act. Patient identification was verified, and a caregiver was present when appropriate. The patient was located at Home: [address]. Provider was located at [facility] ([address] [place] [address]): [address] [place], [place] [id number] [facility]. This encounter was performed under my, Vandana Abreu, direct supervision, 6/14/2022.

## 2022-07-02 ENCOUNTER — HOSPITAL ENCOUNTER (OUTPATIENT)
Dept: LAB | Age: 62
Discharge: HOME | End: 2022-07-02
Payer: MEDICARE

## 2022-07-02 DIAGNOSIS — Z12.5 SCREENING FOR PROSTATE CANCER: ICD-10-CM

## 2022-07-02 DIAGNOSIS — R74.8 ELEVATED SERUM GGT LEVEL: ICD-10-CM

## 2022-07-02 DIAGNOSIS — E11.65 TYPE 2 DIABETES MELLITUS WITH HYPERGLYCEMIA, UNSPECIFIED WHETHER LONG TERM INSULIN USE (HCC): ICD-10-CM

## 2022-07-02 LAB
ABSOLUTE EOS #: 0.25 K/UL (ref 0–0.44)
ABSOLUTE IMMATURE GRANULOCYTE: <0.03 K/UL (ref 0–0.3)
ABSOLUTE LYMPH #: 1.34 K/UL (ref 1.1–3.7)
ABSOLUTE MONO #: 0.89 K/UL (ref 0.1–1.2)
ALBUMIN SERPL-MCNC: 3.9 G/DL (ref 3.5–5.2)
ALBUMIN/GLOBULIN RATIO: 1.3 (ref 1–2.5)
ALP BLD-CCNC: 101 U/L (ref 40–129)
ALT SERPL-CCNC: 14 U/L (ref 5–41)
ANION GAP SERPL CALCULATED.3IONS-SCNC: 10 MMOL/L (ref 9–17)
AST SERPL-CCNC: 20 U/L
BASOPHILS # BLD: 2 % (ref 0–2)
BASOPHILS ABSOLUTE: 0.16 K/UL (ref 0–0.2)
BILIRUB SERPL-MCNC: 0.76 MG/DL (ref 0.3–1.2)
BUN BLDV-MCNC: 12 MG/DL (ref 6–20)
BUN/CREAT BLD: 5 (ref 9–20)
CALCIUM SERPL-MCNC: 9 MG/DL (ref 8.6–10.4)
CHLORIDE BLD-SCNC: 93 MMOL/L (ref 98–107)
CO2: 28 MMOL/L (ref 20–31)
CREAT SERPL-MCNC: 2.3 MG/DL (ref 0.7–1.2)
EOSINOPHILS RELATIVE PERCENT: 2 % (ref 1–4)
GFR AFRICAN AMERICAN: 35 ML/MIN
GFR NON-AFRICAN AMERICAN: 29 ML/MIN
GFR SERPL CREATININE-BSD FRML MDRD: ABNORMAL ML/MIN/{1.73_M2}
GGT: 18 U/L (ref 8–61)
GLUCOSE BLD-MCNC: 179 MG/DL (ref 70–99)
HCT VFR BLD CALC: 45.8 % (ref 40.7–50.3)
HEMOGLOBIN: 15.6 G/DL (ref 13–17)
IMMATURE GRANULOCYTES: 0 %
LYMPHOCYTES # BLD: 13 % (ref 24–43)
MCH RBC QN AUTO: 32.5 PG (ref 25.2–33.5)
MCHC RBC AUTO-ENTMCNC: 34.1 G/DL (ref 25.2–33.5)
MCV RBC AUTO: 95.4 FL (ref 82.6–102.9)
MONOCYTES # BLD: 9 % (ref 3–12)
NRBC AUTOMATED: 0 PER 100 WBC
PDW BLD-RTO: 14.9 % (ref 11.8–14.4)
PLATELET # BLD: 298 K/UL (ref 138–453)
PMV BLD AUTO: 9.2 FL (ref 8.1–13.5)
POTASSIUM SERPL-SCNC: 5.2 MMOL/L (ref 3.7–5.3)
PROSTATE SPECIFIC ANTIGEN: 1.41 NG/ML
RBC # BLD: 4.8 M/UL (ref 4.21–5.77)
RBC # BLD: ABNORMAL 10*6/UL
SEG NEUTROPHILS: 74 % (ref 36–65)
SEGMENTED NEUTROPHILS ABSOLUTE COUNT: 7.58 K/UL (ref 1.5–8.1)
SODIUM BLD-SCNC: 131 MMOL/L (ref 135–144)
TOTAL PROTEIN: 6.8 G/DL (ref 6.4–8.3)
WBC # BLD: 10.2 K/UL (ref 3.5–11.3)

## 2022-07-02 PROCEDURE — G0103 PSA SCREENING: HCPCS

## 2022-07-02 PROCEDURE — 82977 ASSAY OF GGT: CPT

## 2022-07-02 PROCEDURE — 85025 COMPLETE CBC W/AUTO DIFF WBC: CPT

## 2022-07-02 PROCEDURE — 80053 COMPREHEN METABOLIC PANEL: CPT

## 2022-07-02 PROCEDURE — 36415 COLL VENOUS BLD VENIPUNCTURE: CPT

## 2022-07-02 PROCEDURE — 83036 HEMOGLOBIN GLYCOSYLATED A1C: CPT

## 2022-07-03 LAB
ESTIMATED AVERAGE GLUCOSE: 177 MG/DL
HBA1C MFR BLD: 7.8 % (ref 4–6)

## 2022-07-08 DIAGNOSIS — Z12.5 ENCOUNTER FOR SCREENING FOR MALIGNANT NEOPLASM OF PROSTATE: ICD-10-CM

## 2022-07-08 DIAGNOSIS — E11.65 TYPE 2 DIABETES MELLITUS WITH HYPERGLYCEMIA, UNSPECIFIED WHETHER LONG TERM INSULIN USE (HCC): Primary | ICD-10-CM

## 2022-07-08 DIAGNOSIS — R97.20 INCREASING PROSTATE SPECIFIC ANTIGEN LEVEL: ICD-10-CM

## 2022-08-16 ENCOUNTER — TELEPHONE (OUTPATIENT)
Dept: GASTROENTEROLOGY | Age: 62
End: 2022-08-16

## 2022-08-17 RX ORDER — PANTOPRAZOLE SODIUM 20 MG/1
20 TABLET, DELAYED RELEASE ORAL DAILY
Qty: 30 TABLET | Refills: 3 | Status: SHIPPED | OUTPATIENT
Start: 2022-08-17

## 2022-08-26 ENCOUNTER — TELEPHONE (OUTPATIENT)
Dept: GASTROENTEROLOGY | Age: 62
End: 2022-08-26

## 2022-08-26 RX ORDER — PANTOPRAZOLE SODIUM 20 MG/1
20 TABLET, DELAYED RELEASE ORAL DAILY
Qty: 30 TABLET | Refills: 3 | Status: SHIPPED | OUTPATIENT
Start: 2022-08-26

## 2022-08-26 RX ORDER — PANTOPRAZOLE SODIUM 20 MG/1
TABLET, DELAYED RELEASE ORAL
Qty: 30 TABLET | Refills: 3 | Status: SHIPPED | OUTPATIENT
Start: 2022-08-26

## 2022-09-12 ENCOUNTER — OFFICE VISIT (OUTPATIENT)
Dept: CARDIOLOGY | Age: 62
End: 2022-09-12
Payer: MEDICARE

## 2022-09-12 VITALS
HEART RATE: 56 BPM | WEIGHT: 194 LBS | SYSTOLIC BLOOD PRESSURE: 128 MMHG | DIASTOLIC BLOOD PRESSURE: 78 MMHG | BODY MASS INDEX: 29.07 KG/M2

## 2022-09-12 DIAGNOSIS — I10 ESSENTIAL HYPERTENSION: ICD-10-CM

## 2022-09-12 DIAGNOSIS — I63.9 CEREBROVASCULAR ACCIDENT (CVA), UNSPECIFIED MECHANISM (HCC): ICD-10-CM

## 2022-09-12 DIAGNOSIS — I10 PRIMARY HYPERTENSION: Primary | ICD-10-CM

## 2022-09-12 PROCEDURE — 99213 OFFICE O/P EST LOW 20 MIN: CPT | Performed by: NURSE PRACTITIONER

## 2022-09-12 PROCEDURE — 99214 OFFICE O/P EST MOD 30 MIN: CPT | Performed by: NURSE PRACTITIONER

## 2022-09-12 PROCEDURE — 3017F COLORECTAL CA SCREEN DOC REV: CPT | Performed by: NURSE PRACTITIONER

## 2022-09-12 PROCEDURE — 93010 ELECTROCARDIOGRAM REPORT: CPT | Performed by: NURSE PRACTITIONER

## 2022-09-12 PROCEDURE — 1036F TOBACCO NON-USER: CPT | Performed by: NURSE PRACTITIONER

## 2022-09-12 PROCEDURE — 93005 ELECTROCARDIOGRAM TRACING: CPT | Performed by: NURSE PRACTITIONER

## 2022-09-12 PROCEDURE — G8417 CALC BMI ABV UP PARAM F/U: HCPCS | Performed by: NURSE PRACTITIONER

## 2022-09-12 PROCEDURE — G8427 DOCREV CUR MEDS BY ELIG CLIN: HCPCS | Performed by: NURSE PRACTITIONER

## 2022-09-12 NOTE — PROGRESS NOTES
Today's Date: 9/12/2022  Patient Name: Samuel Novak  Patient's age: 63 y.o., 1960    CC: follow up for HTN    HPI:  The patient is a 63 y.o.  male is in the office for f/u. Continues to c/o significant arthritis pain. He is following with specialist for this but states continued pain. He is taking methotrexate again but states he needs to get off of that along with the [employer]. He states his BP has been \"all over the pain depending on what my pain is. \"  He states he has put on about 20lbs over the summer because he is in too much pain to do anything. Past Medical History:   has a past medical history of Allergic reaction caused by a drug, Arthritis, Chronic kidney disease, COPD (chronic obstructive pulmonary disease) ([address] CVA (cerebral vascular accident) ([address] Depression, Diabetes mellitus ([address] Erectile dysfunction, History of blood transfusion, History of migraine headaches, Hyperlipidemia, Hypertension, Pituitary microadenoma ([address] Pneumothorax, Psoriatic arthritis ([address] Stroke ([address] and Thrombocytopenia ([address] Past Surgical History:   has a past surgical history that includes other surgical history (Right); shoulder surgery (Left); Colonoscopy; Upper gastrointestinal endoscopy; Pilonidal cyst drainage; chest tube insertion (Left); Thoracoscopy (05/27/2015); thoracotomy (05/27/2015); other surgical history (05/27/2015); Upper gastrointestinal endoscopy (N/A, 07/08/2020); Upper gastrointestinal endoscopy (01/20/2021); Upper gastrointestinal endoscopy (N/A, 01/20/2021); Endoscopy, colon, diagnostic (04/08/2021); Upper gastrointestinal endoscopy (N/A, 04/08/2021); and Cholecystectomy, laparoscopic (N/A, 4/15/2022). Home Medications:    Prior to Admission medications    Medication Sig Start Date End Date Taking?  Authorizing Provider   pantoprazole (PROTONIX) 20 MG tablet TAKE ONE TABLET BY MOUTH ONCE NIGHTLY 8/26/22  Yes Lee Goodpasture, MD pantoprazole (PROTONIX) 20 MG tablet Take 1 tablet by mouth daily 8/26/22  Yes Loree Chacon MD   pantoprazole (PROTONIX) 20 MG tablet Take 1 tablet by mouth daily 8/17/22  Yes Loree Chacon MD   NOVOLOG 100 UNIT/ML injection vial Use as directed TID with meals as directed with sliding scale: 171-220 - 3 units; 221-270 - 5 units; 271-320 - 7 units; 321-370 - 9 units; >371 - 11 units. Max daily dose 33 units. 5/27/22  Yes Avani Chen, DO   ondansetron (ZOFRAN) 4 MG tablet Take 1 tablet by mouth 3 times daily as needed for Nausea or Vomiting 4/15/22  Yes Sb Chatterjee, DO   blood glucose test strips (FREESTYLE LITE) strip TEST THREE TIMES A DAY AND AS NEEDED FOR SYMPTOMS OF IRREGULAR BLOOD GLUCOSE. 3/31/22  Yes Avani Chen, DO   desoximetasone (TOPICORT) 0.25 % OINT  2/25/22  Yes Historical Provider, MD   methotrexate (RHEUMATREX) 2.5 MG chemo tablet Take 5 mg by mouth once a week Take 5 mg (two of the 2.5 mg tabs) weekly on Friday's. Yes Historical Provider, MD   folic acid (FOLVITE) 1 MG tablet Take 1 mg by mouth daily   Yes Historical Provider, MD   metoprolol tartrate (LOPRESSOR) 100 MG tablet Take 1 tablet by mouth 2 times daily 2/28/22  Yes Phu Sanz,    amLODIPine (NORVASC) 5 MG tablet Take 1 tablet by mouth daily 2/28/22  Yes Phu Sanz, DO   triamcinolone (KENALOG) 0.1 % ointment  12/24/21  Yes Historical Provider, MD   Continuous Blood Gluc  ([address]) SILVANA Use to continuously check blood glucose. 2/11/22  Yes Avani Chen, DO   Continuous Blood Gluc Transmit (DEXCOM G6 TRANSMITTER) MISC Use to check blood glucose continuously. 2/11/22  Yes Avani Chen, DO   Continuous Blood Gluc Sensor (DEXCOM G6 SENSOR) MISC Use to check blood glucose continuously.  2/11/22  Yes Avani Chen, DO   Insulin Syringe-Needle U-100 30G X 5/16\" 0.3 ML MISC USE UP TO FIVE TIMES DAILY AS DIRECTED WITH INSULIN 1/22/22  Yes Cornell Chen, DO   pantoprazole (PROTONIX) 40 MG tablet TAKE ONE TABLET BY MOUTH EVERY MORNING BEFORE BREAKFAST 1/10/22  Yes Fanta Drew MD   lisinopril (PRINIVIL;ZESTRIL) 10 MG tablet TAKE ONE TABLET BY MOUTH DAILY WHEN BLOOD PRESSURE IS ELEVATED AFTER SKYRIZI. 1/3/22  Yes Phu Sanz, DO   Clobetasol Propionate 0.05 % SHAM APPLY TO SCALP, LEAVE ON FOR 15 MINUTES THEN RINSE THOROUGHLY. MAY USE 3-4 TIMES A WEEK FOR MAINTENANCE 11/23/21  Yes Marlon Joneschidi Chen, DO   sildenafil (VIAGRA) 100 MG tablet TAKE 1/2 - 1 TABLET BY MOUTH AS NEEDED 7/9/21  Yes Marlonmarsha Chen, DO   SKYRIZI, 150 MG DOSE, 75 MG/0.83ML PSKT injection Inject 150 mg into the skin Every 2 months Every 2 !/2 months 11/25/19  Yes Historical Provider, MD       Allergies:  Cosentyx [secukinumab], Lantus [insulin glargine], Stellaria, Infliximab, Methotrexate derivatives, Seasonal, Adhesive tape, Keflex [cephalexin], Otezla [apremilast], Taltz [ixekizumab], and Ustekinumab    Social History:   reports that he quit smoking about 7 years ago. His smoking use included cigarettes. He started smoking about 27 years ago. He has a 30.00 pack-year smoking history. He has never used smokeless tobacco. He reports that he does not currently use alcohol. He reports that he does not use drugs. REVIEW OF SYSTEMS:  Constitutional: there has been no unanticipated weight loss. There's been No change in energy level, No change in activity level. Eyes: No visual changes or diplopia. No scleral icterus. ENT: No Headaches, hearing loss or vertigo. No mouth sores or sore throat. Cardiovascular: AS HPI  Respiratory: AS HPI  Gastrointestinal: No abdominal pain, appetite loss, blood in stools. No change in bowel or bladder habits. Genitourinary: No dysuria, trouble voiding, or hematuria. Musculoskeletal:  No gait disturbance, No weakness or joint complaints. Integumentary: No rash or pruritis. Neurological: No headache, diplopia, change in muscle strength, numbness or tingling.  No change in gait, balance, coordination, mood, affect, memory, mentation, behavior. Psychiatric: No new anxiety or depression. Endocrine: No temperature intolerance. No excessive thirst, fluid intake, or urination. No tremor. Hematologic/Lymphatic: No abnormal bruising or bleeding, blood clots or swollen lymph nodes. Allergic/Immunologic: No nasal congestion or hives. PHYSICAL EXAM:      /78 (Site: Right Upper Arm, Position: Sitting)   Pulse 56   Wt 194 lb (88 kg)   BMI 29.07 kg/m²    HEENT: PERRL, no cervical lymphadenopathy. No masses palpable. Cardiovascular: The apical impulse is not displaced  Heart  Sounds:RRR, S4  Jugular venous pulsation Normal  The carotid upstroke is normal  Peripheral pulses are symmetrical and full  Respiratory: Good respiratory effort. On auscultation: clear to auscultation bilaterally  Abdomen:  No masses or tenderness  Bowel sounds present  Extremities:   No Cyanosis or Clubbing   Lower extremity edema: trace to +1 bilaterally  Skin: Warm and dry    Cardiac data:      ECG :  Sinus  Rhythm   WITHIN NORMAL LIMITS    Labs:   Lab Results   Component Value Date    CHOL 189 03/25/2022    TRIG 158 (H) 03/25/2022    HDL 28 (L) 03/25/2022    LDLCHOLESTEROL 129 03/25/2022    VLDL NOT REPORTED (H) 10/11/2021    CHOLHDLRATIO 6.8 (H) 03/25/2022     Lab Results   Component Value Date     (L) 07/02/2022    K 5.2 07/02/2022    CL 93 (L) 07/02/2022    CO2 28 07/02/2022    BUN 12 07/02/2022    CREATININE 2.30 (H) 07/02/2022    GLUCOSE 179 (H) 07/02/2022    CALCIUM 9.0 07/02/2022    PROT 6.8 07/02/2022    LABALBU 3.9 07/02/2022    BILITOT 0.76 07/02/2022    ALKPHOS 101 07/02/2022    AST 20 07/02/2022    ALT 14 07/02/2022    LABGLOM 29 (L) 07/02/2022    GFRAA 35 (L) 07/02/2022    GLOB 3.1 03/15/2022     Assessment:    HTN- not tolerating coreg (due to persistent higher HR - per patient). DM  HLP  Previous history of smoking. CKD with solitary kidney. COPD  History of CVA.   Preserved LV systolic function  NEGATIVE STRESS TEST 10/2017  Psoriasis- follows at [facility]    Recommendations:  - Stable. Reviewed how he is dosing his BB, ACE, & Norvasc in detail.  - Continue lopressor 100 - he is only taking once a day d/t \"side effects of ears ringing if I take twice daily. \"  - Taking the Norvasc & Lisinopril only as needed. He states sometimes he will not take them for weeks at a time because his BP is \"in the 90's. \"   - continue nephro follow up. States they said everything was \"as good as one kidney could be. \"  - following with [facility] for his psoriasis. Very frustrated with current medications and states he is giving it until the end of this month and if he is not feeling better he is going to request changes. Support provided. F/U MD in 6mo. Advised continued ambulatory BP monitoring and to call if SBP consistently >140 (while taking Norvasc & Lisinopril) or SBP consistently <90 (while NOT taking lisinopril & Norvasc).     XOCHILT Mills - CNP

## 2022-09-18 ENCOUNTER — PATIENT MESSAGE (OUTPATIENT)
Dept: FAMILY MEDICINE CLINIC | Age: 62
End: 2022-09-18

## 2022-09-19 ENCOUNTER — TELEPHONE (OUTPATIENT)
Dept: ONCOLOGY | Age: 62
End: 2022-09-19

## 2022-09-19 DIAGNOSIS — Z87.891 PERSONAL HISTORY OF NICOTINE DEPENDENCE: Primary | ICD-10-CM

## 2022-09-19 NOTE — TELEPHONE ENCOUNTER
From: Radha Davenport  To: Dr. Ho Gretchen: 9/18/2022 4:42 PM EDT  Subject: Iván Mccormack can you please tell me where to get rid of my used needles. Thank you.

## 2022-09-19 NOTE — TELEPHONE ENCOUNTER
Our records indicate that your patient is coming due for their annual lung cancer screening follow up testing. For your convenience, we have pended the order for the scan for you. If you do not agree with the need for the test, please cancel the order and let us know. Sincerely,    [facility]    Auto printed reminder letter sent to patient.

## 2022-09-20 ENCOUNTER — PATIENT MESSAGE (OUTPATIENT)
Dept: FAMILY MEDICINE CLINIC | Age: 62
End: 2022-09-20

## 2022-09-20 DIAGNOSIS — N52.9 ERECTILE DYSFUNCTION, UNSPECIFIED ERECTILE DYSFUNCTION TYPE: ICD-10-CM

## 2022-09-20 NOTE — TELEPHONE ENCOUNTER
From: Alessandro Tolbert  To: Dr. Bridget Valdivia: 9/20/2022 10:38 AM EDT  Subject: Refill     Doctor vamsi can you please refill my sidenafil 100 mg.tablets. Thank you.

## 2022-09-20 NOTE — TELEPHONE ENCOUNTER
Wade Mabry called requesting a refill of the below medication which has been pended for you:     Requested Prescriptions     Pending Prescriptions Disp Refills    sildenafil (VIAGRA) 100 MG tablet 30 tablet 5     Sig: TAKE 1/2 - 1 TABLET BY MOUTH AS NEEDED       Last Appointment Date: 6/14/2022  Next Appointment Date: 10/18/2022    Allergies   Allergen Reactions    Cosentyx [Secukinumab] Nausea And Vomiting and Rash      fever    Lantus [Insulin Glargine] Itching, Swelling and Rash     Patient started Lantus about a month ago, only new medication. His skin reaction started on his abdomen where he injects the Lantus and moved up to the face. This is similar to his response to Cosentyx.      Stellaria Rash    Infliximab Other (See Comments)     Nerve damage    Methotrexate Derivatives Other (See Comments)     Bone marrow toxicity    Seasonal     Adhesive Tape Rash    Keflex [Cephalexin] Rash    Otezla [Apremilast] Rash     Other reaction(s): Unknown    Taltz [Ixekizumab] Rash     Other reaction(s): Unknown    Ustekinumab Rash     Other reaction(s): rash-allergic and acute renal failure  Other reaction(s): Unknown

## 2022-09-21 RX ORDER — SILDENAFIL 100 MG/1
50-100 TABLET, FILM COATED ORAL DAILY PRN
Qty: 30 TABLET | Refills: 5 | Status: SHIPPED | OUTPATIENT
Start: 2022-09-21

## 2022-09-23 DIAGNOSIS — T38.0X5D STEROID-INDUCED DIABETES MELLITUS, SUBSEQUENT ENCOUNTER (HCC): ICD-10-CM

## 2022-09-23 DIAGNOSIS — E09.9 STEROID-INDUCED DIABETES MELLITUS, SUBSEQUENT ENCOUNTER (HCC): ICD-10-CM

## 2022-09-23 NOTE — TELEPHONE ENCOUNTER
Jay Ely called requesting a refill of the below medication which has been pended for you:     Requested Prescriptions     Pending Prescriptions Disp Refills    Insulin Syringe-Needle U-100 30G X 5/16\" 0.3 ML MISC [Pharmacy Med Name: Deanna Bright INS SYRIN 0.3 ML 30GX5/16\"] 500 each      Sig: USE UP TO FIVE TIMES DAILY AS DIRECTED WITH INSULIN       Last Appointment Date: 6/14/2022  Next Appointment Date: 10/18/2022    Allergies   Allergen Reactions    Cosentyx [Secukinumab] Nausea And Vomiting and Rash      fever    Lantus [Insulin Glargine] Itching, Swelling and Rash     Patient started Lantus about a month ago, only new medication. His skin reaction started on his abdomen where he injects the Lantus and moved up to the face. This is similar to his response to Cosentyx.      Stellaria Rash    Infliximab Other (See Comments)     Nerve damage    Methotrexate Derivatives Other (See Comments)     Bone marrow toxicity    Seasonal     Adhesive Tape Rash    Keflex [Cephalexin] Rash    Otezla [Apremilast] Rash     Other reaction(s): Unknown    Taltz [Ixekizumab] Rash     Other reaction(s): Unknown    Ustekinumab Rash     Other reaction(s): rash-allergic and acute renal failure  Other reaction(s): Unknown

## 2022-09-24 RX ORDER — BLOOD SUGAR DIAGNOSTIC
STRIP MISCELLANEOUS
Qty: 500 EACH | Refills: 3 | Status: SHIPPED | OUTPATIENT
Start: 2022-09-24

## 2022-10-08 ENCOUNTER — HOSPITAL ENCOUNTER (OUTPATIENT)
Dept: LAB | Age: 62
Discharge: HOME | End: 2022-10-08
Payer: MEDICARE

## 2022-10-08 LAB
ALBUMIN SERPL-MCNC: 4 G/DL (ref 3.5–5.2)
ALBUMIN/GLOBULIN RATIO: 1.5 (ref 1–2.5)
ALP BLD-CCNC: 118 U/L (ref 40–129)
ALT SERPL-CCNC: 13 U/L (ref 5–41)
ANION GAP SERPL CALCULATED.3IONS-SCNC: 8 MMOL/L (ref 9–17)
AST SERPL-CCNC: 17 U/L
BILIRUB SERPL-MCNC: 0.9 MG/DL (ref 0.3–1.2)
BUN BLDV-MCNC: 19 MG/DL (ref 6–20)
BUN/CREAT BLD: 8 (ref 9–20)
CALCIUM SERPL-MCNC: 8.8 MG/DL (ref 8.6–10.4)
CHLORIDE BLD-SCNC: 96 MMOL/L (ref 98–107)
CO2: 27 MMOL/L (ref 20–31)
CREAT SERPL-MCNC: 2.36 MG/DL (ref 0.7–1.2)
GFR SERPL CREATININE-BSD FRML MDRD: 31 ML/MIN/1.73M2
GLUCOSE BLD-MCNC: 213 MG/DL (ref 70–99)
HCT VFR BLD CALC: 42.8 % (ref 40.7–50.3)
HEMOGLOBIN: 14.7 G/DL (ref 13–17)
MCH RBC QN AUTO: 32.6 PG (ref 25.2–33.5)
MCHC RBC AUTO-ENTMCNC: 34.3 G/DL (ref 25.2–33.5)
MCV RBC AUTO: 94.9 FL (ref 82.6–102.9)
NRBC AUTOMATED: 0 PER 100 WBC
PDW BLD-RTO: 14.7 % (ref 11.8–14.4)
PLATELET # BLD: 203 K/UL (ref 138–453)
PMV BLD AUTO: 9.3 FL (ref 8.1–13.5)
POTASSIUM SERPL-SCNC: 4.4 MMOL/L (ref 3.7–5.3)
RBC # BLD: 4.51 M/UL (ref 4.21–5.77)
SODIUM BLD-SCNC: 131 MMOL/L (ref 135–144)
TOTAL PROTEIN: 6.6 G/DL (ref 6.4–8.3)
WBC # BLD: 7.3 K/UL (ref 3.5–11.3)

## 2022-10-08 PROCEDURE — 80053 COMPREHEN METABOLIC PANEL: CPT

## 2022-10-08 PROCEDURE — 36415 COLL VENOUS BLD VENIPUNCTURE: CPT

## 2022-10-08 PROCEDURE — 85027 COMPLETE CBC AUTOMATED: CPT

## 2022-11-17 ENCOUNTER — PATIENT MESSAGE (OUTPATIENT)
Dept: FAMILY MEDICINE CLINIC | Age: 62
End: 2022-11-17

## 2022-11-17 DIAGNOSIS — H93.19 TINNITUS, UNSPECIFIED LATERALITY: Primary | ICD-10-CM

## 2022-11-18 NOTE — TELEPHONE ENCOUNTER
From: Carmen Pryor  To: Dr. Hodgson Footman: 11/17/2022 7:02 PM EST  Subject: Ear ringing     Can you please set me up an appointment with someone who can help with the ear ringing. Thank you.

## 2022-12-01 RX ORDER — PANTOPRAZOLE SODIUM 40 MG/1
TABLET, DELAYED RELEASE ORAL
Qty: 90 TABLET | Refills: 2 | OUTPATIENT
Start: 2022-12-01

## 2022-12-05 RX ORDER — METOPROLOL TARTRATE 100 MG/1
100 TABLET ORAL DAILY
Qty: 90 TABLET | Refills: 3 | Status: SHIPPED | OUTPATIENT
Start: 2022-12-05

## 2022-12-19 RX ORDER — LISINOPRIL 10 MG/1
TABLET ORAL
Qty: 90 TABLET | Refills: 3 | Status: SHIPPED | OUTPATIENT
Start: 2022-12-19

## 2022-12-19 NOTE — TELEPHONE ENCOUNTER
Last Appt:  9/12/2022  Next Appt:   3/13/2023  Med verified in On license of [facility] [address] [facility]  Ralph Vargas  [place] [address]  Phone: [phone]  Fax: [phone] [place] - [address]        September 6, 2022     Patient: Desirae Ruffin   YOB: 1986   Date of Visit: 9/6/2022       To Whom It May Concern:    Covid testing today was negative. If you have any questions or concerns, please don't hesitate to call.     Sincerely,        STEPHANIE Castillo

## 2022-12-21 ENCOUNTER — HOSPITAL ENCOUNTER (OUTPATIENT)
Age: 62
Discharge: HOME | End: 2022-12-21
Payer: MEDICARE

## 2022-12-21 LAB
ABSOLUTE EOS #: 0.56 K/UL (ref 0–0.44)
ABSOLUTE IMMATURE GRANULOCYTE: 0.06 K/UL (ref 0–0.3)
ABSOLUTE LYMPH #: 0.71 K/UL (ref 1.1–3.7)
ABSOLUTE MONO #: 0.82 K/UL (ref 0.1–1.2)
ALBUMIN SERPL-MCNC: 3.8 G/DL (ref 3.5–5.2)
ALBUMIN/GLOBULIN RATIO: 1.3 (ref 1–2.5)
ALP BLD-CCNC: 107 U/L (ref 40–129)
ALT SERPL-CCNC: 10 U/L (ref 5–41)
ANION GAP SERPL CALCULATED.3IONS-SCNC: 7 MMOL/L (ref 9–17)
AST SERPL-CCNC: 10 U/L
BASOPHILS # BLD: 2 % (ref 0–2)
BASOPHILS ABSOLUTE: 0.12 K/UL (ref 0–0.2)
BILIRUB SERPL-MCNC: 0.8 MG/DL (ref 0.3–1.2)
BUN BLDV-MCNC: 9 MG/DL (ref 6–20)
BUN/CREAT BLD: 4 (ref 9–20)
CALCIUM SERPL-MCNC: 8.9 MG/DL (ref 8.6–10.4)
CHLORIDE BLD-SCNC: 89 MMOL/L (ref 98–107)
CO2: 29 MMOL/L (ref 20–31)
CREAT SERPL-MCNC: 2.01 MG/DL (ref 0.7–1.2)
EOSINOPHILS RELATIVE PERCENT: 9 % (ref 1–4)
GFR SERPL CREATININE-BSD FRML MDRD: 38 ML/MIN/1.73M2
GLUCOSE BLD-MCNC: 135 MG/DL (ref 70–99)
HCT VFR BLD CALC: 40.4 % (ref 40.7–50.3)
HEMOGLOBIN: 14.1 G/DL (ref 13–17)
IMMATURE GRANULOCYTES: 1 %
LYMPHOCYTES # BLD: 11 % (ref 24–43)
MCH RBC QN AUTO: 33.7 PG (ref 25.2–33.5)
MCHC RBC AUTO-ENTMCNC: 34.9 G/DL (ref 25.2–33.5)
MCV RBC AUTO: 96.7 FL (ref 82.6–102.9)
MONOCYTES # BLD: 13 % (ref 3–12)
NRBC AUTOMATED: 0 PER 100 WBC
PDW BLD-RTO: 15.9 % (ref 11.8–14.4)
PLATELET # BLD: 240 K/UL (ref 138–453)
PMV BLD AUTO: 8.7 FL (ref 8.1–13.5)
POTASSIUM SERPL-SCNC: 4.5 MMOL/L (ref 3.7–5.3)
RBC # BLD: 4.18 M/UL (ref 4.21–5.77)
RBC # BLD: ABNORMAL 10*6/UL
SEG NEUTROPHILS: 64 % (ref 36–65)
SEGMENTED NEUTROPHILS ABSOLUTE COUNT: 4.19 K/UL (ref 1.5–8.1)
SODIUM BLD-SCNC: 125 MMOL/L (ref 135–144)
TOTAL PROTEIN: 6.8 G/DL (ref 6.4–8.3)
WBC # BLD: 6.5 K/UL (ref 3.5–11.3)

## 2022-12-21 PROCEDURE — 80053 COMPREHEN METABOLIC PANEL: CPT

## 2022-12-21 PROCEDURE — 36415 COLL VENOUS BLD VENIPUNCTURE: CPT

## 2022-12-21 PROCEDURE — 85025 COMPLETE CBC W/AUTO DIFF WBC: CPT

## 2023-01-15 ENCOUNTER — APPOINTMENT (OUTPATIENT)
Dept: CT IMAGING | Age: 63
End: 2023-01-15
Payer: MEDICARE

## 2023-01-15 ENCOUNTER — HOSPITAL ENCOUNTER (EMERGENCY)
Age: 63
Discharge: HOME | End: 2023-01-15
Attending: EMERGENCY MEDICINE
Payer: MEDICARE

## 2023-01-15 VITALS
RESPIRATION RATE: 16 BRPM | OXYGEN SATURATION: 99 % | HEIGHT: 69 IN | DIASTOLIC BLOOD PRESSURE: 96 MMHG | SYSTOLIC BLOOD PRESSURE: 145 MMHG | TEMPERATURE: 98.3 F | HEART RATE: 76 BPM | BODY MASS INDEX: 26.81 KG/M2 | WEIGHT: 181 LBS

## 2023-01-15 DIAGNOSIS — S39.012A BACK STRAIN, INITIAL ENCOUNTER: Primary | ICD-10-CM

## 2023-01-15 LAB
ABSOLUTE EOS #: 0.41 K/UL (ref 0–0.44)
ABSOLUTE IMMATURE GRANULOCYTE: <0.03 K/UL (ref 0–0.3)
ABSOLUTE LYMPH #: 1.34 K/UL (ref 1.1–3.7)
ABSOLUTE MONO #: 0.54 K/UL (ref 0.1–1.2)
ALBUMIN SERPL-MCNC: 4 G/DL (ref 3.5–5.2)
ALBUMIN/GLOBULIN RATIO: 1.2 (ref 1–2.5)
ALP BLD-CCNC: 128 U/L (ref 40–129)
ALT SERPL-CCNC: 17 U/L (ref 5–41)
ANION GAP SERPL CALCULATED.3IONS-SCNC: 11 MMOL/L (ref 9–17)
AST SERPL-CCNC: 16 U/L
BASOPHILS # BLD: 1 % (ref 0–2)
BASOPHILS ABSOLUTE: 0.1 K/UL (ref 0–0.2)
BILIRUB SERPL-MCNC: 0.9 MG/DL (ref 0.3–1.2)
BUN BLDV-MCNC: 10 MG/DL (ref 8–23)
BUN/CREAT BLD: 5 (ref 9–20)
CALCIUM SERPL-MCNC: 9.4 MG/DL (ref 8.6–10.4)
CHLORIDE BLD-SCNC: 92 MMOL/L (ref 98–107)
CO2: 27 MMOL/L (ref 20–31)
CREAT SERPL-MCNC: 1.9 MG/DL (ref 0.7–1.2)
EOSINOPHILS RELATIVE PERCENT: 6 % (ref 1–4)
FLU A ANTIGEN: NEGATIVE
FLU B ANTIGEN: NEGATIVE
GFR SERPL CREATININE-BSD FRML MDRD: 40 ML/MIN/1.73M2
GLUCOSE BLD-MCNC: 147 MG/DL (ref 70–99)
HCT VFR BLD CALC: 43.4 % (ref 40.7–50.3)
HEMOGLOBIN: 15 G/DL (ref 13–17)
IMMATURE GRANULOCYTES: 0 %
LYMPHOCYTES # BLD: 18 % (ref 24–43)
MCH RBC QN AUTO: 33.5 PG (ref 25.2–33.5)
MCHC RBC AUTO-ENTMCNC: 34.6 G/DL (ref 25.2–33.5)
MCV RBC AUTO: 96.9 FL (ref 82.6–102.9)
MONOCYTES # BLD: 7 % (ref 3–12)
NRBC AUTOMATED: 0 PER 100 WBC
PDW BLD-RTO: 14.9 % (ref 11.8–14.4)
PLATELET # BLD: 304 K/UL (ref 138–453)
PMV BLD AUTO: 8.5 FL (ref 8.1–13.5)
POTASSIUM SERPL-SCNC: 4.9 MMOL/L (ref 3.7–5.3)
RBC # BLD: 4.48 M/UL (ref 4.21–5.77)
RBC # BLD: ABNORMAL 10*6/UL
SEG NEUTROPHILS: 68 % (ref 36–65)
SEGMENTED NEUTROPHILS ABSOLUTE COUNT: 4.98 K/UL (ref 1.5–8.1)
SODIUM BLD-SCNC: 130 MMOL/L (ref 135–144)
TOTAL PROTEIN: 7.3 G/DL (ref 6.4–8.3)
WBC # BLD: 7.4 K/UL (ref 3.5–11.3)

## 2023-01-15 PROCEDURE — 2580000003 HC RX 258: Performed by: EMERGENCY MEDICINE

## 2023-01-15 PROCEDURE — 96375 TX/PRO/DX INJ NEW DRUG ADDON: CPT

## 2023-01-15 PROCEDURE — 99284 EMERGENCY DEPT VISIT MOD MDM: CPT

## 2023-01-15 PROCEDURE — 96374 THER/PROPH/DIAG INJ IV PUSH: CPT

## 2023-01-15 PROCEDURE — 87804 INFLUENZA ASSAY W/OPTIC: CPT

## 2023-01-15 PROCEDURE — 85025 COMPLETE CBC W/AUTO DIFF WBC: CPT

## 2023-01-15 PROCEDURE — 74176 CT ABD & PELVIS W/O CONTRAST: CPT

## 2023-01-15 PROCEDURE — 6360000002 HC RX W HCPCS: Performed by: EMERGENCY MEDICINE

## 2023-01-15 PROCEDURE — 80053 COMPREHEN METABOLIC PANEL: CPT

## 2023-01-15 RX ORDER — OXYCODONE HYDROCHLORIDE AND ACETAMINOPHEN 5; 325 MG/1; MG/1
1 TABLET ORAL EVERY 6 HOURS PRN
Qty: 12 TABLET | Refills: 0 | Status: SHIPPED | OUTPATIENT
Start: 2023-01-15 | End: 2023-01-18

## 2023-01-15 RX ORDER — CYCLOBENZAPRINE HCL 10 MG
10 TABLET ORAL 3 TIMES DAILY PRN
Qty: 20 TABLET | Refills: 0 | Status: SHIPPED | OUTPATIENT
Start: 2023-01-15 | End: 2023-01-22

## 2023-01-15 RX ORDER — 0.9 % SODIUM CHLORIDE 0.9 %
1000 INTRAVENOUS SOLUTION INTRAVENOUS ONCE
Status: COMPLETED | OUTPATIENT
Start: 2023-01-15 | End: 2023-01-15

## 2023-01-15 RX ORDER — LIDOCAINE 50 MG/G
1 PATCH TOPICAL EVERY 24 HOURS
Qty: 30 PATCH | Refills: 0 | Status: SHIPPED | OUTPATIENT
Start: 2023-01-15 | End: 2023-02-14

## 2023-01-15 RX ORDER — RISANKIZUMAB-RZAA 150 MG/ML
INJECTION SUBCUTANEOUS
COMMUNITY
Start: 2022-10-18

## 2023-01-15 RX ORDER — ONDANSETRON 2 MG/ML
4 INJECTION INTRAMUSCULAR; INTRAVENOUS ONCE
Status: COMPLETED | OUTPATIENT
Start: 2023-01-15 | End: 2023-01-15

## 2023-01-15 RX ADMIN — HYDROMORPHONE HYDROCHLORIDE 1 MG: 1 INJECTION, SOLUTION INTRAMUSCULAR; INTRAVENOUS; SUBCUTANEOUS at 10:33

## 2023-01-15 RX ADMIN — SODIUM CHLORIDE 1000 ML: 9 INJECTION, SOLUTION INTRAVENOUS at 10:33

## 2023-01-15 RX ADMIN — ONDANSETRON 4 MG: 2 INJECTION INTRAMUSCULAR; INTRAVENOUS at 10:33

## 2023-01-15 ASSESSMENT — PAIN SCALES - GENERAL
PAINLEVEL_OUTOF10: 8
PAINLEVEL_OUTOF10: 6
PAINLEVEL_OUTOF10: 9

## 2023-01-15 ASSESSMENT — PAIN DESCRIPTION - LOCATION
LOCATION: BACK
LOCATION: BACK

## 2023-01-15 ASSESSMENT — PAIN - FUNCTIONAL ASSESSMENT: PAIN_FUNCTIONAL_ASSESSMENT: 0-10

## 2023-01-15 ASSESSMENT — PAIN DESCRIPTION - ORIENTATION
ORIENTATION: RIGHT
ORIENTATION: RIGHT;MID

## 2023-01-15 NOTE — ED NOTES
Patient states his nephrologist is Dr. Farzana Hawkins in [facility]. Wife states he goes every 6-8 months and phone number is [phone].      Avila Vaca RN  01/15/23 [id number]

## 2023-01-15 NOTE — ED PROVIDER NOTES
[facility] ED  [address]  [place] [address]  Phone: [phone]  [facility]      Pt Name: Flores Gallardo  MRN: [id number]  Fabriciogfkiesha 1960  Date of evaluation: 1/15/2023    CHIEF COMPLAINT       Chief Complaint   Patient presents with    URI     X1 week, no fever, eating well, possible dehydrated, congested, no vomit    Back Pain     Coughing in bed and felt like something burst, right side of back       HISTORY OF PRESENT ILLNESS    Flores Gallardo is a 63 y.o. male who presents with upper respiratory type symptoms for the last couple of days and then a sudden onset of right flank pain. He states that he has only 1 kidney and on the 1 side his where his finger flank pain is he describes it as primarily centered in that area with some mild radiation to the front. He states he cannot take anti-inflammatory medicines has not noticed any fevers or chills he has a history of psoriatic arthritis.     REVIEW OF SYSTEMS     Constitutional: No fevers or chills   HEENT: No sore throat, rhinorrhea, or earache   Eyes: No blurry vision or double vision no drainage   Cardiovascular: No chest pain or tachycardia   Respiratory: No wheezing or shortness of breath no cough   Gastrointestinal: No nausea, vomiting, diarrhea, constipation, or abdominal pain   : No hematuria or dysuria right flank pain see above  Musculoskeletal: No swelling or pain   Skin: No rash   Neurological: No focal neurologic complaints, paresthesias, weakness, or headache    PAST MEDICAL HISTORY    has a past medical history of Allergic reaction caused by a drug, Arthritis, Chronic kidney disease, COPD (chronic obstructive pulmonary disease) ([address] CVA (cerebral vascular accident) ([address] Depression, Diabetes mellitus ([address] Erectile dysfunction, History of blood transfusion, History of migraine headaches, Hyperlipidemia, Hypertension, Pituitary microadenoma ([address] Pneumothorax, Psoriatic arthritis [facility]), Stroke ([facility] [address] and Thrombocytopenia ([facility] [address] SURGICAL HISTORY      has a past surgical history that includes other surgical history (Right); shoulder surgery (Left); Colonoscopy; Upper gastrointestinal endoscopy; Pilonidal cyst drainage; chest tube insertion (Left); Thoracoscopy (05/27/2015); thoracotomy (05/27/2015); other surgical history (05/27/2015); Upper gastrointestinal endoscopy (N/A, 07/08/2020); Upper gastrointestinal endoscopy (01/20/2021); Upper gastrointestinal endoscopy (N/A, 01/20/2021); Endoscopy, colon, diagnostic (04/08/2021); Upper gastrointestinal endoscopy (N/A, 04/08/2021); and Cholecystectomy, laparoscopic (N/A, 4/15/2022). CURRENT MEDICATIONS       Previous Medications    AMLODIPINE (NORVASC) 5 MG TABLET    Take 1 tablet by mouth daily    BLOOD GLUCOSE TEST STRIPS (FREESTYLE LITE) STRIP    TEST THREE TIMES A DAY AND AS NEEDED FOR SYMPTOMS OF IRREGULAR BLOOD GLUCOSE. CLOBETASOL PROPIONATE 0.05 % SHAM    APPLY TO SCALP, LEAVE ON FOR 15 MINUTES THEN RINSE THOROUGHLY. MAY USE 3-4 TIMES A WEEK FOR MAINTENANCE    CONTINUOUS BLOOD GLUC  (DEXCOM G6 ) SILVANA    Use to continuously check blood glucose. CONTINUOUS BLOOD GLUC SENSOR (DEXCOM G6 SENSOR) MISC    Use to check blood glucose continuously. CONTINUOUS BLOOD GLUC TRANSMIT (DEXCOM G6 TRANSMITTER) MISC    Use to check blood glucose continuously. DESOXIMETASONE (TOPICORT) 0.25 % OINT        FOLIC ACID (FOLVITE) 1 MG TABLET    Take 1 mg by mouth daily    INSULIN SYRINGE-NEEDLE U-100 30G X 5/16\" 0.3 ML MISC    USE UP TO FIVE TIMES DAILY AS DIRECTED WITH INSULIN    LISINOPRIL (PRINIVIL;ZESTRIL) 10 MG TABLET    TAKE ONE TABLET BY MOUTH DAILY WHEN BLOOD PRESSURE IS ELEVATED AFTER SKYRIZI. METHOTREXATE (RHEUMATREX) 2.5 MG CHEMO TABLET    Take 5 mg by mouth once a week Take 5 mg (two of the 2.5 mg tabs) weekly on Friday's.     METOPROLOL (LOPRESSOR) 100 MG TABLET    Take 1 tablet by mouth daily    NOVOLOG 100 UNIT/ML INJECTION VIAL    Use as directed TID with meals as directed with sliding scale: 171-220 - 3 units; 221-270 - 5 units; 271-320 - 7 units; 321-370 - 9 units; >371 - 11 units. Max daily dose 33 units. ONDANSETRON (ZOFRAN) 4 MG TABLET    Take 1 tablet by mouth 3 times daily as needed for Nausea or Vomiting    PANTOPRAZOLE (PROTONIX) 20 MG TABLET    Take 1 tablet by mouth daily    PANTOPRAZOLE (PROTONIX) 40 MG TABLET    TAKE ONE TABLET BY MOUTH EVERY MORNING BEFORE BREAKFAST    SILDENAFIL (VIAGRA) 100 MG TABLET    Take 0.5-1 tablets by mouth daily as needed for Erectile Dysfunction    SKYRIZI 150 MG/ML SOSY        SKYRIZI, 150 MG DOSE, 75 MG/0.83ML PSKT INJECTION    Inject 150 mg into the skin Every 2 months Every 2 !/2 months    TAPINAROF 1 % CREA    Apply topically daily    TRIAMCINOLONE (KENALOG) 0.1 % OINTMENT           ALLERGIES     is allergic to cosentyx [secukinumab], lantus [insulin glargine], stellaria, infliximab, methotrexate derivatives, seasonal, adhesive tape, keflex [cephalexin], otezla [apremilast], taltz [ixekizumab], and ustekinumab. FAMILY HISTORY     He indicated that his mother is alive. He indicated that his father is alive. He indicated that his sister is alive. He indicated that his maternal grandmother is . He indicated that his maternal grandfather is . He indicated that his paternal grandmother is . He indicated that his paternal grandfather is . family history includes Alzheimer's Disease in his maternal grandmother; Cancer in his maternal grandfather; Diabetes in his sister; High Blood Pressure in his maternal grandfather and mother; Other in his maternal grandmother, mother, and paternal grandmother. SOCIAL HISTORY      reports that he quit smoking about 7 years ago. His smoking use included cigarettes. He started smoking about 27 years ago. He has a 30.00 pack-year smoking history.  He has never used smokeless tobacco. He reports that he does not currently use alcohol. He reports that he does not use drugs. PHYSICAL EXAM       ED Triage Vitals [01/15/23 1008]   BP Temp Temp Source Heart Rate Resp SpO2 Height Weight   (!) 165/120 98.3 °F (36.8 °C) Tympanic 97 16 99 % 5' 8.5\" (1.74 m) 181 lb (82.1 kg)     Constitutional: Alert, oriented x3, nontoxic, answering questions appropriately, acting properly for age, in mild to moderate acute distress movement of his right flank area. HEENT: Extraocular muscles intact, mucus membranes moist, TMs clear bilaterally, no posterior pharyngeal erythema or exudates, Pupils equal, round, reactive to light,   Neck: Trachea midline   Cardiovascular: Regular rhythm and rate no murmurs   Respiratory: Clear but diminished auscultation bilaterally no wheezes, rhonchi, rales, no respiratory distress no tachypnea no retractions no hypoxia  Gastrointestinal: Soft, nontender, nondistended, positive bowel sounds. No rebound, rigidity, or guarding. Musculoskeletal: No extremity pain or swelling no tenderness of the bony spine in the lumbar area  Neurologic: Moving all 4 extremities without difficulty there are no gross focal neurologic deficits   Skin: Warm and dry red and dusky in discoloration which is chronic for him. No pathologic rashes    DIFFERENTIAL DIAGNOSIS/ MDM:     We will get a CT scan to make sure there is no renal obstruction or colic involved on his 1 functioning kidney we will give him pain medications and check labs as well.     DIAGNOSTIC RESULTS     EKG: All EKG's are interpreted by the Emergency Department Physician who either signs or Co-signs this chart in the absence of a cardiologist.        Not indicated unless otherwise documented above    LABS:  Results for orders placed or performed during the hospital encounter of 01/15/23   RAPID INFLUENZA A/B ANTIGENS    Specimen: Nasopharyngeal   Result Value Ref Range    Flu A Antigen NEGATIVE NEGATIVE    Flu B Antigen NEGATIVE NEGATIVE   CMP   Result Value Ref Range    Glucose 147 (H) 70 - 99 mg/dL    BUN 10 8 - 23 mg/dL    Creatinine 1.90 (H) 0.70 - 1.20 mg/dL    Est, Glom Filt Rate 40 (L) >60 mL/min/1.73m2    Bun/Cre Ratio 5 (L) 9 - 20    Calcium 9.4 8.6 - 10.4 mg/dL    Sodium 130 (L) 135 - 144 mmol/L    Potassium 4.9 3.7 - 5.3 mmol/L    Chloride 92 (L) 98 - 107 mmol/L    CO2 27 20 - 31 mmol/L    Anion Gap 11 9 - 17 mmol/L    Alkaline Phosphatase 128 40 - 129 U/L    ALT 17 5 - 41 U/L    AST 16 <40 U/L    Total Bilirubin 0.9 0.3 - 1.2 mg/dL    Total Protein 7.3 6.4 - 8.3 g/dL    Albumin 4.0 3.5 - 5.2 g/dL    Albumin/Globulin Ratio 1.2 1.0 - 2.5   CBC with Auto Differential   Result Value Ref Range    WBC 7.4 3.5 - 11.3 k/uL    RBC 4.48 4.21 - 5.77 m/uL    Hemoglobin 15.0 13.0 - 17.0 g/dL    Hematocrit 43.4 40.7 - 50.3 %    MCV 96.9 82.6 - 102.9 fL    MCH 33.5 25.2 - 33.5 pg    MCHC 34.6 (H) 25.2 - 33.5 g/dL    RDW 14.9 (H) 11.8 - 14.4 %    Platelets 518 610 - 636 k/uL    MPV 8.5 8.1 - 13.5 fL    NRBC Automated 0.0 0.0 per 100 WBC    Seg Neutrophils 68 (H) 36 - 65 %    Lymphocytes 18 (L) 24 - 43 %    Monocytes 7 3 - 12 %    Eosinophils % 6 (H) 1 - 4 %    Basophils 1 0 - 2 %    Immature Granulocytes 0 0 %    Segs Absolute 4.98 1.50 - 8.10 k/uL    Absolute Lymph # 1.34 1.10 - 3.70 k/uL    Absolute Mono # 0.54 0.10 - 1.20 k/uL    Absolute Eos # 0.41 0.00 - 0.44 k/uL    Basophils Absolute 0.10 0.00 - 0.20 k/uL    Absolute Immature Granulocyte <0.03 0.00 - 0.30 k/uL    RBC Morphology ANISOCYTOSIS PRESENT        Not indicated unless otherwise documented above    RADIOLOGY:   I reviewed the radiologist interpretations:    CT ABDOMEN PELVIS WO CONTRAST Additional Contrast? None   Final Result   No CT evidence of obstructive uropathy. Nonobstructing bilateral nephrolithiasis. Atrophic left kidney.              Not indicated unless otherwise documented above    EMERGENCY DEPARTMENT COURSE:     The patient was given the following medications:  Orders Placed This Encounter   Medications    0.9 % sodium chloride bolus    HYDROmorphone (DILAUDID) injection 1 mg    ondansetron (ZOFRAN) injection 4 mg    cyclobenzaprine (FLEXERIL) 10 MG tablet     Sig: Take 1 tablet by mouth 3 times daily as needed for Muscle spasms     Dispense:  20 tablet     Refill:  0    lidocaine (LIDODERM) 5 %     Sig: Place 1 patch onto the skin every 24 hours Place 1 patch onto the skin daily 12 hours on, 12 hours off. Dispense:  30 patch     Refill:  0    oxyCODONE-acetaminophen (PERCOCET) 5-325 MG per tablet     Sig: Take 1 tablet by mouth every 6 hours as needed for Pain for up to 3 days. Intended supply: 3 days. Take lowest dose possible to manage pain Max Daily Amount: 4 tablets     Dispense:  12 tablet     Refill:  0        Vitals:   -------------------------  BP (!) 165/120   Pulse 97   Temp 98.3 °F (36.8 °C) (Tympanic)   Resp 16   Ht 5' 8.5\" (1.74 m)   Wt 181 lb (82.1 kg)   SpO2 99%   BMI 27.12 kg/m²         I have reviewed the disposition diagnosis with the patient and or their family/guardian. I have answered their questions and given discharge instructions. They voiced understanding of these instructions and did not have any furtherquestions or complaints. CRITICAL CARE:    None    CONSULTS:    None    PROCEDURES:    None      OARRS Report if indicated             FINAL IMPRESSION      1. Back strain, initial encounter          DISPOSITION/PLAN   DISPOSITION Decision To Discharge 01/15/2023 11:22:36 AM        CONDITION ON DISPOSITION: STABLE       PATIENT REFERRED TO:  Jerie Osgood, DO  [address] [address]  [phone]    In 3 days  If symptoms worsen    DISCHARGE MEDICATIONS:  New Prescriptions    CYCLOBENZAPRINE (FLEXERIL) 10 MG TABLET    Take 1 tablet by mouth 3 times daily as needed for Muscle spasms    LIDOCAINE (LIDODERM) 5 %    Place 1 patch onto the skin every 24 hours Place 1 patch onto the skin daily 12 hours on, 12 hours off. OXYCODONE-ACETAMINOPHEN (PERCOCET) 5-325 MG PER TABLET    Take 1 tablet by mouth every 6 hours as needed for Pain for up to 3 days. Intended supply: 3 days.  Take lowest dose possible to manage pain Max Daily Amount: 4 tablets       (Please note that portions of thisnote were completed with a voice recognition program.  Efforts were made to edit the dictations but occasionally words are mis-transcribed.)    Garret Ortiz MD,, MD  Attending Emergency Physician        Garret Ortiz MD  01/15/23 [id number]

## 2023-01-15 NOTE — ED NOTES
Patient ambulated to bathroom to give urine sample but states he was unable. Dr. Reid All aware and states it is okay to discharge patient without urine sample.      Vola Kocher, RN  01/15/23 0918

## 2023-02-03 ENCOUNTER — PATIENT MESSAGE (OUTPATIENT)
Dept: FAMILY MEDICINE CLINIC | Age: 63
End: 2023-02-03

## 2023-02-03 DIAGNOSIS — E11.65 TYPE 2 DIABETES MELLITUS WITH HYPERGLYCEMIA, UNSPECIFIED WHETHER LONG TERM INSULIN USE (HCC): Primary | ICD-10-CM

## 2023-02-06 NOTE — TELEPHONE ENCOUNTER
From: Tejal Mcleod  To: Dr. Francisco Search: 2/3/2023 5:06 PM EST  Subject: Kidney doctor     Could you please send in a referral so I can see my kidney doctor. Dr. Dionicio Del Real. O'Mauricio. I've not seen him in 2 years. Thank you.

## 2023-02-20 DIAGNOSIS — E11.65 TYPE 2 DIABETES MELLITUS WITH HYPERGLYCEMIA, UNSPECIFIED WHETHER LONG TERM INSULIN USE (HCC): ICD-10-CM

## 2023-02-21 NOTE — TELEPHONE ENCOUNTER
Karie Walton called requesting a refill of the below medication which has been pended for you:     Requested Prescriptions     Pending Prescriptions Disp Refills    NOVOLOG 100 UNIT/ML injection vial 10 mL 5     Sig: Use as directed TID with meals as directed with sliding scale: 171-220 - 3 units; 221-270 - 5 units; 271-320 - 7 units; 321-370 - 9 units; >371 - 11 units. Max daily dose 33 units. Last Appointment Date: 6/14/2022  Next Appointment Date: 3/10/2023    Allergies   Allergen Reactions    Cosentyx [Secukinumab] Nausea And Vomiting and Rash      fever    Lantus [Insulin Glargine] Itching, Swelling and Rash     Patient started Lantus about a month ago, only new medication. His skin reaction started on his abdomen where he injects the Lantus and moved up to the face. This is similar to his response to Cosentyx.      Stellaria Rash    Infliximab Other (See Comments)     Nerve damage    Methotrexate Derivatives Other (See Comments)     Bone marrow toxicity    Seasonal     Adhesive Tape Rash    Keflex [Cephalexin] Rash    Otezla [Apremilast] Rash     Other reaction(s): Unknown    Taltz [Ixekizumab] Rash     Other reaction(s): Unknown    Ustekinumab Rash     Other reaction(s): rash-allergic and acute renal failure  Other reaction(s): Unknown

## 2023-02-22 RX ORDER — INSULIN ASPART 100 [IU]/ML
INJECTION, SOLUTION INTRAVENOUS; SUBCUTANEOUS
Qty: 10 ML | Refills: 5 | Status: SHIPPED | OUTPATIENT
Start: 2023-02-22

## 2023-03-06 ENCOUNTER — HOSPITAL ENCOUNTER (OUTPATIENT)
Age: 63
Setting detail: SPECIMEN
Discharge: HOME | End: 2023-03-06
Payer: MEDICARE

## 2023-03-06 ENCOUNTER — OFFICE VISIT (OUTPATIENT)
Dept: PRIMARY CARE CLINIC | Age: 63
End: 2023-03-06
Payer: MEDICARE

## 2023-03-06 VITALS
TEMPERATURE: 98.4 F | DIASTOLIC BLOOD PRESSURE: 80 MMHG | HEART RATE: 114 BPM | OXYGEN SATURATION: 95 % | WEIGHT: 182 LBS | HEIGHT: 69 IN | BODY MASS INDEX: 26.96 KG/M2 | SYSTOLIC BLOOD PRESSURE: 110 MMHG

## 2023-03-06 DIAGNOSIS — R06.2 WHEEZING: ICD-10-CM

## 2023-03-06 DIAGNOSIS — J06.9 VIRAL URI: ICD-10-CM

## 2023-03-06 DIAGNOSIS — J06.9 VIRAL URI: Primary | ICD-10-CM

## 2023-03-06 DIAGNOSIS — R52 BODY ACHES: ICD-10-CM

## 2023-03-06 LAB
INFLUENZA A ANTIGEN, POC: NEGATIVE
INFLUENZA B ANTIGEN, POC: NEGATIVE
LOT EXPIRE DATE: NORMAL
LOT KIT NUMBER: NORMAL
SARS-COV-2, POC: NORMAL
VALID INTERNAL CONTROL: NORMAL
VENDOR AND KIT NAME POC: NORMAL

## 2023-03-06 PROCEDURE — 99213 OFFICE O/P EST LOW 20 MIN: CPT | Performed by: NURSE PRACTITIONER

## 2023-03-06 PROCEDURE — U0003 INFECTIOUS AGENT DETECTION BY NUCLEIC ACID (DNA OR RNA); SEVERE ACUTE RESPIRATORY SYNDROME CORONAVIRUS 2 (SARS-COV-2) (CORONAVIRUS DISEASE [COVID-19]), AMPLIFIED PROBE TECHNIQUE, MAKING USE OF HIGH THROUGHPUT TECHNOLOGIES AS DESCRIBED BY CMS-2020-01-R: HCPCS

## 2023-03-06 PROCEDURE — U0005 INFEC AGEN DETEC AMPLI PROBE: HCPCS

## 2023-03-06 PROCEDURE — 87428 SARSCOV & INF VIR A&B AG IA: CPT | Performed by: NURSE PRACTITIONER

## 2023-03-06 RX ORDER — BUTALBITAL, ACETAMINOPHEN AND CAFFEINE 50; 325; 40 MG/1; MG/1; MG/1
1 TABLET ORAL EVERY 4 HOURS PRN
Qty: 20 TABLET | Refills: 0 | Status: SHIPPED | OUTPATIENT
Start: 2023-03-06

## 2023-03-06 RX ORDER — ONDANSETRON 4 MG/1
4 TABLET, ORALLY DISINTEGRATING ORAL 3 TIMES DAILY PRN
Qty: 21 TABLET | Refills: 0 | Status: SHIPPED | OUTPATIENT
Start: 2023-03-06

## 2023-03-06 RX ORDER — PREDNISONE 20 MG/1
20 TABLET ORAL 2 TIMES DAILY
Qty: 10 TABLET | Refills: 0 | Status: CANCELLED | OUTPATIENT
Start: 2023-03-06 | End: 2023-03-11

## 2023-03-06 RX ORDER — DEXTROMETHORPHAN HYDROBROMIDE AND PROMETHAZINE HYDROCHLORIDE 15; 6.25 MG/5ML; MG/5ML
5 SYRUP ORAL 4 TIMES DAILY PRN
Qty: 180 ML | Refills: 0 | Status: SHIPPED | OUTPATIENT
Start: 2023-03-06 | End: 2023-03-13

## 2023-03-06 SDOH — ECONOMIC STABILITY: FOOD INSECURITY: WITHIN THE PAST 12 MONTHS, THE FOOD YOU BOUGHT JUST DIDN'T LAST AND YOU DIDN'T HAVE MONEY TO GET MORE.: NEVER TRUE

## 2023-03-06 SDOH — ECONOMIC STABILITY: HOUSING INSECURITY
IN THE LAST 12 MONTHS, WAS THERE A TIME WHEN YOU DID NOT HAVE A STEADY PLACE TO SLEEP OR SLEPT IN A SHELTER (INCLUDING NOW)?: NO

## 2023-03-06 SDOH — ECONOMIC STABILITY: FOOD INSECURITY: WITHIN THE PAST 12 MONTHS, YOU WORRIED THAT YOUR FOOD WOULD RUN OUT BEFORE YOU GOT MONEY TO BUY MORE.: NEVER TRUE

## 2023-03-06 SDOH — ECONOMIC STABILITY: INCOME INSECURITY: HOW HARD IS IT FOR YOU TO PAY FOR THE VERY BASICS LIKE FOOD, HOUSING, MEDICAL CARE, AND HEATING?: NOT VERY HARD

## 2023-03-06 ASSESSMENT — ENCOUNTER SYMPTOMS
RHINORRHEA: 1
COUGH: 1
WHEEZING: 1
NAUSEA: 1
ABDOMINAL PAIN: 0
VOMITING: 1

## 2023-03-06 ASSESSMENT — PATIENT HEALTH QUESTIONNAIRE - PHQ9
2. FEELING DOWN, DEPRESSED OR HOPELESS: 0
SUM OF ALL RESPONSES TO PHQ QUESTIONS 1-9: 0
SUM OF ALL RESPONSES TO PHQ QUESTIONS 1-9: 0
1. LITTLE INTEREST OR PLEASURE IN DOING THINGS: 0
SUM OF ALL RESPONSES TO PHQ9 QUESTIONS 1 & 2: 0
SUM OF ALL RESPONSES TO PHQ QUESTIONS 1-9: 0
SUM OF ALL RESPONSES TO PHQ QUESTIONS 1-9: 0

## 2023-03-06 NOTE — PATIENT INSTRUCTIONS
Symbicort will help with the cough/wheezing.  Make sure you rinse your mouth out afterwards to prevent thrush  Covid pcr testing will take 24hrs   Zofran odt for nausea  Fioricet for headaches  Cough medications also sent in

## 2023-03-06 NOTE — PROGRESS NOTES
Subjective:      Patient ID: Tor Will is a 63 y.o. male coming in for   Chief Complaint   Patient presents with    Fever     Pt cannot get warm and pt takes skyrizzi and his immune system is low. Pt wife has covid. URI   This is a new problem. Episode onset: 3/4/23. The maximum temperature recorded prior to his arrival was 100.4 - 100.9 F. The fever has been present for 1 to 2 days. Associated symptoms include congestion, coughing (mild), headaches, nausea, rhinorrhea, vomiting and wheezing. Pertinent negatives include no abdominal pain, chest pain or rash. Pt's wife tested positive for COVID last week. Review of Systems   Constitutional:  Positive for chills and fever. HENT:  Positive for congestion and rhinorrhea. Respiratory:  Positive for cough (mild) and wheezing. Cardiovascular:  Negative for chest pain. Gastrointestinal:  Positive for nausea and vomiting. Negative for abdominal pain. Skin:  Negative for rash. Neurological:  Positive for headaches. Objective:/80   Pulse (!) 114   Temp 98.4 °F (36.9 °C) (Temporal)   Ht 5' 8.5\" (1.74 m)   Wt 182 lb (82.6 kg)   SpO2 95%   BMI 27.27 kg/m²      Physical Exam  Vitals and nursing note reviewed. Constitutional:       General: He is not in acute distress. Appearance: Normal appearance. He is ill-appearing (chronically ill appearing). HENT:      Head: Normocephalic. Nose: Congestion and rhinorrhea present. Mouth/Throat:      Mouth: Mucous membranes are moist.      Pharynx: Oropharynx is clear. No oropharyngeal exudate or posterior oropharyngeal erythema. Cardiovascular:      Rate and Rhythm: Normal rate and regular rhythm. Heart sounds: Normal heart sounds. Pulmonary:      Effort: Pulmonary effort is normal. No tachypnea. Breath sounds: Examination of the right-lower field reveals wheezing. Wheezing present.    Musculoskeletal:      Cervical back: Normal range of motion and neck supple. Right lower leg: No edema. Left lower leg: No edema. Lymphadenopathy:      Cervical: No cervical adenopathy. Skin:     General: Skin is warm and dry. Findings: No rash. Neurological:      General: No focal deficit present. Mental Status: He is alert and oriented to person, place, and time. Assessment:      1. Viral URI    2. Body aches    3. Wheezing           Plan:    -negative rapid covid/flu  -pcr covid sent out  -pt reports oral prednisone and decadron does not work well for him  -will treat wheezing with dulera  -other medications sent in to help with symptoms  -push fluids. Tylenol/motrin for fevers/chills/body aches  -if PCR is positive plan will be to treat with antivirals due to med hx. Orders Placed This Encounter   Procedures    COVID-19     Standing Status:   Future     Standing Expiration Date:   3/6/2024     Scheduling Instructions:      1) Due to current limited availability of the COVID-19 test, tests will be prioritized based on responses to questions above. Testing may be delayed due to volume. 2) Print and instruct patient to adhere to CDC home isolation program. (Link Above)              3) Set up or refer patient for a monitoring program.              4) Have patient sign up for and leverage MyChart (if not previously done). Order Specific Question:   Is this test for diagnosis or screening? Answer:   Diagnosis of ill patient     Order Specific Question:   Symptomatic for COVID-19 as defined by CDC? Answer:   Yes     Order Specific Question:   Date of Symptom Onset     Answer:   3/5/2023     Order Specific Question:   Hospitalized for COVID-19? Answer:   No     Order Specific Question:   Admitted to ICU for COVID-19? Answer:   No     Order Specific Question:   Employed in healthcare setting? Answer:   No     Order Specific Question:   Resident in a congregate (group) care setting?      Answer:   No     Order Specific Question:   Previously tested for COVID-19? Answer:   Unknown     Order Specific Question:   Pregnant: Answer:   No    POCT COVID-19 & Influenza A/B     Order Specific Question:   Employed in healthcare setting? Answer:   No     Order Specific Question:   Resident in a congregate (group) care setting? Answer:   No     Order Specific Question:   Previously tested for COVID-19? Answer:   Yes     Order Specific Question:   Pregnant: Answer:   No      Outpatient Encounter Medications as of 3/6/2023   Medication Sig Dispense Refill    butalbital-acetaminophen-caffeine (FIORICET, ESGIC) -40 MG per tablet Take 1 tablet by mouth every 4 hours as needed for Headaches 20 tablet 0    mometasone-formoterol (DULERA) 200-5 MCG/ACT inhaler Inhale 2 puffs into the lungs in the morning and 2 puffs in the evening. 13 g 0    ondansetron (ZOFRAN-ODT) 4 MG disintegrating tablet Take 1 tablet by mouth 3 times daily as needed for Nausea or Vomiting 21 tablet 0    promethazine-dextromethorphan (PROMETHAZINE-DM) 6.25-15 MG/5ML syrup Take 5 mLs by mouth 4 times daily as needed for Cough 180 mL 0    NOVOLOG 100 UNIT/ML injection vial Use as directed TID with meals as directed with sliding scale: 171-220 - 3 units; 221-270 - 5 units; 271-320 - 7 units; 321-370 - 9 units; >371 - 11 units. Max daily dose 33 units. 10 mL 5    SKYRIZI 150 MG/ML SOSY       Tapinarof 1 % CREA Apply topically daily      lisinopril (PRINIVIL;ZESTRIL) 10 MG tablet TAKE ONE TABLET BY MOUTH DAILY WHEN BLOOD PRESSURE IS ELEVATED AFTER SKYRIZI.  90 tablet 3    metoprolol (LOPRESSOR) 100 MG tablet Take 1 tablet by mouth daily 90 tablet 3    sildenafil (VIAGRA) 100 MG tablet Take 0.5-1 tablets by mouth daily as needed for Erectile Dysfunction 30 tablet 5    pantoprazole (PROTONIX) 20 MG tablet Take 1 tablet by mouth daily 30 tablet 3    desoximetasone (TOPICORT) 0.25 % OINT       methotrexate (RHEUMATREX) 2.5 MG chemo tablet Take 5 mg by mouth once a week Take 5 mg (two of the 2.5 mg tabs) weekly on Friday's.      folic acid (FOLVITE) 1 MG tablet Take 1 mg by mouth daily      amLODIPine (NORVASC) 5 MG tablet Take 1 tablet by mouth daily (Patient taking differently: Take 5 mg by mouth as needed) 90 tablet 1    triamcinolone (KENALOG) 0.1 % ointment       pantoprazole (PROTONIX) 40 MG tablet TAKE ONE TABLET BY MOUTH EVERY MORNING BEFORE BREAKFAST 90 tablet 2    Clobetasol Propionate 0.05 % SHAM APPLY TO SCALP, LEAVE ON FOR 15 MINUTES THEN RINSE THOROUGHLY. MAY USE 3-4 TIMES A WEEK FOR MAINTENANCE 118 mL 5    SKYRIZI, 150 MG DOSE, 75 MG/0.83ML PSKT injection Inject 150 mg into the skin Every 2 months Every 2 !/2 months      Insulin Syringe-Needle U-100 30G X 5/16\" 0.3 ML MISC USE UP TO FIVE TIMES DAILY AS DIRECTED WITH INSULIN 500 each 3    ondansetron (ZOFRAN) 4 MG tablet Take 1 tablet by mouth 3 times daily as needed for Nausea or Vomiting (Patient not taking: Reported on 1/15/2023) 15 tablet 0    blood glucose test strips (FREESTYLE LITE) strip TEST THREE TIMES A DAY AND AS NEEDED FOR SYMPTOMS OF IRREGULAR BLOOD GLUCOSE. 300 strip 3    Continuous Blood Gluc  (DEXCOM G6 ) SILVANA Use to continuously check blood glucose. (Patient not taking: Reported on 3/6/2023) 1 each 0    Continuous Blood Gluc Transmit (DEXCOM G6 TRANSMITTER) MISC Use to check blood glucose continuously. (Patient not taking: Reported on 3/6/2023) 1 each 3    Continuous Blood Gluc Sensor (DEXCOM G6 SENSOR) MISC Use to check blood glucose continuously. (Patient not taking: Reported on 3/6/2023) 9 each 3     No facility-administered encounter medications on file as of 3/6/2023.             Trudy Braga, APRN - CNP

## 2023-03-08 ENCOUNTER — PATIENT MESSAGE (OUTPATIENT)
Dept: FAMILY MEDICINE CLINIC | Age: 63
End: 2023-03-08

## 2023-03-08 DIAGNOSIS — U07.1 COVID-19: Primary | ICD-10-CM

## 2023-03-08 LAB
SARS-COV-2 RNA RESP QL NAA+PROBE: ABNORMAL
SARS-COV-2 RNA RESP QL NAA+PROBE: DETECTED
SOURCE: ABNORMAL

## 2023-03-08 NOTE — TELEPHONE ENCOUNTER
From: Artist Stands  To: Dr. Amaya Founds: 3/8/2023 10:51 AM EST  Subject: Appointment     I tested positive for COVID-19. Can't make the appointment. Just found out this morning.

## 2023-03-15 ENCOUNTER — PATIENT MESSAGE (OUTPATIENT)
Dept: FAMILY MEDICINE CLINIC | Age: 63
End: 2023-03-15

## 2023-03-16 ENCOUNTER — OFFICE VISIT (OUTPATIENT)
Dept: FAMILY MEDICINE CLINIC | Age: 63
End: 2023-03-16
Payer: MEDICARE

## 2023-03-16 VITALS
SYSTOLIC BLOOD PRESSURE: 106 MMHG | BODY MASS INDEX: 25.62 KG/M2 | DIASTOLIC BLOOD PRESSURE: 80 MMHG | WEIGHT: 173 LBS | OXYGEN SATURATION: 98 % | TEMPERATURE: 97.2 F | HEART RATE: 88 BPM | HEIGHT: 69 IN

## 2023-03-16 DIAGNOSIS — J06.9 VIRAL URI: ICD-10-CM

## 2023-03-16 DIAGNOSIS — U07.1 COVID-19: Primary | ICD-10-CM

## 2023-03-16 PROCEDURE — 99212 OFFICE O/P EST SF 10 MIN: CPT

## 2023-03-16 PROCEDURE — 3017F COLORECTAL CA SCREEN DOC REV: CPT | Performed by: STUDENT IN AN ORGANIZED HEALTH CARE EDUCATION/TRAINING PROGRAM

## 2023-03-16 PROCEDURE — 99213 OFFICE O/P EST LOW 20 MIN: CPT | Performed by: STUDENT IN AN ORGANIZED HEALTH CARE EDUCATION/TRAINING PROGRAM

## 2023-03-16 PROCEDURE — G8427 DOCREV CUR MEDS BY ELIG CLIN: HCPCS | Performed by: STUDENT IN AN ORGANIZED HEALTH CARE EDUCATION/TRAINING PROGRAM

## 2023-03-16 PROCEDURE — G8483 FLU IMM NO ADMIN DOC REA: HCPCS | Performed by: STUDENT IN AN ORGANIZED HEALTH CARE EDUCATION/TRAINING PROGRAM

## 2023-03-16 PROCEDURE — 3078F DIAST BP <80 MM HG: CPT | Performed by: STUDENT IN AN ORGANIZED HEALTH CARE EDUCATION/TRAINING PROGRAM

## 2023-03-16 PROCEDURE — G8417 CALC BMI ABV UP PARAM F/U: HCPCS | Performed by: STUDENT IN AN ORGANIZED HEALTH CARE EDUCATION/TRAINING PROGRAM

## 2023-03-16 PROCEDURE — 1036F TOBACCO NON-USER: CPT | Performed by: STUDENT IN AN ORGANIZED HEALTH CARE EDUCATION/TRAINING PROGRAM

## 2023-03-16 PROCEDURE — 3074F SYST BP LT 130 MM HG: CPT | Performed by: STUDENT IN AN ORGANIZED HEALTH CARE EDUCATION/TRAINING PROGRAM

## 2023-03-16 RX ORDER — ONDANSETRON 4 MG/1
4 TABLET, FILM COATED ORAL 3 TIMES DAILY PRN
Qty: 15 TABLET | Refills: 0 | Status: CANCELLED | OUTPATIENT
Start: 2023-03-16

## 2023-03-16 RX ORDER — DEXTROMETHORPHAN HYDROBROMIDE AND PROMETHAZINE HYDROCHLORIDE 15; 6.25 MG/5ML; MG/5ML
5 SYRUP ORAL 4 TIMES DAILY PRN
Qty: 180 ML | Refills: 0 | Status: SHIPPED | OUTPATIENT
Start: 2023-03-16 | End: 2023-03-23

## 2023-03-16 RX ORDER — ONDANSETRON 4 MG/1
4 TABLET, FILM COATED ORAL EVERY 8 HOURS PRN
Qty: 30 TABLET | Refills: 0 | Status: SHIPPED | OUTPATIENT
Start: 2023-03-16

## 2023-03-16 RX ORDER — BUTALBITAL, ACETAMINOPHEN AND CAFFEINE 50; 325; 40 MG/1; MG/1; MG/1
1 TABLET ORAL EVERY 4 HOURS PRN
Qty: 20 TABLET | Refills: 0 | Status: SHIPPED | OUTPATIENT
Start: 2023-03-16

## 2023-03-16 RX ORDER — DEXAMETHASONE 4 MG/1
4 TABLET ORAL 2 TIMES DAILY WITH MEALS
Qty: 10 TABLET | Refills: 0 | Status: SHIPPED | OUTPATIENT
Start: 2023-03-16 | End: 2023-03-21

## 2023-03-16 NOTE — PROGRESS NOTES
educational materials - see patient instructions. Discussed use, benefit, and side effects of prescribed medications. All patient questions answered. Pt voiced understanding. Reviewed health maintenance. Instructed to continue current medications, diet and exercise. Patient agreed with treatment plan. Follow up as directed.      Electronically signed by Kain Erazo DO on 3/20/2023 at 9:18 AM

## 2023-03-16 NOTE — TELEPHONE ENCOUNTER
From: Cass Wilson  To: Dr. Ashley Ramos: 3/15/2023 5:00 PM EDT  Subject: After covid    Since I'm supposed to be over [facility]. I still can't eat or keep any thing down.  How long should this last?

## 2023-03-20 ASSESSMENT — ENCOUNTER SYMPTOMS
NAUSEA: 1
BLOOD IN STOOL: 0
DIARRHEA: 0
VOMITING: 0
RHINORRHEA: 1
SHORTNESS OF BREATH: 0
CONSTIPATION: 0
ABDOMINAL PAIN: 0
EYE PAIN: 0
TROUBLE SWALLOWING: 0
COUGH: 1

## 2023-04-19 ENCOUNTER — HOSPITAL ENCOUNTER (OUTPATIENT)
Age: 63
Discharge: HOME | End: 2023-04-19
Payer: MEDICARE

## 2023-04-19 ENCOUNTER — OFFICE VISIT (OUTPATIENT)
Dept: FAMILY MEDICINE CLINIC | Age: 63
End: 2023-04-19
Payer: MEDICARE

## 2023-04-19 VITALS
WEIGHT: 175 LBS | TEMPERATURE: 97.3 F | HEIGHT: 69 IN | BODY MASS INDEX: 25.92 KG/M2 | DIASTOLIC BLOOD PRESSURE: 90 MMHG | HEART RATE: 69 BPM | SYSTOLIC BLOOD PRESSURE: 130 MMHG | OXYGEN SATURATION: 96 %

## 2023-04-19 DIAGNOSIS — G47.00 INSOMNIA, UNSPECIFIED TYPE: ICD-10-CM

## 2023-04-19 DIAGNOSIS — L40.50 PSORIATIC ARTHRITIS (HCC): ICD-10-CM

## 2023-04-19 DIAGNOSIS — L40.9 PSORIASIS: ICD-10-CM

## 2023-04-19 DIAGNOSIS — E11.65 TYPE 2 DIABETES MELLITUS WITH HYPERGLYCEMIA, UNSPECIFIED WHETHER LONG TERM INSULIN USE (HCC): ICD-10-CM

## 2023-04-19 DIAGNOSIS — Z12.5 ENCOUNTER FOR SCREENING FOR MALIGNANT NEOPLASM OF PROSTATE: ICD-10-CM

## 2023-04-19 DIAGNOSIS — I10 ESSENTIAL HYPERTENSION: Primary | ICD-10-CM

## 2023-04-19 LAB
CREATININE URINE: 113.4 MG/DL (ref 39–259)
EST. AVERAGE GLUCOSE BLD GHB EST-MCNC: 169 MG/DL
HBA1C MFR BLD: 7.5 % (ref 4–6)
MICROALBUMIN/CREAT 24H UR: 33 MG/L
MICROALBUMIN/CREAT UR-RTO: 29 MCG/MG CREAT
PROSTATE SPECIFIC ANTIGEN: 1.29 NG/ML

## 2023-04-19 PROCEDURE — 36415 COLL VENOUS BLD VENIPUNCTURE: CPT

## 2023-04-19 PROCEDURE — 3017F COLORECTAL CA SCREEN DOC REV: CPT | Performed by: FAMILY MEDICINE

## 2023-04-19 PROCEDURE — G8427 DOCREV CUR MEDS BY ELIG CLIN: HCPCS | Performed by: FAMILY MEDICINE

## 2023-04-19 PROCEDURE — 1036F TOBACCO NON-USER: CPT | Performed by: FAMILY MEDICINE

## 2023-04-19 PROCEDURE — 3074F SYST BP LT 130 MM HG: CPT | Performed by: FAMILY MEDICINE

## 2023-04-19 PROCEDURE — 3051F HG A1C>EQUAL 7.0%<8.0%: CPT | Performed by: FAMILY MEDICINE

## 2023-04-19 PROCEDURE — 99214 OFFICE O/P EST MOD 30 MIN: CPT | Performed by: FAMILY MEDICINE

## 2023-04-19 PROCEDURE — 83036 HEMOGLOBIN GLYCOSYLATED A1C: CPT

## 2023-04-19 PROCEDURE — 3078F DIAST BP <80 MM HG: CPT | Performed by: FAMILY MEDICINE

## 2023-04-19 PROCEDURE — G0103 PSA SCREENING: HCPCS

## 2023-04-19 PROCEDURE — G8417 CALC BMI ABV UP PARAM F/U: HCPCS | Performed by: FAMILY MEDICINE

## 2023-04-19 PROCEDURE — 2022F DILAT RTA XM EVC RTNOPTHY: CPT | Performed by: FAMILY MEDICINE

## 2023-04-19 PROCEDURE — 82570 ASSAY OF URINE CREATININE: CPT

## 2023-04-19 PROCEDURE — 99213 OFFICE O/P EST LOW 20 MIN: CPT | Performed by: FAMILY MEDICINE

## 2023-04-19 PROCEDURE — 82043 UR ALBUMIN QUANTITATIVE: CPT

## 2023-04-19 RX ORDER — QUETIAPINE FUMARATE 25 MG/1
25 TABLET, FILM COATED ORAL NIGHTLY PRN
Qty: 30 TABLET | Refills: 0 | Status: SHIPPED | OUTPATIENT
Start: 2023-04-19

## 2023-05-01 ASSESSMENT — ENCOUNTER SYMPTOMS: COLOR CHANGE: 1

## 2023-05-21 ENCOUNTER — PATIENT MESSAGE (OUTPATIENT)
Dept: FAMILY MEDICINE CLINIC | Age: 63
End: 2023-05-21

## 2023-05-21 DIAGNOSIS — G47.00 INSOMNIA, UNSPECIFIED TYPE: ICD-10-CM

## 2023-05-22 NOTE — TELEPHONE ENCOUNTER
See patient message 5/21/23, \"It helps,it takes a while but it works. Can I have another prescription. Please. Thank you. \"      Josee Mason called requesting a refill of the below medication which has been pended for you:     Requested Prescriptions     Pending Prescriptions Disp Refills    QUEtiapine (SEROQUEL) 25 MG tablet [Pharmacy Med Name: QUETIAPINE FUMARATE 25 MG TAB] 30 tablet 0     Sig: take 1 tablet by mouth nightly if needed for insomnia       Last Appointment Date: 4/19/2023  Next Appointment Date: 6/5/2023    Allergies   Allergen Reactions    Cosentyx [Secukinumab] Nausea And Vomiting and Rash      fever    Lantus [Insulin Glargine] Itching, Swelling and Rash     Patient started Lantus about a month ago, only new medication. His skin reaction started on his abdomen where he injects the Lantus and moved up to the face. This is similar to his response to Cosentyx.      Stellaria Rash    Infliximab Other (See Comments)     Nerve damage    Cyclosporine      Other reaction(s): has 1 kidney with a stent    Methotrexate Derivatives Other (See Comments)     Bone marrow toxicity    Seasonal     Adhesive Tape Rash    Keflex [Cephalexin] Rash    Otezla [Apremilast] Rash     Other reaction(s): Unknown    Taltz [Ixekizumab] Rash     Other reaction(s): Unknown    Ustekinumab Rash     Other reaction(s): rash-allergic and acute renal failure  Other reaction(s): Unknown

## 2023-05-22 NOTE — TELEPHONE ENCOUNTER
From: Ceci Palmer  To: Dr. Glez Side: 5/21/2023 9:51 AM EDT  Subject: Sleeping     You were right needed more time. Can I have another prescription of the sleeping meds. Thank you.

## 2023-05-25 RX ORDER — QUETIAPINE FUMARATE 25 MG/1
25 TABLET, FILM COATED ORAL NIGHTLY PRN
Qty: 30 TABLET | Refills: 0 | Status: SHIPPED | OUTPATIENT
Start: 2023-05-25

## 2023-06-05 ENCOUNTER — TELEPHONE (OUTPATIENT)
Dept: FAMILY MEDICINE CLINIC | Age: 63
End: 2023-06-05

## 2023-06-05 ENCOUNTER — HOSPITAL ENCOUNTER (OUTPATIENT)
Age: 63
Discharge: HOME | End: 2023-06-05
Payer: MEDICARE

## 2023-06-05 LAB
ALBUMIN SERPL-MCNC: 4.4 G/DL (ref 3.5–5.2)
ANION GAP SERPL CALCULATED.3IONS-SCNC: 6 MMOL/L (ref 9–17)
BASOPHILS # BLD: 0.08 K/UL (ref 0–0.2)
BASOPHILS NFR BLD: 1 % (ref 0–2)
BUN SERPL-MCNC: 8 MG/DL (ref 8–23)
BUN/CREAT SERPL: 4 (ref 9–20)
CALCIUM SERPL-MCNC: 9.4 MG/DL (ref 8.6–10.4)
CHLORIDE SERPL-SCNC: 95 MMOL/L (ref 98–107)
CO2 SERPL-SCNC: 28 MMOL/L (ref 20–31)
CREAT SERPL-MCNC: 1.96 MG/DL (ref 0.7–1.2)
EOSINOPHIL # BLD: 0.26 K/UL (ref 0–0.44)
EOSINOPHILS RELATIVE PERCENT: 3 % (ref 1–4)
ERYTHROCYTE [DISTWIDTH] IN BLOOD BY AUTOMATED COUNT: 14.6 % (ref 11.8–14.4)
GFR SERPL CREATININE-BSD FRML MDRD: 38 ML/MIN/1.73M2
GLUCOSE SERPL-MCNC: 142 MG/DL (ref 70–99)
HCT VFR BLD AUTO: 41.4 % (ref 40.7–50.3)
HGB BLD-MCNC: 14.3 G/DL (ref 13–17)
IMM GRANULOCYTES # BLD AUTO: <0.03 K/UL (ref 0–0.3)
IMM GRANULOCYTES NFR BLD: 0 %
LYMPHOCYTES # BLD: 13 % (ref 24–43)
LYMPHOCYTES NFR BLD: 1.01 K/UL (ref 1.1–3.7)
MCH RBC QN AUTO: 33.5 PG (ref 25.2–33.5)
MCHC RBC AUTO-ENTMCNC: 34.5 G/DL (ref 25.2–33.5)
MCV RBC AUTO: 97 FL (ref 82.6–102.9)
MONOCYTES NFR BLD: 0.58 K/UL (ref 0.1–1.2)
MONOCYTES NFR BLD: 8 % (ref 3–12)
NEUTROPHILS NFR BLD: 75 % (ref 36–65)
NEUTS SEG NFR BLD: 5.59 K/UL (ref 1.5–8.1)
NRBC AUTOMATED: 0 PER 100 WBC
PHOSPHATE SERPL-MCNC: 2.6 MG/DL (ref 2.5–4.5)
PLATELET # BLD AUTO: 274 K/UL (ref 138–453)
PMV BLD AUTO: 8.9 FL (ref 8.1–13.5)
POTASSIUM SERPL-SCNC: 5.4 MMOL/L (ref 3.7–5.3)
RBC # BLD AUTO: 4.27 M/UL (ref 4.21–5.77)
RBC # BLD: ABNORMAL 10*6/UL
SODIUM SERPL-SCNC: 129 MMOL/L (ref 135–144)
WBC OTHER # BLD: 7.5 K/UL (ref 3.5–11.3)

## 2023-06-05 PROCEDURE — 36415 COLL VENOUS BLD VENIPUNCTURE: CPT

## 2023-06-05 PROCEDURE — 85027 COMPLETE CBC AUTOMATED: CPT

## 2023-06-05 PROCEDURE — 80069 RENAL FUNCTION PANEL: CPT

## 2023-06-05 NOTE — TELEPHONE ENCOUNTER
Attempted to reach patient regarding missed appointment on 06/05/2023. Unable to contact at this time. Patient left a message in [facility] to reschedule on 06/04. I tried to call him today to reschedule and he did not answer, left him a voicemail to call us back at his convince to reschedule.

## 2023-06-29 DIAGNOSIS — G47.00 INSOMNIA, UNSPECIFIED TYPE: ICD-10-CM

## 2023-06-30 RX ORDER — QUETIAPINE FUMARATE 25 MG/1
25 TABLET, FILM COATED ORAL NIGHTLY PRN
Qty: 30 TABLET | Refills: 0 | Status: SHIPPED | OUTPATIENT
Start: 2023-06-30

## 2023-07-25 DIAGNOSIS — G47.00 INSOMNIA, UNSPECIFIED TYPE: ICD-10-CM

## 2023-07-25 NOTE — TELEPHONE ENCOUNTER
Jose Else called requesting a refill of the below medication which has been pended for you:     Requested Prescriptions     Pending Prescriptions Disp Refills    QUEtiapine (SEROQUEL) 25 MG tablet [Pharmacy Med Name: QUETIAPINE FUMARATE 25 MG TAB] 30 tablet 0     Sig: Take 1 tablet by mouth nightly as needed (insomnia)       Last Appointment Date: 4/19/2023  Next Appointment Date: 8/8/2023    Allergies   Allergen Reactions    Cosentyx [Secukinumab] Nausea And Vomiting and Rash      fever    Lantus [Insulin Glargine] Itching, Swelling and Rash     Patient started Lantus about a month ago, only new medication. His skin reaction started on his abdomen where he injects the Lantus and moved up to the face. This is similar to his response to Cosentyx.      Stellaria Rash    Infliximab Other (See Comments)     Nerve damage    Cyclosporine      Other reaction(s): has 1 kidney with a stent    Methotrexate Derivatives Other (See Comments)     Bone marrow toxicity    Seasonal     Adhesive Tape Rash    Keflex [Cephalexin] Rash    Otezla [Apremilast] Rash     Other reaction(s): Unknown    Taltz [Ixekizumab] Rash     Other reaction(s): Unknown    Ustekinumab Rash     Other reaction(s): rash-allergic and acute renal failure  Other reaction(s): Unknown

## 2023-07-27 RX ORDER — QUETIAPINE FUMARATE 25 MG/1
25 TABLET, FILM COATED ORAL NIGHTLY PRN
Qty: 90 TABLET | Refills: 0 | Status: SHIPPED | OUTPATIENT
Start: 2023-07-27

## 2023-08-08 ENCOUNTER — OFFICE VISIT (OUTPATIENT)
Dept: FAMILY MEDICINE CLINIC | Age: 63
End: 2023-08-08
Payer: MEDICARE

## 2023-08-08 ENCOUNTER — TELEPHONE (OUTPATIENT)
Dept: SURGERY | Age: 63
End: 2023-08-08

## 2023-08-08 VITALS
SYSTOLIC BLOOD PRESSURE: 128 MMHG | HEART RATE: 65 BPM | OXYGEN SATURATION: 98 % | BODY MASS INDEX: 26.76 KG/M2 | RESPIRATION RATE: 16 BRPM | WEIGHT: 180.7 LBS | TEMPERATURE: 97.1 F | HEIGHT: 69 IN | DIASTOLIC BLOOD PRESSURE: 80 MMHG

## 2023-08-08 DIAGNOSIS — T38.0X5D STEROID-INDUCED DIABETES MELLITUS, SUBSEQUENT ENCOUNTER (HCC): ICD-10-CM

## 2023-08-08 DIAGNOSIS — G47.00 INSOMNIA, UNSPECIFIED TYPE: ICD-10-CM

## 2023-08-08 DIAGNOSIS — E55.9 VITAMIN D DEFICIENCY: ICD-10-CM

## 2023-08-08 DIAGNOSIS — Z12.11 SCREEN FOR COLON CANCER: ICD-10-CM

## 2023-08-08 DIAGNOSIS — E09.9 STEROID-INDUCED DIABETES MELLITUS, SUBSEQUENT ENCOUNTER (HCC): ICD-10-CM

## 2023-08-08 DIAGNOSIS — M25.552 LEFT HIP PAIN: Primary | ICD-10-CM

## 2023-08-08 DIAGNOSIS — E78.5 HYPERLIPIDEMIA, UNSPECIFIED HYPERLIPIDEMIA TYPE: ICD-10-CM

## 2023-08-08 DIAGNOSIS — I10 ESSENTIAL HYPERTENSION: ICD-10-CM

## 2023-08-08 PROCEDURE — 3078F DIAST BP <80 MM HG: CPT | Performed by: FAMILY MEDICINE

## 2023-08-08 PROCEDURE — 1036F TOBACCO NON-USER: CPT | Performed by: FAMILY MEDICINE

## 2023-08-08 PROCEDURE — G8417 CALC BMI ABV UP PARAM F/U: HCPCS | Performed by: FAMILY MEDICINE

## 2023-08-08 PROCEDURE — 3017F COLORECTAL CA SCREEN DOC REV: CPT | Performed by: FAMILY MEDICINE

## 2023-08-08 PROCEDURE — G8427 DOCREV CUR MEDS BY ELIG CLIN: HCPCS | Performed by: FAMILY MEDICINE

## 2023-08-08 PROCEDURE — 3074F SYST BP LT 130 MM HG: CPT | Performed by: FAMILY MEDICINE

## 2023-08-08 PROCEDURE — 99214 OFFICE O/P EST MOD 30 MIN: CPT | Performed by: FAMILY MEDICINE

## 2023-08-08 PROCEDURE — 99213 OFFICE O/P EST LOW 20 MIN: CPT | Performed by: FAMILY MEDICINE

## 2023-08-08 RX ORDER — LISINOPRIL 10 MG/1
TABLET ORAL
Qty: 180 TABLET | Refills: 3 | Status: SHIPPED | OUTPATIENT
Start: 2023-08-08

## 2023-08-08 RX ORDER — PREDNISONE 20 MG/1
40 TABLET ORAL DAILY
Qty: 10 TABLET | Refills: 0 | Status: SHIPPED | OUTPATIENT
Start: 2023-08-08 | End: 2023-08-13

## 2023-08-08 RX ORDER — ZOLPIDEM TARTRATE 5 MG/1
5 TABLET ORAL NIGHTLY PRN
Qty: 15 TABLET | Refills: 0 | Status: SHIPPED | OUTPATIENT
Start: 2023-08-08 | End: 2023-08-23

## 2023-08-08 NOTE — PROGRESS NOTES
Alzheimer's Disease Maternal Grandmother     Other Maternal Grandmother         dementia    High Blood Pressure Maternal Grandfather     Cancer Maternal Grandfather         Unknown    High Blood Pressure Mother     Other Mother         blood clots    Other Paternal Grandmother         dementia     Social History     Tobacco Use    Smoking status: Former     Packs/day: 1.50     Years: 20.00     Pack years: 30.00     Types: Cigarettes     Start date: 1995     Quit date: 2015     Years since quittin.2    Smokeless tobacco: Never    Tobacco comments:     Quit smoking 2016, CARMEN Mack MALI, 10/20/2016. Substance Use Topics    Alcohol use: Not Currently     Alcohol/week: 0.0 standard drinks     Comment: Not since this started. Before not much. Current Outpatient Medications   Medication Sig Dispense Refill    lisinopril (PRINIVIL;ZESTRIL) 10 MG tablet Take 1-2 tabs by mouth daily as needed for elevated BP. 180 tablet 3    zolpidem (AMBIEN) 5 MG tablet Take 1 tablet by mouth nightly as needed for Sleep for up to 15 days. Max Daily Amount: 5 mg 15 tablet 0    pantoprazole (PROTONIX) 20 MG tablet Take 1 tablet by mouth at bedtime 90 tablet 1    pantoprazole (PROTONIX) 40 MG tablet Take 1 tablet by mouth every morning (before breakfast) 90 tablet 1    butalbital-acetaminophen-caffeine (FIORICET, ESGIC) -40 MG per tablet Take 1 tablet by mouth every 4 hours as needed for Headaches 20 tablet 0    ondansetron (ZOFRAN) 4 MG tablet Take 1 tablet by mouth every 8 hours as needed for Nausea 30 tablet 0    NOVOLOG 100 UNIT/ML injection vial Use as directed TID with meals as directed with sliding scale: 171-220 - 3 units; 221-270 - 5 units; 271-320 - 7 units; 321-370 - 9 units; >371 - 11 units. Max daily dose 33 units.  10 mL 5    SKYRIZI 150 MG/ML SOSY       Tapinarof 1 % CREA Apply topically daily      metoprolol (LOPRESSOR) 100 MG tablet Take 1 tablet by mouth daily 90 tablet 3    Insulin

## 2023-08-09 ENCOUNTER — TELEMEDICINE (OUTPATIENT)
Dept: FAMILY MEDICINE CLINIC | Age: 63
End: 2023-08-09
Payer: MEDICARE

## 2023-08-09 DIAGNOSIS — Z00.00 MEDICARE ANNUAL WELLNESS VISIT, SUBSEQUENT: Primary | ICD-10-CM

## 2023-08-09 PROCEDURE — G0439 PPPS, SUBSEQ VISIT: HCPCS | Performed by: FAMILY MEDICINE

## 2023-08-09 PROCEDURE — 3017F COLORECTAL CA SCREEN DOC REV: CPT | Performed by: FAMILY MEDICINE

## 2023-08-09 SDOH — HEALTH STABILITY: PHYSICAL HEALTH: ON AVERAGE, HOW MANY MINUTES DO YOU ENGAGE IN EXERCISE AT THIS LEVEL?: 40 MIN

## 2023-08-09 SDOH — HEALTH STABILITY: PHYSICAL HEALTH: ON AVERAGE, HOW MANY DAYS PER WEEK DO YOU ENGAGE IN MODERATE TO STRENUOUS EXERCISE (LIKE A BRISK WALK)?: 2 DAYS

## 2023-08-09 ASSESSMENT — PATIENT HEALTH QUESTIONNAIRE - PHQ9
1. LITTLE INTEREST OR PLEASURE IN DOING THINGS: 0
SUM OF ALL RESPONSES TO PHQ9 QUESTIONS 1 & 2: 0
SUM OF ALL RESPONSES TO PHQ QUESTIONS 1-9: 0
2. FEELING DOWN, DEPRESSED OR HOPELESS: 0
SUM OF ALL RESPONSES TO PHQ QUESTIONS 1-9: 0

## 2023-08-09 ASSESSMENT — LIFESTYLE VARIABLES
HOW MANY STANDARD DRINKS CONTAINING ALCOHOL DO YOU HAVE ON A TYPICAL DAY: 1
HOW OFTEN DO YOU HAVE SIX OR MORE DRINKS ON ONE OCCASION: 1
HOW MANY STANDARD DRINKS CONTAINING ALCOHOL DO YOU HAVE ON A TYPICAL DAY: 1 OR 2
HOW OFTEN DO YOU HAVE A DRINK CONTAINING ALCOHOL: 2
HOW OFTEN DO YOU HAVE A DRINK CONTAINING ALCOHOL: MONTHLY OR LESS

## 2023-08-09 NOTE — PATIENT INSTRUCTIONS
Personalized Preventive Plan for Klaudia Caban - 8/9/2023  Medicare offers a range of preventive health benefits. Some of the tests and screenings are paid in full while other may be subject to a deductible, co-insurance, and/or copay. Some of these benefits include a comprehensive review of your medical history including lifestyle, illnesses that may run in your family, and various assessments and screenings as appropriate. After reviewing your medical record and screening and assessments performed today your provider may have ordered immunizations, labs, imaging, and/or referrals for you. A list of these orders (if applicable) as well as your Preventive Care list are included within your After Visit Summary for your review. Other Preventive Recommendations:    A preventive eye exam performed by an eye specialist is recommended every 1-2 years to screen for glaucoma; cataracts, macular degeneration, and other eye disorders. A preventive dental visit is recommended every 6 months. Try to get at least 150 minutes of exercise per week or 10,000 steps per day on a pedometer . Order or download the FREE \"Exercise & Physical Activity: Your Everyday Guide\" from [employer]. Call 4-360.251.3250 or search [employer] online. You need 8548-9974 mg of calcium and 3117-3344 IU of vitamin D per day. It is possible to meet your calcium requirement with diet alone, but a vitamin D supplement is usually necessary to meet this goal.  When exposed to the sun, use a sunscreen that protects against both UVA and UVB radiation with an SPF of 30 or greater. Reapply every 2 to 3 hours or after sweating, drying off with a towel, or swimming. Always wear a seat belt when traveling in a car. Always wear a helmet when riding a bicycle or motorcycle.

## 2023-08-09 NOTE — PROGRESS NOTES
Medicare Annual Wellness Visit    Debi Judd is here for Tc GUZMAN    Assessment & Plan   Medicare annual wellness visit, subsequent    Recommendations for Preventive Services Due: see orders and patient instructions/AVS.  Recommended screening schedule for the next 5-10 years is provided to the patient in written form: see Patient Instructions/AVS.     No follow-ups on file. Subjective     Patient's complete Health Risk Assessment and screening values have been reviewed and are found in Flowsheets. The following problems were reviewed today and where indicated follow up appointments were made and/or referrals ordered. Positive Risk Factor Screenings with Interventions:              Self-assessment of health: In general, how would you say your health is?: (!) Poor    Interventions:  Had OV with KB on 8/8, discussed health issues            Advanced Directives:  Do you have a Living Will?: (!) No    Intervention:  has NO advanced directive - information provided                Objective      Patient-Reported Vitals  No data recorded         Allergies   Allergen Reactions    Cosentyx [Secukinumab] Nausea And Vomiting and Rash      fever    Lantus [Insulin Glargine] Itching, Swelling and Rash     Patient started Lantus about a month ago, only new medication. His skin reaction started on his abdomen where he injects the Lantus and moved up to the face. This is similar to his response to Cosentyx.      Stellaria Rash    Infliximab Other (See Comments)     Nerve damage    Cyclosporine      Other reaction(s): has 1 kidney with a stent    Methotrexate Derivatives Other (See Comments)     Bone marrow toxicity    Seasonal     Adhesive Tape Rash    Keflex [Cephalexin] Rash    Otezla [Apremilast] Rash     Other reaction(s): Unknown    Taltz [Ixekizumab] Rash     Other reaction(s): Unknown    Ustekinumab Rash     Other reaction(s): rash-allergic and acute renal failure  Other reaction(s): Unknown     Prior

## 2023-08-14 ENCOUNTER — PATIENT MESSAGE (OUTPATIENT)
Dept: FAMILY MEDICINE CLINIC | Age: 63
End: 2023-08-14

## 2023-08-14 DIAGNOSIS — M25.552 LEFT HIP PAIN: Primary | ICD-10-CM

## 2023-08-16 NOTE — TELEPHONE ENCOUNTER
From: Riky Azar  To: Dr. Alex Alfaro: 8/14/2023 10:24 AM EDT  Subject: Sleeping     The sleeping pills didn't work. The Prednisone did. What do we do next. Thank you.

## 2023-09-07 ENCOUNTER — PATIENT MESSAGE (OUTPATIENT)
Dept: FAMILY MEDICINE CLINIC | Age: 63
End: 2023-09-07

## 2023-09-07 DIAGNOSIS — G47.00 INSOMNIA, UNSPECIFIED TYPE: ICD-10-CM

## 2023-09-08 NOTE — TELEPHONE ENCOUNTER
From: Clarissa Laura  To: Dr. Massey Pa: 9/7/2023 6:51 PM EDT  Subject: Sleeping     Is there any way I can have more quetiapine fomaratt 25 mg. Taking 2 tablets a night seems to work. Thank you.

## 2023-09-08 NOTE — TELEPHONE ENCOUNTER
Pt has been taking 2- 25mg tablets at night and states has been helping. Would like a refill. Tj Wynn called requesting a refill of the below medication which has been pended for you:     Requested Prescriptions     Pending Prescriptions Disp Refills    QUEtiapine (SEROQUEL) 25 MG tablet 90 tablet 0     Sig: Take 1 tablet by mouth nightly as needed (insomnia)       Last Appointment Date: 8/9/2023  Next Appointment Date: 11/8/2023    Allergies   Allergen Reactions    Cosentyx [Secukinumab] Nausea And Vomiting and Rash      fever    Lantus [Insulin Glargine] Itching, Swelling and Rash     Patient started Lantus about a month ago, only new medication. His skin reaction started on his abdomen where he injects the Lantus and moved up to the face. This is similar to his response to Cosentyx.      Stellaria Rash    Infliximab Other (See Comments)     Nerve damage    Cyclosporine      Other reaction(s): has 1 kidney with a stent    Methotrexate Derivatives Other (See Comments)     Bone marrow toxicity    Seasonal     Adhesive Tape Rash    Keflex [Cephalexin] Rash    Otezla [Apremilast] Rash     Other reaction(s): Unknown    Taltz [Ixekizumab] Rash     Other reaction(s): Unknown    Ustekinumab Rash     Other reaction(s): rash-allergic and acute renal failure  Other reaction(s): Unknown

## 2023-09-10 RX ORDER — QUETIAPINE FUMARATE 25 MG/1
50 TABLET, FILM COATED ORAL NIGHTLY
Qty: 180 TABLET | Refills: 0 | Status: SHIPPED | OUTPATIENT
Start: 2023-09-10

## 2023-09-28 ENCOUNTER — PATIENT MESSAGE (OUTPATIENT)
Dept: FAMILY MEDICINE CLINIC | Age: 63
End: 2023-09-28

## 2023-09-28 DIAGNOSIS — G47.00 INSOMNIA, UNSPECIFIED TYPE: ICD-10-CM

## 2023-09-28 DIAGNOSIS — N52.9 ERECTILE DYSFUNCTION, UNSPECIFIED ERECTILE DYSFUNCTION TYPE: ICD-10-CM

## 2023-09-29 NOTE — TELEPHONE ENCOUNTER
Please see [employer] message from 8/14. Per [facility], last fill 8/8, quantity 15 for 15 days. Marlaine Cushing called requesting a refill of the below medication which has been pended for you:     Requested Prescriptions     Pending Prescriptions Disp Refills    zolpidem (AMBIEN) 5 MG tablet [Pharmacy Med Name: ZOLPIDEM TARTRATE 5 MG TABLET] 15 tablet      Sig: Take 1 tablet by mouth nightly as needed for Sleep.    sildenafil (VIAGRA) 100 MG tablet [Pharmacy Med Name: SILDENAFIL 100 MG TABLET] 30 tablet 5     Sig: take 1/2-1 tablet by mouth once daily if needed for EAT OR DRINK       Last Appointment Date: 8/9/2023  Next Appointment Date: 11/8/2023    Allergies   Allergen Reactions    Cosentyx [Secukinumab] Nausea And Vomiting and Rash      fever    Lantus [Insulin Glargine] Itching, Swelling and Rash     Patient started Lantus about a month ago, only new medication. His skin reaction started on his abdomen where he injects the Lantus and moved up to the face. This is similar to his response to Cosentyx.      Stellaria Rash    Infliximab Other (See Comments)     Nerve damage    Cyclosporine      Other reaction(s): has 1 kidney with a stent    Methotrexate Derivatives Other (See Comments)     Bone marrow toxicity    Seasonal     Adhesive Tape Rash    Keflex [Cephalexin] Rash    Otezla [Apremilast] Rash     Other reaction(s): Unknown    Taltz [Ixekizumab] Rash     Other reaction(s): Unknown    Ustekinumab Rash     Other reaction(s): rash-allergic and acute renal failure  Other reaction(s): Unknown

## 2023-10-02 NOTE — TELEPHONE ENCOUNTER
From: Ruth Saldana  To: Dr. Caballero Iron: 9/28/2023 5:38 PM EDT  Subject: Shara Pena for Miguel Patches    Dr Deborah St, can you please fill this paperwork out for me? I need it for the 's office.   Thank you

## 2023-10-04 ENCOUNTER — PATIENT MESSAGE (OUTPATIENT)
Dept: FAMILY MEDICINE CLINIC | Age: 63
End: 2023-10-04

## 2023-10-06 ENCOUNTER — PATIENT MESSAGE (OUTPATIENT)
Dept: FAMILY MEDICINE CLINIC | Age: 63
End: 2023-10-06

## 2023-10-06 DIAGNOSIS — K20.90 ESOPHAGITIS: ICD-10-CM

## 2023-10-06 DIAGNOSIS — N52.9 ERECTILE DYSFUNCTION, UNSPECIFIED ERECTILE DYSFUNCTION TYPE: ICD-10-CM

## 2023-10-06 NOTE — TELEPHONE ENCOUNTER
Kristian Wylie called requesting a refill of the below medication which has been pended for you:     Requested Prescriptions     Pending Prescriptions Disp Refills    sildenafil (VIAGRA) 100 MG tablet [Pharmacy Med Name: SILDENAFIL 100 MG TABLET] 30 tablet 5     Sig: take 1/2-1 tablet by mouth once daily if needed for EAT OR DRINK       Last Appointment Date: 8/9/2023  Next Appointment Date: 11/8/2023    Allergies   Allergen Reactions    Cosentyx [Secukinumab] Nausea And Vomiting and Rash      fever    Lantus [Insulin Glargine] Itching, Swelling and Rash     Patient started Lantus about a month ago, only new medication. His skin reaction started on his abdomen where he injects the Lantus and moved up to the face. This is similar to his response to Cosentyx.      Stellaria Rash    Infliximab Other (See Comments)     Nerve damage    Cyclosporine      Other reaction(s): has 1 kidney with a stent    Methotrexate Derivatives Other (See Comments)     Bone marrow toxicity    Seasonal     Adhesive Tape Rash    Keflex [Cephalexin] Rash    Otezla [Apremilast] Rash     Other reaction(s): Unknown    Taltz [Ixekizumab] Rash     Other reaction(s): Unknown    Ustekinumab Rash     Other reaction(s): rash-allergic and acute renal failure  Other reaction(s): Unknown

## 2023-10-06 NOTE — TELEPHONE ENCOUNTER
From: Nestor Myers  To: Dr. Reyna Solders: 10/4/2023 5:33 PM EDT  Subject: Ed medicine     Is there any way you can renew my sildenafil.  Thank you

## 2023-10-06 NOTE — TELEPHONE ENCOUNTER
Fito Tao called requesting a refill of the below medication which has been pended for you:     Requested Prescriptions     Pending Prescriptions Disp Refills    pantoprazole (PROTONIX) 40 MG tablet [Pharmacy Med Name: PANTOPRAZOLE SOD DR 40 MG TAB] 90 tablet 1     Sig: take 1 tablet by mouth EVERY MORNING BEFORE BREAKFAST       Last Appointment Date: 8/9/2023  Next Appointment Date: 11/8/2023    Allergies   Allergen Reactions    Cosentyx [Secukinumab] Nausea And Vomiting and Rash      fever    Lantus [Insulin Glargine] Itching, Swelling and Rash     Patient started Lantus about a month ago, only new medication. His skin reaction started on his abdomen where he injects the Lantus and moved up to the face. This is similar to his response to Cosentyx.      Stellaria Rash    Infliximab Other (See Comments)     Nerve damage    Cyclosporine      Other reaction(s): has 1 kidney with a stent    Methotrexate Derivatives Other (See Comments)     Bone marrow toxicity    Seasonal     Adhesive Tape Rash    Keflex [Cephalexin] Rash    Otezla [Apremilast] Rash     Other reaction(s): Unknown    Taltz [Ixekizumab] Rash     Other reaction(s): Unknown    Ustekinumab Rash     Other reaction(s): rash-allergic and acute renal failure  Other reaction(s): Unknown

## 2023-10-09 ENCOUNTER — TELEPHONE (OUTPATIENT)
Dept: FAMILY MEDICINE CLINIC | Age: 63
End: 2023-10-09

## 2023-10-09 DIAGNOSIS — K20.90 ESOPHAGITIS: ICD-10-CM

## 2023-10-09 RX ORDER — PANTOPRAZOLE SODIUM 40 MG/1
40 TABLET, DELAYED RELEASE ORAL
Qty: 90 TABLET | Refills: 1 | Status: CANCELLED | OUTPATIENT
Start: 2023-10-09

## 2023-10-09 NOTE — TELEPHONE ENCOUNTER
Patient wife is calling wanting scripts sent in bc he is all out now and patient wife is checking on paperwork she brought in to be addressed. Please advise.

## 2023-10-10 NOTE — TELEPHONE ENCOUNTER
Meds are pending for Dr. Alfonso Cisse to refill. Paperwork received, will contact pt to inquire more information.

## 2023-10-11 RX ORDER — SILDENAFIL 100 MG/1
50-100 TABLET, FILM COATED ORAL PRN
Qty: 30 TABLET | Refills: 5 | Status: SHIPPED | OUTPATIENT
Start: 2023-10-11

## 2023-10-11 RX ORDER — ZOLPIDEM TARTRATE 5 MG/1
5 TABLET ORAL NIGHTLY PRN
Qty: 15 TABLET | OUTPATIENT
Start: 2023-10-11

## 2023-10-11 RX ORDER — SILDENAFIL 100 MG/1
TABLET, FILM COATED ORAL
Qty: 30 TABLET | Refills: 5 | OUTPATIENT
Start: 2023-10-11

## 2023-10-11 RX ORDER — PANTOPRAZOLE SODIUM 40 MG/1
40 TABLET, DELAYED RELEASE ORAL
Qty: 90 TABLET | Refills: 3 | Status: SHIPPED | OUTPATIENT
Start: 2023-10-11

## 2023-10-11 RX ORDER — PANTOPRAZOLE SODIUM 20 MG/1
20 TABLET, DELAYED RELEASE ORAL NIGHTLY
Qty: 90 TABLET | Refills: 3 | Status: SHIPPED | OUTPATIENT
Start: 2023-10-11

## 2023-10-12 DIAGNOSIS — K20.90 ESOPHAGITIS: ICD-10-CM

## 2023-10-12 RX ORDER — PANTOPRAZOLE SODIUM 20 MG/1
20 TABLET, DELAYED RELEASE ORAL NIGHTLY
Qty: 90 TABLET | Refills: 3 | OUTPATIENT
Start: 2023-10-12

## 2023-11-08 ENCOUNTER — TELEPHONE (OUTPATIENT)
Dept: SURGERY | Age: 63
End: 2023-11-08

## 2023-11-08 ENCOUNTER — OFFICE VISIT (OUTPATIENT)
Dept: FAMILY MEDICINE CLINIC | Age: 63
End: 2023-11-08
Payer: MEDICARE

## 2023-11-08 VITALS
DIASTOLIC BLOOD PRESSURE: 84 MMHG | TEMPERATURE: 97.4 F | HEIGHT: 69 IN | WEIGHT: 179.8 LBS | OXYGEN SATURATION: 99 % | SYSTOLIC BLOOD PRESSURE: 130 MMHG | BODY MASS INDEX: 26.63 KG/M2 | HEART RATE: 63 BPM | RESPIRATION RATE: 16 BRPM

## 2023-11-08 DIAGNOSIS — D35.2 PITUITARY MICROADENOMA (HCC): ICD-10-CM

## 2023-11-08 DIAGNOSIS — G47.00 INSOMNIA, UNSPECIFIED TYPE: Primary | ICD-10-CM

## 2023-11-08 DIAGNOSIS — N18.32 STAGE 3B CHRONIC KIDNEY DISEASE (HCC): Chronic | ICD-10-CM

## 2023-11-08 DIAGNOSIS — H93.19 TINNITUS, UNSPECIFIED LATERALITY: ICD-10-CM

## 2023-11-08 DIAGNOSIS — Z12.11 SCREEN FOR COLON CANCER: ICD-10-CM

## 2023-11-08 PROCEDURE — 1036F TOBACCO NON-USER: CPT | Performed by: FAMILY MEDICINE

## 2023-11-08 PROCEDURE — G8417 CALC BMI ABV UP PARAM F/U: HCPCS | Performed by: FAMILY MEDICINE

## 2023-11-08 PROCEDURE — G8427 DOCREV CUR MEDS BY ELIG CLIN: HCPCS | Performed by: FAMILY MEDICINE

## 2023-11-08 PROCEDURE — 3078F DIAST BP <80 MM HG: CPT | Performed by: FAMILY MEDICINE

## 2023-11-08 PROCEDURE — 99214 OFFICE O/P EST MOD 30 MIN: CPT | Performed by: FAMILY MEDICINE

## 2023-11-08 PROCEDURE — G8483 FLU IMM NO ADMIN DOC REA: HCPCS | Performed by: FAMILY MEDICINE

## 2023-11-08 PROCEDURE — 3074F SYST BP LT 130 MM HG: CPT | Performed by: FAMILY MEDICINE

## 2023-11-08 PROCEDURE — 3017F COLORECTAL CA SCREEN DOC REV: CPT | Performed by: FAMILY MEDICINE

## 2023-11-08 RX ORDER — ZOLPIDEM TARTRATE 6.25 MG/1
6.25 TABLET, FILM COATED, EXTENDED RELEASE ORAL NIGHTLY PRN
Qty: 15 TABLET | Refills: 0 | Status: SHIPPED | OUTPATIENT
Start: 2023-11-08 | End: 2023-11-15 | Stop reason: SINTOL

## 2023-11-10 ENCOUNTER — PATIENT MESSAGE (OUTPATIENT)
Dept: FAMILY MEDICINE CLINIC | Age: 63
End: 2023-11-10

## 2023-11-10 NOTE — TELEPHONE ENCOUNTER
From: Teresa Ramirez  To: Dr. Fuentes Arielle: 11/10/2023 12:01 AM EST  Subject: Sleep     One day that's it. Not even the second day.

## 2023-11-24 ENCOUNTER — PATIENT MESSAGE (OUTPATIENT)
Dept: FAMILY MEDICINE CLINIC | Age: 63
End: 2023-11-24

## 2023-11-27 NOTE — TELEPHONE ENCOUNTER
From: Trinidad Kennedy  To: Dr. Rand Drought: 11/24/2023 6:15 AM EST  Subject: Sleeping     Have tried this for over a week now still not working 2 or 3 hours of sleep a night is not enough. Sorry I'm such a bother. But I need help. Starting to add up. Please help.  Thank you

## 2023-11-29 ENCOUNTER — TELEPHONE (OUTPATIENT)
Dept: SURGERY | Age: 63
End: 2023-11-29

## 2023-11-29 NOTE — TELEPHONE ENCOUNTER
[address] [facility]  General Surgery  Phone # [phone]  Fax # [phone]    [facility]    RFL:[id number]     Surgical/Procedure Planned: Colonoscopy     Date & Location: [facility]       Outpatient   Planned Length of OR: 30-45 min    Sedation: intravenous sedation      Estimated Cardiac Risk for Non-Cardiac Surgery/Procedure     Low______ Moderate______ High______    Medication Instructions - Clarification needed by this date: ASAP    -Insulin: Novolog 100 unit/mL SQ TID with meals - hold the night before procedure and AM of procedure. Pt only takes as needed, so can resume when eating normally and when needed based on glucose.       Resume medications:        Provider: Dr. Tamera Adams       Signature of Provider Giving Orders for Medication holds:    _______Vero Carrington, DO_____________________

## 2023-12-05 ENCOUNTER — PATIENT MESSAGE (OUTPATIENT)
Dept: FAMILY MEDICINE CLINIC | Age: 63
End: 2023-12-05

## 2023-12-05 DIAGNOSIS — I10 ESSENTIAL HYPERTENSION: Primary | ICD-10-CM

## 2023-12-05 NOTE — TELEPHONE ENCOUNTER
From: Devonte Douglas  To: Dr. Monserrat Graham: 12/5/2023 10:30 AM EST  Subject: Heart rate     Could you please refill my metoprolol tartrate 100 mg. Tablets. Thank you.

## 2023-12-07 RX ORDER — POLYETHYLENE GLYCOL 3350, SODIUM SULFATE ANHYDROUS, SODIUM BICARBONATE, SODIUM CHLORIDE, POTASSIUM CHLORIDE 236; 22.74; 6.74; 5.86; 2.97 G/4L; G/4L; G/4L; G/4L; G/4L
POWDER, FOR SOLUTION ORAL
Qty: 4000 ML | Refills: 0 | Status: SHIPPED | OUTPATIENT
Start: 2023-12-07

## 2023-12-07 RX ORDER — METOPROLOL TARTRATE 100 MG/1
100 TABLET ORAL DAILY
Qty: 90 TABLET | Refills: 3 | Status: SHIPPED | OUTPATIENT
Start: 2023-12-07

## 2023-12-07 RX ORDER — BISACODYL 5 MG/1
TABLET, DELAYED RELEASE ORAL
Qty: 2 TABLET | Refills: 0 | Status: SHIPPED | OUTPATIENT
Start: 2023-12-07

## 2023-12-11 DIAGNOSIS — I10 ESSENTIAL HYPERTENSION: ICD-10-CM

## 2023-12-11 DIAGNOSIS — L40.9 SCALP PSORIASIS: ICD-10-CM

## 2023-12-11 RX ORDER — METOPROLOL TARTRATE 100 MG/1
100 TABLET ORAL DAILY
Qty: 90 TABLET | Refills: 3 | OUTPATIENT
Start: 2023-12-11

## 2023-12-11 NOTE — TELEPHONE ENCOUNTER
[facility] called requesting a refill of the below medication which has been pended for you:     Requested Prescriptions     Pending Prescriptions Disp Refills    Clobetasol Propionate 0.05 % SHAM 118 mL 5       Last Appointment Date: 11/8/2023  Next Appointment Date: 12/11/2023    Allergies   Allergen Reactions    Cosentyx [Secukinumab] Nausea And Vomiting and Rash      fever    Lantus [Insulin Glargine] Itching, Swelling and Rash     Patient started Lantus about a month ago, only new medication. His skin reaction started on his abdomen where he injects the Lantus and moved up to the face. This is similar to his response to Cosentyx.      Stellaria Rash    Infliximab Other (See Comments)     Nerve damage    Cyclosporine      Other reaction(s): has 1 kidney with a stent    Methotrexate Derivatives Other (See Comments)     Bone marrow toxicity    Seasonal     Adhesive Tape Rash    Keflex [Cephalexin] Rash    Otezla [Apremilast] Rash     Other reaction(s): Unknown    Taltz [Ixekizumab] Rash     Other reaction(s): Unknown    Ustekinumab Rash     Other reaction(s): rash-allergic and acute renal failure  Other reaction(s): Unknown

## 2023-12-14 RX ORDER — CLOBETASOL PROPIONATE 0.05 G/100ML
SHAMPOO TOPICAL
Qty: 118 ML | Refills: 5 | Status: SHIPPED | OUTPATIENT
Start: 2023-12-14

## 2024-01-25 ENCOUNTER — ANESTHESIA (OUTPATIENT)
Dept: OPERATING ROOM | Age: 64
End: 2024-01-25
Payer: MEDICARE

## 2024-01-25 ENCOUNTER — ANESTHESIA EVENT (OUTPATIENT)
Dept: OPERATING ROOM | Age: 64
End: 2024-01-25
Payer: MEDICARE

## 2024-01-25 ENCOUNTER — HOSPITAL ENCOUNTER (OUTPATIENT)
Age: 64
Setting detail: OUTPATIENT SURGERY
Discharge: HOME | End: 2024-01-25
Attending: SURGERY | Admitting: SURGERY
Payer: MEDICARE

## 2024-01-25 VITALS
TEMPERATURE: 97 F | BODY MASS INDEX: 25.92 KG/M2 | DIASTOLIC BLOOD PRESSURE: 85 MMHG | HEIGHT: 69 IN | RESPIRATION RATE: 16 BRPM | WEIGHT: 175 LBS | SYSTOLIC BLOOD PRESSURE: 150 MMHG | HEART RATE: 62 BPM | OXYGEN SATURATION: 99 %

## 2024-01-25 LAB — GLUCOSE BLD-MCNC: 116 MG/DL (ref 75–110)

## 2024-01-25 PROCEDURE — 2580000003 HC RX 258: Performed by: SURGERY

## 2024-01-25 PROCEDURE — 7100000010 HC PHASE II RECOVERY - FIRST 15 MIN: Performed by: SURGERY

## 2024-01-25 PROCEDURE — 6360000002 HC RX W HCPCS: Performed by: NURSE ANESTHETIST, CERTIFIED REGISTERED

## 2024-01-25 PROCEDURE — 3609027000 HC COLONOSCOPY: Performed by: SURGERY

## 2024-01-25 PROCEDURE — 7100000011 HC PHASE II RECOVERY - ADDTL 15 MIN: Performed by: SURGERY

## 2024-01-25 PROCEDURE — 3700000000 HC ANESTHESIA ATTENDED CARE: Performed by: SURGERY

## 2024-01-25 PROCEDURE — 3700000001 HC ADD 15 MINUTES (ANESTHESIA): Performed by: SURGERY

## 2024-01-25 PROCEDURE — 82947 ASSAY GLUCOSE BLOOD QUANT: CPT

## 2024-01-25 PROCEDURE — 2709999900 HC NON-CHARGEABLE SUPPLY: Performed by: SURGERY

## 2024-01-25 RX ORDER — SODIUM CHLORIDE 9 MG/ML
INJECTION, SOLUTION INTRAVENOUS PRN
Status: DISCONTINUED | OUTPATIENT
Start: 2024-01-25 | End: 2024-01-25 | Stop reason: HOSPADM

## 2024-01-25 RX ORDER — SODIUM CHLORIDE, SODIUM LACTATE, POTASSIUM CHLORIDE, CALCIUM CHLORIDE 600; 310; 30; 20 MG/100ML; MG/100ML; MG/100ML; MG/100ML
INJECTION, SOLUTION INTRAVENOUS CONTINUOUS
Status: DISCONTINUED | OUTPATIENT
Start: 2024-01-25 | End: 2024-01-25 | Stop reason: HOSPADM

## 2024-01-25 RX ORDER — SODIUM CHLORIDE 0.9 % (FLUSH) 0.9 %
5-40 SYRINGE (ML) INJECTION PRN
Status: DISCONTINUED | OUTPATIENT
Start: 2024-01-25 | End: 2024-01-25 | Stop reason: HOSPADM

## 2024-01-25 RX ORDER — PROPOFOL 10 MG/ML
INJECTION, EMULSION INTRAVENOUS CONTINUOUS PRN
Status: DISCONTINUED | OUTPATIENT
Start: 2024-01-25 | End: 2024-01-25 | Stop reason: SDUPTHER

## 2024-01-25 RX ORDER — SODIUM CHLORIDE 0.9 % (FLUSH) 0.9 %
5-40 SYRINGE (ML) INJECTION EVERY 12 HOURS SCHEDULED
Status: DISCONTINUED | OUTPATIENT
Start: 2024-01-25 | End: 2024-01-25 | Stop reason: HOSPADM

## 2024-01-25 RX ADMIN — SODIUM CHLORIDE, POTASSIUM CHLORIDE, SODIUM LACTATE AND CALCIUM CHLORIDE: 600; 310; 30; 20 INJECTION, SOLUTION INTRAVENOUS at 07:44

## 2024-01-25 RX ADMIN — PROPOFOL 100 MG: 10 INJECTION, EMULSION INTRAVENOUS at 08:00

## 2024-01-25 RX ADMIN — PROPOFOL 200 MCG/KG/MIN: 10 INJECTION, EMULSION INTRAVENOUS at 08:02

## 2024-01-25 ASSESSMENT — PAIN - FUNCTIONAL ASSESSMENT
PAIN_FUNCTIONAL_ASSESSMENT: NONE - DENIES PAIN
PAIN_FUNCTIONAL_ASSESSMENT: ADULT NONVERBAL PAIN SCALE (NPVS)

## 2024-01-25 ASSESSMENT — PAIN SCALES - GENERAL
PAINLEVEL_OUTOF10: 0
PAINLEVEL_OUTOF10: 0

## 2024-01-25 NOTE — OP NOTE
[address] [address]                                OPERATIVE REPORT    PATIENT NAME: KATHLEEN OLIVER                :        1960  MED REC NO:   [id number]                             ROOM:  ACCOUNT NO:   [id number]                           ADMIT DATE: 2024  PROVIDER:     Anton Milligan    DATE OF PROCEDURE:  2024    SURGEON:  Dr. Anton Milligan.    ASSISTANT:  None.    PREOPERATIVE DIAGNOSIS:  Screening.    POSTOPERATIVE DIAGNOSIS:  Screening.    PROCEDURE:  Colonoscopy.    ANESTHESIA:  MAC.    ESTIMATED BLOOD LOSS:  Minimal.    FLUIDS:  Per anesthesia record.    COMPLICATIONS:  None.    SPECIMENS:  None.    INDICATION FOR PROCEDURE:  The patient is a 63-year-old gentleman  referred to my office for repeat screening colonoscopy.  After  evaluation, decision was made to proceed.  Prior to the time of the  procedure, risks, benefits, and alternatives were explained to the  patient and consent was obtained.    DESCRIPTION OF PROCEDURE:  The patient was brought to endoscopy suite,  kept on preop gurney and placed in the left lateral decubitus position.   Monitoring devices were placed.  MAC anesthesia was induced.  After  induction of anesthesia, time-out was performed and correct patient and  procedure were verified.  Digital rectal exam was performed which showed  no abnormalities.  The Olympus video endoscope was lubricated, inserted  into the patient's rectum which was gently insufflated with air.  Scope  was then slowly advanced through colon under visualization to the level  of cecum which was identified by appendiceal orifice and ileocecal  valve.  During advancement of scope, bowel prep was adequate.  Scope was  then slowly withdrawn through colon with withdrawal time being greater  than 7 minutes.  During this time, colonic mucosa was carefully  inspected.  Mucosa appeared healthy and there were  No, the medications used were very short acting.  No need for appointment today, she just needs to rest.

## 2024-01-25 NOTE — DISCHARGE INSTRUCTIONS
provider.   Don't share them with anyone else.   Know what effects and side effects to expect, and report them to your healthcare provider.   If you are taking more than one drug, even if it is an over-the-counter medication, herb, or dietary supplement, be sure to check with a physician or pharmacist about drug interactions.   Plan ahead for refills so you don't run out.   Lifestyle Changes    The results of your colonoscopy will determine if any lifestyle changes are necessary.   Follow-up   The doctor will usually give you a preliminary report after the medication wears off and you are more alert. The results from a biopsy can take as long as 1-2 weeks to be completed.   Schedule a follow-up appointment as directed by your doctor.    You should schedule a follow-up colonoscopy as recommended by your doctor.    Call Your Doctor If Any of the Following Occurs   Monitor your recovery once you leave the hospital. As soon as you have a problem, alert your doctor. If any of the following occur, call your doctor:   Bleeding from your rectum; notify your doctor if you pass a teaspoonful or more of blood   Black, tarry stools   Severe abdominal pain   Hard, swollen abdomen   Signs of infection, including fever or chills   Inability to pass gas or stool   Coughing, shortness of breath, chest pain, severe nausea or vomiting   In case of an emergency, call [phone] immediately.     Problems / Questions - Call [phone] ([facility]) or after hours call [phone] ([facility]) and they will page the doctor for you.

## 2024-01-25 NOTE — H&P
Subjective   Juanito Pope is a 63 y.o. male who presents today for repeat screening colonoscopy.  Last was in 2013.  Denies any recent changes in bowel movements, blood per rectum, melena or unintended weight loss.  No reported family hx of colon cancer.     Past Medical History:   Diagnosis Date    Allergic reaction caused by a drug 8/13/2016    Arthritis     psoritic arthritis    Chronic kidney disease     Follows with Nephro    COPD (chronic obstructive pulmonary disease) (HCC)     pt  denies    CVA (cerebral vascular accident) (HCC)     Neuro eval with Dr MALI yost    Depression     Diabetes mellitus (HCC)     Erectile dysfunction     History of blood transfusion     History of migraine headaches     Hyperlipidemia     Hypertension     Pituitary microadenoma (HCC)     Eval by NS and Endo 2011 but no recent FU    Pneumothorax 05/27/2015    left    Psoriatic arthritis (HCC)     Stroke (HCC)     x3    Thrombocytopenia (HCC)        Past Surgical History:   Procedure Laterality Date    CHEST TUBE INSERTION Left     CHOLECYSTECTOMY, LAPAROSCOPIC N/A 04/15/2022    Laparoscopic Robotic Assisted CHOLECYSTECTOMY performed by Anton Milligan DO at [facility] OR    COLONOSCOPY  08/08/2013    normal performed by Dr. Frazier, repeat in 10 years    OTHER SURGICAL HISTORY Right     stent r kidney    OTHER SURGICAL HISTORY  05/27/2015    resection of bleb    PILONIDAL CYST DRAINAGE      SHOULDER SURGERY Left     Rotator cuff    THORACOSCOPY  05/27/2015    THORACOTOMY  05/27/2015    UPPER GASTROINTESTINAL ENDOSCOPY      UPPER GASTROINTESTINAL ENDOSCOPY N/A 07/08/2020    EGD with biopsies performed by Robert Corona DO at [facility] OR    UPPER GASTROINTESTINAL ENDOSCOPY  01/20/2021    UPPER GASTROINTESTINAL ENDOSCOPY N/A 01/20/2021    EGD BIOPSY performed by Quan Burns MD at [facility] OR    UPPER GASTROINTESTINAL ENDOSCOPY N/A 04/08/2021    EGD BIOPSY GASTRIC AND GE JUNCTION  performed by Quan Burns MD at [facility] OR

## 2024-01-25 NOTE — ANESTHESIA POSTPROCEDURE EVALUATION
Department of Anesthesiology  Postprocedure Note    Patient: Juanito Pope  MRN: [id number]  YOB: 1960  Date of evaluation: 1/25/2024    Procedure Summary       Date: 01/25/24 Room / Location: [facility]    Anesthesia Start: 0756 Anesthesia Stop: 0814    Procedure: COLONOSCOPY Diagnosis:       Colon cancer screening      (Colon cancer screening [Z12.11])    Surgeons: Anton Milligan DO Responsible Provider: Hussain Badillo APRN - CRNA    Anesthesia Type: General, TIVA ASA Status: 3            Anesthesia Type: General, TIVA    Vashti Phase I: Vashti Score: 10    Vashti Phase II:      Anesthesia Post Evaluation    Patient location during evaluation: bedside  Level of consciousness: sleepy but conscious  Airway patency: patent  Nausea & Vomiting: no nausea and no vomiting  Cardiovascular status: hemodynamically stable  Respiratory status: spontaneous ventilation  Hydration status: stable  Pain management: satisfactory to patient    No notable events documented.

## 2024-01-25 NOTE — ANESTHESIA PRE PROCEDURE
Zach Yang DO   NOVOLOG 100 UNIT/ML injection vial Use as directed TID with meals as directed with sliding scale: 171-220 - 3 units; 221-270 - 5 units; 271-320 - 7 units; 321-370 - 9 units; >371 - 11 units. Max daily dose 33 units.  Patient not taking: Reported on 11/8/2023 2/22/23   Vero Chen DO   SKYRIZI 150 MG/ML SOSY  10/18/22   Kat Deal MD   Tapinarof 1 % CREA Apply topically daily 8/10/22   Kat Deal MD   Insulin Syringe-Needle U-100 30G X 5/16\" 0.3 ML MISC USE UP TO FIVE TIMES DAILY AS DIRECTED WITH INSULIN 9/24/22   Vero Chen DO   blood glucose test strips (FREESTYLE LITE) strip TEST THREE TIMES A DAY AND AS NEEDED FOR SYMPTOMS OF IRREGULAR BLOOD GLUCOSE. 3/31/22   Vero Chen DO   desoximetasone (TOPICORT) 0.25 % OINT  2/25/22   Kat Deal MD   methotrexate (RHEUMATREX) 2.5 MG chemo tablet Take 2 tablets by mouth once a week Take 5 mg (two of the 2.5 mg tabs) weekly on Friday's.    Kat Deal MD   folic acid (FOLVITE) 1 MG tablet Take 1 tablet by mouth daily    Kat Deal MD   triamcinolone (KENALOG) 0.1 % ointment  12/24/21   Kat Deal MD   Continuous Blood Gluc  (DEXCOM G6 ) SILVANA Use to continuously check blood glucose.  Patient not taking: Reported on 4/19/2023 2/11/22   Vero Chen DO   Continuous Blood Gluc Transmit (DEXCOM G6 TRANSMITTER) MISC Use to check blood glucose continuously.  Patient not taking: Reported on 4/19/2023 2/11/22   Vero Chen DO   Continuous Blood Gluc Sensor (DEXCOM G6 SENSOR) MISC Use to check blood glucose continuously.  Patient not taking: Reported on 4/19/2023 2/11/22   Vero Chen DO       Current medications:    No current facility-administered medications for this encounter.       Allergies:    Allergies   Allergen Reactions   • Cosentyx [Secukinumab] Nausea And Vomiting and Rash      fever   • Lantus [Insulin Glargine] Itching, Swelling and Rash

## 2024-01-31 PROBLEM — Z12.11 SCREEN FOR COLON CANCER: Status: ACTIVE | Noted: 2024-01-31

## 2024-02-03 ENCOUNTER — HOSPITAL ENCOUNTER (OUTPATIENT)
Age: 64
Discharge: HOME | End: 2024-02-03
Payer: MEDICARE

## 2024-02-03 LAB
ALBUMIN SERPL-MCNC: 4.2 G/DL (ref 3.5–5.2)
ALBUMIN/GLOB SERPL: 1.6 {RATIO} (ref 1–2.5)
ALP SERPL-CCNC: 129 U/L (ref 40–129)
ALT SERPL-CCNC: 10 U/L (ref 5–41)
ANION GAP SERPL CALCULATED.3IONS-SCNC: 10 MMOL/L (ref 9–17)
AST SERPL-CCNC: 18 U/L
BASOPHILS # BLD: 0.13 K/UL (ref 0–0.2)
BASOPHILS NFR BLD: 2 % (ref 0–2)
BILIRUB SERPL-MCNC: 0.5 MG/DL (ref 0.3–1.2)
BUN SERPL-MCNC: 11 MG/DL (ref 8–23)
BUN/CREAT SERPL: 5 (ref 9–20)
CALCIUM SERPL-MCNC: 9 MG/DL (ref 8.6–10.4)
CHLORIDE SERPL-SCNC: 97 MMOL/L (ref 98–107)
CO2 SERPL-SCNC: 27 MMOL/L (ref 20–31)
CREAT SERPL-MCNC: 2.2 MG/DL (ref 0.7–1.2)
EOSINOPHIL # BLD: 0.39 K/UL (ref 0–0.44)
EOSINOPHILS RELATIVE PERCENT: 5 % (ref 1–4)
ERYTHROCYTE [DISTWIDTH] IN BLOOD BY AUTOMATED COUNT: 15.8 % (ref 11.8–14.4)
GFR SERPL CREATININE-BSD FRML MDRD: 33 ML/MIN/1.73M2
GLUCOSE SERPL-MCNC: 173 MG/DL (ref 70–99)
HCT VFR BLD AUTO: 42.1 % (ref 40.7–50.3)
HGB BLD-MCNC: 14.4 G/DL (ref 13–17)
IMM GRANULOCYTES # BLD AUTO: 0.03 K/UL (ref 0–0.3)
IMM GRANULOCYTES NFR BLD: 0 %
LYMPHOCYTES NFR BLD: 1.25 K/UL (ref 1.1–3.7)
LYMPHOCYTES RELATIVE PERCENT: 15 % (ref 24–43)
MCH RBC QN AUTO: 32.8 PG (ref 25.2–33.5)
MCHC RBC AUTO-ENTMCNC: 34.2 G/DL (ref 25.2–33.5)
MCV RBC AUTO: 95.9 FL (ref 82.6–102.9)
MONOCYTES NFR BLD: 0.89 K/UL (ref 0.1–1.2)
MONOCYTES NFR BLD: 11 % (ref 3–12)
NEUTROPHILS NFR BLD: 67 % (ref 36–65)
NEUTS SEG NFR BLD: 5.66 K/UL (ref 1.5–8.1)
NRBC BLD-RTO: 0 PER 100 WBC
PLATELET # BLD AUTO: 270 K/UL (ref 138–453)
PMV BLD AUTO: 9.3 FL (ref 8.1–13.5)
POTASSIUM SERPL-SCNC: 4.5 MMOL/L (ref 3.7–5.3)
PROT SERPL-MCNC: 6.8 G/DL (ref 6.4–8.3)
RBC # BLD AUTO: 4.39 M/UL (ref 4.21–5.77)
RBC # BLD: ABNORMAL 10*6/UL
SODIUM SERPL-SCNC: 134 MMOL/L (ref 135–144)
WBC OTHER # BLD: 8.4 K/UL (ref 3.5–11.3)

## 2024-02-03 PROCEDURE — 80053 COMPREHEN METABOLIC PANEL: CPT

## 2024-02-03 PROCEDURE — 36415 COLL VENOUS BLD VENIPUNCTURE: CPT

## 2024-02-03 PROCEDURE — 85025 COMPLETE CBC W/AUTO DIFF WBC: CPT

## 2024-02-09 ENCOUNTER — OFFICE VISIT (OUTPATIENT)
Dept: FAMILY MEDICINE CLINIC | Age: 64
End: 2024-02-09
Payer: MEDICARE

## 2024-02-09 VITALS
HEIGHT: 69 IN | DIASTOLIC BLOOD PRESSURE: 68 MMHG | OXYGEN SATURATION: 97 % | SYSTOLIC BLOOD PRESSURE: 98 MMHG | BODY MASS INDEX: 26.54 KG/M2 | RESPIRATION RATE: 14 BRPM | TEMPERATURE: 97.4 F | HEART RATE: 67 BPM | WEIGHT: 179.2 LBS

## 2024-02-09 DIAGNOSIS — N52.9 ERECTILE DYSFUNCTION, UNSPECIFIED ERECTILE DYSFUNCTION TYPE: ICD-10-CM

## 2024-02-09 DIAGNOSIS — T38.0X5D STEROID-INDUCED DIABETES MELLITUS, SUBSEQUENT ENCOUNTER (HCC): ICD-10-CM

## 2024-02-09 DIAGNOSIS — N18.32 STAGE 3B CHRONIC KIDNEY DISEASE (HCC): ICD-10-CM

## 2024-02-09 DIAGNOSIS — I10 ESSENTIAL HYPERTENSION: Primary | ICD-10-CM

## 2024-02-09 DIAGNOSIS — E09.9 STEROID-INDUCED DIABETES MELLITUS, SUBSEQUENT ENCOUNTER (HCC): ICD-10-CM

## 2024-02-09 DIAGNOSIS — L40.9 PSORIASIS: ICD-10-CM

## 2024-02-09 DIAGNOSIS — F51.04 CHRONIC INSOMNIA: ICD-10-CM

## 2024-02-09 DIAGNOSIS — L40.50 PSORIATIC ARTHRITIS (HCC): ICD-10-CM

## 2024-02-09 PROCEDURE — 3074F SYST BP LT 130 MM HG: CPT | Performed by: FAMILY MEDICINE

## 2024-02-09 PROCEDURE — G8483 FLU IMM NO ADMIN DOC REA: HCPCS | Performed by: FAMILY MEDICINE

## 2024-02-09 PROCEDURE — G8427 DOCREV CUR MEDS BY ELIG CLIN: HCPCS | Performed by: FAMILY MEDICINE

## 2024-02-09 PROCEDURE — G8417 CALC BMI ABV UP PARAM F/U: HCPCS | Performed by: FAMILY MEDICINE

## 2024-02-09 PROCEDURE — 1036F TOBACCO NON-USER: CPT | Performed by: FAMILY MEDICINE

## 2024-02-09 PROCEDURE — 3078F DIAST BP <80 MM HG: CPT | Performed by: FAMILY MEDICINE

## 2024-02-09 PROCEDURE — 99214 OFFICE O/P EST MOD 30 MIN: CPT | Performed by: FAMILY MEDICINE

## 2024-02-09 PROCEDURE — 3017F COLORECTAL CA SCREEN DOC REV: CPT | Performed by: FAMILY MEDICINE

## 2024-02-09 RX ORDER — SILDENAFIL 100 MG/1
50 TABLET, FILM COATED ORAL PRN
Qty: 30 TABLET | Refills: 2 | Status: SHIPPED | OUTPATIENT
Start: 2024-02-09

## 2024-02-09 NOTE — PROGRESS NOTES
are normal. There is no distension.      Palpations: Abdomen is soft.      Tenderness: There is no abdominal tenderness.   Musculoskeletal:      Cervical back: Neck supple.   Skin:     General: Skin is warm and dry.   Neurological:      General: No focal deficit present.      Mental Status: He is alert and oriented to person, place, and time.   Psychiatric:         Mood and Affect: Mood normal.         Assessment:      1. Essential hypertension  2. Steroid-induced diabetes mellitus, subsequent encounter ([facility])  3. Chronic insomnia  4. Stage 3b chronic kidney disease ([facility])  5. Psoriatic arthritis ([facility])  6. Psoriasis  7. Erectile dysfunction, unspecified erectile dysfunction type  -     sildenafil (VIAGRA) 100 MG tablet; Take 0.5 tablets by mouth as needed for Erectile Dysfunction, Disp-30 tablet, R-2Print         Plan:      Declined lung CT for lung CA screening.    Keep upcoming scheduled visit with Dr. Pritchard for sleep issues.      Return in about 6 months (around 8/9/2024) for f/u HTN, DM, labs, sleep doc appt's.    Orders Placed This Encounter   Medications    sildenafil (VIAGRA) 100 MG tablet     Sig: Take 0.5 tablets by mouth as needed for Erectile Dysfunction     Dispense:  30 tablet     Refill:  2       Patient given educational materials - see patient instructions.  Discussed use, benefit, and side effects of prescribed medications.  All patient questions answered.  Pt voiced understanding.  Reviewed health maintenance.            Electronically signed by Vero Chen DO, DO on 2/16/2024 at 8:59 AM

## 2024-02-29 ENCOUNTER — PATIENT MESSAGE (OUTPATIENT)
Dept: FAMILY MEDICINE CLINIC | Age: 64
End: 2024-02-29

## 2024-02-29 NOTE — TELEPHONE ENCOUNTER
From: Juanito Pope  To: Dr. Vero Chen  Sent: 2/29/2024 11:24 AM EST  Subject: Sleep     Went to appointment and no one was there. Talked to someone in [place] and they said they called me and told me that they would have to reschedule because of a doctor's emergency. No one called me. Was there for nothing. This is some kind of joke. They wanted to reschedule me for the next day in another place.

## 2024-03-01 PROBLEM — Z12.11 SCREEN FOR COLON CANCER: Status: RESOLVED | Noted: 2024-01-31 | Resolved: 2024-03-01

## 2024-04-14 DIAGNOSIS — L40.9 SCALP PSORIASIS: ICD-10-CM

## 2024-04-14 RX ORDER — CLOBETASOL PROPIONATE 0.05 G/100ML
SHAMPOO TOPICAL
Qty: 118 ML | Refills: 5 | Status: CANCELLED | OUTPATIENT
Start: 2024-04-14

## 2024-04-15 NOTE — TELEPHONE ENCOUNTER
Per notes from [facility], med is almost 60 dollars without insurance - asking if there is a cheaper option.

## 2024-04-15 NOTE — TELEPHONE ENCOUNTER
Spoke with pharmacy, states med was previously covered but is no longer being covered by insurance - they usually allow 1 fill in the beginning of the year before they deny the other fills. They do not have any listed alternatives for pt to try. There are no forms to fill out, no PA needed as they stated med is listed as \"not covered\" per insurance.

## 2024-05-10 ENCOUNTER — PATIENT MESSAGE (OUTPATIENT)
Dept: FAMILY MEDICINE CLINIC | Age: 64
End: 2024-05-10

## 2024-05-10 NOTE — TELEPHONE ENCOUNTER
From: Juanito Pope  To: Dr. Vero Chen  Sent: 5/10/2024 12:22 PM EDT  Subject: Blood pressure.    The Lisinopril is not helping with my blood pressure. I take 2 pills and it just keeps going. It doesn't seem to stop. I would like to see if their is something else I can use. It's like I'm wasting the pills and get no relief. Thank you.

## 2024-05-22 ENCOUNTER — HOSPITAL ENCOUNTER (OUTPATIENT)
Age: 64
Discharge: HOME | End: 2024-05-22
Payer: MEDICARE

## 2024-05-22 ENCOUNTER — OFFICE VISIT (OUTPATIENT)
Dept: FAMILY MEDICINE CLINIC | Age: 64
End: 2024-05-22
Payer: MEDICARE

## 2024-05-22 VITALS
DIASTOLIC BLOOD PRESSURE: 88 MMHG | TEMPERATURE: 97.6 F | SYSTOLIC BLOOD PRESSURE: 130 MMHG | BODY MASS INDEX: 27.73 KG/M2 | HEIGHT: 69 IN | WEIGHT: 187.2 LBS | HEART RATE: 63 BPM | OXYGEN SATURATION: 98 % | RESPIRATION RATE: 18 BRPM

## 2024-05-22 DIAGNOSIS — N18.32 STAGE 3B CHRONIC KIDNEY DISEASE (HCC): ICD-10-CM

## 2024-05-22 DIAGNOSIS — I10 ESSENTIAL HYPERTENSION: Primary | ICD-10-CM

## 2024-05-22 DIAGNOSIS — K13.0 LESION OF LIP: ICD-10-CM

## 2024-05-22 DIAGNOSIS — R07.81 RIB PAIN ON RIGHT SIDE: ICD-10-CM

## 2024-05-22 LAB
ALBUMIN SERPL-MCNC: 4.2 G/DL (ref 3.5–5.2)
ALBUMIN/GLOB SERPL: 1.8 {RATIO} (ref 1–2.5)
ALP SERPL-CCNC: 92 U/L (ref 40–129)
ALT SERPL-CCNC: 11 U/L (ref 5–41)
ANION GAP SERPL CALCULATED.3IONS-SCNC: 7 MMOL/L (ref 9–17)
AST SERPL-CCNC: 12 U/L
BASOPHILS # BLD: 0.11 K/UL (ref 0–0.2)
BASOPHILS NFR BLD: 2 % (ref 0–2)
BILIRUB SERPL-MCNC: 0.5 MG/DL (ref 0.3–1.2)
BUN SERPL-MCNC: 16 MG/DL (ref 8–23)
BUN/CREAT SERPL: 7 (ref 9–20)
CALCIUM SERPL-MCNC: 9 MG/DL (ref 8.6–10.4)
CHLORIDE SERPL-SCNC: 91 MMOL/L (ref 98–107)
CO2 SERPL-SCNC: 28 MMOL/L (ref 20–31)
CREAT SERPL-MCNC: 2.4 MG/DL (ref 0.7–1.2)
EOSINOPHIL # BLD: 0.29 K/UL (ref 0–0.44)
EOSINOPHILS RELATIVE PERCENT: 4 % (ref 1–4)
ERYTHROCYTE [DISTWIDTH] IN BLOOD BY AUTOMATED COUNT: 14.6 % (ref 11.8–14.4)
GFR, ESTIMATED: 30 ML/MIN/1.73M2
GLUCOSE SERPL-MCNC: 150 MG/DL (ref 70–99)
HCT VFR BLD AUTO: 40.9 % (ref 40.7–50.3)
HGB BLD-MCNC: 14.1 G/DL (ref 13–17)
IMM GRANULOCYTES # BLD AUTO: <0.03 K/UL (ref 0–0.3)
IMM GRANULOCYTES NFR BLD: 0 %
LYMPHOCYTES NFR BLD: 1.13 K/UL (ref 1.1–3.7)
LYMPHOCYTES RELATIVE PERCENT: 16 % (ref 24–43)
MCH RBC QN AUTO: 32.3 PG (ref 25.2–33.5)
MCHC RBC AUTO-ENTMCNC: 34.5 G/DL (ref 25.2–33.5)
MCV RBC AUTO: 93.6 FL (ref 82.6–102.9)
MONOCYTES NFR BLD: 0.65 K/UL (ref 0.1–1.2)
MONOCYTES NFR BLD: 9 % (ref 3–12)
NEUTROPHILS NFR BLD: 69 % (ref 36–65)
NEUTS SEG NFR BLD: 4.7 K/UL (ref 1.5–8.1)
NRBC BLD-RTO: 0 PER 100 WBC
PLATELET # BLD AUTO: 219 K/UL (ref 138–453)
PMV BLD AUTO: 9 FL (ref 8.1–13.5)
POTASSIUM SERPL-SCNC: 4.6 MMOL/L (ref 3.7–5.3)
PROT SERPL-MCNC: 6.6 G/DL (ref 6.4–8.3)
RBC # BLD AUTO: 4.37 M/UL (ref 4.21–5.77)
RBC # BLD: ABNORMAL 10*6/UL
SODIUM SERPL-SCNC: 126 MMOL/L (ref 135–144)
WBC OTHER # BLD: 6.9 K/UL (ref 3.5–11.3)

## 2024-05-22 PROCEDURE — 85025 COMPLETE CBC W/AUTO DIFF WBC: CPT

## 2024-05-22 PROCEDURE — G8417 CALC BMI ABV UP PARAM F/U: HCPCS | Performed by: FAMILY MEDICINE

## 2024-05-22 PROCEDURE — 3079F DIAST BP 80-89 MM HG: CPT | Performed by: FAMILY MEDICINE

## 2024-05-22 PROCEDURE — G8427 DOCREV CUR MEDS BY ELIG CLIN: HCPCS | Performed by: FAMILY MEDICINE

## 2024-05-22 PROCEDURE — G2211 COMPLEX E/M VISIT ADD ON: HCPCS | Performed by: FAMILY MEDICINE

## 2024-05-22 PROCEDURE — 3017F COLORECTAL CA SCREEN DOC REV: CPT | Performed by: FAMILY MEDICINE

## 2024-05-22 PROCEDURE — 3075F SYST BP GE 130 - 139MM HG: CPT | Performed by: FAMILY MEDICINE

## 2024-05-22 PROCEDURE — 1036F TOBACCO NON-USER: CPT | Performed by: FAMILY MEDICINE

## 2024-05-22 PROCEDURE — 99214 OFFICE O/P EST MOD 30 MIN: CPT | Performed by: FAMILY MEDICINE

## 2024-05-22 PROCEDURE — 80053 COMPREHEN METABOLIC PANEL: CPT

## 2024-05-22 PROCEDURE — 36415 COLL VENOUS BLD VENIPUNCTURE: CPT

## 2024-05-22 RX ORDER — ADHESIVE BANDAGE 3/4"
BANDAGE TOPICAL
Qty: 1 EACH | Refills: 0 | Status: SHIPPED | OUTPATIENT
Start: 2024-05-22

## 2024-05-22 RX ORDER — TRIAMCINOLONE ACETONIDE 0.1 %
PASTE (GRAM) DENTAL
Qty: 5 G | Refills: 1 | Status: SHIPPED | OUTPATIENT
Start: 2024-05-22 | End: 2024-05-29

## 2024-05-22 SDOH — ECONOMIC STABILITY: FOOD INSECURITY: WITHIN THE PAST 12 MONTHS, THE FOOD YOU BOUGHT JUST DIDN'T LAST AND YOU DIDN'T HAVE MONEY TO GET MORE.: NEVER TRUE

## 2024-05-22 SDOH — ECONOMIC STABILITY: INCOME INSECURITY: HOW HARD IS IT FOR YOU TO PAY FOR THE VERY BASICS LIKE FOOD, HOUSING, MEDICAL CARE, AND HEATING?: NOT HARD AT ALL

## 2024-05-22 SDOH — ECONOMIC STABILITY: FOOD INSECURITY: WITHIN THE PAST 12 MONTHS, YOU WORRIED THAT YOUR FOOD WOULD RUN OUT BEFORE YOU GOT MONEY TO BUY MORE.: NEVER TRUE

## 2024-05-22 ASSESSMENT — PATIENT HEALTH QUESTIONNAIRE - PHQ9
SUM OF ALL RESPONSES TO PHQ QUESTIONS 1-9: 0
SUM OF ALL RESPONSES TO PHQ9 QUESTIONS 1 & 2: 0
SUM OF ALL RESPONSES TO PHQ QUESTIONS 1-9: 0
SUM OF ALL RESPONSES TO PHQ QUESTIONS 1-9: 0
2. FEELING DOWN, DEPRESSED OR HOPELESS: NOT AT ALL
1. LITTLE INTEREST OR PLEASURE IN DOING THINGS: NOT AT ALL
SUM OF ALL RESPONSES TO PHQ QUESTIONS 1-9: 0

## 2024-05-22 NOTE — PROGRESS NOTES
Checked BP with pt home cuff. R arm 154/30, L arm 160/100. Pt reports getting HA at home with elevated BP readings. Writer manually checked BP in office and was 130/88.  
left    Psoriatic arthritis (HCC)     Stroke (HCC)     x3    Thrombocytopenia (HCC)       Past Surgical History:   Procedure Laterality Date    CHEST TUBE INSERTION Left     CHOLECYSTECTOMY, LAPAROSCOPIC N/A 04/15/2022    Laparoscopic Robotic Assisted CHOLECYSTECTOMY performed by Anton Milligan DO at [facility] OR    COLONOSCOPY  2013    normal performed by Dr. Frazier, repeat in 10 years    COLONOSCOPY N/A 2024    COLONOSCOPY performed by Anton Milligan DO at [facility] OR    OTHER SURGICAL HISTORY Right     stent r kidney    OTHER SURGICAL HISTORY  2015    resection of bleb    PILONIDAL CYST DRAINAGE      SHOULDER SURGERY Left     Rotator cuff    THORACOSCOPY  2015    THORACOTOMY  2015    UPPER GASTROINTESTINAL ENDOSCOPY      UPPER GASTROINTESTINAL ENDOSCOPY N/A 2020    EGD with biopsies performed by Robert Corona DO at [facility] OR    UPPER GASTROINTESTINAL ENDOSCOPY  2021    UPPER GASTROINTESTINAL ENDOSCOPY N/A 2021    EGD BIOPSY performed by Quan Burns MD at [facility] OR    UPPER GASTROINTESTINAL ENDOSCOPY N/A 2021    EGD BIOPSY GASTRIC AND GE JUNCTION  performed by Quan Burns MD at [facility] OR     Family History   Problem Relation Age of Onset    Diabetes Sister     Alzheimer's Disease Maternal Grandmother     Other Maternal Grandmother         dementia    High Blood Pressure Maternal Grandfather     Cancer Maternal Grandfather         Unknown    High Blood Pressure Mother     Other Mother         blood clots    Other Paternal Grandmother         dementia     Social History     Tobacco Use    Smoking status: Former     Current packs/day: 0.00     Average packs/day: 1.5 packs/day for 20.0 years (30.0 ttl pk-yrs)     Types: Cigarettes     Start date: 1995     Quit date: 2015     Years since quittin.0    Smokeless tobacco: Never    Tobacco comments:     Quit smoking 2016, CARMEN Mack RCP, 10/20/2016.   Substance Use Topics    Alcohol

## 2024-06-29 DIAGNOSIS — I10 ESSENTIAL HYPERTENSION: ICD-10-CM

## 2024-07-01 NOTE — TELEPHONE ENCOUNTER
Needs [facility].        Juanito called requesting a refill of the below medication which has been pended for you:     Requested Prescriptions     Pending Prescriptions Disp Refills    lisinopril (PRINIVIL;ZESTRIL) 10 MG tablet 180 tablet 3     Sig: Take 1-2 tabs by mouth daily as needed for elevated BP.       Last Appointment Date: 5/22/2024  Next Appointment Date: 8/13/2024    Allergies   Allergen Reactions    Cosentyx [Secukinumab] Nausea And Vomiting and Rash      fever    Lantus [Insulin Glargine] Itching, Swelling and Rash     Patient started Lantus about a month ago, only new medication. His skin reaction started on his abdomen where he injects the Lantus and moved up to the face. This is similar to his response to Cosentyx.     Stellaria Rash    Infliximab Other (See Comments)     Nerve damage    Cyclosporine      Other reaction(s): has 1 kidney with a stent    Methotrexate Derivatives Other (See Comments)     Bone marrow toxicity    Seasonal     Adhesive Tape Rash    Keflex [Cephalexin] Rash    Otezla [Apremilast] Rash     Other reaction(s): Unknown    Taltz [Ixekizumab] Rash     Other reaction(s): Unknown    Ustekinumab (Murine) Rash     Other reaction(s): rash-allergic and acute renal failure  Other reaction(s): Unknown

## 2024-07-02 RX ORDER — LISINOPRIL 10 MG/1
TABLET ORAL
Qty: 180 TABLET | Refills: 3 | Status: SHIPPED | OUTPATIENT
Start: 2024-07-02

## 2024-08-10 ENCOUNTER — HOSPITAL ENCOUNTER (OUTPATIENT)
Age: 64
Discharge: HOME | End: 2024-08-10
Payer: MEDICARE

## 2024-08-10 LAB
ALBUMIN SERPL-MCNC: 3.7 G/DL (ref 3.5–5.2)
ALBUMIN/GLOB SERPL: 1.5 {RATIO} (ref 1–2.5)
ALP SERPL-CCNC: 98 U/L (ref 40–129)
ALT SERPL-CCNC: 9 U/L (ref 5–41)
ANION GAP SERPL CALCULATED.3IONS-SCNC: 7 MMOL/L (ref 9–17)
AST SERPL-CCNC: 11 U/L
BASOPHILS # BLD: 0.08 K/UL (ref 0–0.2)
BASOPHILS NFR BLD: 1 % (ref 0–2)
BILIRUB SERPL-MCNC: 0.4 MG/DL (ref 0.3–1.2)
BUN SERPL-MCNC: 14 MG/DL (ref 8–23)
BUN/CREAT SERPL: 6 (ref 9–20)
CALCIUM SERPL-MCNC: 8.7 MG/DL (ref 8.6–10.4)
CHLORIDE SERPL-SCNC: 96 MMOL/L (ref 98–107)
CO2 SERPL-SCNC: 30 MMOL/L (ref 20–31)
CREAT SERPL-MCNC: 2.5 MG/DL (ref 0.7–1.2)
CRP SERPL HS-MCNC: 11.2 MG/L (ref 0–5)
EOSINOPHIL # BLD: 0.33 K/UL (ref 0–0.44)
EOSINOPHILS RELATIVE PERCENT: 5 % (ref 1–4)
ERYTHROCYTE [DISTWIDTH] IN BLOOD BY AUTOMATED COUNT: 14.9 % (ref 11.8–14.4)
ERYTHROCYTE [SEDIMENTATION RATE] IN BLOOD BY PHOTOMETRIC METHOD: 1 MM/HR (ref 0–20)
GFR, ESTIMATED: 29 ML/MIN/1.73M2
GLUCOSE SERPL-MCNC: 175 MG/DL (ref 70–99)
HCT VFR BLD AUTO: 39.2 % (ref 40.7–50.3)
HGB BLD-MCNC: 13.2 G/DL (ref 13–17)
IMM GRANULOCYTES # BLD AUTO: 0.04 K/UL (ref 0–0.3)
IMM GRANULOCYTES NFR BLD: 1 %
LYMPHOCYTES NFR BLD: 0.95 K/UL (ref 1.1–3.7)
LYMPHOCYTES RELATIVE PERCENT: 13 % (ref 24–43)
MCH RBC QN AUTO: 31.3 PG (ref 25.2–33.5)
MCHC RBC AUTO-ENTMCNC: 33.7 G/DL (ref 25.2–33.5)
MCV RBC AUTO: 92.9 FL (ref 82.6–102.9)
MONOCYTES NFR BLD: 0.71 K/UL (ref 0.1–1.2)
MONOCYTES NFR BLD: 10 % (ref 3–12)
NEUTROPHILS NFR BLD: 70 % (ref 36–65)
NEUTS SEG NFR BLD: 4.96 K/UL (ref 1.5–8.1)
NRBC BLD-RTO: 0 PER 100 WBC
PLATELET # BLD AUTO: 219 K/UL (ref 138–453)
PMV BLD AUTO: 9.4 FL (ref 8.1–13.5)
POTASSIUM SERPL-SCNC: 4.3 MMOL/L (ref 3.7–5.3)
PROT SERPL-MCNC: 6.2 G/DL (ref 6.4–8.3)
RBC # BLD AUTO: 4.22 M/UL (ref 4.21–5.77)
RBC # BLD: ABNORMAL 10*6/UL
SODIUM SERPL-SCNC: 133 MMOL/L (ref 135–144)
WBC OTHER # BLD: 7.1 K/UL (ref 3.5–11.3)

## 2024-08-10 PROCEDURE — 86140 C-REACTIVE PROTEIN: CPT

## 2024-08-10 PROCEDURE — 85025 COMPLETE CBC W/AUTO DIFF WBC: CPT

## 2024-08-10 PROCEDURE — 80053 COMPREHEN METABOLIC PANEL: CPT

## 2024-08-10 PROCEDURE — 36415 COLL VENOUS BLD VENIPUNCTURE: CPT

## 2024-08-10 PROCEDURE — 85652 RBC SED RATE AUTOMATED: CPT

## 2024-08-13 ENCOUNTER — HOSPITAL ENCOUNTER (OUTPATIENT)
Dept: GENERAL RADIOLOGY | Age: 64
Discharge: HOME | End: 2024-08-15
Payer: MEDICARE

## 2024-08-13 ENCOUNTER — OFFICE VISIT (OUTPATIENT)
Dept: FAMILY MEDICINE CLINIC | Age: 64
End: 2024-08-13

## 2024-08-13 VITALS
SYSTOLIC BLOOD PRESSURE: 132 MMHG | TEMPERATURE: 97.9 F | HEART RATE: 59 BPM | DIASTOLIC BLOOD PRESSURE: 80 MMHG | OXYGEN SATURATION: 98 % | HEIGHT: 69 IN | BODY MASS INDEX: 28.58 KG/M2 | RESPIRATION RATE: 16 BRPM | WEIGHT: 193 LBS

## 2024-08-13 DIAGNOSIS — R10.11 RUQ PAIN: Primary | ICD-10-CM

## 2024-08-13 DIAGNOSIS — R07.81 RIB PAIN ON RIGHT SIDE: ICD-10-CM

## 2024-08-13 DIAGNOSIS — L40.59 POLYARTICULAR PSORIATIC ARTHRITIS (HCC): ICD-10-CM

## 2024-08-13 DIAGNOSIS — I10 ESSENTIAL HYPERTENSION: ICD-10-CM

## 2024-08-13 PROCEDURE — 73620 X-RAY EXAM OF FOOT: CPT

## 2024-08-13 PROCEDURE — 73100 X-RAY EXAM OF WRIST: CPT

## 2024-08-13 PROCEDURE — 73120 X-RAY EXAM OF HAND: CPT

## 2024-08-13 NOTE — PROGRESS NOTES
01/25/2034   • Hepatitis C screen  Completed   • HIV screen  Addressed   • Hepatitis A vaccine  Aged Out   • Hepatitis B vaccine  Aged Out   • Hib vaccine  Aged Out   • Polio vaccine  Aged Out   • Meningococcal (ACWY) vaccine  Aged Out   • Depression Monitoring  Discontinued   • Prostate Specific Antigen (PSA) Screening or Monitoring  Discontinued       Subjective:      Review of Systems   Constitutional:  Negative for fever. Unexpected weight change: has gained 6 lbs since his last OV in 5/2024.  Gastrointestinal:  Negative for blood in stool and constipation.   Genitourinary:  Negative for difficulty urinating, dysuria, frequency and hematuria.       Objective:     Vitals:    08/13/24 1006   BP: 132/80   Site: Right Upper Arm   Position: Sitting   Pulse: 59   Resp: 16   Temp: 97.9 °F (36.6 °C)   TempSrc: Temporal   SpO2: 98%   Weight: 87.5 kg (193 lb)   Height: 1.74 m (5' 8.5\")     Physical Exam  Vitals and nursing note reviewed.   Constitutional:       General: He is not in acute distress.     Appearance: Normal appearance.   HENT:      Head: Normocephalic and atraumatic.   Eyes:      Conjunctiva/sclera: Conjunctivae normal.   Cardiovascular:      Rate and Rhythm: Normal rate and regular rhythm.      Heart sounds: Normal heart sounds.   Pulmonary:      Effort: Pulmonary effort is normal. No respiratory distress.      Breath sounds: Normal breath sounds.   Abdominal:      General: Bowel sounds are normal. There is no distension.      Palpations: Abdomen is soft.      Tenderness: Tenderness: most tender over RUQ, just under costal margin and around R side; less tender over lower anterior R ribs.   Musculoskeletal:      Right lower leg: No edema.      Left lower leg: No edema.   Skin:     General: Skin is warm and dry.   Neurological:      General: No focal deficit present.      Mental Status: He is alert and oriented to person, place, and time.   Psychiatric:         Mood and Affect: Mood normal.       Assessment:

## 2024-08-20 ENCOUNTER — HOSPITAL ENCOUNTER (OUTPATIENT)
Dept: CT IMAGING | Age: 64
Discharge: HOME | End: 2024-08-22
Attending: FAMILY MEDICINE
Payer: MEDICARE

## 2024-08-20 DIAGNOSIS — R07.81 RIB PAIN ON RIGHT SIDE: ICD-10-CM

## 2024-08-20 DIAGNOSIS — R10.11 RUQ PAIN: ICD-10-CM

## 2024-08-20 PROCEDURE — 74176 CT ABD & PELVIS W/O CONTRAST: CPT

## 2024-09-05 NOTE — PROGRESS NOTES
[facility]  [address] [place] [address]  [phone]      Isaiah Grier is a 63 y.o. male who presents today for his medical conditions/complaints as noted below. Isaiah Grier is c/o of Insomnia (3 mo f/u) and Tinnitus (Both ears)      HPI:     Pt here today for follow-up of sleep. After increasing the Seroquel, it helped for only a few days, but then his insomnia returned. Has trouble falling and staying asleep. A lot of nights, he never falls asleep at all. Ran out of Seroquel 2 nights ago - increased as high as 75 mg nightly. Today, he is having a lot of tinnitus - has been constant x past 2 weeks. Recently, this has been keeping him from sleeping. Last Skyrizi injection on 10/15; takes this once every 4 months. Typically has tinnitus for the first 2 weeks after each injection. BP was high first thing this morning - 165/110 - pt states he did not sleep at all last night, so chalks it up to this. Took Lisinopril 20 mg (two 10 mg tabs) and it has come down to 130/84 now. Took Lopressor 100 mg nightly last night as he usually does. States his BP was high yesterday, so he had to take another 2 Lisinopril later in the day. Has only had to do this twice in the past 2 weeks. Has not had to use insulin in the past 2 months - glucose levels have been mid-100's; highest 170. Due for A1c re-check now. Sees Dr. Villasenor Caller office for regular eye exams - will see her again in 1/2024. Last saw Dr. De La O Or (Nephrology) on 6/6/23 - will see him again in 1 year. Last Cr 1.96 on 6/5/23.           Past Medical History:   Diagnosis Date    Allergic reaction caused by a drug 8/13/2016    Arthritis     psoritic arthritis    Chronic kidney disease     Follows with Nephro    COPD (chronic obstructive pulmonary disease) ([address])     pt  denies    CVA (cerebral vascular accident) ([address])     Neuro eval with Dr Ortega Peaks here    Depression     Diabetes Resident

## 2024-09-15 DIAGNOSIS — I10 ESSENTIAL HYPERTENSION: ICD-10-CM

## 2024-09-16 ENCOUNTER — PATIENT MESSAGE (OUTPATIENT)
Dept: FAMILY MEDICINE CLINIC | Age: 64
End: 2024-09-16

## 2024-09-17 RX ORDER — METOPROLOL TARTRATE 100 MG
100 TABLET ORAL DAILY
Qty: 90 TABLET | Refills: 3 | Status: SHIPPED | OUTPATIENT
Start: 2024-09-17

## 2024-10-14 DIAGNOSIS — K20.90 ESOPHAGITIS: ICD-10-CM

## 2024-10-15 NOTE — TELEPHONE ENCOUNTER
Juanito called requesting a refill of the below medication which has been pended for you:     Requested Prescriptions     Pending Prescriptions Disp Refills    pantoprazole (PROTONIX) 40 MG tablet 90 tablet 3     Sig: Take 1 tablet by mouth every morning (before breakfast)    pantoprazole (PROTONIX) 20 MG tablet 90 tablet 3     Sig: Take 1 tablet by mouth at bedtime       Last Appointment Date: 8/13/2024  Next Appointment Date: 11/13/2024    Allergies   Allergen Reactions    Cosentyx [Secukinumab] Nausea And Vomiting and Rash      fever    Lantus [Insulin Glargine] Itching, Swelling and Rash     Patient started Lantus about a month ago, only new medication. His skin reaction started on his abdomen where he injects the Lantus and moved up to the face. This is similar to his response to Cosentyx.     Stellaria Rash    Infliximab Other (See Comments)     Nerve damage    Cyclosporine      Other reaction(s): has 1 kidney with a stent    Methotrexate Derivatives Other (See Comments)     Bone marrow toxicity    Seasonal     Adhesive Tape Rash    Keflex [Cephalexin] Rash    Otezla [Apremilast] Rash     Other reaction(s): Unknown    Taltz [Ixekizumab] Rash     Other reaction(s): Unknown    Ustekinumab (Murine) Rash     Other reaction(s): rash-allergic and acute renal failure  Other reaction(s): Unknown

## 2024-10-18 RX ORDER — PANTOPRAZOLE SODIUM 20 MG/1
20 TABLET, DELAYED RELEASE ORAL NIGHTLY
Qty: 90 TABLET | Refills: 3 | Status: SHIPPED | OUTPATIENT
Start: 2024-10-18

## 2024-10-18 RX ORDER — PANTOPRAZOLE SODIUM 40 MG/1
40 TABLET, DELAYED RELEASE ORAL
Qty: 90 TABLET | Refills: 3 | Status: SHIPPED | OUTPATIENT
Start: 2024-10-18

## 2024-11-02 ENCOUNTER — HOSPITAL ENCOUNTER (OUTPATIENT)
Age: 64
Discharge: HOME | End: 2024-11-02
Payer: MEDICARE

## 2024-11-02 LAB
ALBUMIN SERPL-MCNC: 3.9 G/DL (ref 3.5–5.2)
ALBUMIN/GLOB SERPL: 1.4 {RATIO} (ref 1–2.5)
ALP SERPL-CCNC: 100 U/L (ref 40–129)
ALT SERPL-CCNC: 10 U/L (ref 5–41)
ANION GAP SERPL CALCULATED.3IONS-SCNC: 8 MMOL/L (ref 9–17)
AST SERPL-CCNC: 12 U/L
BASOPHILS # BLD: 0.08 K/UL (ref 0–0.2)
BASOPHILS NFR BLD: 1 % (ref 0–2)
BILIRUB SERPL-MCNC: 0.7 MG/DL (ref 0.3–1.2)
BUN SERPL-MCNC: 12 MG/DL (ref 8–23)
BUN/CREAT SERPL: 5 (ref 9–20)
CALCIUM SERPL-MCNC: 8.6 MG/DL (ref 8.6–10.4)
CHLORIDE SERPL-SCNC: 96 MMOL/L (ref 98–107)
CO2 SERPL-SCNC: 27 MMOL/L (ref 20–31)
CREAT SERPL-MCNC: 2.4 MG/DL (ref 0.7–1.2)
EOSINOPHIL # BLD: 0.4 K/UL (ref 0–0.44)
EOSINOPHILS RELATIVE PERCENT: 6 % (ref 1–4)
ERYTHROCYTE [DISTWIDTH] IN BLOOD BY AUTOMATED COUNT: 15.3 % (ref 11.8–14.4)
GFR, ESTIMATED: 30 ML/MIN/1.73M2
GLUCOSE SERPL-MCNC: 269 MG/DL (ref 70–99)
HCT VFR BLD AUTO: 38.8 % (ref 40.7–50.3)
HGB BLD-MCNC: 13.3 G/DL (ref 13–17)
IMM GRANULOCYTES # BLD AUTO: 0.03 K/UL (ref 0–0.3)
IMM GRANULOCYTES NFR BLD: 0 %
LYMPHOCYTES NFR BLD: 0.79 K/UL (ref 1.1–3.7)
LYMPHOCYTES RELATIVE PERCENT: 11 % (ref 24–43)
MCH RBC QN AUTO: 31.3 PG (ref 25.2–33.5)
MCHC RBC AUTO-ENTMCNC: 34.3 G/DL (ref 25.2–33.5)
MCV RBC AUTO: 91.3 FL (ref 82.6–102.9)
MONOCYTES NFR BLD: 0.93 K/UL (ref 0.1–1.2)
MONOCYTES NFR BLD: 13 % (ref 3–12)
NEUTROPHILS NFR BLD: 69 % (ref 36–65)
NEUTS SEG NFR BLD: 5.07 K/UL (ref 1.5–8.1)
NRBC BLD-RTO: 0 PER 100 WBC
PLATELET # BLD AUTO: 231 K/UL (ref 138–453)
PMV BLD AUTO: 9.4 FL (ref 8.1–13.5)
POTASSIUM SERPL-SCNC: 4.7 MMOL/L (ref 3.7–5.3)
PROT SERPL-MCNC: 6.6 G/DL (ref 6.4–8.3)
RBC # BLD AUTO: 4.25 M/UL (ref 4.21–5.77)
RBC # BLD: ABNORMAL 10*6/UL
SODIUM SERPL-SCNC: 131 MMOL/L (ref 135–144)
WBC OTHER # BLD: 7.3 K/UL (ref 3.5–11.3)

## 2024-11-02 PROCEDURE — 85025 COMPLETE CBC W/AUTO DIFF WBC: CPT

## 2024-11-02 PROCEDURE — 80053 COMPREHEN METABOLIC PANEL: CPT

## 2024-11-02 PROCEDURE — 36415 COLL VENOUS BLD VENIPUNCTURE: CPT

## 2024-11-13 ENCOUNTER — OFFICE VISIT (OUTPATIENT)
Dept: FAMILY MEDICINE CLINIC | Age: 64
End: 2024-11-13
Payer: MEDICARE

## 2024-11-13 VITALS
WEIGHT: 182.7 LBS | RESPIRATION RATE: 16 BRPM | TEMPERATURE: 97.5 F | SYSTOLIC BLOOD PRESSURE: 112 MMHG | HEIGHT: 69 IN | BODY MASS INDEX: 27.06 KG/M2 | DIASTOLIC BLOOD PRESSURE: 78 MMHG | OXYGEN SATURATION: 99 % | HEART RATE: 71 BPM

## 2024-11-13 DIAGNOSIS — Z23 NEED FOR INFLUENZA VACCINATION: ICD-10-CM

## 2024-11-13 DIAGNOSIS — E55.9 VITAMIN D DEFICIENCY: ICD-10-CM

## 2024-11-13 DIAGNOSIS — Z12.5 SCREENING FOR PROSTATE CANCER: ICD-10-CM

## 2024-11-13 DIAGNOSIS — I10 ESSENTIAL HYPERTENSION: Primary | ICD-10-CM

## 2024-11-13 DIAGNOSIS — E11.65 TYPE 2 DIABETES MELLITUS WITH HYPERGLYCEMIA, UNSPECIFIED WHETHER LONG TERM INSULIN USE (HCC): ICD-10-CM

## 2024-11-13 DIAGNOSIS — E78.2 MIXED HYPERLIPIDEMIA: ICD-10-CM

## 2024-11-13 DIAGNOSIS — D35.2 PITUITARY MICROADENOMA (HCC): ICD-10-CM

## 2024-11-13 DIAGNOSIS — R00.0 TACHYCARDIA: ICD-10-CM

## 2024-11-13 PROBLEM — K80.20 SYMPTOMATIC CHOLELITHIASIS: Status: RESOLVED | Noted: 2022-04-05 | Resolved: 2024-11-13

## 2024-11-13 PROCEDURE — 90656 IIV3 VACC NO PRSV 0.5 ML IM: CPT | Performed by: FAMILY MEDICINE

## 2024-11-13 RX ORDER — METOPROLOL TARTRATE 100 MG/1
100 TABLET ORAL 2 TIMES DAILY
Qty: 180 TABLET | Refills: 3 | Status: SHIPPED | OUTPATIENT
Start: 2024-11-13

## 2024-11-13 NOTE — PROGRESS NOTES
y+), IM, Preservative Free, 0.5mL    Microalbumin, Ur     Standing Status:   Future     Standing Expiration Date:   11/13/2025    Hemoglobin A1C     Standing Status:   Future     Standing Expiration Date:   11/13/2025    Lipid Panel     Standing Status:   Future     Standing Expiration Date:   11/13/2025    TSH With Reflex Ft4     Standing Status:   Future     Standing Expiration Date:   11/13/2025    Vitamin D 25 Hydroxy     Standing Status:   Future     Standing Expiration Date:   11/13/2025    PSA Screening     Standing Status:   Future     Standing Expiration Date:   11/13/2025    Prolactin     Standing Status:   Future     Standing Expiration Date:   11/13/2025     Orders Placed This Encounter   Medications    metoprolol (LOPRESSOR) 100 MG tablet     Sig: Take 1 tablet by mouth 2 times daily     Dispense:  180 tablet     Refill:  3         Discussed use of prescribed medications.  All patient questions answered.  Pt voiced understanding.  Reviewed health maintenance.            Electronically signed by Vero Chen DO, DO on 11/20/2024 at 10:12 AM

## 2025-01-29 ENCOUNTER — HOSPITAL ENCOUNTER (OUTPATIENT)
Age: 65
Discharge: HOME | End: 2025-01-29
Payer: MEDICARE

## 2025-01-29 LAB
CRP SERPL HS-MCNC: 16.9 MG/L (ref 0–5)
ERYTHROCYTE [SEDIMENTATION RATE] IN BLOOD BY PHOTOMETRIC METHOD: 5 MM/HR (ref 0–20)

## 2025-01-29 PROCEDURE — 86140 C-REACTIVE PROTEIN: CPT

## 2025-01-29 PROCEDURE — 85652 RBC SED RATE AUTOMATED: CPT

## 2025-01-29 PROCEDURE — 36415 COLL VENOUS BLD VENIPUNCTURE: CPT

## 2025-02-17 DIAGNOSIS — L40.9 SCALP PSORIASIS: ICD-10-CM

## 2025-02-20 RX ORDER — CLOBETASOL PROPIONATE 0.05 G/100ML
SHAMPOO TOPICAL
Qty: 118 ML | Refills: 5 | Status: SHIPPED | OUTPATIENT
Start: 2025-02-20

## 2025-03-09 SDOH — ECONOMIC STABILITY: INCOME INSECURITY: IN THE LAST 12 MONTHS, WAS THERE A TIME WHEN YOU WERE NOT ABLE TO PAY THE MORTGAGE OR RENT ON TIME?: NO

## 2025-03-09 SDOH — ECONOMIC STABILITY: FOOD INSECURITY: WITHIN THE PAST 12 MONTHS, YOU WORRIED THAT YOUR FOOD WOULD RUN OUT BEFORE YOU GOT MONEY TO BUY MORE.: NEVER TRUE

## 2025-03-09 SDOH — ECONOMIC STABILITY: FOOD INSECURITY: WITHIN THE PAST 12 MONTHS, THE FOOD YOU BOUGHT JUST DIDN'T LAST AND YOU DIDN'T HAVE MONEY TO GET MORE.: NEVER TRUE

## 2025-03-09 SDOH — ECONOMIC STABILITY: TRANSPORTATION INSECURITY
IN THE PAST 12 MONTHS, HAS THE LACK OF TRANSPORTATION KEPT YOU FROM MEDICAL APPOINTMENTS OR FROM GETTING MEDICATIONS?: NO

## 2025-03-09 SDOH — ECONOMIC STABILITY: TRANSPORTATION INSECURITY
IN THE PAST 12 MONTHS, HAS LACK OF TRANSPORTATION KEPT YOU FROM MEETINGS, WORK, OR FROM GETTING THINGS NEEDED FOR DAILY LIVING?: NO

## 2025-03-09 ASSESSMENT — PATIENT HEALTH QUESTIONNAIRE - PHQ9
SUM OF ALL RESPONSES TO PHQ QUESTIONS 1-9: 0
2. FEELING DOWN, DEPRESSED OR HOPELESS: NOT AT ALL
1. LITTLE INTEREST OR PLEASURE IN DOING THINGS: NOT AT ALL
SUM OF ALL RESPONSES TO PHQ QUESTIONS 1-9: 0
SUM OF ALL RESPONSES TO PHQ QUESTIONS 1-9: 0
1. LITTLE INTEREST OR PLEASURE IN DOING THINGS: NOT AT ALL
SUM OF ALL RESPONSES TO PHQ9 QUESTIONS 1 & 2: 0
SUM OF ALL RESPONSES TO PHQ QUESTIONS 1-9: 0
2. FEELING DOWN, DEPRESSED OR HOPELESS: NOT AT ALL

## 2025-03-12 ENCOUNTER — OFFICE VISIT (OUTPATIENT)
Dept: FAMILY MEDICINE CLINIC | Age: 65
End: 2025-03-12
Payer: MEDICARE

## 2025-03-12 VITALS
RESPIRATION RATE: 16 BRPM | TEMPERATURE: 97 F | WEIGHT: 185.6 LBS | OXYGEN SATURATION: 96 % | BODY MASS INDEX: 27.49 KG/M2 | DIASTOLIC BLOOD PRESSURE: 70 MMHG | HEIGHT: 69 IN | SYSTOLIC BLOOD PRESSURE: 118 MMHG | HEART RATE: 59 BPM

## 2025-03-12 DIAGNOSIS — I10 ESSENTIAL HYPERTENSION: ICD-10-CM

## 2025-03-12 DIAGNOSIS — E11.65 TYPE 2 DIABETES MELLITUS WITH HYPERGLYCEMIA, UNSPECIFIED WHETHER LONG TERM INSULIN USE (HCC): Primary | ICD-10-CM

## 2025-03-12 DIAGNOSIS — N18.32 STAGE 3B CHRONIC KIDNEY DISEASE (HCC): ICD-10-CM

## 2025-03-12 DIAGNOSIS — D35.2 PITUITARY MICROADENOMA (HCC): ICD-10-CM

## 2025-03-12 DIAGNOSIS — L40.50 PSORIATIC ARTHRITIS (HCC): ICD-10-CM

## 2025-03-12 PROCEDURE — 99214 OFFICE O/P EST MOD 30 MIN: CPT

## 2025-03-12 PROCEDURE — 3074F SYST BP LT 130 MM HG: CPT | Performed by: FAMILY MEDICINE

## 2025-03-12 PROCEDURE — 3078F DIAST BP <80 MM HG: CPT | Performed by: FAMILY MEDICINE

## 2025-03-12 PROCEDURE — 99214 OFFICE O/P EST MOD 30 MIN: CPT | Performed by: FAMILY MEDICINE

## 2025-03-12 PROCEDURE — G2211 COMPLEX E/M VISIT ADD ON: HCPCS | Performed by: FAMILY MEDICINE

## 2025-03-12 NOTE — PROGRESS NOTES
tablet by mouth at bedtime 90 tablet 3    lisinopril (PRINIVIL;ZESTRIL) 10 MG tablet Take 1-2 tabs by mouth daily as needed for elevated BP. 180 tablet 3    NOVOLOG 100 UNIT/ML injection vial Use as directed TID with meals as directed with sliding scale: 171-220 - 3 units; 221-270 - 5 units; 271-320 - 7 units; 321-370 - 9 units; >371 - 11 units. Max daily dose 33 units. 10 mL 5    SKYRIZI 150 MG/ML SOSY       Insulin Syringe-Needle U-100 30G X 5/16\" 0.3 ML MISC USE UP TO FIVE TIMES DAILY AS DIRECTED WITH INSULIN 500 each 3    blood glucose test strips (FREESTYLE LITE) strip TEST THREE TIMES A DAY AND AS NEEDED FOR SYMPTOMS OF IRREGULAR BLOOD GLUCOSE. 300 strip 3    desoximetasone (TOPICORT) 0.25 % OINT       methotrexate (RHEUMATREX) 2.5 MG chemo tablet Take 4 tablets by mouth once a week On Friday's.      folic acid (FOLVITE) 1 MG tablet Take 1 tablet by mouth daily      triamcinolone (KENALOG) 0.1 % ointment       sildenafil (VIAGRA) 100 MG tablet Take 0.5-1 tablets by mouth as needed for Erectile Dysfunction 30 tablet 2     No current facility-administered medications for this visit.     Allergies   Allergen Reactions    Cosentyx [Secukinumab] Nausea And Vomiting and Rash      fever    Lantus [Insulin Glargine] Itching, Swelling and Rash     Patient started Lantus about a month ago, only new medication. His skin reaction started on his abdomen where he injects the Lantus and moved up to the face. This is similar to his response to Cosentyx.     Stellaria Rash    Infliximab Other (See Comments)     Nerve damage    Cyclosporine      Other reaction(s): has 1 kidney with a stent    Methotrexate Derivatives Other (See Comments)     Bone marrow toxicity    Seasonal     Adhesive Tape Rash    Keflex [Cephalexin] Rash    Otezla [Apremilast] Rash     Other reaction(s): Unknown    Taltz [Ixekizumab] Rash     Other reaction(s): Unknown    Ustekinumab (Murine) Rash     Other reaction(s): rash-allergic and acute renal

## 2025-03-13 ENCOUNTER — PATIENT MESSAGE (OUTPATIENT)
Dept: FAMILY MEDICINE CLINIC | Age: 65
End: 2025-03-13

## 2025-03-13 DIAGNOSIS — N52.9 ERECTILE DYSFUNCTION, UNSPECIFIED ERECTILE DYSFUNCTION TYPE: ICD-10-CM

## 2025-03-14 NOTE — TELEPHONE ENCOUNTER
Juanito called requesting a refill of the below medication which has been pended for you:     Requested Prescriptions     Pending Prescriptions Disp Refills    sildenafil (VIAGRA) 100 MG tablet 30 tablet 2     Sig: Take 0.5 tablets by mouth as needed for Erectile Dysfunction       Last Appointment Date: 3/12/2025  Next Appointment Date: Visit date not found    Allergies   Allergen Reactions    Cosentyx [Secukinumab] Nausea And Vomiting and Rash      fever    Lantus [Insulin Glargine] Itching, Swelling and Rash     Patient started Lantus about a month ago, only new medication. His skin reaction started on his abdomen where he injects the Lantus and moved up to the face. This is similar to his response to Cosentyx.     Stellaria Rash    Infliximab Other (See Comments)     Nerve damage    Cyclosporine      Other reaction(s): has 1 kidney with a stent    Methotrexate Derivatives Other (See Comments)     Bone marrow toxicity    Seasonal     Adhesive Tape Rash    Keflex [Cephalexin] Rash    Otezla [Apremilast] Rash     Other reaction(s): Unknown    Taltz [Ixekizumab] Rash     Other reaction(s): Unknown    Ustekinumab (Murine) Rash     Other reaction(s): rash-allergic and acute renal failure  Other reaction(s): Unknown

## 2025-03-16 RX ORDER — SILDENAFIL 100 MG/1
50-100 TABLET, FILM COATED ORAL PRN
Qty: 30 TABLET | Refills: 2 | Status: SHIPPED | OUTPATIENT
Start: 2025-03-16

## 2025-05-20 ENCOUNTER — PATIENT MESSAGE (OUTPATIENT)
Dept: FAMILY MEDICINE CLINIC | Age: 65
End: 2025-05-20

## 2025-05-20 DIAGNOSIS — E11.65 TYPE 2 DIABETES MELLITUS WITH HYPERGLYCEMIA, UNSPECIFIED WHETHER LONG TERM INSULIN USE (HCC): ICD-10-CM

## 2025-05-23 RX ORDER — INSULIN ASPART 100 [IU]/ML
INJECTION, SOLUTION INTRAVENOUS; SUBCUTANEOUS
Qty: 10 ML | Refills: 5 | Status: SHIPPED | OUTPATIENT
Start: 2025-05-23

## 2025-06-07 ENCOUNTER — HOSPITAL ENCOUNTER (OUTPATIENT)
Age: 65
Discharge: HOME | End: 2025-06-07
Payer: MEDICARE

## 2025-06-07 ENCOUNTER — HOSPITAL ENCOUNTER (OUTPATIENT)
Age: 65
End: 2025-06-07
Payer: MEDICARE

## 2025-06-07 DIAGNOSIS — Z12.5 SCREENING FOR PROSTATE CANCER: ICD-10-CM

## 2025-06-07 DIAGNOSIS — E78.2 MIXED HYPERLIPIDEMIA: ICD-10-CM

## 2025-06-07 DIAGNOSIS — E55.9 VITAMIN D DEFICIENCY: ICD-10-CM

## 2025-06-07 DIAGNOSIS — E11.65 TYPE 2 DIABETES MELLITUS WITH HYPERGLYCEMIA, UNSPECIFIED WHETHER LONG TERM INSULIN USE (HCC): ICD-10-CM

## 2025-06-07 DIAGNOSIS — D35.2 PITUITARY MICROADENOMA (HCC): ICD-10-CM

## 2025-06-07 LAB
25(OH)D3 SERPL-MCNC: 20.6 NG/ML (ref 30–100)
ALBUMIN SERPL-MCNC: 4 G/DL (ref 3.5–5.2)
ALBUMIN/GLOB SERPL: 1.5 {RATIO} (ref 1–2.5)
ALP SERPL-CCNC: 108 U/L (ref 40–129)
ALT SERPL-CCNC: 10 U/L (ref 10–50)
ANION GAP SERPL CALCULATED.3IONS-SCNC: 10 MMOL/L (ref 9–16)
AST SERPL-CCNC: 15 U/L (ref 10–50)
BASOPHILS # BLD: 0.07 K/UL (ref 0–0.2)
BASOPHILS NFR BLD: 1 % (ref 0–2)
BILIRUB SERPL-MCNC: 0.5 MG/DL (ref 0–1.2)
BUN SERPL-MCNC: 15 MG/DL (ref 8–23)
BUN/CREAT SERPL: 6 (ref 9–20)
CALCIUM SERPL-MCNC: 8.8 MG/DL (ref 8.6–10.4)
CHLORIDE SERPL-SCNC: 96 MMOL/L (ref 98–107)
CHOLEST SERPL-MCNC: 163 MG/DL (ref 0–199)
CHOLESTEROL/HDL RATIO: 5.3
CO2 SERPL-SCNC: 27 MMOL/L (ref 20–31)
CREAT SERPL-MCNC: 2.7 MG/DL (ref 0.7–1.2)
CREAT UR-MCNC: 223 MG/DL (ref 39–259)
EOSINOPHIL # BLD: 0.29 K/UL (ref 0–0.44)
EOSINOPHILS RELATIVE PERCENT: 6 % (ref 1–4)
ERYTHROCYTE [DISTWIDTH] IN BLOOD BY AUTOMATED COUNT: 18.1 % (ref 11.8–14.4)
EST. AVERAGE GLUCOSE BLD GHB EST-MCNC: 192 MG/DL
GFR, ESTIMATED: 26 ML/MIN/1.73M2
GLUCOSE SERPL-MCNC: 167 MG/DL (ref 74–99)
HBA1C MFR BLD: 8.3 % (ref 4–6)
HCT VFR BLD AUTO: 41.5 % (ref 40.7–50.3)
HDLC SERPL-MCNC: 31 MG/DL
HGB BLD-MCNC: 14 G/DL (ref 13–17)
IMM GRANULOCYTES # BLD AUTO: <0.03 K/UL (ref 0–0.3)
IMM GRANULOCYTES NFR BLD: 0 %
LDLC SERPL CALC-MCNC: 96 MG/DL (ref 0–100)
LYMPHOCYTES NFR BLD: 0.85 K/UL (ref 1.1–3.7)
LYMPHOCYTES RELATIVE PERCENT: 17 % (ref 24–43)
MCH RBC QN AUTO: 33.1 PG (ref 25.2–33.5)
MCHC RBC AUTO-ENTMCNC: 33.7 G/DL (ref 25.2–33.5)
MCV RBC AUTO: 98.1 FL (ref 82.6–102.9)
MICROALBUMIN UR-MCNC: 24 MG/L (ref 0–20)
MICROALBUMIN/CREAT UR-RTO: 11 MCG/MG CREAT (ref 0–17)
MONOCYTES NFR BLD: 0.58 K/UL (ref 0.1–1.2)
MONOCYTES NFR BLD: 11 % (ref 3–12)
NEUTROPHILS NFR BLD: 65 % (ref 36–65)
NEUTS SEG NFR BLD: 3.3 K/UL (ref 1.5–8.1)
NRBC BLD-RTO: 0 PER 100 WBC
PLATELET # BLD AUTO: 160 K/UL (ref 138–453)
PMV BLD AUTO: 9.5 FL (ref 8.1–13.5)
POTASSIUM SERPL-SCNC: 4.6 MMOL/L (ref 3.7–5.3)
PROLACTIN SERPL-MCNC: 11.8 NG/ML (ref 4.04–15.2)
PROT SERPL-MCNC: 6.7 G/DL (ref 6.6–8.7)
PSA SERPL-MCNC: 1.4 NG/ML (ref 0–4)
RBC # BLD AUTO: 4.23 M/UL (ref 4.21–5.77)
RBC # BLD: ABNORMAL 10*6/UL
SODIUM SERPL-SCNC: 133 MMOL/L (ref 136–145)
TRIGL SERPL-MCNC: 180 MG/DL
VLDLC SERPL CALC-MCNC: 36 MG/DL (ref 1–30)
WBC OTHER # BLD: 5.1 K/UL (ref 3.5–11.3)

## 2025-06-07 PROCEDURE — 80061 LIPID PANEL: CPT

## 2025-06-07 PROCEDURE — 82043 UR ALBUMIN QUANTITATIVE: CPT

## 2025-06-07 PROCEDURE — G0103 PSA SCREENING: HCPCS

## 2025-06-07 PROCEDURE — 82306 VITAMIN D 25 HYDROXY: CPT

## 2025-06-07 PROCEDURE — 36415 COLL VENOUS BLD VENIPUNCTURE: CPT

## 2025-06-07 PROCEDURE — 82570 ASSAY OF URINE CREATININE: CPT

## 2025-06-07 PROCEDURE — 85025 COMPLETE CBC W/AUTO DIFF WBC: CPT

## 2025-06-07 PROCEDURE — 83036 HEMOGLOBIN GLYCOSYLATED A1C: CPT

## 2025-06-07 PROCEDURE — 80053 COMPREHEN METABOLIC PANEL: CPT

## 2025-06-07 PROCEDURE — 84146 ASSAY OF PROLACTIN: CPT

## 2025-07-17 ENCOUNTER — TELEMEDICINE (OUTPATIENT)
Dept: FAMILY MEDICINE CLINIC | Age: 65
End: 2025-07-17
Payer: MEDICARE

## 2025-07-17 DIAGNOSIS — Z00.00 MEDICARE ANNUAL WELLNESS VISIT, SUBSEQUENT: Primary | ICD-10-CM

## 2025-07-17 PROCEDURE — G0439 PPPS, SUBSEQ VISIT: HCPCS | Performed by: FAMILY MEDICINE

## 2025-07-17 SDOH — ECONOMIC STABILITY: FOOD INSECURITY: WITHIN THE PAST 12 MONTHS, YOU WORRIED THAT YOUR FOOD WOULD RUN OUT BEFORE YOU GOT MONEY TO BUY MORE.: NEVER TRUE

## 2025-07-17 SDOH — HEALTH STABILITY: PHYSICAL HEALTH: ON AVERAGE, HOW MANY MINUTES DO YOU ENGAGE IN EXERCISE AT THIS LEVEL?: 0 MIN

## 2025-07-17 SDOH — ECONOMIC STABILITY: FOOD INSECURITY: WITHIN THE PAST 12 MONTHS, THE FOOD YOU BOUGHT JUST DIDN'T LAST AND YOU DIDN'T HAVE MONEY TO GET MORE.: NEVER TRUE

## 2025-07-17 SDOH — HEALTH STABILITY: PHYSICAL HEALTH: ON AVERAGE, HOW MANY DAYS PER WEEK DO YOU ENGAGE IN MODERATE TO STRENUOUS EXERCISE (LIKE A BRISK WALK)?: 0 DAYS

## 2025-07-17 ASSESSMENT — LIFESTYLE VARIABLES
HOW MANY STANDARD DRINKS CONTAINING ALCOHOL DO YOU HAVE ON A TYPICAL DAY: 1
HOW MANY STANDARD DRINKS CONTAINING ALCOHOL DO YOU HAVE ON A TYPICAL DAY: 1 OR 2
HOW OFTEN DO YOU HAVE A DRINK CONTAINING ALCOHOL: MONTHLY OR LESS
HOW OFTEN DO YOU HAVE SIX OR MORE DRINKS ON ONE OCCASION: 1
HOW OFTEN DO YOU HAVE A DRINK CONTAINING ALCOHOL: 2

## 2025-07-17 ASSESSMENT — PATIENT HEALTH QUESTIONNAIRE - PHQ9
SUM OF ALL RESPONSES TO PHQ QUESTIONS 1-9: 15
2. FEELING DOWN, DEPRESSED OR HOPELESS: NEARLY EVERY DAY
9. THOUGHTS THAT YOU WOULD BE BETTER OFF DEAD, OR OF HURTING YOURSELF: NOT AT ALL
8. MOVING OR SPEAKING SO SLOWLY THAT OTHER PEOPLE COULD HAVE NOTICED. OR THE OPPOSITE, BEING SO FIGETY OR RESTLESS THAT YOU HAVE BEEN MOVING AROUND A LOT MORE THAN USUAL: NEARLY EVERY DAY
5. POOR APPETITE OR OVEREATING: NOT AT ALL
3. TROUBLE FALLING OR STAYING ASLEEP: NEARLY EVERY DAY
10. IF YOU CHECKED OFF ANY PROBLEMS, HOW DIFFICULT HAVE THESE PROBLEMS MADE IT FOR YOU TO DO YOUR WORK, TAKE CARE OF THINGS AT HOME, OR GET ALONG WITH OTHER PEOPLE: SOMEWHAT DIFFICULT
SUM OF ALL RESPONSES TO PHQ QUESTIONS 1-9: 15
7. TROUBLE CONCENTRATING ON THINGS, SUCH AS READING THE NEWSPAPER OR WATCHING TELEVISION: NEARLY EVERY DAY
6. FEELING BAD ABOUT YOURSELF - OR THAT YOU ARE A FAILURE OR HAVE LET YOURSELF OR YOUR FAMILY DOWN: NOT AT ALL
SUM OF ALL RESPONSES TO PHQ QUESTIONS 1-9: 15
1. LITTLE INTEREST OR PLEASURE IN DOING THINGS: NOT AT ALL
SUM OF ALL RESPONSES TO PHQ QUESTIONS 1-9: 15
4. FEELING TIRED OR HAVING LITTLE ENERGY: NEARLY EVERY DAY

## 2025-07-17 NOTE — PROGRESS NOTES
Medicare Annual Wellness Visit    Juanito Pope is here for Medicare AWV    Assessment & Plan      No follow-ups on file.     Subjective     Patient's complete Health Risk Assessment and screening values have been reviewed and are found in Flowsheets. The following problems were reviewed today and where indicated follow up appointments were made and/or referrals ordered.    Positive Risk Factor Screenings with Interventions:      Depression:  PHQ-2 Score: 3  PHQ-9 Total Score: 15  Total Score Interpretation: 15-19 = moderately severe depression    Interventions:  Pt has hx of Depression, has tried medication in the past, not currently taking anything for this. Follows in office with Dr. Chen         Self-assessment of health:  In general, how would you say your health is?: (!) Poor    Interventions:  Patient comments: states health is poor in relation to chronic health problems     Inactivity:  On average, how many days per week do you engage in moderate to strenuous exercise (like a brisk walk)?: 0 days (!) Abnormal  On average, how many minutes do you engage in exercise at this level?: 0 min    Interventions:  Patient comments: does not participate in any structured exercise activity but he mows his yard with a push mower and is active outdoors.      Dentist Screen:  Have you seen the dentist within the past year?: (!) No    Intervention:  Not applicable - dentures  Pt does not have teeth    Hearing Screen:  Do you or your family notice any trouble with your hearing that hasn't been managed with hearing aids?: (!) Yes    Interventions:  Patient comments: has chronic tinnitus     Vision Screen:  Do you have difficulty driving, watching TV, or doing any of your daily activities because of your eyesight?: No  Have you had an eye exam within the past year?: (!) No    Interventions:   Patient comments: pt states his eye specialist advised he can resume yearly screenings next month. Will call to make appt.

## 2025-08-15 ENCOUNTER — TELEPHONE (OUTPATIENT)
Dept: PHARMACY | Facility: CLINIC | Age: 65
End: 2025-08-15

## 2025-08-28 DIAGNOSIS — I10 ESSENTIAL HYPERTENSION: ICD-10-CM

## 2025-09-06 RX ORDER — LISINOPRIL 10 MG/1
TABLET ORAL
Qty: 180 TABLET | Refills: 3 | Status: SHIPPED | OUTPATIENT
Start: 2025-09-06

## (undated) DEVICE — ADAPTER,CATHETER/SYRINGE/LUER,STERILE: Brand: MEDLINE

## (undated) DEVICE — BLADE ES ELASTOMERIC COAT INSUL DURABLE BEND UPTO 90DEG

## (undated) DEVICE — APPLICATOR MEDICATED 26 CC SOLUTION HI LT ORNG CHLORAPREP

## (undated) DEVICE — SOLUTION ANTIFOG VIS SYS CLEARIFY LAPSCP

## (undated) DEVICE — INTEGRA® JARIT® KNIGHT NASAL SCISSORS, 6-1/4", 33MM BLADE LENGTH: Brand: INTEGRA® JARIT®

## (undated) DEVICE — FORCEPS BX L240CM JAW DIA2.8MM L CAP W/ NDL MIC MESH TOOTH

## (undated) DEVICE — BITEBLOCK 54FR W/ DENT RIM BLOX

## (undated) DEVICE — ARM DRAPE

## (undated) DEVICE — JELLY,LUBE,STERILE,FLIP TOP,TUBE,2-OZ: Brand: MEDLINE

## (undated) DEVICE — 4-PORT MANIFOLD: Brand: NEPTUNE 2

## (undated) DEVICE — ANCHOR TISSUE RETRIEVAL SYSTEM, BAG SIZE 125 ML, PORT SIZE 8 MM: Brand: ANCHOR TISSUE RETRIEVAL SYSTEM

## (undated) DEVICE — GAUZE,SPONGE,3"X3",4PLY,NS,LF: Brand: MEDLINE

## (undated) DEVICE — FORCEPS BX L240CM JAW DIA2.4MM ORNG L CAP W/ NDL DISP RAD

## (undated) DEVICE — POLYP TRAP: Brand: TRAPEASE®

## (undated) DEVICE — CANNULA IV 18GA L15IN BLNT FILL LUERLOCK HUB MJCT

## (undated) DEVICE — CONNECTOR TBNG AUX H2O JET DISP FOR OLY 160/180 SER

## (undated) DEVICE — INSUFFLATION TUBING SET, ENDOFLATOR 50: Brand: N.A.

## (undated) DEVICE — BITE BLOCK W/VELCRO STRAP

## (undated) DEVICE — SOLUTION IV IRRIG WATER 1000ML POUR BRL 2F7114

## (undated) DEVICE — Device: Brand: DEFENDO VALVE AND CONNECTOR KIT

## (undated) DEVICE — MERCY DEFIANCE ENDO KIT: Brand: MEDLINE INDUSTRIES, INC.

## (undated) DEVICE — GLOVE ORANGE PI 7   MSG9070

## (undated) DEVICE — 1200CC GUARDIAN II: Brand: GUARDIAN

## (undated) DEVICE — GARMENT,MEDLINE,DVT,INT,CALF,MED, GEN2: Brand: MEDLINE

## (undated) DEVICE — CO2 CANNULA,SSOFT,ADLT,7O2,4CO2,FEMALE: Brand: MEDLINE

## (undated) DEVICE — BASIN EMSIS 700ML GRAPHITE PLAS KID SHP GRAD

## (undated) DEVICE — SYRINGE MED 50ML LUERLOCK TIP

## (undated) DEVICE — TUBING, SUCTION, 3/16" X 10', STRAIGHT: Brand: MEDLINE

## (undated) DEVICE — SOLUTION IV 1000ML 0.9% SOD CHL PH 5 INJ USP VIAFLX PLAS

## (undated) DEVICE — SMOKE EVACUATION FILTER, SET WITH TUBE: Brand: N.A.

## (undated) DEVICE — SKIN AFFIX SURG ADHESIVE 72/CS 0.55ML: Brand: MEDLINE

## (undated) DEVICE — SOLUTION IV IRRIG POUR BRL 0.9% SODIUM CHL 2F7124

## (undated) DEVICE — GLOVE ORANGE PI 7 1/2   MSG9075

## (undated) DEVICE — Z DISCONTINUED USE 2751540 TUBING IRRIG L10IN DISP PMP ENDOGATOR

## (undated) DEVICE — GENERAL ROBOTIC PACK: Brand: MEDLINE INDUSTRIES, INC.

## (undated) DEVICE — PACK SURG PROC REMINDER N WVN DISPOSABLE BEAC TIME OUT

## (undated) DEVICE — CANNULA SEAL

## (undated) DEVICE — BLADELESS OBTURATOR: Brand: WECK VISTA

## (undated) DEVICE — TUBING SET, SUCTION LAP, S-PILOT: Brand: N.A.

## (undated) DEVICE — Device

## (undated) DEVICE — SUTURE MCRYL SZ 4-0 L18IN ABSRB UD L19MM PS-2 3/8 CIR PRIM Y496G

## (undated) DEVICE — SUTURE VCRL SZ 0 L27IN ABSRB VLT L26MM CT-2 1/2 CIR J334H

## (undated) DEVICE — CANNULA NSL AD L2IN ETCO2 SAMP SFT CRUSH RESIST FEM AIRLFE

## (undated) DEVICE — BLANKET WRM W29.9XL79.1IN UP BODY FORC AIR MISTRAL-AIR

## (undated) DEVICE — INSUFFLATION NEEDLE TO ESTABLISH PNEUMOPERITONEUM.: Brand: INSUFFLATION NEEDLE

## (undated) DEVICE — CLIP INT L POLYMER LOK LIG HEM O LOK

## (undated) DEVICE — PAD,ARMBOARD,CONV,FOAM,2X8X20",12PR/CS: Brand: MEDLINE

## (undated) DEVICE — SINGLE-USE BIOPSY FORCEPS: Brand: RADIAL JAW 4

## (undated) DEVICE — TIP COVER ACCESSORY

## (undated) DEVICE — GLOVE SURG SZ 6 THK91MIL LTX FREE SYN POLYISOPRENE ANTI